# Patient Record
Sex: MALE | Race: WHITE | NOT HISPANIC OR LATINO
[De-identification: names, ages, dates, MRNs, and addresses within clinical notes are randomized per-mention and may not be internally consistent; named-entity substitution may affect disease eponyms.]

---

## 2017-02-10 ENCOUNTER — APPOINTMENT (OUTPATIENT)
Dept: CARDIOLOGY | Facility: CLINIC | Age: 77
End: 2017-02-10

## 2017-02-10 VITALS — HEIGHT: 71 IN | BODY MASS INDEX: 33.6 KG/M2 | WEIGHT: 240 LBS

## 2017-02-10 VITALS — DIASTOLIC BLOOD PRESSURE: 70 MMHG | HEART RATE: 68 BPM | RESPIRATION RATE: 18 BRPM | SYSTOLIC BLOOD PRESSURE: 118 MMHG

## 2017-02-16 ENCOUNTER — APPOINTMENT (OUTPATIENT)
Dept: CARDIOLOGY | Facility: CLINIC | Age: 77
End: 2017-02-16

## 2017-02-16 ENCOUNTER — APPOINTMENT (OUTPATIENT)
Dept: ENDOCRINOLOGY | Facility: CLINIC | Age: 77
End: 2017-02-16

## 2017-02-16 VITALS — HEIGHT: 71 IN | WEIGHT: 240 LBS | BODY MASS INDEX: 33.6 KG/M2

## 2017-02-16 VITALS — WEIGHT: 240 LBS | BODY MASS INDEX: 33.47 KG/M2

## 2017-02-16 VITALS — SYSTOLIC BLOOD PRESSURE: 110 MMHG | DIASTOLIC BLOOD PRESSURE: 64 MMHG

## 2017-02-16 DIAGNOSIS — Z86.39 PERSONAL HISTORY OF OTHER ENDOCRINE, NUTRITIONAL AND METABOLIC DISEASE: ICD-10-CM

## 2017-02-17 PROBLEM — Z86.39 HISTORY OF TYPE 2 DIABETES MELLITUS: Status: RESOLVED | Noted: 2017-02-10 | Resolved: 2017-02-17

## 2017-02-23 ENCOUNTER — APPOINTMENT (OUTPATIENT)
Dept: CARDIOLOGY | Facility: CLINIC | Age: 77
End: 2017-02-23

## 2017-03-09 ENCOUNTER — APPOINTMENT (OUTPATIENT)
Dept: ENDOCRINOLOGY | Facility: CLINIC | Age: 77
End: 2017-03-09

## 2017-03-09 VITALS — HEIGHT: 71 IN | WEIGHT: 240 LBS | BODY MASS INDEX: 33.6 KG/M2

## 2017-03-09 RX ORDER — CANAGLIFLOZIN 100 MG/1
100 TABLET, FILM COATED ORAL
Qty: 30 | Refills: 5 | Status: DISCONTINUED | COMMUNITY
Start: 2017-02-16 | End: 2017-03-09

## 2017-03-16 ENCOUNTER — APPOINTMENT (OUTPATIENT)
Dept: CARDIOLOGY | Facility: CLINIC | Age: 77
End: 2017-03-16

## 2017-03-17 ENCOUNTER — MED ADMIN CHARGE (OUTPATIENT)
Age: 77
End: 2017-03-17

## 2017-03-20 RX ORDER — BLOOD SUGAR DIAGNOSTIC
STRIP MISCELLANEOUS 3 TIMES DAILY
Qty: 3 | Refills: 2 | Status: ACTIVE | COMMUNITY
Start: 2017-03-20 | End: 1900-01-01

## 2017-03-23 ENCOUNTER — APPOINTMENT (OUTPATIENT)
Dept: ENDOCRINOLOGY | Facility: CLINIC | Age: 77
End: 2017-03-23

## 2017-04-28 ENCOUNTER — APPOINTMENT (OUTPATIENT)
Dept: CARDIOLOGY | Facility: CLINIC | Age: 77
End: 2017-04-28

## 2017-04-28 VITALS
OXYGEN SATURATION: 97 % | BODY MASS INDEX: 35.71 KG/M2 | SYSTOLIC BLOOD PRESSURE: 148 MMHG | HEART RATE: 66 BPM | DIASTOLIC BLOOD PRESSURE: 81 MMHG | WEIGHT: 256 LBS

## 2017-05-18 ENCOUNTER — OUTPATIENT (OUTPATIENT)
Dept: OUTPATIENT SERVICES | Facility: HOSPITAL | Age: 77
LOS: 1 days | Discharge: HOME | End: 2017-05-18

## 2017-06-15 ENCOUNTER — OUTPATIENT (OUTPATIENT)
Dept: OUTPATIENT SERVICES | Facility: HOSPITAL | Age: 77
LOS: 1 days | Discharge: HOME | End: 2017-06-15

## 2017-06-15 DIAGNOSIS — R74.8 ABNORMAL LEVELS OF OTHER SERUM ENZYMES: ICD-10-CM

## 2017-06-15 DIAGNOSIS — I50.9 HEART FAILURE, UNSPECIFIED: ICD-10-CM

## 2017-06-15 DIAGNOSIS — R07.9 CHEST PAIN, UNSPECIFIED: ICD-10-CM

## 2017-06-22 ENCOUNTER — OUTPATIENT (OUTPATIENT)
Dept: OUTPATIENT SERVICES | Facility: HOSPITAL | Age: 77
LOS: 1 days | Discharge: HOME | End: 2017-06-22

## 2017-06-22 DIAGNOSIS — I50.9 HEART FAILURE, UNSPECIFIED: ICD-10-CM

## 2017-06-22 DIAGNOSIS — R74.8 ABNORMAL LEVELS OF OTHER SERUM ENZYMES: ICD-10-CM

## 2017-06-22 DIAGNOSIS — R07.9 CHEST PAIN, UNSPECIFIED: ICD-10-CM

## 2017-06-23 ENCOUNTER — APPOINTMENT (OUTPATIENT)
Dept: CARDIOLOGY | Facility: CLINIC | Age: 77
End: 2017-06-23

## 2017-06-23 VITALS — DIASTOLIC BLOOD PRESSURE: 80 MMHG | SYSTOLIC BLOOD PRESSURE: 159 MMHG | HEART RATE: 65 BPM | OXYGEN SATURATION: 96 %

## 2017-06-23 VITALS — BODY MASS INDEX: 35.7 KG/M2 | WEIGHT: 255 LBS | HEIGHT: 71 IN

## 2017-06-23 VITALS — DIASTOLIC BLOOD PRESSURE: 80 MMHG | SYSTOLIC BLOOD PRESSURE: 120 MMHG

## 2017-06-23 VITALS — RESPIRATION RATE: 18 BRPM | HEART RATE: 64 BPM

## 2017-06-28 DIAGNOSIS — I48.91 UNSPECIFIED ATRIAL FIBRILLATION: ICD-10-CM

## 2017-06-28 DIAGNOSIS — Z79.01 LONG TERM (CURRENT) USE OF ANTICOAGULANTS: ICD-10-CM

## 2017-07-06 ENCOUNTER — OUTPATIENT (OUTPATIENT)
Dept: OUTPATIENT SERVICES | Facility: HOSPITAL | Age: 77
LOS: 1 days | Discharge: HOME | End: 2017-07-06

## 2017-07-06 DIAGNOSIS — R07.9 CHEST PAIN, UNSPECIFIED: ICD-10-CM

## 2017-07-06 DIAGNOSIS — Z79.01 LONG TERM (CURRENT) USE OF ANTICOAGULANTS: ICD-10-CM

## 2017-07-06 DIAGNOSIS — I50.9 HEART FAILURE, UNSPECIFIED: ICD-10-CM

## 2017-07-06 DIAGNOSIS — R74.8 ABNORMAL LEVELS OF OTHER SERUM ENZYMES: ICD-10-CM

## 2017-07-06 DIAGNOSIS — I48.91 UNSPECIFIED ATRIAL FIBRILLATION: ICD-10-CM

## 2017-07-10 ENCOUNTER — OUTPATIENT (OUTPATIENT)
Dept: OUTPATIENT SERVICES | Facility: HOSPITAL | Age: 77
LOS: 1 days | Discharge: HOME | End: 2017-07-10

## 2017-07-10 DIAGNOSIS — R74.8 ABNORMAL LEVELS OF OTHER SERUM ENZYMES: ICD-10-CM

## 2017-07-10 DIAGNOSIS — Z79.01 LONG TERM (CURRENT) USE OF ANTICOAGULANTS: ICD-10-CM

## 2017-07-10 DIAGNOSIS — I48.91 UNSPECIFIED ATRIAL FIBRILLATION: ICD-10-CM

## 2017-07-10 DIAGNOSIS — R07.9 CHEST PAIN, UNSPECIFIED: ICD-10-CM

## 2017-07-10 DIAGNOSIS — I50.9 HEART FAILURE, UNSPECIFIED: ICD-10-CM

## 2017-07-11 ENCOUNTER — APPOINTMENT (OUTPATIENT)
Dept: CARDIOLOGY | Facility: CLINIC | Age: 77
End: 2017-07-11

## 2017-07-12 ENCOUNTER — OUTPATIENT (OUTPATIENT)
Dept: PREADMISSION | Facility: HOSPITAL | Age: 77
LOS: 1 days | Discharge: HOME | End: 2017-07-12

## 2017-07-12 DIAGNOSIS — I50.9 HEART FAILURE, UNSPECIFIED: ICD-10-CM

## 2017-07-12 DIAGNOSIS — R74.8 ABNORMAL LEVELS OF OTHER SERUM ENZYMES: ICD-10-CM

## 2017-07-12 DIAGNOSIS — R07.9 CHEST PAIN, UNSPECIFIED: ICD-10-CM

## 2017-07-12 DIAGNOSIS — Z01.818 ENCOUNTER FOR OTHER PREPROCEDURAL EXAMINATION: ICD-10-CM

## 2017-07-17 ENCOUNTER — INPATIENT (INPATIENT)
Facility: HOSPITAL | Age: 77
LOS: 1 days | Discharge: HOME | End: 2017-07-19
Attending: INTERNAL MEDICINE

## 2017-07-17 DIAGNOSIS — R74.8 ABNORMAL LEVELS OF OTHER SERUM ENZYMES: ICD-10-CM

## 2017-07-17 DIAGNOSIS — R07.9 CHEST PAIN, UNSPECIFIED: ICD-10-CM

## 2017-07-17 DIAGNOSIS — I50.9 HEART FAILURE, UNSPECIFIED: ICD-10-CM

## 2017-07-20 ENCOUNTER — OUTPATIENT (OUTPATIENT)
Dept: OUTPATIENT SERVICES | Facility: HOSPITAL | Age: 77
LOS: 1 days | Discharge: HOME | End: 2017-07-20

## 2017-07-20 DIAGNOSIS — R74.8 ABNORMAL LEVELS OF OTHER SERUM ENZYMES: ICD-10-CM

## 2017-07-20 DIAGNOSIS — Z79.01 LONG TERM (CURRENT) USE OF ANTICOAGULANTS: ICD-10-CM

## 2017-07-20 DIAGNOSIS — I48.91 UNSPECIFIED ATRIAL FIBRILLATION: ICD-10-CM

## 2017-07-20 DIAGNOSIS — R07.9 CHEST PAIN, UNSPECIFIED: ICD-10-CM

## 2017-07-20 DIAGNOSIS — I50.9 HEART FAILURE, UNSPECIFIED: ICD-10-CM

## 2017-07-24 ENCOUNTER — OUTPATIENT (OUTPATIENT)
Dept: OUTPATIENT SERVICES | Facility: HOSPITAL | Age: 77
LOS: 1 days | Discharge: HOME | End: 2017-07-24
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
## 2017-07-24 DIAGNOSIS — Z87.891 PERSONAL HISTORY OF NICOTINE DEPENDENCE: ICD-10-CM

## 2017-07-24 DIAGNOSIS — I48.0 PAROXYSMAL ATRIAL FIBRILLATION: ICD-10-CM

## 2017-07-24 DIAGNOSIS — I11.0 HYPERTENSIVE HEART DISEASE WITH HEART FAILURE: ICD-10-CM

## 2017-07-24 DIAGNOSIS — I82.B12 ACUTE EMBOLISM AND THROMBOSIS OF LEFT SUBCLAVIAN VEIN: ICD-10-CM

## 2017-07-24 DIAGNOSIS — E03.9 HYPOTHYROIDISM, UNSPECIFIED: ICD-10-CM

## 2017-07-24 DIAGNOSIS — E11.9 TYPE 2 DIABETES MELLITUS WITHOUT COMPLICATIONS: ICD-10-CM

## 2017-07-24 DIAGNOSIS — I82.A11 ACUTE EMBOLISM AND THROMBOSIS OF RIGHT AXILLARY VEIN: ICD-10-CM

## 2017-07-24 DIAGNOSIS — I42.8 OTHER CARDIOMYOPATHIES: ICD-10-CM

## 2017-07-24 DIAGNOSIS — I50.9 HEART FAILURE, UNSPECIFIED: ICD-10-CM

## 2017-07-24 DIAGNOSIS — E78.00 PURE HYPERCHOLESTEROLEMIA, UNSPECIFIED: ICD-10-CM

## 2017-07-24 DIAGNOSIS — H40.89 OTHER SPECIFIED GLAUCOMA: ICD-10-CM

## 2017-07-24 DIAGNOSIS — I25.2 OLD MYOCARDIAL INFARCTION: ICD-10-CM

## 2017-07-24 DIAGNOSIS — Z79.01 LONG TERM (CURRENT) USE OF ANTICOAGULANTS: ICD-10-CM

## 2017-07-24 DIAGNOSIS — T82.897A OTHER SPECIFIED COMPLICATION OF CARDIAC PROSTHETIC DEVICES, IMPLANTS AND GRAFTS, INITIAL ENCOUNTER: ICD-10-CM

## 2017-07-24 DIAGNOSIS — R74.8 ABNORMAL LEVELS OF OTHER SERUM ENZYMES: ICD-10-CM

## 2017-07-24 DIAGNOSIS — Z86.73 PERSONAL HISTORY OF TRANSIENT ISCHEMIC ATTACK (TIA), AND CEREBRAL INFARCTION WITHOUT RESIDUAL DEFICITS: ICD-10-CM

## 2017-07-24 DIAGNOSIS — Z95.1 PRESENCE OF AORTOCORONARY BYPASS GRAFT: ICD-10-CM

## 2017-07-24 DIAGNOSIS — N40.0 BENIGN PROSTATIC HYPERPLASIA WITHOUT LOWER URINARY TRACT SYMPTOMS: ICD-10-CM

## 2017-07-24 DIAGNOSIS — I25.10 ATHEROSCLEROTIC HEART DISEASE OF NATIVE CORONARY ARTERY WITHOUT ANGINA PECTORIS: ICD-10-CM

## 2017-07-24 DIAGNOSIS — R07.9 CHEST PAIN, UNSPECIFIED: ICD-10-CM

## 2017-07-24 DIAGNOSIS — I44.2 ATRIOVENTRICULAR BLOCK, COMPLETE: ICD-10-CM

## 2017-07-24 DIAGNOSIS — L40.9 PSORIASIS, UNSPECIFIED: ICD-10-CM

## 2017-07-24 DIAGNOSIS — I48.91 UNSPECIFIED ATRIAL FIBRILLATION: ICD-10-CM

## 2017-07-31 DIAGNOSIS — I50.22 CHRONIC SYSTOLIC (CONGESTIVE) HEART FAILURE: ICD-10-CM

## 2017-08-02 ENCOUNTER — OUTPATIENT (OUTPATIENT)
Dept: OUTPATIENT SERVICES | Facility: HOSPITAL | Age: 77
LOS: 1 days | Discharge: HOME | End: 2017-08-02

## 2017-08-02 DIAGNOSIS — I50.9 HEART FAILURE, UNSPECIFIED: ICD-10-CM

## 2017-08-02 DIAGNOSIS — R07.9 CHEST PAIN, UNSPECIFIED: ICD-10-CM

## 2017-08-02 DIAGNOSIS — I48.91 UNSPECIFIED ATRIAL FIBRILLATION: ICD-10-CM

## 2017-08-02 DIAGNOSIS — Z79.01 LONG TERM (CURRENT) USE OF ANTICOAGULANTS: ICD-10-CM

## 2017-08-02 DIAGNOSIS — R74.8 ABNORMAL LEVELS OF OTHER SERUM ENZYMES: ICD-10-CM

## 2017-08-04 ENCOUNTER — APPOINTMENT (OUTPATIENT)
Dept: CARDIOLOGY | Facility: CLINIC | Age: 77
End: 2017-08-04

## 2017-08-04 VITALS — SYSTOLIC BLOOD PRESSURE: 145 MMHG | DIASTOLIC BLOOD PRESSURE: 80 MMHG | BODY MASS INDEX: 35.43 KG/M2 | WEIGHT: 254 LBS

## 2017-08-09 ENCOUNTER — OUTPATIENT (OUTPATIENT)
Dept: OUTPATIENT SERVICES | Facility: HOSPITAL | Age: 77
LOS: 1 days | Discharge: HOME | End: 2017-08-09

## 2017-08-09 DIAGNOSIS — I50.9 HEART FAILURE, UNSPECIFIED: ICD-10-CM

## 2017-08-09 DIAGNOSIS — R74.8 ABNORMAL LEVELS OF OTHER SERUM ENZYMES: ICD-10-CM

## 2017-08-09 DIAGNOSIS — Z79.01 LONG TERM (CURRENT) USE OF ANTICOAGULANTS: ICD-10-CM

## 2017-08-09 DIAGNOSIS — I48.91 UNSPECIFIED ATRIAL FIBRILLATION: ICD-10-CM

## 2017-08-09 DIAGNOSIS — R07.9 CHEST PAIN, UNSPECIFIED: ICD-10-CM

## 2017-08-23 ENCOUNTER — OUTPATIENT (OUTPATIENT)
Dept: OUTPATIENT SERVICES | Facility: HOSPITAL | Age: 77
LOS: 1 days | Discharge: HOME | End: 2017-08-23

## 2017-08-23 DIAGNOSIS — I48.91 UNSPECIFIED ATRIAL FIBRILLATION: ICD-10-CM

## 2017-08-23 DIAGNOSIS — R07.9 CHEST PAIN, UNSPECIFIED: ICD-10-CM

## 2017-08-23 DIAGNOSIS — R74.8 ABNORMAL LEVELS OF OTHER SERUM ENZYMES: ICD-10-CM

## 2017-08-23 DIAGNOSIS — I50.9 HEART FAILURE, UNSPECIFIED: ICD-10-CM

## 2017-08-23 DIAGNOSIS — Z79.01 LONG TERM (CURRENT) USE OF ANTICOAGULANTS: ICD-10-CM

## 2017-08-29 ENCOUNTER — OUTPATIENT (OUTPATIENT)
Dept: OUTPATIENT SERVICES | Facility: HOSPITAL | Age: 77
LOS: 1 days | Discharge: HOME | End: 2017-08-29

## 2017-08-29 DIAGNOSIS — I50.9 HEART FAILURE, UNSPECIFIED: ICD-10-CM

## 2017-08-29 DIAGNOSIS — I48.91 UNSPECIFIED ATRIAL FIBRILLATION: ICD-10-CM

## 2017-08-29 DIAGNOSIS — R74.8 ABNORMAL LEVELS OF OTHER SERUM ENZYMES: ICD-10-CM

## 2017-08-29 DIAGNOSIS — Z79.01 LONG TERM (CURRENT) USE OF ANTICOAGULANTS: ICD-10-CM

## 2017-08-29 DIAGNOSIS — R07.9 CHEST PAIN, UNSPECIFIED: ICD-10-CM

## 2017-08-31 ENCOUNTER — OUTPATIENT (OUTPATIENT)
Dept: OUTPATIENT SERVICES | Facility: HOSPITAL | Age: 77
LOS: 1 days | Discharge: HOME | End: 2017-08-31

## 2017-08-31 ENCOUNTER — APPOINTMENT (OUTPATIENT)
Dept: ENDOCRINOLOGY | Facility: CLINIC | Age: 77
End: 2017-08-31

## 2017-08-31 VITALS
DIASTOLIC BLOOD PRESSURE: 77 MMHG | HEART RATE: 66 BPM | BODY MASS INDEX: 35 KG/M2 | SYSTOLIC BLOOD PRESSURE: 137 MMHG | WEIGHT: 250 LBS | HEIGHT: 71 IN

## 2017-08-31 DIAGNOSIS — R07.9 CHEST PAIN, UNSPECIFIED: ICD-10-CM

## 2017-08-31 DIAGNOSIS — I50.9 HEART FAILURE, UNSPECIFIED: ICD-10-CM

## 2017-08-31 DIAGNOSIS — R74.8 ABNORMAL LEVELS OF OTHER SERUM ENZYMES: ICD-10-CM

## 2017-09-08 ENCOUNTER — OUTPATIENT (OUTPATIENT)
Dept: OUTPATIENT SERVICES | Facility: HOSPITAL | Age: 77
LOS: 1 days | Discharge: HOME | End: 2017-09-08

## 2017-09-08 DIAGNOSIS — R74.8 ABNORMAL LEVELS OF OTHER SERUM ENZYMES: ICD-10-CM

## 2017-09-08 DIAGNOSIS — I50.9 HEART FAILURE, UNSPECIFIED: ICD-10-CM

## 2017-09-08 DIAGNOSIS — I48.91 UNSPECIFIED ATRIAL FIBRILLATION: ICD-10-CM

## 2017-09-08 DIAGNOSIS — R07.9 CHEST PAIN, UNSPECIFIED: ICD-10-CM

## 2017-09-08 DIAGNOSIS — Z79.01 LONG TERM (CURRENT) USE OF ANTICOAGULANTS: ICD-10-CM

## 2017-09-11 DIAGNOSIS — E11.65 TYPE 2 DIABETES MELLITUS WITH HYPERGLYCEMIA: ICD-10-CM

## 2017-09-11 DIAGNOSIS — E66.09 OTHER OBESITY DUE TO EXCESS CALORIES: ICD-10-CM

## 2017-09-11 DIAGNOSIS — E06.3 AUTOIMMUNE THYROIDITIS: ICD-10-CM

## 2017-09-18 ENCOUNTER — OUTPATIENT (OUTPATIENT)
Dept: OUTPATIENT SERVICES | Facility: HOSPITAL | Age: 77
LOS: 1 days | Discharge: HOME | End: 2017-09-18

## 2017-09-18 DIAGNOSIS — R07.9 CHEST PAIN, UNSPECIFIED: ICD-10-CM

## 2017-09-18 DIAGNOSIS — I48.91 UNSPECIFIED ATRIAL FIBRILLATION: ICD-10-CM

## 2017-09-18 DIAGNOSIS — R74.8 ABNORMAL LEVELS OF OTHER SERUM ENZYMES: ICD-10-CM

## 2017-09-18 DIAGNOSIS — I50.9 HEART FAILURE, UNSPECIFIED: ICD-10-CM

## 2017-09-18 DIAGNOSIS — Z79.01 LONG TERM (CURRENT) USE OF ANTICOAGULANTS: ICD-10-CM

## 2017-09-25 ENCOUNTER — OUTPATIENT (OUTPATIENT)
Dept: OUTPATIENT SERVICES | Facility: HOSPITAL | Age: 77
LOS: 1 days | Discharge: HOME | End: 2017-09-25

## 2017-09-25 DIAGNOSIS — I48.91 UNSPECIFIED ATRIAL FIBRILLATION: ICD-10-CM

## 2017-09-25 DIAGNOSIS — Z79.01 LONG TERM (CURRENT) USE OF ANTICOAGULANTS: ICD-10-CM

## 2017-09-25 DIAGNOSIS — R74.8 ABNORMAL LEVELS OF OTHER SERUM ENZYMES: ICD-10-CM

## 2017-09-25 DIAGNOSIS — R07.9 CHEST PAIN, UNSPECIFIED: ICD-10-CM

## 2017-09-25 DIAGNOSIS — I50.9 HEART FAILURE, UNSPECIFIED: ICD-10-CM

## 2017-10-03 ENCOUNTER — OUTPATIENT (OUTPATIENT)
Dept: OUTPATIENT SERVICES | Facility: HOSPITAL | Age: 77
LOS: 1 days | Discharge: HOME | End: 2017-10-03

## 2017-10-03 DIAGNOSIS — R07.9 CHEST PAIN, UNSPECIFIED: ICD-10-CM

## 2017-10-03 DIAGNOSIS — I48.91 UNSPECIFIED ATRIAL FIBRILLATION: ICD-10-CM

## 2017-10-03 DIAGNOSIS — R74.8 ABNORMAL LEVELS OF OTHER SERUM ENZYMES: ICD-10-CM

## 2017-10-03 DIAGNOSIS — I50.9 HEART FAILURE, UNSPECIFIED: ICD-10-CM

## 2017-10-03 DIAGNOSIS — Z79.01 LONG TERM (CURRENT) USE OF ANTICOAGULANTS: ICD-10-CM

## 2017-10-17 ENCOUNTER — APPOINTMENT (OUTPATIENT)
Dept: CARDIOLOGY | Facility: CLINIC | Age: 77
End: 2017-10-17

## 2017-10-17 ENCOUNTER — OUTPATIENT (OUTPATIENT)
Dept: OUTPATIENT SERVICES | Facility: HOSPITAL | Age: 77
LOS: 1 days | Discharge: HOME | End: 2017-10-17

## 2017-10-17 VITALS — HEART RATE: 80 BPM | RESPIRATION RATE: 18 BRPM | DIASTOLIC BLOOD PRESSURE: 80 MMHG | SYSTOLIC BLOOD PRESSURE: 120 MMHG

## 2017-10-17 VITALS — HEART RATE: 80 BPM | BODY MASS INDEX: 36.12 KG/M2 | WEIGHT: 258 LBS | HEIGHT: 71 IN

## 2017-10-17 DIAGNOSIS — Z79.01 LONG TERM (CURRENT) USE OF ANTICOAGULANTS: ICD-10-CM

## 2017-10-17 DIAGNOSIS — I48.91 UNSPECIFIED ATRIAL FIBRILLATION: ICD-10-CM

## 2017-10-17 DIAGNOSIS — R07.9 CHEST PAIN, UNSPECIFIED: ICD-10-CM

## 2017-10-17 DIAGNOSIS — I50.9 HEART FAILURE, UNSPECIFIED: ICD-10-CM

## 2017-10-17 DIAGNOSIS — R74.8 ABNORMAL LEVELS OF OTHER SERUM ENZYMES: ICD-10-CM

## 2017-11-03 ENCOUNTER — APPOINTMENT (OUTPATIENT)
Dept: CARDIOLOGY | Facility: CLINIC | Age: 77
End: 2017-11-03

## 2017-11-03 VITALS
OXYGEN SATURATION: 97 % | WEIGHT: 257 LBS | BODY MASS INDEX: 35.84 KG/M2 | SYSTOLIC BLOOD PRESSURE: 153 MMHG | HEART RATE: 79 BPM | DIASTOLIC BLOOD PRESSURE: 87 MMHG

## 2017-11-06 ENCOUNTER — OUTPATIENT (OUTPATIENT)
Dept: OUTPATIENT SERVICES | Facility: HOSPITAL | Age: 77
LOS: 1 days | Discharge: HOME | End: 2017-11-06

## 2017-11-06 DIAGNOSIS — I48.91 UNSPECIFIED ATRIAL FIBRILLATION: ICD-10-CM

## 2017-11-06 DIAGNOSIS — R07.9 CHEST PAIN, UNSPECIFIED: ICD-10-CM

## 2017-11-06 DIAGNOSIS — I50.9 HEART FAILURE, UNSPECIFIED: ICD-10-CM

## 2017-11-06 DIAGNOSIS — R74.8 ABNORMAL LEVELS OF OTHER SERUM ENZYMES: ICD-10-CM

## 2017-11-06 DIAGNOSIS — Z79.01 LONG TERM (CURRENT) USE OF ANTICOAGULANTS: ICD-10-CM

## 2017-11-13 ENCOUNTER — OUTPATIENT (OUTPATIENT)
Dept: OUTPATIENT SERVICES | Facility: HOSPITAL | Age: 77
LOS: 1 days | Discharge: HOME | End: 2017-11-13

## 2017-11-13 DIAGNOSIS — Z79.01 LONG TERM (CURRENT) USE OF ANTICOAGULANTS: ICD-10-CM

## 2017-11-13 DIAGNOSIS — R74.8 ABNORMAL LEVELS OF OTHER SERUM ENZYMES: ICD-10-CM

## 2017-11-13 DIAGNOSIS — I48.91 UNSPECIFIED ATRIAL FIBRILLATION: ICD-10-CM

## 2017-11-13 DIAGNOSIS — R07.9 CHEST PAIN, UNSPECIFIED: ICD-10-CM

## 2017-11-13 DIAGNOSIS — I50.9 HEART FAILURE, UNSPECIFIED: ICD-10-CM

## 2017-11-21 ENCOUNTER — OUTPATIENT (OUTPATIENT)
Dept: OUTPATIENT SERVICES | Facility: HOSPITAL | Age: 77
LOS: 1 days | Discharge: HOME | End: 2017-11-21

## 2017-11-21 DIAGNOSIS — I48.91 UNSPECIFIED ATRIAL FIBRILLATION: ICD-10-CM

## 2017-11-21 DIAGNOSIS — Z79.01 LONG TERM (CURRENT) USE OF ANTICOAGULANTS: ICD-10-CM

## 2017-11-21 DIAGNOSIS — R07.9 CHEST PAIN, UNSPECIFIED: ICD-10-CM

## 2017-11-21 DIAGNOSIS — R74.8 ABNORMAL LEVELS OF OTHER SERUM ENZYMES: ICD-10-CM

## 2017-11-21 DIAGNOSIS — I50.9 HEART FAILURE, UNSPECIFIED: ICD-10-CM

## 2017-11-28 ENCOUNTER — OUTPATIENT (OUTPATIENT)
Dept: OUTPATIENT SERVICES | Facility: HOSPITAL | Age: 77
LOS: 1 days | Discharge: HOME | End: 2017-11-28

## 2017-11-28 DIAGNOSIS — R07.9 CHEST PAIN, UNSPECIFIED: ICD-10-CM

## 2017-11-28 DIAGNOSIS — I50.9 HEART FAILURE, UNSPECIFIED: ICD-10-CM

## 2017-11-28 DIAGNOSIS — Z79.01 LONG TERM (CURRENT) USE OF ANTICOAGULANTS: ICD-10-CM

## 2017-11-28 DIAGNOSIS — I48.91 UNSPECIFIED ATRIAL FIBRILLATION: ICD-10-CM

## 2017-11-28 DIAGNOSIS — R74.8 ABNORMAL LEVELS OF OTHER SERUM ENZYMES: ICD-10-CM

## 2017-12-06 ENCOUNTER — OUTPATIENT (OUTPATIENT)
Dept: OUTPATIENT SERVICES | Facility: HOSPITAL | Age: 77
LOS: 1 days | Discharge: HOME | End: 2017-12-06

## 2017-12-06 ENCOUNTER — APPOINTMENT (OUTPATIENT)
Dept: ENDOCRINOLOGY | Facility: CLINIC | Age: 77
End: 2017-12-06

## 2017-12-06 DIAGNOSIS — Z79.01 LONG TERM (CURRENT) USE OF ANTICOAGULANTS: ICD-10-CM

## 2017-12-06 DIAGNOSIS — R07.9 CHEST PAIN, UNSPECIFIED: ICD-10-CM

## 2017-12-06 DIAGNOSIS — I50.9 HEART FAILURE, UNSPECIFIED: ICD-10-CM

## 2017-12-06 DIAGNOSIS — R74.8 ABNORMAL LEVELS OF OTHER SERUM ENZYMES: ICD-10-CM

## 2017-12-06 DIAGNOSIS — I48.91 UNSPECIFIED ATRIAL FIBRILLATION: ICD-10-CM

## 2017-12-11 ENCOUNTER — OUTPATIENT (OUTPATIENT)
Dept: OUTPATIENT SERVICES | Facility: HOSPITAL | Age: 77
LOS: 1 days | Discharge: HOME | End: 2017-12-11

## 2017-12-11 DIAGNOSIS — E78.00 PURE HYPERCHOLESTEROLEMIA, UNSPECIFIED: ICD-10-CM

## 2017-12-11 DIAGNOSIS — I50.9 HEART FAILURE, UNSPECIFIED: ICD-10-CM

## 2017-12-11 DIAGNOSIS — E06.3 AUTOIMMUNE THYROIDITIS: ICD-10-CM

## 2017-12-11 DIAGNOSIS — R07.9 CHEST PAIN, UNSPECIFIED: ICD-10-CM

## 2017-12-11 DIAGNOSIS — R74.8 ABNORMAL LEVELS OF OTHER SERUM ENZYMES: ICD-10-CM

## 2017-12-11 DIAGNOSIS — E11.65 TYPE 2 DIABETES MELLITUS WITH HYPERGLYCEMIA: ICD-10-CM

## 2017-12-20 ENCOUNTER — OUTPATIENT (OUTPATIENT)
Dept: OUTPATIENT SERVICES | Facility: HOSPITAL | Age: 77
LOS: 1 days | Discharge: HOME | End: 2017-12-20

## 2017-12-20 DIAGNOSIS — Z79.01 LONG TERM (CURRENT) USE OF ANTICOAGULANTS: ICD-10-CM

## 2017-12-20 DIAGNOSIS — I48.91 UNSPECIFIED ATRIAL FIBRILLATION: ICD-10-CM

## 2017-12-20 DIAGNOSIS — I50.9 HEART FAILURE, UNSPECIFIED: ICD-10-CM

## 2017-12-20 DIAGNOSIS — R07.9 CHEST PAIN, UNSPECIFIED: ICD-10-CM

## 2017-12-20 DIAGNOSIS — R74.8 ABNORMAL LEVELS OF OTHER SERUM ENZYMES: ICD-10-CM

## 2018-01-03 ENCOUNTER — OUTPATIENT (OUTPATIENT)
Dept: OUTPATIENT SERVICES | Facility: HOSPITAL | Age: 78
LOS: 1 days | Discharge: HOME | End: 2018-01-03

## 2018-01-03 DIAGNOSIS — R74.8 ABNORMAL LEVELS OF OTHER SERUM ENZYMES: ICD-10-CM

## 2018-01-03 DIAGNOSIS — Z79.01 LONG TERM (CURRENT) USE OF ANTICOAGULANTS: ICD-10-CM

## 2018-01-03 DIAGNOSIS — R07.9 CHEST PAIN, UNSPECIFIED: ICD-10-CM

## 2018-01-03 DIAGNOSIS — I50.9 HEART FAILURE, UNSPECIFIED: ICD-10-CM

## 2018-01-03 DIAGNOSIS — I48.91 UNSPECIFIED ATRIAL FIBRILLATION: ICD-10-CM

## 2018-01-11 ENCOUNTER — TRANSCRIPTION ENCOUNTER (OUTPATIENT)
Age: 78
End: 2018-01-11

## 2018-01-16 ENCOUNTER — RX RENEWAL (OUTPATIENT)
Age: 78
End: 2018-01-16

## 2018-01-16 DIAGNOSIS — N40.0 BENIGN PROSTATIC HYPERPLASIA WITHOUT LOWER URINARY TRACT SYMPMS: ICD-10-CM

## 2018-01-24 ENCOUNTER — OUTPATIENT (OUTPATIENT)
Dept: OUTPATIENT SERVICES | Facility: HOSPITAL | Age: 78
LOS: 1 days | Discharge: HOME | End: 2018-01-24

## 2018-01-24 DIAGNOSIS — I48.91 UNSPECIFIED ATRIAL FIBRILLATION: ICD-10-CM

## 2018-01-24 DIAGNOSIS — Z79.01 LONG TERM (CURRENT) USE OF ANTICOAGULANTS: ICD-10-CM

## 2018-02-04 DIAGNOSIS — R07.9 CHEST PAIN, UNSPECIFIED: ICD-10-CM

## 2018-02-04 DIAGNOSIS — R74.8 ABNORMAL LEVELS OF OTHER SERUM ENZYMES: ICD-10-CM

## 2018-02-04 DIAGNOSIS — I50.9 HEART FAILURE, UNSPECIFIED: ICD-10-CM

## 2018-02-05 ENCOUNTER — OUTPATIENT (OUTPATIENT)
Dept: OUTPATIENT SERVICES | Facility: HOSPITAL | Age: 78
LOS: 1 days | Discharge: HOME | End: 2018-02-05

## 2018-02-05 DIAGNOSIS — Z79.01 LONG TERM (CURRENT) USE OF ANTICOAGULANTS: ICD-10-CM

## 2018-02-05 DIAGNOSIS — I48.91 UNSPECIFIED ATRIAL FIBRILLATION: ICD-10-CM

## 2018-02-09 ENCOUNTER — APPOINTMENT (OUTPATIENT)
Dept: CARDIOLOGY | Facility: CLINIC | Age: 78
End: 2018-02-09

## 2018-02-09 VITALS — DIASTOLIC BLOOD PRESSURE: 80 MMHG | RESPIRATION RATE: 18 BRPM | SYSTOLIC BLOOD PRESSURE: 130 MMHG | HEART RATE: 72 BPM

## 2018-02-09 VITALS — BODY MASS INDEX: 35.7 KG/M2 | WEIGHT: 255 LBS | HEIGHT: 71 IN

## 2018-02-09 DIAGNOSIS — I63.9 CEREBRAL INFARCTION, UNSPECIFIED: ICD-10-CM

## 2018-02-21 ENCOUNTER — OUTPATIENT (OUTPATIENT)
Dept: OUTPATIENT SERVICES | Facility: HOSPITAL | Age: 78
LOS: 1 days | Discharge: HOME | End: 2018-02-21

## 2018-02-21 DIAGNOSIS — I50.9 HEART FAILURE, UNSPECIFIED: ICD-10-CM

## 2018-02-26 ENCOUNTER — OUTPATIENT (OUTPATIENT)
Dept: OUTPATIENT SERVICES | Facility: HOSPITAL | Age: 78
LOS: 1 days | Discharge: HOME | End: 2018-02-26

## 2018-02-26 DIAGNOSIS — I48.91 UNSPECIFIED ATRIAL FIBRILLATION: ICD-10-CM

## 2018-02-26 DIAGNOSIS — Z79.01 LONG TERM (CURRENT) USE OF ANTICOAGULANTS: ICD-10-CM

## 2018-03-02 ENCOUNTER — OUTPATIENT (OUTPATIENT)
Dept: OUTPATIENT SERVICES | Facility: HOSPITAL | Age: 78
LOS: 1 days | Discharge: HOME | End: 2018-03-02

## 2018-03-02 DIAGNOSIS — I48.91 UNSPECIFIED ATRIAL FIBRILLATION: ICD-10-CM

## 2018-03-02 DIAGNOSIS — Z79.01 LONG TERM (CURRENT) USE OF ANTICOAGULANTS: ICD-10-CM

## 2018-03-14 ENCOUNTER — OUTPATIENT (OUTPATIENT)
Dept: OUTPATIENT SERVICES | Facility: HOSPITAL | Age: 78
LOS: 1 days | Discharge: HOME | End: 2018-03-14

## 2018-03-14 DIAGNOSIS — Z79.01 LONG TERM (CURRENT) USE OF ANTICOAGULANTS: ICD-10-CM

## 2018-03-14 DIAGNOSIS — I48.91 UNSPECIFIED ATRIAL FIBRILLATION: ICD-10-CM

## 2018-03-19 ENCOUNTER — OUTPATIENT (OUTPATIENT)
Dept: OUTPATIENT SERVICES | Facility: HOSPITAL | Age: 78
LOS: 1 days | Discharge: HOME | End: 2018-03-19

## 2018-03-19 DIAGNOSIS — Z79.01 LONG TERM (CURRENT) USE OF ANTICOAGULANTS: ICD-10-CM

## 2018-03-19 DIAGNOSIS — I48.91 UNSPECIFIED ATRIAL FIBRILLATION: ICD-10-CM

## 2018-03-26 ENCOUNTER — OUTPATIENT (OUTPATIENT)
Dept: OUTPATIENT SERVICES | Facility: HOSPITAL | Age: 78
LOS: 1 days | Discharge: HOME | End: 2018-03-26

## 2018-03-26 DIAGNOSIS — Z79.01 LONG TERM (CURRENT) USE OF ANTICOAGULANTS: ICD-10-CM

## 2018-03-26 DIAGNOSIS — I48.91 UNSPECIFIED ATRIAL FIBRILLATION: ICD-10-CM

## 2018-03-28 ENCOUNTER — OUTPATIENT (OUTPATIENT)
Dept: OUTPATIENT SERVICES | Facility: HOSPITAL | Age: 78
LOS: 1 days | Discharge: HOME | End: 2018-03-28

## 2018-03-28 ENCOUNTER — APPOINTMENT (OUTPATIENT)
Dept: ENDOCRINOLOGY | Facility: CLINIC | Age: 78
End: 2018-03-28

## 2018-03-28 VITALS
WEIGHT: 255 LBS | DIASTOLIC BLOOD PRESSURE: 80 MMHG | HEIGHT: 71 IN | BODY MASS INDEX: 35.7 KG/M2 | SYSTOLIC BLOOD PRESSURE: 129 MMHG | HEART RATE: 70 BPM

## 2018-03-28 DIAGNOSIS — I48.91 UNSPECIFIED ATRIAL FIBRILLATION: ICD-10-CM

## 2018-03-28 DIAGNOSIS — Z86.39 PERSONAL HISTORY OF OTHER ENDOCRINE, NUTRITIONAL AND METABOLIC DISEASE: ICD-10-CM

## 2018-03-28 DIAGNOSIS — Z79.01 LONG TERM (CURRENT) USE OF ANTICOAGULANTS: ICD-10-CM

## 2018-03-30 ENCOUNTER — OUTPATIENT (OUTPATIENT)
Dept: OUTPATIENT SERVICES | Facility: HOSPITAL | Age: 78
LOS: 1 days | Discharge: HOME | End: 2018-03-30

## 2018-03-30 DIAGNOSIS — I48.91 UNSPECIFIED ATRIAL FIBRILLATION: ICD-10-CM

## 2018-03-30 DIAGNOSIS — Z79.01 LONG TERM (CURRENT) USE OF ANTICOAGULANTS: ICD-10-CM

## 2018-04-03 ENCOUNTER — OUTPATIENT (OUTPATIENT)
Dept: OUTPATIENT SERVICES | Facility: HOSPITAL | Age: 78
LOS: 1 days | Discharge: HOME | End: 2018-04-03

## 2018-04-03 DIAGNOSIS — Z79.01 LONG TERM (CURRENT) USE OF ANTICOAGULANTS: ICD-10-CM

## 2018-04-03 DIAGNOSIS — I48.91 UNSPECIFIED ATRIAL FIBRILLATION: ICD-10-CM

## 2018-04-04 DIAGNOSIS — E06.3 AUTOIMMUNE THYROIDITIS: ICD-10-CM

## 2018-04-04 DIAGNOSIS — E66.01 MORBID (SEVERE) OBESITY DUE TO EXCESS CALORIES: ICD-10-CM

## 2018-04-04 DIAGNOSIS — E11.65 TYPE 2 DIABETES MELLITUS WITH HYPERGLYCEMIA: ICD-10-CM

## 2018-04-09 ENCOUNTER — INPATIENT (INPATIENT)
Facility: HOSPITAL | Age: 78
LOS: 4 days | Discharge: HOME | End: 2018-04-14
Attending: INTERNAL MEDICINE

## 2018-04-09 ENCOUNTER — OUTPATIENT (OUTPATIENT)
Dept: OUTPATIENT SERVICES | Facility: HOSPITAL | Age: 78
LOS: 1 days | Discharge: HOME | End: 2018-04-09

## 2018-04-09 VITALS
TEMPERATURE: 96 F | RESPIRATION RATE: 18 BRPM | OXYGEN SATURATION: 100 % | HEART RATE: 62 BPM | DIASTOLIC BLOOD PRESSURE: 91 MMHG | SYSTOLIC BLOOD PRESSURE: 140 MMHG

## 2018-04-09 DIAGNOSIS — Z79.01 LONG TERM (CURRENT) USE OF ANTICOAGULANTS: ICD-10-CM

## 2018-04-09 DIAGNOSIS — Z95.0 PRESENCE OF CARDIAC PACEMAKER: Chronic | ICD-10-CM

## 2018-04-09 DIAGNOSIS — I48.91 UNSPECIFIED ATRIAL FIBRILLATION: ICD-10-CM

## 2018-04-09 DIAGNOSIS — Z95.1 PRESENCE OF AORTOCORONARY BYPASS GRAFT: Chronic | ICD-10-CM

## 2018-04-09 LAB
ALBUMIN SERPL ELPH-MCNC: 4.5 G/DL — SIGNIFICANT CHANGE UP (ref 3.5–5.2)
ALP SERPL-CCNC: 99 U/L — SIGNIFICANT CHANGE UP (ref 30–115)
ALT FLD-CCNC: 41 U/L — SIGNIFICANT CHANGE UP (ref 0–41)
ANION GAP SERPL CALC-SCNC: 13 MMOL/L — SIGNIFICANT CHANGE UP (ref 7–14)
APTT BLD: 28.1 SEC — SIGNIFICANT CHANGE UP (ref 27–39.2)
AST SERPL-CCNC: 103 U/L — HIGH (ref 0–41)
BASE EXCESS BLDV CALC-SCNC: 1.4 MMOL/L — SIGNIFICANT CHANGE UP (ref -2–2)
BASOPHILS # BLD AUTO: 0.02 K/UL — SIGNIFICANT CHANGE UP (ref 0–0.2)
BASOPHILS NFR BLD AUTO: 0.3 % — SIGNIFICANT CHANGE UP (ref 0–1)
BILIRUB SERPL-MCNC: 0.8 MG/DL — SIGNIFICANT CHANGE UP (ref 0.2–1.2)
BLD GP AB SCN SERPL QL: SIGNIFICANT CHANGE UP
BUN SERPL-MCNC: 35 MG/DL — HIGH (ref 10–20)
CA-I SERPL-SCNC: 1.21 MMOL/L — SIGNIFICANT CHANGE UP (ref 1.12–1.3)
CALCIUM SERPL-MCNC: 9 MG/DL — SIGNIFICANT CHANGE UP (ref 8.5–10.1)
CHLORIDE SERPL-SCNC: 102 MMOL/L — SIGNIFICANT CHANGE UP (ref 98–110)
CO2 SERPL-SCNC: 25 MMOL/L — SIGNIFICANT CHANGE UP (ref 17–32)
CREAT SERPL-MCNC: 1.3 MG/DL — SIGNIFICANT CHANGE UP (ref 0.7–1.5)
EOSINOPHIL # BLD AUTO: 0.05 K/UL — SIGNIFICANT CHANGE UP (ref 0–0.7)
EOSINOPHIL NFR BLD AUTO: 0.7 % — SIGNIFICANT CHANGE UP (ref 0–8)
GAS PNL BLDV: 144 MMOL/L — SIGNIFICANT CHANGE UP (ref 136–145)
GAS PNL BLDV: SIGNIFICANT CHANGE UP
GLUCOSE SERPL-MCNC: 174 MG/DL — HIGH (ref 70–99)
HCO3 BLDV-SCNC: 28 MMOL/L — SIGNIFICANT CHANGE UP (ref 22–29)
HCT VFR BLD CALC: 36.7 % — LOW (ref 42–52)
HCT VFR BLDA CALC: 39.6 % — SIGNIFICANT CHANGE UP (ref 34–44)
HGB BLD CALC-MCNC: 12.9 G/DL — LOW (ref 14–18)
HGB BLD-MCNC: 12.2 G/DL — LOW (ref 14–18)
IMM GRANULOCYTES NFR BLD AUTO: 1 % — HIGH (ref 0.1–0.3)
INR BLD: 1.67 RATIO — HIGH (ref 0.65–1.3)
LACTATE BLDV-MCNC: 1.1 MMOL/L — SIGNIFICANT CHANGE UP (ref 0.5–1.6)
LIDOCAIN IGE QN: 27 U/L — SIGNIFICANT CHANGE UP (ref 7–60)
LYMPHOCYTES # BLD AUTO: 1.47 K/UL — SIGNIFICANT CHANGE UP (ref 1.2–3.4)
LYMPHOCYTES # BLD AUTO: 21.7 % — SIGNIFICANT CHANGE UP (ref 20.5–51.1)
MAGNESIUM SERPL-MCNC: 1.6 MG/DL — LOW (ref 1.8–2.4)
MCHC RBC-ENTMCNC: 32.4 PG — HIGH (ref 27–31)
MCHC RBC-ENTMCNC: 33.2 G/DL — SIGNIFICANT CHANGE UP (ref 32–37)
MCV RBC AUTO: 97.6 FL — HIGH (ref 80–94)
MONOCYTES # BLD AUTO: 0.85 K/UL — HIGH (ref 0.1–0.6)
MONOCYTES NFR BLD AUTO: 12.6 % — HIGH (ref 1.7–9.3)
NEUTROPHILS # BLD AUTO: 4.31 K/UL — SIGNIFICANT CHANGE UP (ref 1.4–6.5)
NEUTROPHILS NFR BLD AUTO: 63.7 % — SIGNIFICANT CHANGE UP (ref 42.2–75.2)
NRBC # BLD: 0 /100 WBCS — SIGNIFICANT CHANGE UP (ref 0–0)
NT-PROBNP SERPL-SCNC: 1828 PG/ML — HIGH (ref 0–300)
PCO2 BLDV: 49 MMHG — SIGNIFICANT CHANGE UP (ref 41–51)
PH BLDV: 7.36 — SIGNIFICANT CHANGE UP (ref 7.26–7.43)
PLATELET # BLD AUTO: 135 K/UL — SIGNIFICANT CHANGE UP (ref 130–400)
PO2 BLDV: 14 MMHG — LOW (ref 20–40)
POTASSIUM BLDV-SCNC: 5 MMOL/L — SIGNIFICANT CHANGE UP (ref 3.3–5.6)
POTASSIUM SERPL-MCNC: 7.1 MMOL/L — CRITICAL HIGH (ref 3.5–5)
POTASSIUM SERPL-SCNC: 7.1 MMOL/L — CRITICAL HIGH (ref 3.5–5)
PROT SERPL-MCNC: 8.1 G/DL — HIGH (ref 6–8)
PROTHROM AB SERPL-ACNC: 18.2 SEC — HIGH (ref 9.95–12.87)
RBC # BLD: 3.76 M/UL — LOW (ref 4.7–6.1)
RBC # FLD: 16.9 % — HIGH (ref 11.5–14.5)
SAO2 % BLDV: 17 % — SIGNIFICANT CHANGE UP
SODIUM SERPL-SCNC: 140 MMOL/L — SIGNIFICANT CHANGE UP (ref 135–146)
TROPONIN T SERPL-MCNC: 0.11 NG/ML — CRITICAL HIGH
TROPONIN T SERPL-MCNC: 0.11 NG/ML — CRITICAL HIGH
TYPE + AB SCN PNL BLD: SIGNIFICANT CHANGE UP
WBC # BLD: 6.77 K/UL — SIGNIFICANT CHANGE UP (ref 4.8–10.8)
WBC # FLD AUTO: 6.77 K/UL — SIGNIFICANT CHANGE UP (ref 4.8–10.8)

## 2018-04-09 RX ORDER — MAGNESIUM SULFATE 500 MG/ML
2 VIAL (ML) INJECTION ONCE
Qty: 0 | Refills: 0 | Status: COMPLETED | OUTPATIENT
Start: 2018-04-09 | End: 2018-04-09

## 2018-04-09 RX ORDER — GLUCAGON INJECTION, SOLUTION 0.5 MG/.1ML
1 INJECTION, SOLUTION SUBCUTANEOUS ONCE
Qty: 0 | Refills: 0 | Status: DISCONTINUED | OUTPATIENT
Start: 2018-04-09 | End: 2018-04-14

## 2018-04-09 RX ORDER — FUROSEMIDE 40 MG
40 TABLET ORAL DAILY
Qty: 0 | Refills: 0 | Status: DISCONTINUED | OUTPATIENT
Start: 2018-04-09 | End: 2018-04-11

## 2018-04-09 RX ORDER — DEXTROSE 50 % IN WATER 50 %
25 SYRINGE (ML) INTRAVENOUS ONCE
Qty: 0 | Refills: 0 | Status: DISCONTINUED | OUTPATIENT
Start: 2018-04-09 | End: 2018-04-14

## 2018-04-09 RX ORDER — WARFARIN SODIUM 2.5 MG/1
10 TABLET ORAL ONCE
Qty: 0 | Refills: 0 | Status: COMPLETED | OUTPATIENT
Start: 2018-04-09 | End: 2018-04-09

## 2018-04-09 RX ORDER — LEVOTHYROXINE SODIUM 125 MCG
88 TABLET ORAL DAILY
Qty: 0 | Refills: 0 | Status: DISCONTINUED | OUTPATIENT
Start: 2018-04-09 | End: 2018-04-14

## 2018-04-09 RX ORDER — METOPROLOL TARTRATE 50 MG
50 TABLET ORAL DAILY
Qty: 0 | Refills: 0 | Status: DISCONTINUED | OUTPATIENT
Start: 2018-04-09 | End: 2018-04-14

## 2018-04-09 RX ORDER — LOSARTAN POTASSIUM 100 MG/1
100 TABLET, FILM COATED ORAL DAILY
Qty: 0 | Refills: 0 | Status: DISCONTINUED | OUTPATIENT
Start: 2018-04-09 | End: 2018-04-11

## 2018-04-09 RX ORDER — ENOXAPARIN SODIUM 100 MG/ML
115 INJECTION SUBCUTANEOUS
Qty: 0 | Refills: 0 | Status: DISCONTINUED | OUTPATIENT
Start: 2018-04-09 | End: 2018-04-10

## 2018-04-09 RX ORDER — DEXTROSE 50 % IN WATER 50 %
12.5 SYRINGE (ML) INTRAVENOUS ONCE
Qty: 0 | Refills: 0 | Status: DISCONTINUED | OUTPATIENT
Start: 2018-04-09 | End: 2018-04-14

## 2018-04-09 RX ORDER — DEXTROSE 50 % IN WATER 50 %
1 SYRINGE (ML) INTRAVENOUS ONCE
Qty: 0 | Refills: 0 | Status: DISCONTINUED | OUTPATIENT
Start: 2018-04-09 | End: 2018-04-14

## 2018-04-09 RX ORDER — INSULIN LISPRO 100/ML
6 VIAL (ML) SUBCUTANEOUS
Qty: 0 | Refills: 0 | Status: DISCONTINUED | OUTPATIENT
Start: 2018-04-09 | End: 2018-04-14

## 2018-04-09 RX ORDER — SODIUM CHLORIDE 9 MG/ML
1000 INJECTION, SOLUTION INTRAVENOUS
Qty: 0 | Refills: 0 | Status: DISCONTINUED | OUTPATIENT
Start: 2018-04-09 | End: 2018-04-14

## 2018-04-09 RX ORDER — TAMSULOSIN HYDROCHLORIDE 0.4 MG/1
0.4 CAPSULE ORAL AT BEDTIME
Qty: 0 | Refills: 0 | Status: DISCONTINUED | OUTPATIENT
Start: 2018-04-09 | End: 2018-04-14

## 2018-04-09 RX ORDER — LATANOPROST 0.05 MG/ML
1 SOLUTION/ DROPS OPHTHALMIC; TOPICAL AT BEDTIME
Qty: 0 | Refills: 0 | Status: DISCONTINUED | OUTPATIENT
Start: 2018-04-09 | End: 2018-04-14

## 2018-04-09 RX ORDER — ASPIRIN/CALCIUM CARB/MAGNESIUM 324 MG
81 TABLET ORAL DAILY
Qty: 0 | Refills: 0 | Status: DISCONTINUED | OUTPATIENT
Start: 2018-04-09 | End: 2018-04-14

## 2018-04-09 RX ORDER — INSULIN GLARGINE 100 [IU]/ML
16 INJECTION, SOLUTION SUBCUTANEOUS EVERY MORNING
Qty: 0 | Refills: 0 | Status: DISCONTINUED | OUTPATIENT
Start: 2018-04-10 | End: 2018-04-14

## 2018-04-09 RX ORDER — ASPIRIN/CALCIUM CARB/MAGNESIUM 324 MG
325 TABLET ORAL ONCE
Qty: 0 | Refills: 0 | Status: COMPLETED | OUTPATIENT
Start: 2018-04-09 | End: 2018-04-09

## 2018-04-09 RX ORDER — ATORVASTATIN CALCIUM 80 MG/1
40 TABLET, FILM COATED ORAL AT BEDTIME
Qty: 0 | Refills: 0 | Status: DISCONTINUED | OUTPATIENT
Start: 2018-04-09 | End: 2018-04-14

## 2018-04-09 RX ORDER — INSULIN LISPRO 100/ML
VIAL (ML) SUBCUTANEOUS
Qty: 0 | Refills: 0 | Status: DISCONTINUED | OUTPATIENT
Start: 2018-04-09 | End: 2018-04-14

## 2018-04-09 RX ADMIN — ENOXAPARIN SODIUM 115 MILLIGRAM(S): 100 INJECTION SUBCUTANEOUS at 22:17

## 2018-04-09 RX ADMIN — WARFARIN SODIUM 10 MILLIGRAM(S): 2.5 TABLET ORAL at 22:18

## 2018-04-09 RX ADMIN — LATANOPROST 1 DROP(S): 0.05 SOLUTION/ DROPS OPHTHALMIC; TOPICAL at 22:17

## 2018-04-09 RX ADMIN — ATORVASTATIN CALCIUM 40 MILLIGRAM(S): 80 TABLET, FILM COATED ORAL at 22:17

## 2018-04-09 RX ADMIN — TAMSULOSIN HYDROCHLORIDE 0.4 MILLIGRAM(S): 0.4 CAPSULE ORAL at 22:17

## 2018-04-09 RX ADMIN — Medication 50 GRAM(S): at 19:22

## 2018-04-09 NOTE — ED PROVIDER NOTE - NS ED ROS FT
Review of Systems:  	•	CONSTITUTIONAL - No fever, No diaphoresis, No weight change  	•	SKIN - No rash  	•	HEMATOLOGIC - No abnormal bleeding or bruising  	•	EYES - No eye pain, No blurred vision  	•	ENT - No change in hearing, No sore throat, No neck pain, No rhinorrhea, No ear pain  	•	RESPIRATORY - No shortness of breath, No cough, +dyspnea on exertion  	•	CARDIAC - No chest pain, No palpitations  	•	GI - No abdominal pain, No nausea, No vomiting, No diarrhea, No constipation, No bright red blood per rectum or melena.                      •                 - No dysuria, frequency, hematuria.   	•	ENDO - No polydypsia, No polyuria, No heat/cold intolerance  	•	MUSCULOSKELETAL - No joint paint, No swelling, No back pain  	•	NEUROLOGIC - No numbness, No focal weakness, No headache, + dizziness

## 2018-04-09 NOTE — ED PROVIDER NOTE - CARE PLAN
Principal Discharge DX:	NSTEMI (non-ST elevated myocardial infarction)  Secondary Diagnosis:	Hypomagnesemia Principal Discharge DX:	NSTEMI (non-ST elevated myocardial infarction)  Assessment and plan of treatment:	a/p: Lightheaded, [place] - will check cbc, lytes, EKG/troponin, CXR, UA, CTH given coumadin, H&P not c/w pe, dissection, cva/tia. consider chf, acs, electrolyte abnl, r/o ich, r/o pna/uti.  Secondary Diagnosis:	Hypomagnesemia

## 2018-04-09 NOTE — H&P ADULT - HISTORY OF PRESENT ILLNESS
76 yo male patient with PMHx of CAD s/p CABG (2014), repeat cath in 2017 showed patent grafts, Atrial fibrillation on coumadin, chronic systolic CHF s/P CRT-D, uncontrolled DM II (last HbA1c 10.4%), hypothyroidism, HTN, DL, BPH, obesity, presented because of worsening lightheadedness over the last month.  Patient says he's been experiencing short episodes of dizziness and light headedness particularly when he stands up from sitting position or when he bends forward. Symptoms lasts for few seconds/minutes, until he stands still or sits down. He denies headaches, denies fainting, denies palpitations, chest pain, nausea vomiting, GI or  symptoms.  Patient has exertional shortness of breath (chronic) because of his heart failure.  On the side, patient says its been around a month or so that he ran out of his synthroid (so he was not taking his thyroid medications), also he says he stopped taking the lasix for around a month now because he was urinating a lot.

## 2018-04-09 NOTE — H&P ADULT - NSHPSOCIALHISTORY_GEN_ALL_CORE
Social History:    Substance Use (street drugs): (x ) never used  (  ) other:  Tobacco Usage:  (   ) never smoked   (  x ) former smoker   (   ) current smoker  (     ) pack year  (        ) last cigarette date  Alcohol Usage: negative

## 2018-04-09 NOTE — H&P ADULT - PMH
Atrial fibrillation    Benign prostate hyperplasia    Coronary artery disease  s/p CABG 2014  Diabetes mellitus    Dyslipidemia    Heart failure  chronic systolic heart failure  Hypertension    Hypothyroidism

## 2018-04-09 NOTE — ED PROVIDER NOTE - PHYSICAL EXAMINATION
VITAL SIGNS: AFebrile, vital signs stable  CONSTITUTIONAL: Well-developed; well-nourished; in no acute distress.  SKIN: Skin exam is warm and dry, no acute rash.  HEAD: Normocephalic; atraumatic.  EYES: Pupils equal round reactive to light, Extraocular movements intact; conjunctiva and sclera clear.  ENT: No nasal discharge; airway clear. Moist mucus membranes.  NECK: Supple; non tender. No rigidity  CARD: Regular rate and rhythm. Normal S1, S2; no murmurs, gallops, or rubs.  RESP: Lungs clear to auscultation bilaterally. No wheezes, rales or rhonchi.  ABD: Abdomen soft; non-tender; non-distended;  no hepatosplenomegaly. No costovertebral angle tenderness.   EXT: Normal ROM. No clubbing, cyanosis or edema. No calf tenderness to palpation.  NEURO: Alert and Oriented x 3, Cranial nerves 2-12 intact, No nystagmus.  5/5 motor x 4 extremities, Sensation intact to light touch x 4 extremities, No facial droop or slurred speech. No pronator drift.  Normal rapid alternating movement and finger nose finger bilaterally. No midline cervical/thoracic/lumbar tenderness to palpation or step off. Normal gait, No ataxia.  PSYCH: Cooperative, appropriate.

## 2018-04-09 NOTE — H&P ADULT - NSHPPHYSICALEXAM_GEN_ALL_CORE
PHYSICAL EXAM:    T(F): 96.6, Max: 96.6 (04-09-18 @ 20:56)  HR: 61  BP: 128/70  RR: 18  SpO2: 98%    GENERAL: NAD, well-developed  HEAD:  Atraumatic, Normocephalic  EYES: EOMI, PERRLA, conjunctiva and sclera clear  NECK: Supple, No JVD  CHEST/LUNG: Clear to auscultation bilaterally; No wheeze  HEART: Regular rate and rhythm; No murmurs, rubs, or gallops  ABDOMEN: Soft, Nontender, Nondistended; Bowel sounds present  EXTREMITIES:  2+ Peripheral Pulses, No clubbing, cyanosis, Bilateral +1 pitting edema  PSYCH: AAOx3  NEUROLOGY: non-focal  SKIN: No rashes or lesions

## 2018-04-09 NOTE — ED PROVIDER NOTE - PLAN OF CARE
a/p: Lightheaded, [place] - will check cbc, lytes, EKG/troponin, CXR, UA, CTH given coumadin, H&P not c/w pe, dissection, cva/tia. consider chf, acs, electrolyte abnl, r/o ich, r/o pna/uti.

## 2018-04-09 NOTE — ED PROVIDER NOTE - MEDICAL DECISION MAKING DETAILS
sign out given to dr anderson. spoke w cards fellow dr chavez who recommended 2nd trop to trend for tele vs ccu but then LAVONNE Butt spoke w cards fellow and it was determined pt should go to tele. will admit to tele

## 2018-04-09 NOTE — H&P ADULT - NSHPLABSRESULTS_GEN_ALL_CORE
12.2   6.77  )-----------( 135      ( 09 Apr 2018 15:17 )             36.7     140  |  102  |  35<H>  ----------------------------<  174<H>  7.1<HH>   |  25  |  1.3    Ca    9.0      09 Apr 2018 15:17  Mg     1.6     04-09    TPro  8.1<H>  /  Alb  4.5  /  TBili  0.8  /  DBili  x   /  AST  103<H>  /  ALT  41  /  AlkPhos  99  04-09        PT/INR - ( 09 Apr 2018 15:17 )   PT: 18.20 sec;   INR: 1.67 ratio      PTT - ( 09 Apr 2018 15:17 )  PTT:28.1 sec    CARDIAC MARKERS ( 09 Apr 2018 15:17 )  x     / 0.11 ng/mL / x     / x     / x    ECG: Ventricular pacing on a background of AF    CXR: No congestion, CRT-D in place    < from: CT Head No Cont (04.09.18 @ 14:49) >    Impression:     1.  Extensive periventricular and subcortical white matter chronic small   vessel ischemic changes.    2.  No acute mass effect, midline shift or hemorrhage.      3.  If the patient continues to be symptomatic follow-up MRI of the brain   may be helpful for further evaluation.    < end of copied text >

## 2018-04-09 NOTE — ED PROVIDER NOTE - OBJECTIVE STATEMENT
77M pmh cad stents cabg, chf, aicd, afib coumadin, dm, htn, hl, hypothyroid, bph, p/w 2 months intermittent lightheadedness and GOLD. Pt reports noncompliance w lasix because it makes him urinate frequently. States only takes if he is staying home. feels like he is going to pass out when walking. no loc. no palp. no cp. no sob at rest. no le edema, calf pain, immobilization, hormones, hemoptysis. no abd pain, nvdc. nod ysuria, freq, hematuria. no ha, neck pain, bp, AMS. no numbness, weakness, tingling. no visual, gait, speech changes.

## 2018-04-10 DIAGNOSIS — Z02.9 ENCOUNTER FOR ADMINISTRATIVE EXAMINATIONS, UNSPECIFIED: ICD-10-CM

## 2018-04-10 LAB
ANION GAP SERPL CALC-SCNC: 13 MMOL/L — SIGNIFICANT CHANGE UP (ref 7–14)
ANION GAP SERPL CALC-SCNC: 16 MMOL/L — HIGH (ref 7–14)
BASOPHILS # BLD AUTO: 0.03 K/UL — SIGNIFICANT CHANGE UP (ref 0–0.2)
BASOPHILS NFR BLD AUTO: 0.4 % — SIGNIFICANT CHANGE UP (ref 0–1)
BUN SERPL-MCNC: 27 MG/DL — HIGH (ref 10–20)
BUN SERPL-MCNC: 34 MG/DL — HIGH (ref 10–20)
CALCIUM SERPL-MCNC: 8.6 MG/DL — SIGNIFICANT CHANGE UP (ref 8.5–10.1)
CALCIUM SERPL-MCNC: 9.1 MG/DL — SIGNIFICANT CHANGE UP (ref 8.5–10.1)
CHLORIDE SERPL-SCNC: 101 MMOL/L — SIGNIFICANT CHANGE UP (ref 98–110)
CHLORIDE SERPL-SCNC: 102 MMOL/L — SIGNIFICANT CHANGE UP (ref 98–110)
CK SERPL-CCNC: 2640 U/L — HIGH (ref 0–225)
CO2 SERPL-SCNC: 24 MMOL/L — SIGNIFICANT CHANGE UP (ref 17–32)
CO2 SERPL-SCNC: 26 MMOL/L — SIGNIFICANT CHANGE UP (ref 17–32)
CREAT SERPL-MCNC: 1.2 MG/DL — SIGNIFICANT CHANGE UP (ref 0.7–1.5)
CREAT SERPL-MCNC: 1.3 MG/DL — SIGNIFICANT CHANGE UP (ref 0.7–1.5)
EOSINOPHIL # BLD AUTO: 0.1 K/UL — SIGNIFICANT CHANGE UP (ref 0–0.7)
EOSINOPHIL NFR BLD AUTO: 1.4 % — SIGNIFICANT CHANGE UP (ref 0–8)
ESTIMATED AVERAGE GLUCOSE: 206 MG/DL — HIGH (ref 68–114)
GLUCOSE SERPL-MCNC: 145 MG/DL — HIGH (ref 70–99)
GLUCOSE SERPL-MCNC: 202 MG/DL — HIGH (ref 70–99)
HBA1C BLD-MCNC: 8.8 % — HIGH (ref 4–5.6)
HCT VFR BLD CALC: 33 % — LOW (ref 42–52)
HCT VFR BLD CALC: 38.5 % — LOW (ref 42–52)
HGB BLD-MCNC: 11.1 G/DL — LOW (ref 14–18)
HGB BLD-MCNC: 12.5 G/DL — LOW (ref 14–18)
IMM GRANULOCYTES NFR BLD AUTO: 0.4 % — HIGH (ref 0.1–0.3)
INR BLD: 1.8 RATIO — HIGH (ref 0.65–1.3)
LYMPHOCYTES # BLD AUTO: 1.6 K/UL — SIGNIFICANT CHANGE UP (ref 1.2–3.4)
LYMPHOCYTES # BLD AUTO: 22.4 % — SIGNIFICANT CHANGE UP (ref 20.5–51.1)
MAGNESIUM SERPL-MCNC: 1.7 MG/DL — LOW (ref 1.8–2.4)
MCHC RBC-ENTMCNC: 32.5 G/DL — SIGNIFICANT CHANGE UP (ref 32–37)
MCHC RBC-ENTMCNC: 32.5 PG — HIGH (ref 27–31)
MCHC RBC-ENTMCNC: 32.9 PG — HIGH (ref 27–31)
MCHC RBC-ENTMCNC: 33.6 G/DL — SIGNIFICANT CHANGE UP (ref 32–37)
MCV RBC AUTO: 100 FL — HIGH (ref 80–94)
MCV RBC AUTO: 97.9 FL — HIGH (ref 80–94)
MONOCYTES # BLD AUTO: 0.87 K/UL — HIGH (ref 0.1–0.6)
MONOCYTES NFR BLD AUTO: 12.2 % — HIGH (ref 1.7–9.3)
NEUTROPHILS # BLD AUTO: 4.51 K/UL — SIGNIFICANT CHANGE UP (ref 1.4–6.5)
NEUTROPHILS NFR BLD AUTO: 63.2 % — SIGNIFICANT CHANGE UP (ref 42.2–75.2)
NRBC # BLD: 0 /100 WBCS — SIGNIFICANT CHANGE UP (ref 0–0)
NRBC # BLD: 0 /100 WBCS — SIGNIFICANT CHANGE UP (ref 0–0)
PLATELET # BLD AUTO: 121 K/UL — LOW (ref 130–400)
PLATELET # BLD AUTO: 135 K/UL — SIGNIFICANT CHANGE UP (ref 130–400)
POTASSIUM SERPL-MCNC: 4.6 MMOL/L — SIGNIFICANT CHANGE UP (ref 3.5–5)
POTASSIUM SERPL-MCNC: 4.9 MMOL/L — SIGNIFICANT CHANGE UP (ref 3.5–5)
POTASSIUM SERPL-SCNC: 4.6 MMOL/L — SIGNIFICANT CHANGE UP (ref 3.5–5)
POTASSIUM SERPL-SCNC: 4.9 MMOL/L — SIGNIFICANT CHANGE UP (ref 3.5–5)
PROTHROM AB SERPL-ACNC: 19.7 SEC — HIGH (ref 9.95–12.87)
RBC # BLD: 3.37 M/UL — LOW (ref 4.7–6.1)
RBC # BLD: 3.85 M/UL — LOW (ref 4.7–6.1)
RBC # FLD: 17 % — HIGH (ref 11.5–14.5)
RBC # FLD: 17.3 % — HIGH (ref 11.5–14.5)
SODIUM SERPL-SCNC: 138 MMOL/L — SIGNIFICANT CHANGE UP (ref 135–146)
SODIUM SERPL-SCNC: 144 MMOL/L — SIGNIFICANT CHANGE UP (ref 135–146)
TROPONIN T SERPL-MCNC: 0.11 NG/ML — CRITICAL HIGH
WBC # BLD: 7.14 K/UL — SIGNIFICANT CHANGE UP (ref 4.8–10.8)
WBC # BLD: 7.47 K/UL — SIGNIFICANT CHANGE UP (ref 4.8–10.8)
WBC # FLD AUTO: 7.14 K/UL — SIGNIFICANT CHANGE UP (ref 4.8–10.8)
WBC # FLD AUTO: 7.47 K/UL — SIGNIFICANT CHANGE UP (ref 4.8–10.8)

## 2018-04-10 RX ORDER — MAGNESIUM SULFATE 500 MG/ML
2 VIAL (ML) INJECTION ONCE
Qty: 0 | Refills: 0 | Status: COMPLETED | OUTPATIENT
Start: 2018-04-10 | End: 2018-04-10

## 2018-04-10 RX ORDER — WARFARIN SODIUM 2.5 MG/1
6 TABLET ORAL ONCE
Qty: 0 | Refills: 0 | Status: COMPLETED | OUTPATIENT
Start: 2018-04-10 | End: 2018-04-10

## 2018-04-10 RX ADMIN — Medication 88 MICROGRAM(S): at 06:55

## 2018-04-10 RX ADMIN — LOSARTAN POTASSIUM 100 MILLIGRAM(S): 100 TABLET, FILM COATED ORAL at 06:55

## 2018-04-10 RX ADMIN — Medication 50 MILLIGRAM(S): at 06:56

## 2018-04-10 RX ADMIN — LATANOPROST 1 DROP(S): 0.05 SOLUTION/ DROPS OPHTHALMIC; TOPICAL at 21:05

## 2018-04-10 RX ADMIN — Medication 81 MILLIGRAM(S): at 12:25

## 2018-04-10 RX ADMIN — WARFARIN SODIUM 6 MILLIGRAM(S): 2.5 TABLET ORAL at 21:05

## 2018-04-10 RX ADMIN — TAMSULOSIN HYDROCHLORIDE 0.4 MILLIGRAM(S): 0.4 CAPSULE ORAL at 21:04

## 2018-04-10 RX ADMIN — Medication 40 MILLIGRAM(S): at 06:55

## 2018-04-10 RX ADMIN — Medication 50 GRAM(S): at 23:34

## 2018-04-10 RX ADMIN — ENOXAPARIN SODIUM 115 MILLIGRAM(S): 100 INJECTION SUBCUTANEOUS at 06:56

## 2018-04-10 RX ADMIN — INSULIN GLARGINE 16 UNIT(S): 100 INJECTION, SOLUTION SUBCUTANEOUS at 12:22

## 2018-04-10 RX ADMIN — ATORVASTATIN CALCIUM 40 MILLIGRAM(S): 80 TABLET, FILM COATED ORAL at 21:04

## 2018-04-10 NOTE — PROGRESS NOTE ADULT - ASSESSMENT
76 yo male patient with PMHx of CAD s/p CABG (2014), repeat cath in 2017 showed patent grafts, Atrial fibrillation on coumadin, chronic systolic CHF s/P CRT-D, uncontrolled DM II (last HbA1c 10.4%), hypothyroidism, HTN, DL, BPH, obesity, presented because of worsening lightheadedness over the last month.   Patient reports that he has been feeling lightheaded when ever he gets up from sitting position and goes away after few seconds to minutes after standing still. Patient also reports that he has not been taking his synthroid and lasix for a month now.   ED: Vitals were all within normal limit /90.     Relevant Investigations on admission   CT scan of head: No acute pathology   Hb >12 MCV: 97.6   BUN: 35- might indicate volume depletion   Venous blood gas within normal limit- No hypoxemia or hypercarbia   Blood glucose >150   EKG: Ventricular paced rhythm- Atrial fibrillation   BNP:1828   CXR: No acute pathology- No congestion   ECHO: Pending     Admitting Diagnosis: Orthostatic Hypotension     1- Orthostatic Hypotension   - Placed cardiology consult for Dr. Laughlin   - Discontinued HCTZ- changed flomax to bedtime   - Will advise patient to increase PO intake of fluids   - Can consider compression stockings     2- Chronic systolic CHF  -continue with metoprolol, ARB  -No evidence of decompensation    3- Atrial fibrillation  Subtherapeutic INR  Patient was at coumadin clinic today, was prescribed bridging with lovenox and increase in coumadin dose    4-Hypothyroidism  Patient was off levothyroxine for the last month  will check TSH and resume synthroid at a lower dose for now, and increase slowly to 125mcg (his usual dose)    5-Uncontrolled diabetes mellitus  Last Hba1c 10.4%  continue with insulin S/C  F/U endocrinology as outpatient    6- Miscellaneous  DVT proph (already on coumadin)  GI proph  ambulate as tolerated  2gr Na DASH diet, carb consistent  Full code  Disposition home      TSH-Lovenox? 78 yo male patient with PMHx of CAD s/p CABG (2014), repeat cath in 2017 showed patent grafts, Atrial fibrillation on coumadin, chronic systolic CHF s/P CRT-D, uncontrolled DM II (last HbA1c 10.4%), hypothyroidism, HTN, DL, BPH, obesity, presented because of worsening lightheadedness over the last month.   Patient reports that he has been feeling lightheaded when ever he gets up from sitting position and goes away after few seconds to minutes after standing still. Patient also reports that he has not been taking his synthroid and lasix for a month now.   ED: Vitals were all within normal limit /90.     Relevant Investigations on admission   CT scan of head: No acute pathology   Hb >12 MCV: 97.6   BUN: 35- might indicate volume depletion   Venous blood gas within normal limit- No hypoxemia or hypercarbia   Blood glucose >150   EKG: Ventricular paced rhythm- Atrial fibrillation   BNP:1828   CXR: No acute pathology- No congestion   ECHO: Pending     Admitting Diagnosis: Orthostatic Hypotension     1- Orthostatic Hypotension   - Placed cardiology consult for Dr. Laughlin   - Discontinued HCTZ- changed flomax to bedtime   - Will check orthostatics   - Will advise patient to increase PO intake of fluids   - Can consider compression stockings     2- Chronic systolic CHF  -continue with metoprolol, ARB and lasix   -No evidence of decompensation  -ECHO pending     3- Atrial fibrillation  Subtherapeutic INR  Will dose Coumadin as per INR     4-Hypothyroidism  Patient was off levothyroxine for the last month  will check TSH and resume synthroid at a lower dose for now, and increase slowly to 125mcg (his usual dose)    5-Uncontrolled diabetes mellitus  Last Hba1c 10.4%  continue with insulin S/C  F/U endocrinology as outpatient    6- Miscellaneous  DVT proph (already on coumadin)  GI proph  ambulate as tolerated  2gr Na DASH diet, carb consistent  Full code  Disposition home      TSH to follow

## 2018-04-10 NOTE — CONSULT NOTE ADULT - SUBJECTIVE AND OBJECTIVE BOX
Patient is a 77y old  Male who presents with a chief complaint of Worsening lightheadedness over the last month (09 Apr 2018 21:00)    HPI:  76 yo male patient with PMHx of CAD s/p CABG (2014), repeat cath in 2017 showed patent grafts, atrial fibrillation on coumadin, chronic systolic CHF s/p CRT-D, uncontrolled DM II (last HbA1c 10.4%), hypothyroidism, HTN, DL, BPH, obesity, presented because of worsening lightheadedness over the last month.  Patient says he has been experiencing short episodes of lightheadedness, particularly when he stands up from sitting position or when he bends forward. Symptoms lasts for few seconds/minutes, until he stands still or sits down. He denies headaches, denies fainting, denies palpitations, chest pain, nausea vomiting, GI or  symptoms.  Patient has exertional shortness of breath (chronic) because of his heart failure.    Patient ran out of his synthroid about one month prior to presentation.  He also stopped taking the lasix for around a month now because he was urinating a lot. (09 Apr 2018 21:00)  On admission, HCTZ held, and flomax changed to bedtime dosing.      PAST MEDICAL & SURGICAL HISTORY:  Diabetes mellitus II  Hypothyroidism  Hypertension  Dyslipidemia  Benign prostate hyperplasia  Atrial fibrillation  Heart failure: chronic systolic heart failure  Coronary artery disease: s/p CABG 2014  S/P placement of cardiac pacemaker: CRT-D  S/P CABG (coronary artery bypass graft)    ECHO (TIMOTEO 11/2015)  FINDINGS: Left ventricle: Moderate-to-severe diffuse hypokinesis. Estimated ejection   fraction was 30 % to 35 %.   Aortic valve: Mild aortic stenosis. Mild aortic regurgitation.   Aorta, systemic arteries: Mild, sessile atheroma in the proximal descending   aorta.   Mitral valve: Moderate annular calcification. Mild leaflet calcification. Mild   mitral regurgitation.   Left atrium: Moderately dilated. No thrombus identified. Evidence of   continuous spontaneous echo contrast ("smoke").   Left atrial appendage: The function was severely reduced (markedly reduced   emptying velocity). No thrombus identified. Evidence of continuous   spontaneous echo contrast ("smoke") in the appendage.   Atrial septum: No defect or patent foramen ovale identified on color flow   Doppler. Contrast injection was performed. There was no right-to-left shunt,   with provocative maneuvers to increase right atrial pressure.   Tricuspid valve: Moderate tricuspid regurgitation.     MEDICATIONS  (STANDING):  aspirin  chewable 81 milliGRAM(s) Oral daily  atorvastatin 40 milliGRAM(s) Oral at bedtime  dextrose 5%. 1000 milliLiter(s) (50 mL/Hr) IV Continuous <Continuous>  dextrose 50% Injectable 12.5 Gram(s) IV Push once  dextrose 50% Injectable 25 Gram(s) IV Push once  dextrose 50% Injectable 25 Gram(s) IV Push once  enoxaparin Injectable 115 milliGRAM(s) SubCutaneous two times a day  furosemide    Tablet 40 milliGRAM(s) Oral daily  insulin glargine Injectable (LANTUS) 16 Unit(s) SubCutaneous every morning  insulin lispro (HumaLOG) corrective regimen sliding scale   SubCutaneous three times a day before meals  insulin lispro Injectable (HumaLOG) 6 Unit(s) SubCutaneous three times a day before meals  latanoprost 0.005% Ophthalmic Solution 1 Drop(s) Both EYES at bedtime  levothyroxine 88 MICROGram(s) Oral daily  losartan 100 milliGRAM(s) Oral daily  metoprolol succinate ER 50 milliGRAM(s) Oral daily  tamsulosin 0.4 milliGRAM(s) Oral at bedtime    MEDICATIONS  (PRN):  dextrose Gel 1 Dose(s) Oral once PRN Blood Glucose LESS THAN 70 milliGRAM(s)/deciliter  glucagon  Injectable 1 milliGRAM(s) IntraMuscular once PRN Glucose LESS THAN 70 milligrams/deciliter      FAMILY HISTORY:  Family history of coronary artery disease (Father)    REVIEW OF SYSTEMS      General:	    Skin/Breast:  	  Ophthalmologic:  	  ENMT:	    Respiratory and Thorax:  	  Cardiovascular:	    Gastrointestinal:	    Genitourinary:	    Musculoskeletal:	    Neurological:	    Psychiatric:	    Hematology/Lymphatics:	    Endocrine:	    Allergic/Immunologic:	  SOCIAL HISTORY:    CIGARETTES:    ALCOHOL:  Vital Signs Last 24 Hrs  T(C): 35.6 (10 Apr 2018 06:22), Max: 36 (09 Apr 2018 22:08)  T(F): 96 (10 Apr 2018 06:22), Max: 96.8 (09 Apr 2018 22:08)  HR: 60 (10 Apr 2018 06:45) (59 - 62)  BP: 110/63 (10 Apr 2018 06:45) (104/59 - 140/91)  BP(mean): --  RR: 18 (10 Apr 2018 06:22) (18 - 18)  SpO2: 97% (10 Apr 2018 06:45) (97% - 100%)    PHYSICAL EXAM:  96 degrees Fahrenheit, 110/60, 60, 97%RA      Constitutional:    Eyes:    ENMT:    Neck:    Breasts:    Back:    Respiratory:    Cardiovascular:    Gastrointestinal:    Genitourinary:    Rectal:    Extremities:    Vascular:    Neurological:    Skin:    Lymph Nodes:    Musculoskeletal:    Psychiatric:          ECG: Ventricular pacing on a background of AF    LABS:                        12.2   6.77  )-----------( 135      ( 09 Apr 2018 15:17 )             36.7     04-09    140  |  102  |  35<H>  ----------------------------<  174<H>  7.1<HH>   |  25  |  1.3    (VBG K of 5)    Ca    9.0      09 Apr 2018 15:17  Mg     1.6     04-09    TPro  8.1<H>  /  Alb  4.5  /  TBili  0.8  /  DBili  x   /  AST  103<H>  /  ALT  41  /  AlkPhos  99  04-09    CARDIAC MARKERS ( 09 Apr 2018 20:27 )  x     / 0.11 ng/mL / x     / x     / x      CARDIAC MARKERS ( 09 Apr 2018 15:17 )  x     / 0.11 ng/mL / x     / x     / x          PT/INR - ( 09 Apr 2018 15:17 )   PT: 18.20 sec;   INR: 1.67 ratio         PTT - ( 09 Apr 2018 15:17 )  PTT:28.1 sec    BNP Serum Pro-Brain Natriuretic Peptide: 1828 pg/mL (04-09 @ 15:17)    RADIOLOGY & ADDITIONAL STUDIES: Reviewed Patient is a 77y old  Male who presents with a chief complaint of Worsening lightheadedness over the last month (09 Apr 2018 21:00)    HPI:  78 yo male patient with PMHx of CAD s/p CABG (2014), repeat cath in 2017 showed patent grafts, atrial fibrillation on coumadin, chronic systolic CHF s/p CRT-D (device interrogated by Dr. Gipson in 8/2017-no events), uncontrolled DM II (last HbA1c 10.4%), hypothyroidism, HTN, DL, BPH, obesity, presented because of worsening lightheadedness over the last month.  Patient says he has been experiencing short episodes of lightheadedness, particularly when he stands up from sitting position or when he bends forward. Symptoms lasts for few seconds/minutes, until he stands still or sits down. He denies headaches, denies fainting, denies palpitations, chest pain, nausea vomiting, GI or  symptoms.  Patient has exertional shortness of breath (chronic) because of his heart failure.    Patient ran out of his synthroid about one month prior to presentation.  He also stopped taking the lasix for around a month now because he was urinating a lot. (09 Apr 2018 21:00)  Last saw cardiologist in February 2018, who recommended medical management and EP/PMD follow up.  On admission, HCTZ held, and flomax changed to bedtime dosing.      PAST MEDICAL & SURGICAL HISTORY:  Diabetes mellitus II  Hypothyroidism  Hypertension  Dyslipidemia  Benign prostate hyperplasia  Atrial fibrillation  Heart failure: chronic systolic heart failure  Coronary artery disease: s/p CABG 2014  S/P placement of cardiac pacemaker: CRT-D  S/P CABG (coronary artery bypass graft)    ECHO (TIMOTEO 11/2015)  FINDINGS: Left ventricle: Moderate-to-severe diffuse hypokinesis. Estimated ejection   fraction was 30 % to 35 %.   Aortic valve: Mild aortic stenosis. Mild aortic regurgitation.   Aorta, systemic arteries: Mild, sessile atheroma in the proximal descending   aorta.   Mitral valve: Moderate annular calcification. Mild leaflet calcification. Mild   mitral regurgitation.   Left atrium: Moderately dilated. No thrombus identified. Evidence of   continuous spontaneous echo contrast ("smoke").   Left atrial appendage: The function was severely reduced (markedly reduced   emptying velocity). No thrombus identified. Evidence of continuous   spontaneous echo contrast ("smoke") in the appendage.   Atrial septum: No defect or patent foramen ovale identified on color flow   Doppler. Contrast injection was performed. There was no right-to-left shunt,   with provocative maneuvers to increase right atrial pressure.   Tricuspid valve: Moderate tricuspid regurgitation.     MEDICATIONS  (STANDING):  aspirin  chewable 81 milliGRAM(s) Oral daily  atorvastatin 40 milliGRAM(s) Oral at bedtime  dextrose 5%. 1000 milliLiter(s) (50 mL/Hr) IV Continuous <Continuous>  dextrose 50% Injectable 12.5 Gram(s) IV Push once  dextrose 50% Injectable 25 Gram(s) IV Push once  dextrose 50% Injectable 25 Gram(s) IV Push once  enoxaparin Injectable 115 milliGRAM(s) SubCutaneous two times a day  furosemide    Tablet 40 milliGRAM(s) Oral daily  insulin glargine Injectable (LANTUS) 16 Unit(s) SubCutaneous every morning  insulin lispro (HumaLOG) corrective regimen sliding scale   SubCutaneous three times a day before meals  insulin lispro Injectable (HumaLOG) 6 Unit(s) SubCutaneous three times a day before meals  latanoprost 0.005% Ophthalmic Solution 1 Drop(s) Both EYES at bedtime  levothyroxine 88 MICROGram(s) Oral daily  losartan 100 milliGRAM(s) Oral daily  metoprolol succinate ER 50 milliGRAM(s) Oral daily  tamsulosin 0.4 milliGRAM(s) Oral at bedtime    MEDICATIONS  (PRN):  dextrose Gel 1 Dose(s) Oral once PRN Blood Glucose LESS THAN 70 milliGRAM(s)/deciliter  glucagon  Injectable 1 milliGRAM(s) IntraMuscular once PRN Glucose LESS THAN 70 milligrams/deciliter      FAMILY HISTORY:  Family history of coronary artery disease (Father)    REVIEW OF SYSTEMS      General:	    Skin/Breast:  	  Ophthalmologic:  	  ENMT:	    Respiratory and Thorax:  	  Cardiovascular:	    Gastrointestinal:	    Genitourinary:	    Musculoskeletal:	    Neurological:	    Psychiatric:	    Hematology/Lymphatics:	    Endocrine:	    Allergic/Immunologic:	  SOCIAL HISTORY:    CIGARETTES:    ALCOHOL:  Vital Signs Last 24 Hrs  T(C): 35.6 (10 Apr 2018 06:22), Max: 36 (09 Apr 2018 22:08)  T(F): 96 (10 Apr 2018 06:22), Max: 96.8 (09 Apr 2018 22:08)  HR: 60 (10 Apr 2018 06:45) (59 - 62)  BP: 110/63 (10 Apr 2018 06:45) (104/59 - 140/91)  BP(mean): --  RR: 18 (10 Apr 2018 06:22) (18 - 18)  SpO2: 97% (10 Apr 2018 06:45) (97% - 100%)    PHYSICAL EXAM:  96 degrees Fahrenheit, 110/60, 60, 97%RA      Constitutional:    Eyes:    ENMT:    Neck:    Breasts:    Back:    Respiratory:    Cardiovascular:    Gastrointestinal:    Genitourinary:    Rectal:    Extremities:    Vascular:    Neurological:    Skin:    Lymph Nodes:    Musculoskeletal:    Psychiatric:          ECG: Ventricular pacing on a background of AF    LABS:                        12.2   6.77  )-----------( 135      ( 09 Apr 2018 15:17 )             36.7     04-09    140  |  102  |  35<H>  ----------------------------<  174<H>  7.1<HH>   |  25  |  1.3    (VBG K of 5)    Ca    9.0      09 Apr 2018 15:17  Mg     1.6     04-09    TPro  8.1<H>  /  Alb  4.5  /  TBili  0.8  /  DBili  x   /  AST  103<H>  /  ALT  41  /  AlkPhos  99  04-09    CARDIAC MARKERS ( 09 Apr 2018 20:27 )  x     / 0.11 ng/mL / x     / x     / x      CARDIAC MARKERS ( 09 Apr 2018 15:17 )  x     / 0.11 ng/mL / x     / x     / x          PT/INR - ( 09 Apr 2018 15:17 )   PT: 18.20 sec;   INR: 1.67 ratio         PTT - ( 09 Apr 2018 15:17 )  PTT:28.1 sec    BNP Serum Pro-Brain Natriuretic Peptide: 1828 pg/mL (04-09 @ 15:17)    RADIOLOGY & ADDITIONAL STUDIES: Reviewed Patient is a 77y old  Male who presents with a chief complaint of worsening lightheadedness over the last month (09 Apr 2018 21:00)    HPI:  76 yo male patient with PMHx of CAD s/p CABG (2014), repeat cath in 2017 showed patent grafts, atrial fibrillation on coumadin, chronic systolic CHF s/p CRT-D (device interrogated by Dr. Gipson in 8/2017-no events), uncontrolled DM II (last HbA1c 10.4%), hypothyroidism, HTN, DL, BPH, obesity, presented because of worsening lightheadedness over the last month.  Patient says he has been experiencing short episodes of lightheadedness, particularly when he stands up from sitting position or when he bends forward.  Symptoms lasts for few seconds/minutes, until he stands still or sits down. He denies headaches, denies fainting, denies palpitations, chest pain, nausea vomiting, GI or  symptoms.  Patient has exertional shortness of breath (chronic) because of his heart failure.    Patient ran out of his synthroid about one month prior to presentation.  He also stopped taking the lasix for around a month now because he was urinating a lot.  Prior to that, he was cutting his lasix 40 mg tabs in half and only taking 20 mg. (09 Apr 2018 21:00)  Last saw cardiologist in February 2018, who recommended medical management and EP/PMD follow up.  On admission, HCTZ held, and flomax changed to bedtime dosing.  On examination, patient is continuing to complain of exertional shortness of breath, which he states has become worse over the winter, along with the dizziness with position changes.  He is requesting that his lasix be decreased because shortness of breath limits his ability to walk to the bathroom.      PAST MEDICAL & SURGICAL HISTORY:  Diabetes mellitus II  Hypothyroidism  Hypertension  Dyslipidemia  Benign prostate hyperplasia  Atrial fibrillation  Heart failure: chronic systolic heart failure  Coronary artery disease: s/p CABG 2014  S/P placement of cardiac pacemaker: CRT-D  S/P CABG (coronary artery bypass graft)    ECHO (TIMOTEO 11/2015)  FINDINGS: Left ventricle: Moderate-to-severe diffuse hypokinesis. Estimated ejection   fraction was 30 % to 35 %.   Aortic valve: Mild aortic stenosis. Mild aortic regurgitation.   Aorta, systemic arteries: Mild, sessile atheroma in the proximal descending   aorta.   Mitral valve: Moderate annular calcification. Mild leaflet calcification. Mild   mitral regurgitation.   Left atrium: Moderately dilated. No thrombus identified. Evidence of   continuous spontaneous echo contrast ("smoke").   Left atrial appendage: The function was severely reduced (markedly reduced   emptying velocity). No thrombus identified. Evidence of continuous   spontaneous echo contrast ("smoke") in the appendage.   Atrial septum: No defect or patent foramen ovale identified on color flow   Doppler. Contrast injection was performed. There was no right-to-left shunt,   with provocative maneuvers to increase right atrial pressure.   Tricuspid valve: Moderate tricuspid regurgitation.     MEDICATIONS  (STANDING):  aspirin  chewable 81 milliGRAM(s) Oral daily  atorvastatin 40 milliGRAM(s) Oral at bedtime  dextrose 5%. 1000 milliLiter(s) (50 mL/Hr) IV Continuous <Continuous>  dextrose 50% Injectable 12.5 Gram(s) IV Push once  dextrose 50% Injectable 25 Gram(s) IV Push once  dextrose 50% Injectable 25 Gram(s) IV Push once  enoxaparin Injectable 115 milliGRAM(s) SubCutaneous two times a day  furosemide    Tablet 40 milliGRAM(s) Oral daily  insulin glargine Injectable (LANTUS) 16 Unit(s) SubCutaneous every morning  insulin lispro (HumaLOG) corrective regimen sliding scale   SubCutaneous three times a day before meals  insulin lispro Injectable (HumaLOG) 6 Unit(s) SubCutaneous three times a day before meals  latanoprost 0.005% Ophthalmic Solution 1 Drop(s) Both EYES at bedtime  levothyroxine 88 MICROGram(s) Oral daily  losartan 100 milliGRAM(s) Oral daily  metoprolol succinate ER 50 milliGRAM(s) Oral daily  tamsulosin 0.4 milliGRAM(s) Oral at bedtime    MEDICATIONS  (PRN):  dextrose Gel 1 Dose(s) Oral once PRN Blood Glucose LESS THAN 70 milliGRAM(s)/deciliter  glucagon  Injectable 1 milliGRAM(s) IntraMuscular once PRN Glucose LESS THAN 70 milligrams/deciliter    FAMILY HISTORY:  Family history of coronary artery disease (Father)    REVIEW OF SYSTEMS: as per HPI    SOCIAL HISTORY:  Former heavy smoker, quit 20 years ago.  Rare alcohol use.  No recreational drug use.    ALCOHOL:  Vital Signs Last 24 Hrs  T(C): 35.6 (10 Apr 2018 06:22), Max: 36 (09 Apr 2018 22:08)  T(F): 96 (10 Apr 2018 06:22), Max: 96.8 (09 Apr 2018 22:08)  HR: 60 (10 Apr 2018 06:45) (59 - 62)  BP: 110/63 (10 Apr 2018 06:45) (104/59 - 140/91)  BP(mean): --  RR: 18 (10 Apr 2018 06:22) (18 - 18)  SpO2: 97% (10 Apr 2018 06:45) (97% - 100%)    PHYSICAL EXAM:  96 degrees Fahrenheit, 110/60, 60, 97%RA    Constitutional: NAD, lying comfortably on back in bed    Respiratory:  lungs CTA B/L    Cardiovascular: +S1, S2, irregularly irregular, + healed sternotomy scar    Abdomen: soft, obese, NT/ND, +BS    Extremities: no edema    Neurological:  AAO x 4    Skin: +mild psoriatic plaques on abdomen and B/L LE      ECG: Ventricular pacing on a background of AF    LABS:                        12.2   6.77  )-----------( 135      ( 09 Apr 2018 15:17 )             36.7     04-09    140  |  102  |  35<H>  ----------------------------<  174<H>  7.1<HH>   |  25  |  1.3    (VBG K of 5)    Ca    9.0      09 Apr 2018 15:17  Mg     1.6     04-09    TPro  8.1<H>  /  Alb  4.5  /  TBili  0.8  /  DBili  x   /  AST  103<H>  /  ALT  41  /  AlkPhos  99  04-09    CARDIAC MARKERS ( 09 Apr 2018 20:27 )  x     / 0.11 ng/mL / x     / x     / x      CARDIAC MARKERS ( 09 Apr 2018 15:17 )  x     / 0.11 ng/mL / x     / x     / x          PT/INR - ( 09 Apr 2018 15:17 )   PT: 18.20 sec;   INR: 1.67 ratio         PTT - ( 09 Apr 2018 15:17 )  PTT:28.1 sec    BNP Serum Pro-Brain Natriuretic Peptide: 1828 pg/mL (04-09 @ 15:17)    RADIOLOGY & ADDITIONAL STUDIES: Reviewed Patient is a 77y old  Male who presents with a chief complaint of worsening lightheadedness over the last month (09 Apr 2018 21:00)    HPI:  78 yo male patient with PMHx of CAD s/p CABG (2014), repeat cath in 2017 showed patent grafts, atrial fibrillation on coumadin, chronic systolic CHF s/p CRT-D (device interrogated by Dr. Gipson in 8/2017-no events), uncontrolled DM II (last HbA1c 10.4%), hypothyroidism, HTN, DL, BPH, obesity, presented because of worsening lightheadedness over the last month.  Patient says he has been experiencing short episodes of lightheadedness, particularly when he stands up from sitting position or when he bends forward.  Symptoms lasts for few seconds/minutes, until he stands still or sits down. He denies headaches, denies fainting, denies palpitations, chest pain, nausea vomiting, GI or  symptoms.  Patient has exertional shortness of breath (chronic) because of his heart failure.    Patient ran out of his synthroid about one month prior to presentation.  He also stopped taking the lasix for around a month now because he was urinating a lot.  Prior to that, he was cutting his lasix 40 mg tabs in half and only taking 20 mg. (09 Apr 2018 21:00)  Last saw cardiologist in February 2018, who recommended medical management and EP/PMD follow up.  On admission, HCTZ held, and flomax changed to bedtime dosing.  On examination, patient is continuing to complain of exertional shortness of breath, which he states has become worse over the winter, along with the dizziness with position changes.  He is requesting that his lasix be decreased because shortness of breath limits his ability to walk to the bathroom.      PAST MEDICAL & SURGICAL HISTORY:  Diabetes mellitus II  Hypothyroidism  Hypertension  Dyslipidemia  Benign prostate hyperplasia  Atrial fibrillation  Heart failure: chronic systolic heart failure  Coronary artery disease: s/p CABG 2014  S/P placement of cardiac pacemaker: CRT-D  S/P CABG (coronary artery bypass graft)    ECHO (TIMOTEO 11/2015)  FINDINGS: Left ventricle: Moderate-to-severe diffuse hypokinesis. Estimated ejection   fraction was 30 % to 35 %.   Aortic valve: Mild aortic stenosis. Mild aortic regurgitation.   Aorta, systemic arteries: Mild, sessile atheroma in the proximal descending   aorta.   Mitral valve: Moderate annular calcification. Mild leaflet calcification. Mild   mitral regurgitation.   Left atrium: Moderately dilated. No thrombus identified. Evidence of   continuous spontaneous echo contrast ("smoke").   Left atrial appendage: The function was severely reduced (markedly reduced   emptying velocity). No thrombus identified. Evidence of continuous   spontaneous echo contrast ("smoke") in the appendage.   Atrial septum: No defect or patent foramen ovale identified on color flow   Doppler. Contrast injection was performed. There was no right-to-left shunt,   with provocative maneuvers to increase right atrial pressure.   Tricuspid valve: Moderate tricuspid regurgitation.     MEDICATIONS  (STANDING):  aspirin  chewable 81 milliGRAM(s) Oral daily  atorvastatin 40 milliGRAM(s) Oral at bedtime  dextrose 5%. 1000 milliLiter(s) (50 mL/Hr) IV Continuous <Continuous>  dextrose 50% Injectable 12.5 Gram(s) IV Push once  dextrose 50% Injectable 25 Gram(s) IV Push once  dextrose 50% Injectable 25 Gram(s) IV Push once  enoxaparin Injectable 115 milliGRAM(s) SubCutaneous two times a day  furosemide    Tablet 40 milliGRAM(s) Oral daily  insulin glargine Injectable (LANTUS) 16 Unit(s) SubCutaneous every morning  insulin lispro (HumaLOG) corrective regimen sliding scale   SubCutaneous three times a day before meals  insulin lispro Injectable (HumaLOG) 6 Unit(s) SubCutaneous three times a day before meals  latanoprost 0.005% Ophthalmic Solution 1 Drop(s) Both EYES at bedtime  levothyroxine 88 MICROGram(s) Oral daily  losartan 100 milliGRAM(s) Oral daily  metoprolol succinate ER 50 milliGRAM(s) Oral daily  tamsulosin 0.4 milliGRAM(s) Oral at bedtime    MEDICATIONS  (PRN):  dextrose Gel 1 Dose(s) Oral once PRN Blood Glucose LESS THAN 70 milliGRAM(s)/deciliter  glucagon  Injectable 1 milliGRAM(s) IntraMuscular once PRN Glucose LESS THAN 70 milligrams/deciliter    FAMILY HISTORY:  Family history of coronary artery disease (Father)    REVIEW OF SYSTEMS: as per HPI    SOCIAL HISTORY:  Former heavy smoker, quit 20 years ago.  Rare alcohol use.  No recreational drug use.    ALCOHOL:  Vital Signs Last 24 Hrs  T(C): 35.6 (10 Apr 2018 06:22), Max: 36 (09 Apr 2018 22:08)  T(F): 96 (10 Apr 2018 06:22), Max: 96.8 (09 Apr 2018 22:08)  HR: 60 (10 Apr 2018 06:45) (59 - 62)  BP: 110/63 (10 Apr 2018 06:45) (104/59 - 140/91)  BP(mean): --  RR: 18 (10 Apr 2018 06:22) (18 - 18)  SpO2: 97% (10 Apr 2018 06:45) (97% - 100%)    PHYSICAL EXAM:  96 degrees Fahrenheit, 110/60, 60, 97%RA    Constitutional: NAD, lying comfortably on back in bed    Respiratory:  lungs CTA B/L    Cardiovascular: +S1, S2, irregularly irregular, + healed sternotomy scar    Abdomen: soft, obese, NT/ND, +BS    Extremities: no edema    Neurological:  AAO x 4    Skin: +mild psoriatic plaques on abdomen and B/L LE      ECG: Ventricular pacing on a background of AF    LABS:                        12.2   6.77  )-----------( 135      ( 09 Apr 2018 15:17 )             36.7     04-09    140  |  102  |  35<H>  ----------------------------<  174<H>  7.1<HH>   |  25  |  1.3    (VBG K of 5)    Ca    9.0      09 Apr 2018 15:17  Mg     1.6     04-09    TPro  8.1<H>  /  Alb  4.5  /  TBili  0.8  /  DBili  x   /  AST  103<H>  /  ALT  41  /  AlkPhos  99  04-09    CARDIAC MARKERS ( 09 Apr 2018 20:27 )  x     / 0.11 ng/mL / x     / x     / x      CARDIAC MARKERS ( 09 Apr 2018 15:17 )  x     / 0.11 ng/mL / x     / x     / x          PT/INR - ( 09 Apr 2018 15:17 )   PT: 18.20 sec;   INR: 1.67 ratio         PTT - ( 09 Apr 2018 15:17 )  PTT:28.1 sec    BNP Serum Pro-Brain Natriuretic Peptide: 1828 pg/mL (04-09 @ 15:17)    Echo (4/10/18):  1. Severely decreased global left ventricular systolic function.   2. LV Ejection Fraction by Hickey's Method with a biplane EF of 30 %.   3. Moderately increased LV wall thickness.   4. There is moderate concentric left ventricular hypertrophy.   5. Moderate mitral annular calcification.   6. Mild aortic regurgitation.   7. Mild calcific aortic stenosis.   8. LA volume Index is 51.0 ml/m² ml/m2.   9. Mitral valve mean gradient is 2.3 mmHg consistent with mild mitral   stenosis.    RADIOLOGY & ADDITIONAL STUDIES: Reviewed Patient is a 77y old  Male who presents with a chief complaint of worsening lightheadedness over the last month (09 Apr 2018 21:00)    HPI:  76 yo male patient with PMHx of CAD s/p CABG (2014), repeat cath in 2017 showed patent grafts, atrial fibrillation on coumadin, chronic systolic CHF s/p CRT-D (device interrogated by Dr. Gipson in 8/2017-no events), uncontrolled DM II (last HbA1c 10.4%), hypothyroidism, HTN, DL, BPH, obesity, presented because of worsening lightheadedness over the last month.  Patient says he has been experiencing short episodes of lightheadedness, particularly when he stands up from sitting position or when he bends forward.  Symptoms lasts for few seconds/minutes, until he stands still or sits down. He denies headaches, denies fainting, denies palpitations, chest pain, nausea vomiting, GI or  symptoms.  Patient has exertional shortness of breath (chronic) because of his heart failure.    Patient ran out of his synthroid about one month prior to presentation.  He also stopped taking the lasix for around a month now because he was urinating a lot.  Prior to that, he was cutting his lasix 40 mg tabs in half and only taking 20 mg. (09 Apr 2018 21:00)  Last saw cardiologist in February 2018, who recommended medical management and EP/PMD follow up.  On admission, HCTZ held, and flomax changed to bedtime dosing.  On examination, patient is continuing to complain of exertional shortness of breath, which he states has become worse over the winter, along with the dizziness with position changes.  He is requesting that his lasix be decreased because shortness of breath limits his ability to walk to the bathroom.      PAST MEDICAL & SURGICAL HISTORY:  Diabetes mellitus II  Hypothyroidism  Hypertension  Dyslipidemia  Benign prostate hyperplasia  Atrial fibrillation  Heart failure: chronic systolic heart failure  Coronary artery disease: s/p CABG 2014  S/P placement of cardiac pacemaker: CRT-D  S/P CABG (coronary artery bypass graft)    ECHO (TIMOTEO 11/2015)  FINDINGS: Left ventricle: Moderate-to-severe diffuse hypokinesis. Estimated ejection   fraction was 30 % to 35 %.   Aortic valve: Mild aortic stenosis. Mild aortic regurgitation.   Aorta, systemic arteries: Mild, sessile atheroma in the proximal descending   aorta.   Mitral valve: Moderate annular calcification. Mild leaflet calcification. Mild   mitral regurgitation.   Left atrium: Moderately dilated. No thrombus identified. Evidence of   continuous spontaneous echo contrast ("smoke").   Left atrial appendage: The function was severely reduced (markedly reduced   emptying velocity). No thrombus identified. Evidence of continuous   spontaneous echo contrast ("smoke") in the appendage.   Atrial septum: No defect or patent foramen ovale identified on color flow   Doppler. Contrast injection was performed. There was no right-to-left shunt,   with provocative maneuvers to increase right atrial pressure.   Tricuspid valve: Moderate tricuspid regurgitation.     MEDICATIONS  (STANDING):  aspirin  chewable 81 milliGRAM(s) Oral daily  atorvastatin 40 milliGRAM(s) Oral at bedtime  dextrose 5%. 1000 milliLiter(s) (50 mL/Hr) IV Continuous <Continuous>  dextrose 50% Injectable 12.5 Gram(s) IV Push once  dextrose 50% Injectable 25 Gram(s) IV Push once  dextrose 50% Injectable 25 Gram(s) IV Push once  enoxaparin Injectable 115 milliGRAM(s) SubCutaneous two times a day  furosemide    Tablet 40 milliGRAM(s) Oral daily  insulin glargine Injectable (LANTUS) 16 Unit(s) SubCutaneous every morning  insulin lispro (HumaLOG) corrective regimen sliding scale   SubCutaneous three times a day before meals  insulin lispro Injectable (HumaLOG) 6 Unit(s) SubCutaneous three times a day before meals  latanoprost 0.005% Ophthalmic Solution 1 Drop(s) Both EYES at bedtime  levothyroxine 88 MICROGram(s) Oral daily  losartan 100 milliGRAM(s) Oral daily  metoprolol succinate ER 50 milliGRAM(s) Oral daily  tamsulosin 0.4 milliGRAM(s) Oral at bedtime    MEDICATIONS  (PRN):  dextrose Gel 1 Dose(s) Oral once PRN Blood Glucose LESS THAN 70 milliGRAM(s)/deciliter  glucagon  Injectable 1 milliGRAM(s) IntraMuscular once PRN Glucose LESS THAN 70 milligrams/deciliter    FAMILY HISTORY:  Family history of coronary artery disease (Father)    REVIEW OF SYSTEMS: as per HPI    SOCIAL HISTORY:  Former heavy smoker, quit 20 years ago.  Rare alcohol use.  No recreational drug use.  ALCOHOL: Social  FAMILY HISTORY: Negative    Review of Systems  No fever chills or weight loss  Gum Bleeding from recent Dental Work  No chest pain  (+) Shortness of breath  As above  All others unremarkable       Vital Signs Last 24 Hrs  T(C): 35.6 (10 Apr 2018 06:22), Max: 36 (09 Apr 2018 22:08)  T(F): 96 (10 Apr 2018 06:22), Max: 96.8 (09 Apr 2018 22:08)  HR: 60 (10 Apr 2018 06:45) (59 - 62)  BP: 110/63 (10 Apr 2018 06:45) (104/59 - 140/91)  BP(mean): --  RR: 18 (10 Apr 2018 06:22) (18 - 18)  SpO2: 97% (10 Apr 2018 06:45) (97% - 100%)    PHYSICAL EXAM:  96 degrees Fahrenheit, 110/60, 60, 97%RA    Constitutional: W\D W\N NAD, lying comfortably on back in bed    HEENMT: small amount of gum bleeding    Neck: No JVD    Respiratory:  lungs CTA B/L    Cardiovascular: +S1, S2, irregularly irregular, + healed sternotomy scar    Abdomen: soft, obese, NT/ND, +BS    Extremities: no edema    Neurological:  AAO x 4    Skin: +mild psoriatic plaques on abdomen and B/L LE      ECG: Ventricular pacing on a background of AF    LABS:                        12.2   6.77  )-----------( 135      ( 09 Apr 2018 15:17 )             36.7     04-09    140  |  102  |  35<H>  ----------------------------<  174<H>  7.1<HH>   |  25  |  1.3    (VBG K of 5)    Ca    9.0      09 Apr 2018 15:17  Mg     1.6     04-09    TPro  8.1<H>  /  Alb  4.5  /  TBili  0.8  /  DBili  x   /  AST  103<H>  /  ALT  41  /  AlkPhos  99  04-09    CARDIAC MARKERS ( 09 Apr 2018 20:27 )  x     / 0.11 ng/mL / x     / x     / x      CARDIAC MARKERS ( 09 Apr 2018 15:17 )  x     / 0.11 ng/mL / x     / x     / x          PT/INR - ( 09 Apr 2018 15:17 )   PT: 18.20 sec;   INR: 1.67 ratio         PTT - ( 09 Apr 2018 15:17 )  PTT:28.1 sec    BNP Serum Pro-Brain Natriuretic Peptide: 1828 pg/mL (04-09 @ 15:17)    Echo (4/10/18):  1. Severely decreased global left ventricular systolic function.   2. LV Ejection Fraction by Hickey's Method with a biplane EF of 30 %.   3. Moderately increased LV wall thickness.   4. There is moderate concentric left ventricular hypertrophy.   5. Moderate mitral annular calcification.   6. Mild aortic regurgitation.   7. Mild calcific aortic stenosis.   8. LA volume Index is 51.0 ml/m² ml/m2.   9. Mitral valve mean gradient is 2.3 mmHg consistent with mild mitral   stenosis.    RADIOLOGY & ADDITIONAL STUDIES: Reviewed

## 2018-04-10 NOTE — CONSULT NOTE ADULT - ASSESSMENT
77 yom with extensive PMH, including CAD s/p CABG (2014), repeat cath in 2017 showed patent grafts, atrial fibrillation on coumadin, chronic systolic CHF s/p CRT-D (device interrogated by Dr. Gipson in 8/2017-no events), uncontrolled DM II (last HbA1c 10.4%), hypothyroidism, HTN, DL, BPH, obesity, presented because of worsening lightheadedness over the last month and worsening dyspnea on exertion since winter months.  Patient stopped taking synthroid and lasix about one month prior to presentation.  1. Lightheadedness with position changes-would recommend checking orthostatic vital changes.  Continue to hold hydrochlorothiazide.  Change flomax from daytime to bedtime dosing.  2. Increased dyspnea on exertion-Patient has elevated BNP.  However, there is no evidence of acute CHF exacerbation based on physical exam or CXR.  Check echo-no recent study performed.  Lasix resumed at current dosage.  Patient is requesting dose decrease secondary to difficulty ambulating to bathroom.  However, I explained to patient that his shortness of breath will not improve if he does not take his lasix as instructed.  I also explained to patient that he has multiple comorbidities and may always have some component of dyspnea, although we can work to optimize his medical therapy.  3. CAD s/p CABG-continue aspirin, statin, beta blocker and ARB.  4. Atrial fibrillation-Patient is rate controlled.  CHADS-Vasc score of 5.  Continue warfarin with weight-based lovenox bridging for goal INR of 2-3.    Dr. Laughlin to see. 77 yom with extensive PMH, including CAD s/p CABG (2014), repeat cath in 2017 showed patent grafts, atrial fibrillation on coumadin, chronic systolic CHF s/p CRT-D (device interrogated by Dr. Gipson in 8/2017-no events), uncontrolled DM II (last HbA1c 10.4%), hypothyroidism, HTN, DL, BPH, obesity, presented because of worsening lightheadedness over the last month and worsening dyspnea on exertion since winter months.  Patient stopped taking synthroid and lasix about one month prior to presentation.  1. Lightheadedness with position changes-would recommend checking orthostatic vital changes.  Continue to hold hydrochlorothiazide.  Change flomax from daytime to bedtime dosing.  Also suggest EP consult with Dr. Gipson for device interrogation (last interrogated in August 2017).  2. Increased dyspnea on exertion-Patient has elevated BNP.  However, there is no evidence of acute CHF exacerbation based on physical exam or CXR.  Check echo-no recent study performed.  Lasix resumed at current dosage.  Patient is requesting dose decrease secondary to difficulty ambulating to bathroom.  However, I explained to patient that his shortness of breath will not improve if he does not take his lasix as instructed.  I also explained to patient that he has multiple comorbidities and may always have some component of dyspnea, although we can work to optimize his medical therapy.  3. CAD s/p CABG-continue aspirin, statin, beta blocker and ARB.  4. Atrial fibrillation-Patient is rate controlled.  CHADS-Vasc score of 5.  Continue warfarin with weight-based lovenox bridging for goal INR of 2-3. 77 y.o. m with extensive PMH, including CAD s/p CABG (2014), repeat cath in 2017 showed patent grafts, atrial fibrillation on coumadin, chronic systolic CHF s/p CRT-D (device interrogated by Dr. Gipson in 8/2017-no events), uncontrolled DM II (last HbA1c 10.4%), hypothyroidism, HTN, DL, BPH, obesity, presented because of worsening lightheadedness over the last month and worsening dyspnea on exertion since winter months.  Patient stopped taking synthroid and lasix about one month prior to presentation.  1. Lightheadedness with position changes-would recommend checking orthostatic vital changes.  Continue to hold hydrochlorothiazide.  Change flomax from daytime to bedtime dosing.  Also suggest EP consult with Dr. Gipson for device interrogation (last interrogated in August 2017).  2. Increased dyspnea on exertion-Patient has elevated BNP.  However, there is no evidence of acute CHF exacerbation based on physical exam or CXR.  Check echo-no recent study performed.  Lasix resumed at current dosage.  Patient is requesting dose decrease secondary to difficulty ambulating to bathroom.  However, I explained to patient that his shortness of breath will not improve if he does not take his lasix as instructed.  I also explained to patient that he has multiple comorbidities and may always have some component of dyspnea, although we can work to optimize his medical therapy.  3. CAD s/p CABG-continue aspirin, statin, beta blocker and ARB.  4. Atrial fibrillation-Patient is rate controlled.  CHADS-Vasc score of 5.  Continue warfarin with weight-based lovenox bridging for goal INR of 2-3.

## 2018-04-10 NOTE — PROGRESS NOTE ADULT - SUBJECTIVE AND OBJECTIVE BOX
Patient is a 77y old  Male who presents with a chief complaint of Worsening lightheadedness over the last month (09 Apr 2018 21:00)      HPI:  76 yo male patient with PMHx of CAD s/p CABG (2014), repeat cath in 2017 showed patent grafts, Atrial fibrillation on coumadin, chronic systolic CHF s/P CRT-D, uncontrolled DM II (last HbA1c 10.4%), hypothyroidism, HTN, DL, BPH, obesity, presented because of worsening lightheadedness over the last month.  Patient says he's been experiencing short episodes of dizziness and light headedness particularly when he stands up from sitting position or when he bends forward. Symptoms lasts for few seconds/minutes, until he stands still or sits down. He denies headaches, denies fainting, denies palpitations, chest pain, nausea vomiting, GI or  symptoms.  Patient has exertional shortness of breath (chronic) because of his heart failure.  On the side, patient says its been around a month or so that he ran out of his synthroid (so he was not taking his thyroid medications), also he says he stopped taking the lasix for around a month now because he was urinating a lot. (09 Apr 2018 21:00)      PAST MEDICAL & SURGICAL HISTORY:  Diabetes mellitus  Hypothyroidism  Hypertension  Dyslipidemia  Benign prostate hyperplasia  Atrial fibrillation  Heart failure: chronic systolic heart failure  Coronary artery disease: s/p CABG 2014  S/P placement of cardiac pacemaker: CRT-D  S/P CABG (coronary artery bypass graft)      Home Medications:  aspirin 81 mg oral tablet, chewable: 1 tab(s) orally once a day (09 Apr 2018 21:13)  atorvastatin 40 mg oral tablet: 1 tab(s) orally once a day (at bedtime) (09 Apr 2018 21:09)  Flomax 0.4 mg oral capsule: 1 cap(s) orally once a day (at bedtime) (09 Apr 2018 21:12)  Lasix 40 mg oral tablet: 1 tab(s) orally once a day (09 Apr 2018 21:10)  latanoprost 0.005% ophthalmic solution: 1 drop(s) to each affected eye once a day (in the evening) (09 Apr 2018 21:11)  levothyroxine 125 mcg (0.125 mg) oral capsule: 1 cap(s) orally once a day (09 Apr 2018 21:10)  losartan-hydrochlorothiazide 100mg-12.5mg oral tablet: 1 tab(s) orally once a day (09 Apr 2018 21:11)  metFORMIN 1000 mg oral tablet: 1 tab(s) orally 2 times a day (09 Apr 2018 21:11)  metoprolol succinate 50 mg oral tablet, extended release: 1 tab(s) orally once a day (09 Apr 2018 21:12)  Toujeo SoloStar 300 units/mL subcutaneous solution: 16 unit(s) subcutaneous once a day (09 Apr 2018 21:12)  warfarin 7.5 mg oral tablet: 1 tab(s) orally once a day (09 Apr 2018 21:09)    No Known Allergies      FAMILY HISTORY:  Family history of coronary artery disease (Father)    Home Medications   aspirin  chewable 81 milliGRAM(s) Oral daily  atorvastatin 40 milliGRAM(s) Oral at bedtime  dextrose 5%. 1000 milliLiter(s) IV Continuous <Continuous>  dextrose 50% Injectable 12.5 Gram(s) IV Push once  dextrose 50% Injectable 25 Gram(s) IV Push once  dextrose 50% Injectable 25 Gram(s) IV Push once  dextrose Gel 1 Dose(s) Oral once PRN  enoxaparin Injectable 115 milliGRAM(s) SubCutaneous two times a day  furosemide    Tablet 40 milliGRAM(s) Oral daily  glucagon  Injectable 1 milliGRAM(s) IntraMuscular once PRN  insulin glargine Injectable (LANTUS) 16 Unit(s) SubCutaneous every morning  insulin lispro (HumaLOG) corrective regimen sliding scale   SubCutaneous three times a day before meals  insulin lispro Injectable (HumaLOG) 6 Unit(s) SubCutaneous three times a day before meals  latanoprost 0.005% Ophthalmic Solution 1 Drop(s) Both EYES at bedtime  levothyroxine 88 MICROGram(s) Oral daily  losartan 100 milliGRAM(s) Oral daily  metoprolol succinate ER 50 milliGRAM(s) Oral daily  tamsulosin 0.4 milliGRAM(s) Oral at bedtime      Vital Signs Last 24 Hrs  T(C): 36 (09 Apr 2018 22:08), Max: 36 (09 Apr 2018 22:08)  T(F): 96.8 (09 Apr 2018 22:08), Max: 96.8 (09 Apr 2018 22:08)  HR: 61 (09 Apr 2018 22:08) (61 - 62)  BP: 139/84 (09 Apr 2018 22:08) (128/70 - 140/91)  BP(mean): --  RR: 18 (09 Apr 2018 22:08) (18 - 18)  SpO2: 98% (09 Apr 2018 20:56) (98% - 100%)    I&O's Summary                            12.2   6.77  )-----------( 135      ( 09 Apr 2018 15:17 )             36.7       04-09    140  |  102  |  35<H>  ----------------------------<  174<H>  7.1<HH>   |  25  |  1.3    Ca    9.0      09 Apr 2018 15:17  Mg     1.6     04-09    TPro  8.1<H>  /  Alb  4.5  /  TBili  0.8  /  DBili  x   /  AST  103<H>  /  ALT  41  /  AlkPhos  99  04-09      CARDIAC MARKERS ( 09 Apr 2018 20:27 )  x     / 0.11 ng/mL / x     / x     / x      CARDIAC MARKERS ( 09 Apr 2018 15:17 )  x     / 0.11 ng/mL / x     / x     / x Patient is a 77y old  Male who presents with a chief complaint of Worsening lightheadedness over the last month (09 Apr 2018 21:00)      HPI:  78 yo male patient with PMHx of CAD s/p CABG (2014), repeat cath in 2017 showed patent grafts, Atrial fibrillation on coumadin, chronic systolic CHF s/P CRT-D, uncontrolled DM II (last HbA1c 10.4%), hypothyroidism, HTN, DL, BPH, obesity, presented because of worsening lightheadedness over the last month.  Patient says he's been experiencing short episodes of dizziness and light headedness particularly when he stands up from sitting position or when he bends forward. Symptoms lasts for few seconds/minutes, until he stands still or sits down. He denies headaches, denies fainting, denies palpitations, chest pain, nausea vomiting, GI or  symptoms.  Patient has exertional shortness of breath (chronic) because of his heart failure.  On the side, patient says its been around a month or so that he ran out of his synthroid (so he was not taking his thyroid medications), also he says he stopped taking the lasix for around a month now because he was urinating a lot. (09 Apr 2018 21:00)      PAST MEDICAL & SURGICAL HISTORY:  Diabetes mellitus  Hypothyroidism  Hypertension  Dyslipidemia  Benign prostate hyperplasia  Atrial fibrillation  Heart failure: chronic systolic heart failure  Coronary artery disease: s/p CABG 2014  S/P placement of cardiac pacemaker: CRT-D  S/P CABG (coronary artery bypass graft)      Home Medications:  aspirin 81 mg oral tablet, chewable: 1 tab(s) orally once a day (09 Apr 2018 21:13)  atorvastatin 40 mg oral tablet: 1 tab(s) orally once a day (at bedtime) (09 Apr 2018 21:09)  Flomax 0.4 mg oral capsule: 1 cap(s) orally once a day (at bedtime) (09 Apr 2018 21:12)  Lasix 40 mg oral tablet: 1 tab(s) orally once a day (09 Apr 2018 21:10)  latanoprost 0.005% ophthalmic solution: 1 drop(s) to each affected eye once a day (in the evening) (09 Apr 2018 21:11)  levothyroxine 125 mcg (0.125 mg) oral capsule: 1 cap(s) orally once a day (09 Apr 2018 21:10)  losartan-hydrochlorothiazide 100mg-12.5mg oral tablet: 1 tab(s) orally once a day (09 Apr 2018 21:11)  metFORMIN 1000 mg oral tablet: 1 tab(s) orally 2 times a day (09 Apr 2018 21:11)  metoprolol succinate 50 mg oral tablet, extended release: 1 tab(s) orally once a day (09 Apr 2018 21:12)  Toujeo SoloStar 300 units/mL subcutaneous solution: 16 unit(s) subcutaneous once a day (09 Apr 2018 21:12)  warfarin 7.5 mg oral tablet: 1 tab(s) orally once a day (09 Apr 2018 21:09)    No Known Allergies      FAMILY HISTORY:  Family history of coronary artery disease (Father)    Home Medications   aspirin  chewable 81 milliGRAM(s) Oral daily  atorvastatin 40 milliGRAM(s) Oral at bedtime  dextrose 5%. 1000 milliLiter(s) IV Continuous <Continuous>  dextrose 50% Injectable 12.5 Gram(s) IV Push once  dextrose 50% Injectable 25 Gram(s) IV Push once  dextrose 50% Injectable 25 Gram(s) IV Push once  dextrose Gel 1 Dose(s) Oral once PRN  enoxaparin Injectable 115 milliGRAM(s) SubCutaneous two times a day  furosemide    Tablet 40 milliGRAM(s) Oral daily  glucagon  Injectable 1 milliGRAM(s) IntraMuscular once PRN  insulin glargine Injectable (LANTUS) 16 Unit(s) SubCutaneous every morning  insulin lispro (HumaLOG) corrective regimen sliding scale   SubCutaneous three times a day before meals  insulin lispro Injectable (HumaLOG) 6 Unit(s) SubCutaneous three times a day before meals  latanoprost 0.005% Ophthalmic Solution 1 Drop(s) Both EYES at bedtime  levothyroxine 88 MICROGram(s) Oral daily  losartan 100 milliGRAM(s) Oral daily  metoprolol succinate ER 50 milliGRAM(s) Oral daily  tamsulosin 0.4 milliGRAM(s) Oral at bedtime      Vital Signs Last 24 Hrs  T(C): 36 (09 Apr 2018 22:08), Max: 36 (09 Apr 2018 22:08)  T(F): 96.8 (09 Apr 2018 22:08), Max: 96.8 (09 Apr 2018 22:08)  HR: 61 (09 Apr 2018 22:08) (61 - 62)  BP: 139/84 (09 Apr 2018 22:08) (128/70 - 140/91)  BP(mean): --  RR: 18 (09 Apr 2018 22:08) (18 - 18)  SpO2: 98% (09 Apr 2018 20:56) (98% - 100%)    I&O's Summary                            12.2   6.77  )-----------( 135      ( 09 Apr 2018 15:17 )             36.7       04-09    140  |  102  |  35<H>  ----------------------------<  174<H>  7.1<HH>   |  25  |  1.3    Ca    9.0      09 Apr 2018 15:17  Mg     1.6     04-09    TPro  8.1<H>  /  Alb  4.5  /  TBili  0.8  /  DBili  x   /  AST  103<H>  /  ALT  41  /  AlkPhos  99  04-09      CARDIAC MARKERS ( 09 Apr 2018 20:27 )  x     / 0.11 ng/mL / x     / x     / x      CARDIAC MARKERS ( 09 Apr 2018 15:17 )  x     / 0.11 ng/mL / x     / x     / x              Physical Exam   Chest is clear- no added breath sounds   S1 + S2- no murmur   Abdomen is soft and non tender

## 2018-04-10 NOTE — PROGRESS NOTE ADULT - SUBJECTIVE AND OBJECTIVE BOX
seen/examined full h/p completed  s/s of dizziness yesterday at home  today no dizziness no chest pain    no jvd   cta   rrr  soft nt nd + bs  no e/c/c

## 2018-04-10 NOTE — CONSULT NOTE ADULT - SUBJECTIVE AND OBJECTIVE BOX
HPI:  78 yo male patient with PMHx of CAD s/p CABG (2014), repeat cath in 2017 showed patent grafts, Atrial fibrillation on coumadin, chronic systolic CHF s/P CRT-D, uncontrolled DM II (last HbA1c 10.4%), hypothyroidism, HTN, DL, BPH, obesity, presented because of worsening lightheadedness over the last month.  Patient says he's been experiencing short episodes of dizziness and light headedness particularly when he stands up from sitting position or when he bends forward. Symptoms lasts for few seconds/minutes, until he stands still or sits down. He denies headaches, denies fainting, denies palpitations, chest pain, nausea vomiting, GI or  symptoms.  Patient has exertional shortness of breath (chronic) because of his heart failure.  On the side, patient says its been around a month or so that he ran out of his synthroid (so he was not taking his thyroid medications), also he says he stopped taking the lasix for around a month now because he was urinating a lot. (09 Apr 2018 21:00)      T(C): 35.6 (04-10-18 @ 15:06), Max: 36 (04-09-18 @ 22:08)  HR: 59 (04-10-18 @ 15:06) (59 - 61)  BP: 107/61 (04-10-18 @ 15:06) (104/59 - 139/84)  RR: 18 (04-10-18 @ 15:06) (18 - 18)  SpO2: 97% (04-10-18 @ 06:45) (97% - 98%)    MOTOR EXAM  Good ROM with MS 4+/5 all ext- dizziness/SOB better      Physical Medicine And Rehabilitation Services are not indicated in this patient for the following Reason(s):    [    ] Patient is medically unstable      [    ]  Patient does not have appropriate activity orders      [     ] Patient has no weight bearing status for:      [     ] Patient is independently ambulating      [     ]  Patient is from Skilled Nursing Facility and is appropriate to return      [     ] Patient was non-ambulatory prior to admission      [   x  ]  Other  PATIENT REFUSES PT/REHAB- IS AMBULATING IN ROOM WITH [facility]- LIVES IN ELEVATOR APT WITH GIRLFRIEND                       STATES HE IS GOING HOME TOMORROW    WILL CANCEL PM&R / PT request

## 2018-04-11 LAB
ALBUMIN SERPL ELPH-MCNC: 4 G/DL — SIGNIFICANT CHANGE UP (ref 3.5–5.2)
ALP SERPL-CCNC: 92 U/L — SIGNIFICANT CHANGE UP (ref 30–115)
ALT FLD-CCNC: 31 U/L — SIGNIFICANT CHANGE UP (ref 0–41)
ANION GAP SERPL CALC-SCNC: 15 MMOL/L — HIGH (ref 7–14)
ANION GAP SERPL CALC-SCNC: 15 MMOL/L — HIGH (ref 7–14)
AST SERPL-CCNC: 67 U/L — HIGH (ref 0–41)
BASOPHILS # BLD AUTO: 0.03 K/UL — SIGNIFICANT CHANGE UP (ref 0–0.2)
BASOPHILS NFR BLD AUTO: 0.4 % — SIGNIFICANT CHANGE UP (ref 0–1)
BILIRUB DIRECT SERPL-MCNC: 0.2 MG/DL — SIGNIFICANT CHANGE UP (ref 0–0.2)
BILIRUB INDIRECT FLD-MCNC: 0.5 MG/DL — SIGNIFICANT CHANGE UP (ref 0.2–1.2)
BILIRUB SERPL-MCNC: 0.7 MG/DL — SIGNIFICANT CHANGE UP (ref 0.2–1.2)
BUN SERPL-MCNC: 39 MG/DL — HIGH (ref 10–20)
BUN SERPL-MCNC: 46 MG/DL — HIGH (ref 10–20)
CALCIUM SERPL-MCNC: 8.5 MG/DL — SIGNIFICANT CHANGE UP (ref 8.5–10.1)
CALCIUM SERPL-MCNC: 9 MG/DL — SIGNIFICANT CHANGE UP (ref 8.5–10.1)
CHLORIDE SERPL-SCNC: 100 MMOL/L — SIGNIFICANT CHANGE UP (ref 98–110)
CHLORIDE SERPL-SCNC: 102 MMOL/L — SIGNIFICANT CHANGE UP (ref 98–110)
CK SERPL-CCNC: 1979 U/L — HIGH (ref 0–225)
CO2 SERPL-SCNC: 23 MMOL/L — SIGNIFICANT CHANGE UP (ref 17–32)
CO2 SERPL-SCNC: 24 MMOL/L — SIGNIFICANT CHANGE UP (ref 17–32)
CREAT SERPL-MCNC: 1.9 MG/DL — HIGH (ref 0.7–1.5)
CREAT SERPL-MCNC: 2 MG/DL — HIGH (ref 0.7–1.5)
EOSINOPHIL # BLD AUTO: 0.17 K/UL — SIGNIFICANT CHANGE UP (ref 0–0.7)
EOSINOPHIL NFR BLD AUTO: 2.2 % — SIGNIFICANT CHANGE UP (ref 0–8)
GLUCOSE SERPL-MCNC: 156 MG/DL — HIGH (ref 70–99)
GLUCOSE SERPL-MCNC: 88 MG/DL — SIGNIFICANT CHANGE UP (ref 70–99)
HCT VFR BLD CALC: 35.7 % — LOW (ref 42–52)
HGB BLD-MCNC: 11.7 G/DL — LOW (ref 14–18)
IMM GRANULOCYTES NFR BLD AUTO: 0.5 % — HIGH (ref 0.1–0.3)
INR BLD: 1.88 RATIO — HIGH (ref 0.65–1.3)
LYMPHOCYTES # BLD AUTO: 2.06 K/UL — SIGNIFICANT CHANGE UP (ref 1.2–3.4)
LYMPHOCYTES # BLD AUTO: 26.3 % — SIGNIFICANT CHANGE UP (ref 20.5–51.1)
MAGNESIUM SERPL-MCNC: 2.2 MG/DL — SIGNIFICANT CHANGE UP (ref 1.8–2.4)
MCHC RBC-ENTMCNC: 32.3 PG — HIGH (ref 27–31)
MCHC RBC-ENTMCNC: 32.8 G/DL — SIGNIFICANT CHANGE UP (ref 32–37)
MCV RBC AUTO: 98.6 FL — HIGH (ref 80–94)
MONOCYTES # BLD AUTO: 1.22 K/UL — HIGH (ref 0.1–0.6)
MONOCYTES NFR BLD AUTO: 15.6 % — HIGH (ref 1.7–9.3)
NEUTROPHILS # BLD AUTO: 4.32 K/UL — SIGNIFICANT CHANGE UP (ref 1.4–6.5)
NEUTROPHILS NFR BLD AUTO: 55 % — SIGNIFICANT CHANGE UP (ref 42.2–75.2)
NRBC # BLD: 0 /100 WBCS — SIGNIFICANT CHANGE UP (ref 0–0)
PLATELET # BLD AUTO: 133 K/UL — SIGNIFICANT CHANGE UP (ref 130–400)
POTASSIUM SERPL-MCNC: 4.9 MMOL/L — SIGNIFICANT CHANGE UP (ref 3.5–5)
POTASSIUM SERPL-MCNC: 5.5 MMOL/L — HIGH (ref 3.5–5)
POTASSIUM SERPL-SCNC: 4.9 MMOL/L — SIGNIFICANT CHANGE UP (ref 3.5–5)
POTASSIUM SERPL-SCNC: 5.5 MMOL/L — HIGH (ref 3.5–5)
PROT SERPL-MCNC: 6.7 G/DL — SIGNIFICANT CHANGE UP (ref 6–8)
PROTHROM AB SERPL-ACNC: 20.6 SEC — HIGH (ref 9.95–12.87)
RBC # BLD: 3.62 M/UL — LOW (ref 4.7–6.1)
RBC # FLD: 17 % — HIGH (ref 11.5–14.5)
SODIUM SERPL-SCNC: 138 MMOL/L — SIGNIFICANT CHANGE UP (ref 135–146)
SODIUM SERPL-SCNC: 141 MMOL/L — SIGNIFICANT CHANGE UP (ref 135–146)
TROPONIN T SERPL-MCNC: 0.15 NG/ML — CRITICAL HIGH
TSH SERPL-MCNC: 82.33 UIU/ML — HIGH (ref 0.27–4.2)
WBC # BLD: 7.84 K/UL — SIGNIFICANT CHANGE UP (ref 4.8–10.8)
WBC # FLD AUTO: 7.84 K/UL — SIGNIFICANT CHANGE UP (ref 4.8–10.8)

## 2018-04-11 RX ORDER — WARFARIN SODIUM 2.5 MG/1
6 TABLET ORAL ONCE
Qty: 0 | Refills: 0 | Status: COMPLETED | OUTPATIENT
Start: 2018-04-11 | End: 2018-04-11

## 2018-04-11 RX ADMIN — Medication 6 UNIT(S): at 11:19

## 2018-04-11 RX ADMIN — Medication 88 MICROGRAM(S): at 05:17

## 2018-04-11 RX ADMIN — LATANOPROST 1 DROP(S): 0.05 SOLUTION/ DROPS OPHTHALMIC; TOPICAL at 21:18

## 2018-04-11 RX ADMIN — WARFARIN SODIUM 6 MILLIGRAM(S): 2.5 TABLET ORAL at 21:18

## 2018-04-11 RX ADMIN — INSULIN GLARGINE 16 UNIT(S): 100 INJECTION, SOLUTION SUBCUTANEOUS at 08:32

## 2018-04-11 RX ADMIN — LOSARTAN POTASSIUM 100 MILLIGRAM(S): 100 TABLET, FILM COATED ORAL at 05:17

## 2018-04-11 RX ADMIN — Medication 40 MILLIGRAM(S): at 05:16

## 2018-04-11 RX ADMIN — TAMSULOSIN HYDROCHLORIDE 0.4 MILLIGRAM(S): 0.4 CAPSULE ORAL at 21:18

## 2018-04-11 RX ADMIN — ATORVASTATIN CALCIUM 40 MILLIGRAM(S): 80 TABLET, FILM COATED ORAL at 21:18

## 2018-04-11 NOTE — PROGRESS NOTE ADULT - ATTENDING COMMENTS
1- Orthostatic Hypotension       cardiology eval appreciated- agree w/ Flomax in PM      Discontinued HCTZ      check orthostatics      increase PO intake of fluids       consider compression stockings     2- Chronic systolic CHF     continue with metoprolol, ARB and lasix      No evidence of decompensation    ECHO pending     3.   Atrial fibrillation     Subtherapeutic INR     dose Coumadin as per INR     4.  Hypothyroidism     off levothyroxine for the last month     f/u TFTs    5. Uncontrolled diabetes mellitus     Last Hba1c 10.4%     continue with insulin S/C     F/U endocrinology as outpatient    6- Miscellaneous  DVT proph (already on coumadin)  GI proph  ambulate as tolerated  2gr Na DASH diet, carb consistent  Full code  Disposition home      TSH to follow

## 2018-04-11 NOTE — PROGRESS NOTE ADULT - SUBJECTIVE AND OBJECTIVE BOX
MYRNA TOVAR  77y  Male    Patient is a 77y old  Male who presents with a chief complaint of Worsening lightheadedness over the last month (09 Apr 2018 21:00)      INTERVAL HPI/OVERNIGHT EVENTS: None    T(C): 36.3 (04-10-18 @ 19:28), Max: 36.3 (04-10-18 @ 19:28)  HR: 61 (04-10-18 @ 19:28) (59 - 61)  BP: 106/59 (04-10-18 @ 19:28) (104/59 - 110/63)  RR: 18 (04-10-18 @ 19:28) (18 - 18)  SpO2: 97% (04-10-18 @ 06:45) (97% - 97%)  Wt(kg): --Vital Signs Last 24 Hrs  T(C): 36.3 (10 Apr 2018 19:28), Max: 36.3 (10 Apr 2018 19:28)  T(F): 97.3 (10 Apr 2018 19:28), Max: 97.3 (10 Apr 2018 19:28)  HR: 61 (10 Apr 2018 19:28) (59 - 61)  BP: 106/59 (10 Apr 2018 19:28) (104/59 - 110/63)  BP(mean): --  RR: 18 (10 Apr 2018 19:28) (18 - 18)  SpO2: 97% (10 Apr 2018 06:45) (97% - 97%)    PHYSICAL EXAM:  GENERAL: NAD, well-groomed, well-developed  HEAD:  Atraumatic, Normocephalic  NECK: Supple, No JVD, Normal thyroid  NERVOUS SYSTEM:  Alert & Oriented X3, Good concentration; Motor Strength 5/5 B/L upper and lower extremities; DTRs 2+ intact and symmetric  CHEST/LUNG: Clear to percussion bilaterally; No rales, rhonchi, wheezing, or rubs  HEART: Regular rate and rhythm; No murmurs, rubs, or gallops  ABDOMEN: Soft, Nontender, Nondistended; Bowel sounds present  EXTREMITIES:  2+ Peripheral Pulses, No clubbing, cyanosis, or edema    Consultant(s) Notes Reviewed:  [x ] YES  [ ] NO    Discussed with Consultants/Other Providers/Residents [ x] YES     LABS                         11.1   7.14  )-----------( 121      ( 10 Apr 2018 17:21 )             33.0     04-10    138  |  101  |  34<H>  ----------------------------<  202<H>  4.6   |  24  |  1.3    Ca    8.6      10 Apr 2018 17:21  Mg     1.7     04-10    TPro  8.1<H>  /  Alb  4.5  /  TBili  0.8  /  DBili  x   /  AST  103<H>  /  ALT  41  /  AlkPhos  99  04-09    PT/INR - ( 10 Apr 2018 08:24 )   PT: 19.70 sec;   INR: 1.80 ratio         PTT - ( 09 Apr 2018 15:17 )  PTT:28.1 sec      LIVER FUNCTIONS - ( 09 Apr 2018 15:17 )  Alb: 4.5 g/dL / Pro: 8.1 g/dL / ALK PHOS: 99 U/L / ALT: 41 U/L / AST: 103 U/L / GGT: x           CAPILLARY BLOOD GLUCOSE            RADIOLOGY & ADDITIONAL TESTS:    Imaging Personally Reviewed:  [x ] YES  [ ] NO    MEDICATIONS  (STANDING):  aspirin  chewable 81 milliGRAM(s) Oral daily  atorvastatin 40 milliGRAM(s) Oral at bedtime  dextrose 5%. 1000 milliLiter(s) (50 mL/Hr) IV Continuous <Continuous>  dextrose 50% Injectable 12.5 Gram(s) IV Push once  dextrose 50% Injectable 25 Gram(s) IV Push once  dextrose 50% Injectable 25 Gram(s) IV Push once  furosemide    Tablet 40 milliGRAM(s) Oral daily  insulin glargine Injectable (LANTUS) 16 Unit(s) SubCutaneous every morning  insulin lispro (HumaLOG) corrective regimen sliding scale   SubCutaneous three times a day before meals  insulin lispro Injectable (HumaLOG) 6 Unit(s) SubCutaneous three times a day before meals  latanoprost 0.005% Ophthalmic Solution 1 Drop(s) Both EYES at bedtime  levothyroxine 88 MICROGram(s) Oral daily  losartan 100 milliGRAM(s) Oral daily  metoprolol succinate ER 50 milliGRAM(s) Oral daily  tamsulosin 0.4 milliGRAM(s) Oral at bedtime    MEDICATIONS  (PRN):  dextrose Gel 1 Dose(s) Oral once PRN Blood Glucose LESS THAN 70 milliGRAM(s)/deciliter  glucagon  Injectable 1 milliGRAM(s) IntraMuscular once PRN Glucose LESS THAN 70 milligrams/deciliter      HEALTH ISSUES - PROBLEM Dx:

## 2018-04-11 NOTE — PROGRESS NOTE ADULT - ASSESSMENT
78 yo male patient with PMHx of CAD s/p CABG (2014), repeat cath in 2017 showed patent grafts, Atrial fibrillation on coumadin, chronic systolic CHF s/P CRT-D, uncontrolled DM II (last HbA1c 10.4%), hypothyroidism, HTN, DL, BPH, obesity, presented because of worsening lightheadedness over the last month.   Patient reports that he has been feeling lightheaded when ever he gets up from sitting position and goes away after few seconds to minutes after standing still. Patient also reports that he has not been taking his synthroid and lasix for a month now.   ED: Vitals were all within normal limit /90.     Relevant Investigations on admission   CT scan of head: No acute pathology   Hb >12 MCV: 97.6   BUN: 35- might indicate volume depletion   Venous blood gas within normal limit- No hypoxemia or hypercarbia   Blood glucose >150   EKG: Ventricular paced rhythm- Atrial fibrillation   BNP:1828   CXR: No acute pathology- No congestion   ECHO: EF of 30%- decreases LV systolic function     Admitting Diagnosis: Orthostatic Hypotension     1- Orthostatic Hypotension   - Patient is asymptomatic now and orthostatics were negative   - Discontinued HCTZ and losartan and lasix ( also in face of developing MAHESH)- changed flomax to bedtime     2- Chronic systolic CHF- ECHO revealed EF of 30%  -continue with metoprolol- Discontinued ARB and lasix in face of MAHESH  -No evidence of decompensation     3- MAHESH   Baseline Cr is 1.3- Today it bumped to 1.9   Elevated CK and K (>5)- might be secondary to Rhabdomyolysis?   Sent urine electrolytes and creatinine   Nephrology consult called   Hydration is questionable as patients EF is low     4- Atrial fibrillation  Subtherapeutic INR  Will dose Coumadin as per INR     5-Hypothyroidism  Patient was off levothyroxine for the last month  will check TSH and resume synthroid at a lower dose for now, and increase slowly to 125mcg (his usual dose)    6-Uncontrolled diabetes mellitus  Last Hba1c 10.4%  continue with insulin S/C  F/U endocrinology as outpatient    7- Miscellaneous  DVT proph (already on coumadin)  GI proph  ambulate as tolerated  2gr Na DASH diet, carb consistent  Full code  Disposition home      TSH to follow

## 2018-04-11 NOTE — PROGRESS NOTE ADULT - SUBJECTIVE AND OBJECTIVE BOX
Patient is a 77y old  Male who presents with a chief complaint of Worsening lightheadedness over the last month (09 Apr 2018 21:00)      HPI:  76 yo male patient with PMHx of CAD s/p CABG (2014), repeat cath in 2017 showed patent grafts, Atrial fibrillation on coumadin, chronic systolic CHF s/P CRT-D, uncontrolled DM II (last HbA1c 10.4%), hypothyroidism, HTN, DL, BPH, obesity, presented because of worsening lightheadedness over the last month.  Patient says he's been experiencing short episodes of dizziness and light headedness particularly when he stands up from sitting position or when he bends forward. Symptoms lasts for few seconds/minutes, until he stands still or sits down. He denies headaches, denies fainting, denies palpitations, chest pain, nausea vomiting, GI or  symptoms.  Patient has exertional shortness of breath (chronic) because of his heart failure.  On the side, patient says its been around a month or so that he ran out of his synthroid (so he was not taking his thyroid medications), also he says he stopped taking the lasix for around a month now because he was urinating a lot. (09 Apr 2018 21:00)      PAST MEDICAL & SURGICAL HISTORY:  Diabetes mellitus  Hypothyroidism  Hypertension  Dyslipidemia  Benign prostate hyperplasia  Atrial fibrillation  Heart failure: chronic systolic heart failure  Coronary artery disease: s/p CABG 2014  S/P placement of cardiac pacemaker: CRT-D  S/P CABG (coronary artery bypass graft)      Home Medications:  aspirin 81 mg oral tablet, chewable: 1 tab(s) orally once a day (09 Apr 2018 21:13)  atorvastatin 40 mg oral tablet: 1 tab(s) orally once a day (at bedtime) (09 Apr 2018 21:09)  Flomax 0.4 mg oral capsule: 1 cap(s) orally once a day (at bedtime) (09 Apr 2018 21:12)  Lasix 40 mg oral tablet: 1 tab(s) orally once a day (09 Apr 2018 21:10)  latanoprost 0.005% ophthalmic solution: 1 drop(s) to each affected eye once a day (in the evening) (09 Apr 2018 21:11)  levothyroxine 125 mcg (0.125 mg) oral capsule: 1 cap(s) orally once a day (09 Apr 2018 21:10)  losartan-hydrochlorothiazide 100mg-12.5mg oral tablet: 1 tab(s) orally once a day (09 Apr 2018 21:11)  metFORMIN 1000 mg oral tablet: 1 tab(s) orally 2 times a day (09 Apr 2018 21:11)  metoprolol succinate 50 mg oral tablet, extended release: 1 tab(s) orally once a day (09 Apr 2018 21:12)  Toujeo SoloStar 300 units/mL subcutaneous solution: 16 unit(s) subcutaneous once a day (09 Apr 2018 21:12)  warfarin 7.5 mg oral tablet: 1 tab(s) orally once a day (09 Apr 2018 21:09)      No Known Allergies      FAMILY HISTORY:  Family history of coronary artery disease (Father)      aspirin  chewable 81 milliGRAM(s) Oral daily  atorvastatin 40 milliGRAM(s) Oral at bedtime  dextrose 5%. 1000 milliLiter(s) IV Continuous <Continuous>  dextrose 50% Injectable 12.5 Gram(s) IV Push once  dextrose 50% Injectable 25 Gram(s) IV Push once  dextrose 50% Injectable 25 Gram(s) IV Push once  dextrose Gel 1 Dose(s) Oral once PRN  glucagon  Injectable 1 milliGRAM(s) IntraMuscular once PRN  insulin glargine Injectable (LANTUS) 16 Unit(s) SubCutaneous every morning  insulin lispro (HumaLOG) corrective regimen sliding scale   SubCutaneous three times a day before meals  insulin lispro Injectable (HumaLOG) 6 Unit(s) SubCutaneous three times a day before meals  latanoprost 0.005% Ophthalmic Solution 1 Drop(s) Both EYES at bedtime  levothyroxine 88 MICROGram(s) Oral daily  metoprolol succinate ER 50 milliGRAM(s) Oral daily  tamsulosin 0.4 milliGRAM(s) Oral at bedtime      Vital Signs Last 24 Hrs  T(C): 35.9 (11 Apr 2018 14:02), Max: 36.3 (10 Apr 2018 19:28)  T(F): 96.6 (11 Apr 2018 14:02), Max: 97.3 (10 Apr 2018 19:28)  HR: 70 (11 Apr 2018 14:02) (60 - 70)  BP: 95/55 (11 Apr 2018 14:02) (95/55 - 116/71)  BP(mean): --  RR: 20 (11 Apr 2018 14:02) (18 - 20)  SpO2: --    I&O's Summary    10 Apr 2018 07:01  -  11 Apr 2018 07:00  --------------------------------------------------------  IN: 50 mL / OUT: 0 mL / NET: 50 mL                              11.7   7.84  )-----------( 133      ( 11 Apr 2018 06:27 )             35.7       04-11    141  |  102  |  39<H>  ----------------------------<  88  5.5<H>   |  24  |  1.9<H>    Ca    9.0      11 Apr 2018 06:27  Mg     2.2     04-11        CARDIAC MARKERS ( 10 Apr 2018 08:24 )  x     / 0.11 ng/mL / 2640 U/L / x     / x      CARDIAC MARKERS ( 09 Apr 2018 20:27 )  x     / 0.11 ng/mL / x     / x     / x              Physical exam   Chest is clear to auscultation   S1+S2- no murmur, no rubs   Abdomen is soft and non tender   Bleeding from sites of tooth extraction

## 2018-04-12 LAB
ANION GAP SERPL CALC-SCNC: 10 MMOL/L — SIGNIFICANT CHANGE UP (ref 7–14)
BASOPHILS # BLD AUTO: 0.02 K/UL — SIGNIFICANT CHANGE UP (ref 0–0.2)
BASOPHILS NFR BLD AUTO: 0.3 % — SIGNIFICANT CHANGE UP (ref 0–1)
BUN SERPL-MCNC: 42 MG/DL — HIGH (ref 10–20)
CALCIUM SERPL-MCNC: 8.5 MG/DL — SIGNIFICANT CHANGE UP (ref 8.5–10.1)
CHLORIDE SERPL-SCNC: 102 MMOL/L — SIGNIFICANT CHANGE UP (ref 98–110)
CO2 SERPL-SCNC: 26 MMOL/L — SIGNIFICANT CHANGE UP (ref 17–32)
CREAT ?TM UR-MCNC: 88 MG/DL — SIGNIFICANT CHANGE UP
CREAT SERPL-MCNC: 1.6 MG/DL — HIGH (ref 0.7–1.5)
EOSINOPHIL # BLD AUTO: 0.08 K/UL — SIGNIFICANT CHANGE UP (ref 0–0.7)
EOSINOPHIL NFR BLD AUTO: 1.3 % — SIGNIFICANT CHANGE UP (ref 0–8)
GLUCOSE SERPL-MCNC: 84 MG/DL — SIGNIFICANT CHANGE UP (ref 70–99)
HCT VFR BLD CALC: 32.4 % — LOW (ref 42–52)
HGB BLD-MCNC: 11.1 G/DL — LOW (ref 14–18)
IMM GRANULOCYTES NFR BLD AUTO: 0.5 % — HIGH (ref 0.1–0.3)
INR BLD: 1.98 RATIO — HIGH (ref 0.65–1.3)
LYMPHOCYTES # BLD AUTO: 1.65 K/UL — SIGNIFICANT CHANGE UP (ref 1.2–3.4)
LYMPHOCYTES # BLD AUTO: 26.8 % — SIGNIFICANT CHANGE UP (ref 20.5–51.1)
MAGNESIUM SERPL-MCNC: 1.9 MG/DL — SIGNIFICANT CHANGE UP (ref 1.8–2.4)
MCHC RBC-ENTMCNC: 33.2 PG — HIGH (ref 27–31)
MCHC RBC-ENTMCNC: 34.3 G/DL — SIGNIFICANT CHANGE UP (ref 32–37)
MCV RBC AUTO: 97 FL — HIGH (ref 80–94)
MONOCYTES # BLD AUTO: 0.88 K/UL — HIGH (ref 0.1–0.6)
MONOCYTES NFR BLD AUTO: 14.3 % — HIGH (ref 1.7–9.3)
NEUTROPHILS # BLD AUTO: 3.49 K/UL — SIGNIFICANT CHANGE UP (ref 1.4–6.5)
NEUTROPHILS NFR BLD AUTO: 56.8 % — SIGNIFICANT CHANGE UP (ref 42.2–75.2)
NRBC # BLD: 0 /100 WBCS — SIGNIFICANT CHANGE UP (ref 0–0)
PHOSPHATE 24H UR-MCNC: 34 MG/DL — SIGNIFICANT CHANGE UP
PLATELET # BLD AUTO: 120 K/UL — LOW (ref 130–400)
POTASSIUM SERPL-MCNC: 4.8 MMOL/L — SIGNIFICANT CHANGE UP (ref 3.5–5)
POTASSIUM SERPL-SCNC: 4.8 MMOL/L — SIGNIFICANT CHANGE UP (ref 3.5–5)
POTASSIUM UR-SCNC: 43 MMOL/L — SIGNIFICANT CHANGE UP
PROTHROM AB SERPL-ACNC: 21.7 SEC — HIGH (ref 9.95–12.87)
RBC # BLD: 3.34 M/UL — LOW (ref 4.7–6.1)
RBC # FLD: 16.5 % — HIGH (ref 11.5–14.5)
SODIUM SERPL-SCNC: 138 MMOL/L — SIGNIFICANT CHANGE UP (ref 135–146)
SODIUM UR-SCNC: 90 MMOL/L — SIGNIFICANT CHANGE UP
UUN UR-MCNC: 332 MG/DL — SIGNIFICANT CHANGE UP
WBC # BLD: 6.15 K/UL — SIGNIFICANT CHANGE UP (ref 4.8–10.8)
WBC # FLD AUTO: 6.15 K/UL — SIGNIFICANT CHANGE UP (ref 4.8–10.8)

## 2018-04-12 RX ORDER — MAGNESIUM SULFATE 500 MG/ML
2 VIAL (ML) INJECTION ONCE
Qty: 0 | Refills: 0 | Status: COMPLETED | OUTPATIENT
Start: 2018-04-12 | End: 2018-04-12

## 2018-04-12 RX ORDER — WARFARIN SODIUM 2.5 MG/1
6 TABLET ORAL ONCE
Qty: 0 | Refills: 0 | Status: COMPLETED | OUTPATIENT
Start: 2018-04-12 | End: 2018-04-12

## 2018-04-12 RX ADMIN — ATORVASTATIN CALCIUM 40 MILLIGRAM(S): 80 TABLET, FILM COATED ORAL at 22:09

## 2018-04-12 RX ADMIN — Medication 6 UNIT(S): at 11:51

## 2018-04-12 RX ADMIN — TAMSULOSIN HYDROCHLORIDE 0.4 MILLIGRAM(S): 0.4 CAPSULE ORAL at 22:09

## 2018-04-12 RX ADMIN — Medication 88 MICROGRAM(S): at 05:24

## 2018-04-12 RX ADMIN — Medication 50 GRAM(S): at 11:16

## 2018-04-12 RX ADMIN — INSULIN GLARGINE 16 UNIT(S): 100 INJECTION, SOLUTION SUBCUTANEOUS at 10:41

## 2018-04-12 RX ADMIN — Medication 50 MILLIGRAM(S): at 05:24

## 2018-04-12 RX ADMIN — Medication 6 UNIT(S): at 18:26

## 2018-04-12 RX ADMIN — Medication 2: at 18:26

## 2018-04-12 RX ADMIN — WARFARIN SODIUM 6 MILLIGRAM(S): 2.5 TABLET ORAL at 22:09

## 2018-04-12 NOTE — CONSULT NOTE ADULT - SUBJECTIVE AND OBJECTIVE BOX
NEPHROLOGY CONSULTATION NOTE    Patient is a 78y Male whom presented to the hospital with     PAST MEDICAL & SURGICAL HISTORY:  Diabetes mellitus  Hypothyroidism  Hypertension  Dyslipidemia  Benign prostate hyperplasia  Atrial fibrillation  Heart failure: chronic systolic heart failure  Coronary artery disease: s/p CABG 2014  S/P placement of cardiac pacemaker: CRT-D  S/P CABG (coronary artery bypass graft)    Allergies:  No Known Allergies    Home Medications Reviewed  Hospital Medications:   MEDICATIONS  (STANDING):  aspirin  chewable 81 milliGRAM(s) Oral daily  atorvastatin 40 milliGRAM(s) Oral at bedtime  dextrose 5%. 1000 milliLiter(s) (50 mL/Hr) IV Continuous <Continuous>  dextrose 50% Injectable 12.5 Gram(s) IV Push once  dextrose 50% Injectable 25 Gram(s) IV Push once  dextrose 50% Injectable 25 Gram(s) IV Push once  insulin glargine Injectable (LANTUS) 16 Unit(s) SubCutaneous every morning  insulin lispro (HumaLOG) corrective regimen sliding scale   SubCutaneous three times a day before meals  insulin lispro Injectable (HumaLOG) 6 Unit(s) SubCutaneous three times a day before meals  latanoprost 0.005% Ophthalmic Solution 1 Drop(s) Both EYES at bedtime  levothyroxine 88 MICROGram(s) Oral daily  metoprolol succinate ER 50 milliGRAM(s) Oral daily  tamsulosin 0.4 milliGRAM(s) Oral at bedtime      SOCIAL HISTORY:  Denies ETOH,Smoking,   FAMILY HISTORY:  Family history of coronary artery disease (Father)        REVIEW OF SYSTEMS:  CONSTITUTIONAL: No weakness, fevers or chills  EYES/ENT: No visual changes;  No vertigo or throat pain   NECK: No pain or stiffness  RESPIRATORY: No cough, wheezing, hemoptysis; No shortness of breath  CARDIOVASCULAR: No chest pain or palpitations.  GASTROINTESTINAL: No abdominal or epigastric pain. No nausea, vomiting, or hematemesis; No diarrhea or constipation. No melena or hematochezia.  GENITOURINARY: No dysuria, frequency, foamy urine, urinary urgency, incontinence or hematuria  NEUROLOGICAL: No numbness or weakness  SKIN: No itching, burning, rashes, or lesions   VASCULAR: No bilateral lower extremity edema.   All other review of systems is negative unless indicated above.    VITALS:  T(F): 96.7 (04-12-18 @ 05:26), Max: 96.8 (04-11-18 @ 21:00)  HR: 59 (04-12-18 @ 05:26)  BP: 111/59 (04-12-18 @ 05:26)  RR: 18 (04-12-18 @ 05:26)  SpO2: --        I&O's Detail    Creatine Kinase, Serum: 1979 U/L (04-11-18 @ 18:18)      PHYSICAL EXAM:  Constitutional: NAD  HEENT: anicteric sclera, oropharynx clear, MMM  Neck: No JVD  Respiratory: CTAB, no wheezes, rales or rhonchi  Cardiovascular: S1, S2, RRR  Gastrointestinal: BS+, soft, NT/ND  Extremities: No cyanosis or clubbing. No peripheral edema  Neurological: A/O x 3, no focal deficits  Psychiatric: Normal mood, normal affect  : No CVA tenderness. No davis.   Skin: No rashes  Vascular Access:    LABS:  04-12    138  |  102  |  42<H>  ----------------------------<  84  4.8   |  26  |  1.6<H>    Ca    8.5      12 Apr 2018 08:10  Mg     1.9     04-12    TPro  6.7  /  Alb  4.0  /  TBili  0.7  /  DBili  0.2  /  AST  67<H>  /  ALT  31  /  AlkPhos  92  04-11    Creatinine Trend: 1.6 <--, 2.0 <--, 1.9 <--, 1.3 <--, 1.2 <--, 1.3 <--                        11.1   6.15  )-----------( 120      ( 12 Apr 2018 08:10 )             32.4     Urine Studies:    Sodium, Random Urine: 90.0 mmoL/L (04-12 @ 06:09)  Potassium, Random Urine: 43 mmol/L (04-12 @ 06:09)  Creatinine, Random Urine: 88 mg/dL (04-12 @ 06:09)            RADIOLOGY & ADDITIONAL STUDIES: NEPHROLOGY CONSULTATION NOTE    Patient is a 78y Male whom presented to the hospital with worsening lightheadedness likely 2/2 orthostatic hypotension and dyspnea with exertion likely 2/2 worsening chronic HFrEF 30% due to medication non-compliance. Other significant pmh includes CAD s/p CABG (2014), A.fib on coumain, chronic systolic CHF s/p defibrillator, uncontrolled DMII (A1C 10.4), hypothyroidism, HTN, DLD, BPH, obesity. Hospital course complicated by MAHESH, baseline cr 1.3 and now 1.9. Pt was taking HCTZ, losartan and lasix which was discontinued      PAST MEDICAL & SURGICAL HISTORY:  Diabetes mellitus  Hypothyroidism  Hypertension  Dyslipidemia  Benign prostate hyperplasia  Atrial fibrillation  Heart failure: chronic systolic heart failure  Coronary artery disease: s/p CABG 2014  S/P placement of cardiac pacemaker: CRT-D  S/P CABG (coronary artery bypass graft)    Allergies:  No Known Allergies    Home Medications:  aspirin 81 mg oral tablet, chewable: 1 tab(s) orally once a day (09 Apr 2018 21:13)  atorvastatin 40 mg oral tablet: 1 tab(s) orally once a day (at bedtime) (09 Apr 2018 21:09)  Flomax 0.4 mg oral capsule: 1 cap(s) orally once a day (at bedtime) (09 Apr 2018 21:12)  Lasix 40 mg oral tablet: 1 tab(s) orally once a day (09 Apr 2018 21:10)  latanoprost 0.005% ophthalmic solution: 1 drop(s) to each affected eye once a day (in the evening) (09 Apr 2018 21:11)  levothyroxine 125 mcg (0.125 mg) oral capsule: 1 cap(s) orally once a day (09 Apr 2018 21:10)  losartan-hydrochlorothiazide 100mg-12.5mg oral tablet: 1 tab(s) orally once a day (09 Apr 2018 21:11)  metFORMIN 1000 mg oral tablet: 1 tab(s) orally 2 times a day (09 Apr 2018 21:11)  metoprolol succinate 50 mg oral tablet, extended release: 1 tab(s) orally once a day (09 Apr 2018 21:12)  Toujeo SoloStar 300 units/mL subcutaneous solution: 16 unit(s) subcutaneous once a day (09 Apr 2018 21:12)  warfarin 7.5 mg oral tablet: 1 tab(s) orally once a day (09 Apr 2018 21:09)      Hospital Medications:   MEDICATIONS  (STANDING):  aspirin  chewable 81 milliGRAM(s) Oral daily  atorvastatin 40 milliGRAM(s) Oral at bedtime  dextrose 5%. 1000 milliLiter(s) (50 mL/Hr) IV Continuous <Continuous>  dextrose 50% Injectable 12.5 Gram(s) IV Push once  dextrose 50% Injectable 25 Gram(s) IV Push once  dextrose 50% Injectable 25 Gram(s) IV Push once  insulin glargine Injectable (LANTUS) 16 Unit(s) SubCutaneous every morning  insulin lispro (HumaLOG) corrective regimen sliding scale   SubCutaneous three times a day before meals  insulin lispro Injectable (HumaLOG) 6 Unit(s) SubCutaneous three times a day before meals  latanoprost 0.005% Ophthalmic Solution 1 Drop(s) Both EYES at bedtime  levothyroxine 88 MICROGram(s) Oral daily  metoprolol succinate ER 50 milliGRAM(s) Oral daily  tamsulosin 0.4 milliGRAM(s) Oral at bedtime      SOCIAL HISTORY:  Denies ETOH,Smoking,   FAMILY HISTORY:  Family history of coronary artery disease (Father)        REVIEW OF SYSTEMS:  CONSTITUTIONAL: No weakness, fevers or chills  EYES/ENT: No visual changes;  No vertigo or throat pain   NECK: No pain or stiffness  RESPIRATORY: No cough, wheezing, hemoptysis; No shortness of breath  CARDIOVASCULAR: No chest pain or palpitations.  GASTROINTESTINAL: No abdominal or epigastric pain. No nausea, vomiting, or hematemesis; No diarrhea or constipation. No melena or hematochezia.  GENITOURINARY: No dysuria, frequency, foamy urine, urinary urgency, incontinence or hematuria  NEUROLOGICAL: No numbness or weakness  SKIN: No itching, burning, rashes, or lesions   VASCULAR: No bilateral lower extremity edema.   All other review of systems is negative unless indicated above.    VITALS:  T(F): 96.7 (04-12-18 @ 05:26), Max: 96.8 (04-11-18 @ 21:00)  HR: 59 (04-12-18 @ 05:26)  BP: 111/59 (04-12-18 @ 05:26)  RR: 18 (04-12-18 @ 05:26)  SpO2: --        I&O's Detail      Creatine Kinase, Serum: 1979 U/L (04-11-18 @ 18:18)      PHYSICAL EXAM:  Constitutional: NAD  HEENT: anicteric sclera, oropharynx clear, MMM  Neck: No JVD  Respiratory: CTAB, no wheezes, rales or rhonchi  Cardiovascular: S1, S2, RRR  Gastrointestinal: BS+, soft, NT/ND  Extremities: No cyanosis or clubbing. No peripheral edema  Neurological: A/O x 3, no focal deficits  Psychiatric: Normal mood, normal affect  : No CVA tenderness. No davis.   Skin: No rashes  Vascular Access:    LABS:  04-12    138  |  102  |  42<H>  ----------------------------<  84  4.8   |  26  |  1.6<H>    Ca    8.5      12 Apr 2018 08:10  Mg     1.9     04-12    TPro  6.7  /  Alb  4.0  /  TBili  0.7  /  DBili  0.2  /  AST  67<H>  /  ALT  31  /  AlkPhos  92  04-11    Creatinine Trend: 1.6 <--, 2.0 <--, 1.9 <--, 1.3 <--, 1.2 <--, 1.3 <--                        11.1   6.15  )-----------( 120      ( 12 Apr 2018 08:10 )             32.4     Urine Studies:    Sodium, Random Urine: 90.0 mmoL/L (04-12 @ 06:09)  Potassium, Random Urine: 43 mmol/L (04-12 @ 06:09)  Creatinine, Random Urine: 88 mg/dL (04-12 @ 06:09)            RADIOLOGY & ADDITIONAL STUDIES: NEPHROLOGY CONSULTATION NOTE    Patient is a 78y Male whom presented to the hospital with worsening lightheadedness likely 2/2 orthostatic hypotension and dyspnea with exertion likely 2/2 worsening chronic HFrEF 30% due to medication non-compliance. Other significant pmh includes CAD s/p CABG (2014), A.fib on coumain, chronic systolic CHF s/p defibrillator, uncontrolled DMII (A1C 10.4), hypothyroidism, HTN, DLD, BPH, obesity. Hospital course complicated by MAHESH, baseline cr 1.3 and now 1.9. Pt was taking HCTZ, losartan and lasix which was discontinued      PAST MEDICAL & SURGICAL HISTORY:  Diabetes mellitus  Hypothyroidism  Hypertension  Dyslipidemia  Benign prostate hyperplasia  Atrial fibrillation  Heart failure: chronic systolic heart failure  Coronary artery disease: s/p CABG 2014  S/P placement of cardiac pacemaker: CRT-D  S/P CABG (coronary artery bypass graft)    Allergies:  No Known Allergies    Home Medications:  aspirin 81 mg oral tablet, chewable: 1 tab(s) orally once a day (09 Apr 2018 21:13)  atorvastatin 40 mg oral tablet: 1 tab(s) orally once a day (at bedtime) (09 Apr 2018 21:09)  Flomax 0.4 mg oral capsule: 1 cap(s) orally once a day (at bedtime) (09 Apr 2018 21:12)  Lasix 40 mg oral tablet: 1 tab(s) orally once a day (09 Apr 2018 21:10)  latanoprost 0.005% ophthalmic solution: 1 drop(s) to each affected eye once a day (in the evening) (09 Apr 2018 21:11)  levothyroxine 125 mcg (0.125 mg) oral capsule: 1 cap(s) orally once a day (09 Apr 2018 21:10)  losartan-hydrochlorothiazide 100mg-12.5mg oral tablet: 1 tab(s) orally once a day (09 Apr 2018 21:11)  metFORMIN 1000 mg oral tablet: 1 tab(s) orally 2 times a day (09 Apr 2018 21:11)  metoprolol succinate 50 mg oral tablet, extended release: 1 tab(s) orally once a day (09 Apr 2018 21:12)  Toujeo SoloStar 300 units/mL subcutaneous solution: 16 unit(s) subcutaneous once a day (09 Apr 2018 21:12)  warfarin 7.5 mg oral tablet: 1 tab(s) orally once a day (09 Apr 2018 21:09)      Hospital Medications:   MEDICATIONS  (STANDING):  aspirin  chewable 81 milliGRAM(s) Oral daily  atorvastatin 40 milliGRAM(s) Oral at bedtime  dextrose 5%. 1000 milliLiter(s) (50 mL/Hr) IV Continuous <Continuous>  dextrose 50% Injectable 12.5 Gram(s) IV Push once  dextrose 50% Injectable 25 Gram(s) IV Push once  dextrose 50% Injectable 25 Gram(s) IV Push once  insulin glargine Injectable (LANTUS) 16 Unit(s) SubCutaneous every morning  insulin lispro (HumaLOG) corrective regimen sliding scale   SubCutaneous three times a day before meals  insulin lispro Injectable (HumaLOG) 6 Unit(s) SubCutaneous three times a day before meals  latanoprost 0.005% Ophthalmic Solution 1 Drop(s) Both EYES at bedtime  levothyroxine 88 MICROGram(s) Oral daily  metoprolol succinate ER 50 milliGRAM(s) Oral daily  tamsulosin 0.4 milliGRAM(s) Oral at bedtime      SOCIAL HISTORY:  Denies ETOH,Smoking,   FAMILY HISTORY:  Family history of coronary artery disease (Father)        REVIEW OF SYSTEMS:  CONSTITUTIONAL: No weakness, fevers or chills  EYES/ENT: No visual changes;  No vertigo or throat pain   NECK: No pain or stiffness  RESPIRATORY: No cough, wheezing, hemoptysis; No shortness of breath  CARDIOVASCULAR: No chest pain or palpitations.  GASTROINTESTINAL: No abdominal or epigastric pain. No nausea, vomiting, or hematemesis; No diarrhea or constipation. No melena or hematochezia.  GENITOURINARY: No dysuria, frequency, foamy urine, urinary urgency, incontinence or hematuria  NEUROLOGICAL: No numbness or weakness  SKIN: No itching, burning, rashes, or lesions   VASCULAR: No bilateral lower extremity edema.   All other review of systems is negative unless indicated above.    VITALS:  T(F): 96.7 (04-12-18 @ 05:26), Max: 96.8 (04-11-18 @ 21:00)  HR: 59 (04-12-18 @ 05:26)  BP: 111/59 (04-12-18 @ 05:26)  RR: 18 (04-12-18 @ 05:26)  SpO2: --        I&O's Detail      Creatine Kinase, Serum: 1979 U/L (04-11-18 @ 18:18)      PHYSICAL EXAM:  Constitutional: NAD  HEENT: gum bleeding  Neck: No JVD  Respiratory: CTAB, no wheezes, rales or rhonchi  Cardiovascular: S1, S2, RRR  Gastrointestinal: BS+, soft, NT/ND  Extremities: No cyanosis or clubbing. No peripheral edema  Neurological: A/O x 3, no focal deficits  Psychiatric: Normal mood, normal affect  : No CVA tenderness. No davis.   Skin: No rashes  Vascular Access:    LABS:  04-12    138  |  102  |  42<H>  ----------------------------<  84  4.8   |  26  |  1.6<H>    Ca    8.5      12 Apr 2018 08:10  Mg     1.9     04-12    TPro  6.7  /  Alb  4.0  /  TBili  0.7  /  DBili  0.2  /  AST  67<H>  /  ALT  31  /  AlkPhos  92  04-11    Creatinine Trend: 1.6 <--, 2.0 <--, 1.9 <--, 1.3 <--, 1.2 <--, 1.3 <--                        11.1   6.15  )-----------( 120      ( 12 Apr 2018 08:10 )             32.4     Urine Studies:    Sodium, Random Urine: 90.0 mmoL/L (04-12 @ 06:09)  Potassium, Random Urine: 43 mmol/L (04-12 @ 06:09)  Creatinine, Random Urine: 88 mg/dL (04-12 @ 06:09)            RADIOLOGY & ADDITIONAL STUDIES: NEPHROLOGY CONSULTATION NOTE    Patient is a 78y Male whom presented to the hospital with worsening lightheadedness likely 2/2 orthostatic hypotension and dyspnea with exertion likely 2/2 worsening chronic HFrEF 30% due to medication non-compliance. Other significant pmh includes CAD s/p CABG (2014), A.fib on coumain, chronic systolic CHF s/p defibrillator, uncontrolled DMII (A1C 10.4), hypothyroidism, HTN, DLD, BPH, obesity. Hospital course complicated by MAHESH, baseline cr 1.3 and now 1.9. Pt was taking HCTZ, losartan and lasix which was discontinued. Pt is currently feeling better. Has no problem urinating. No fever, chill, HA, CP, SOB, abd pain, n/v/d.       PAST MEDICAL & SURGICAL HISTORY:  Diabetes mellitus  Hypothyroidism  Hypertension  Dyslipidemia  Benign prostate hyperplasia  Atrial fibrillation  Heart failure: chronic systolic heart failure  Coronary artery disease: s/p CABG 2014  S/P placement of cardiac pacemaker: CRT-D  S/P CABG (coronary artery bypass graft)    Allergies:  No Known Allergies    Home Medications:  aspirin 81 mg oral tablet, chewable: 1 tab(s) orally once a day (09 Apr 2018 21:13)  atorvastatin 40 mg oral tablet: 1 tab(s) orally once a day (at bedtime) (09 Apr 2018 21:09)  Flomax 0.4 mg oral capsule: 1 cap(s) orally once a day (at bedtime) (09 Apr 2018 21:12)  Lasix 40 mg oral tablet: 1 tab(s) orally once a day (09 Apr 2018 21:10)  latanoprost 0.005% ophthalmic solution: 1 drop(s) to each affected eye once a day (in the evening) (09 Apr 2018 21:11)  levothyroxine 125 mcg (0.125 mg) oral capsule: 1 cap(s) orally once a day (09 Apr 2018 21:10)  losartan-hydrochlorothiazide 100mg-12.5mg oral tablet: 1 tab(s) orally once a day (09 Apr 2018 21:11)  metFORMIN 1000 mg oral tablet: 1 tab(s) orally 2 times a day (09 Apr 2018 21:11)  metoprolol succinate 50 mg oral tablet, extended release: 1 tab(s) orally once a day (09 Apr 2018 21:12)  Toujeo SoloStar 300 units/mL subcutaneous solution: 16 unit(s) subcutaneous once a day (09 Apr 2018 21:12)  warfarin 7.5 mg oral tablet: 1 tab(s) orally once a day (09 Apr 2018 21:09)      Hospital Medications:   MEDICATIONS  (STANDING):  aspirin  chewable 81 milliGRAM(s) Oral daily  atorvastatin 40 milliGRAM(s) Oral at bedtime  dextrose 5%. 1000 milliLiter(s) (50 mL/Hr) IV Continuous <Continuous>  dextrose 50% Injectable 12.5 Gram(s) IV Push once  dextrose 50% Injectable 25 Gram(s) IV Push once  dextrose 50% Injectable 25 Gram(s) IV Push once  insulin glargine Injectable (LANTUS) 16 Unit(s) SubCutaneous every morning  insulin lispro (HumaLOG) corrective regimen sliding scale   SubCutaneous three times a day before meals  insulin lispro Injectable (HumaLOG) 6 Unit(s) SubCutaneous three times a day before meals  latanoprost 0.005% Ophthalmic Solution 1 Drop(s) Both EYES at bedtime  levothyroxine 88 MICROGram(s) Oral daily  metoprolol succinate ER 50 milliGRAM(s) Oral daily  tamsulosin 0.4 milliGRAM(s) Oral at bedtime      SOCIAL HISTORY:  Denies ETOH,Smoking,   FAMILY HISTORY:  Family history of coronary artery disease (Father)        REVIEW OF SYSTEMS:  CONSTITUTIONAL: No weakness, fevers or chills  EYES/ENT: No visual changes;  No vertigo or throat pain   NECK: No pain or stiffness  RESPIRATORY: No cough, wheezing, hemoptysis; No shortness of breath  CARDIOVASCULAR: No chest pain or palpitations.  GASTROINTESTINAL: No abdominal or epigastric pain. No nausea, vomiting, or hematemesis; No diarrhea or constipation. No melena or hematochezia.  GENITOURINARY: No dysuria, frequency, foamy urine, urinary urgency, incontinence or hematuria  NEUROLOGICAL: No numbness or weakness  SKIN: No itching, burning, rashes, or lesions   VASCULAR: No bilateral lower extremity edema.   All other review of systems is negative unless indicated above.    VITALS:  T(F): 96.7 (04-12-18 @ 05:26), Max: 96.8 (04-11-18 @ 21:00)  HR: 59 (04-12-18 @ 05:26)  BP: 111/59 (04-12-18 @ 05:26)  RR: 18 (04-12-18 @ 05:26)  SpO2: --        I&O's Detail      Creatine Kinase, Serum: 1979 U/L (04-11-18 @ 18:18)      PHYSICAL EXAM:  Constitutional: NAD  HEENT: gum bleeding  Neck: No JVD  Respiratory: CTAB, no wheezes, rales or rhonchi  Cardiovascular: S1, S2, RRR  Gastrointestinal: BS+, soft, NT/ND  Extremities: No cyanosis or clubbing. No peripheral edema  Neurological: A/O x 3, no focal deficits  Psychiatric: Normal mood, normal affect  : No CVA tenderness. No davis.   Skin: No rashes  Vascular Access:    LABS:  04-12    138  |  102  |  42<H>  ----------------------------<  84  4.8   |  26  |  1.6<H>    Ca    8.5      12 Apr 2018 08:10  Mg     1.9     04-12    TPro  6.7  /  Alb  4.0  /  TBili  0.7  /  DBili  0.2  /  AST  67<H>  /  ALT  31  /  AlkPhos  92  04-11    Creatinine Trend: 1.6 <--, 2.0 <--, 1.9 <--, 1.3 <--, 1.2 <--, 1.3 <--                        11.1   6.15  )-----------( 120      ( 12 Apr 2018 08:10 )             32.4     Urine Studies:    Sodium, Random Urine: 90.0 mmoL/L (04-12 @ 06:09)  Potassium, Random Urine: 43 mmol/L (04-12 @ 06:09)  Creatinine, Random Urine: 88 mg/dL (04-12 @ 06:09)            RADIOLOGY & ADDITIONAL STUDIES: NEPHROLOGY CONSULTATION NOTE    Patient is a 78y Male whom presented to the hospital with worsening lightheadedness likely 2/2 orthostatic hypotension and dyspnea with exertion likely 2/2 worsening chronic HFrEF 30% due to medication non-compliance. Other significant pmh includes CAD s/p CABG (2014), A.fib on coumain, chronic systolic CHF s/p defibrillator, uncontrolled DMII (A1C 10.4), hypothyroidism, HTN, DLD, BPH, obesity. Hospital course complicated by MAHESH, baseline cr 1.3 and now 1.9. Pt was taking HCTZ, losartan and lasix which was discontinued. Pt is currently feeling better. Has no problem urinating. No fever, chill, HA, CP, SOB, abd pain, n/v/d.       PAST MEDICAL & SURGICAL HISTORY:  Diabetes mellitus  Hypothyroidism  Hypertension  Dyslipidemia  Benign prostate hyperplasia  Atrial fibrillation  Heart failure: chronic systolic heart failure  Coronary artery disease: s/p CABG 2014  S/P placement of cardiac pacemaker: CRT-D  S/P CABG (coronary artery bypass graft)    Allergies:  No Known Allergies    Home Medications:  aspirin 81 mg oral tablet, chewable: 1 tab(s) orally once a day (09 Apr 2018 21:13)  atorvastatin 40 mg oral tablet: 1 tab(s) orally once a day (at bedtime) (09 Apr 2018 21:09)  Flomax 0.4 mg oral capsule: 1 cap(s) orally once a day (at bedtime) (09 Apr 2018 21:12)  Lasix 40 mg oral tablet: 1 tab(s) orally once a day (09 Apr 2018 21:10)  latanoprost 0.005% ophthalmic solution: 1 drop(s) to each affected eye once a day (in the evening) (09 Apr 2018 21:11)  levothyroxine 125 mcg (0.125 mg) oral capsule: 1 cap(s) orally once a day (09 Apr 2018 21:10)  losartan-hydrochlorothiazide 100mg-12.5mg oral tablet: 1 tab(s) orally once a day (09 Apr 2018 21:11)  metFORMIN 1000 mg oral tablet: 1 tab(s) orally 2 times a day (09 Apr 2018 21:11)  metoprolol succinate 50 mg oral tablet, extended release: 1 tab(s) orally once a day (09 Apr 2018 21:12)  Toujeo SoloStar 300 units/mL subcutaneous solution: 16 unit(s) subcutaneous once a day (09 Apr 2018 21:12)  warfarin 7.5 mg oral tablet: 1 tab(s) orally once a day (09 Apr 2018 21:09)      Hospital Medications:   MEDICATIONS  (STANDING):  aspirin  chewable 81 milliGRAM(s) Oral daily  atorvastatin 40 milliGRAM(s) Oral at bedtime  dextrose 5%. 1000 milliLiter(s) (50 mL/Hr) IV Continuous <Continuous>  dextrose 50% Injectable 12.5 Gram(s) IV Push once  dextrose 50% Injectable 25 Gram(s) IV Push once  dextrose 50% Injectable 25 Gram(s) IV Push once  insulin glargine Injectable (LANTUS) 16 Unit(s) SubCutaneous every morning  insulin lispro (HumaLOG) corrective regimen sliding scale   SubCutaneous three times a day before meals  insulin lispro Injectable (HumaLOG) 6 Unit(s) SubCutaneous three times a day before meals  latanoprost 0.005% Ophthalmic Solution 1 Drop(s) Both EYES at bedtime  levothyroxine 88 MICROGram(s) Oral daily  metoprolol succinate ER 50 milliGRAM(s) Oral daily  tamsulosin 0.4 milliGRAM(s) Oral at bedtime      SOCIAL HISTORY:  Denies ETOH,Smoking,   FAMILY HISTORY:  Family history of coronary artery disease (Father)        REVIEW OF SYSTEMS:  CONSTITUTIONAL: No weakness, fevers or chills  EYES/ENT: No visual changes;  No vertigo or throat pain   NECK: c/o discomfort in his shoulders  RESPIRATORY: No cough, wheezing, hemoptysis; No shortness of breath  CARDIOVASCULAR: No chest pain or palpitations.  GASTROINTESTINAL: No abdominal or epigastric pain. No nausea, vomiting, or hematemesis; No diarrhea or constipation.   GENITOURINARY: No dysuria, frequency, foamy urine, urinary urgency, incontinence or hematuria  SKIN: No itching, burning, rashes, or lesions   VASCULAR: No bilateral lower extremity edema.   All other review of systems is negative unless indicated above.    VITALS:  T(F): 96.7 (04-12-18 @ 05:26), Max: 96.8 (04-11-18 @ 21:00)  HR: 59 (04-12-18 @ 05:26)  BP: 111/59 (04-12-18 @ 05:26)  RR: 18 (04-12-18 @ 05:26)  SpO2: --        I&O's Detail      Creatine Kinase, Serum: 1979 U/L (04-11-18 @ 18:18)      PHYSICAL EXAM:  Constitutional: NAD  HEENT: gum bleeding  Neck: No JVD  Respiratory: CTAB, no wheezes, rales or rhonchi  Cardiovascular: S1, S2, RRR  Gastrointestinal: BS+, soft, NT/ND  Extremities: No cyanosis or clubbing. No peripheral edema  Neurological: A/O x 3, no focal deficits  Psychiatric: Normal mood, normal affect  : No CVA tenderness. No davis.   Skin: No rashes  Vascular Access:    LABS:  04-12    138  |  102  |  42<H>  ----------------------------<  84  4.8   |  26  |  1.6<H>    Ca    8.5      12 Apr 2018 08:10  Mg     1.9     04-12    TPro  6.7  /  Alb  4.0  /  TBili  0.7  /  DBili  0.2  /  AST  67<H>  /  ALT  31  /  AlkPhos  92  04-11    Creatinine Trend: 1.6 <--, 2.0 <--, 1.9 <--, 1.3 <--, 1.2 <--, 1.3 <--                        11.1   6.15  )-----------( 120      ( 12 Apr 2018 08:10 )             32.4     Urine Studies:    Sodium, Random Urine: 90.0 mmoL/L (04-12 @ 06:09)  Potassium, Random Urine: 43 mmol/L (04-12 @ 06:09)  Creatinine, Random Urine: 88 mg/dL (04-12 @ 06:09)            RADIOLOGY & ADDITIONAL STUDIES:

## 2018-04-12 NOTE — CONSULT NOTE ADULT - ASSESSMENT
78y Male pmh of CAD s/p CABG (2014), A.fib on coumain, chronic systolic CHF s/p defibrillator, uncontrolled DMII (A1C 10.4), hypothyroidism, HTN, DLD, BPH, obesity, whom presented to the hospital with worsening lightheadedness likely 2/2 orthostatic hypotension and dyspnea with exertion likely 2/2 worsening chronic HFrEF 30% due to medication non-compliance. Hospital course complicated by MAHESH, baseline cr 1.3 and now 1.9. Pt was taking HCTZ, losartan and lasix which was discontinued.     # MAHESH likely pre-renal  - was on lasix 40mg (last does 4/11AM), now off lasix, HCTZ, losartan  - baseline crt 1.3, Crt today 1.6 from 2.0 (4/11)  - BUN 42 today, 27 on presentation (4/10)  - FENA 1.1% (pt was on lasix)  - check US bladder/kidney to r/o obstruction, check post voidal  - keep I=O, strict I and O, no IVF for now  - low Na diet 78y Male pmh of CAD s/p CABG (2014), A.fib on coumain, chronic systolic CHF s/p defibrillator, uncontrolled DMII (A1C 10.4), hypothyroidism, HTN, DLD, BPH, obesity, whom presented to the hospital with worsening lightheadedness likely 2/2 orthostatic hypotension and dyspnea with exertion likely 2/2 worsening chronic HFrEF 30% due to medication non-compliance. Hospital course complicated by MAHESH, baseline cr 1.3 and now 1.9. Pt was taking HCTZ, losartan and lasix which was discontinued.     # MAHESH likely pre-renal  - was on lasix 40mg (last does 4/11AM), now off lasix, HCTZ, losartan  - baseline crt 1.3, Crt today 1.6 from 2.0 (4/11)  - BUN 42 today, 27 on presentation (4/10)  - FENA 1.1% (pt was on lasix)  - check US bladder/kidney to r/o obstruction, check post voidal  - keep I=O, strict I and O, no IVF for now  - low Na diet  - f/u cardio 78y Male pmh of CAD s/p CABG (2014), A.fib on coumain, chronic systolic CHF s/p defibrillator, uncontrolled DMII (A1C 10.4), hypothyroidism, HTN, DLD, BPH, obesity, whom presented to the hospital with worsening lightheadedness likely 2/2 orthostatic hypotension and dyspnea with exertion likely 2/2 worsening chronic HFrEF 30% due to medication non-compliance. Hospital course complicated by MAHESH, baseline cr 1.3 and now 1.9. Pt was taking HCTZ, losartan and lasix which was discontinued.     # MAHESH likely pre-renal  - was on lasix 40mg (last does 4/11 AM), now off lasix, HCTZ, losartan  - baseline creat 1.3, Crt today 1.6 from 2.0 (4/11), no need for IVF or Lasix  - BUN 42 today, 27 on presentation (4/10)  - FENA 1.1% (pt was on lasix)  - check US bladder/kidney to r/o obstruction, check post voidal  - keep I=O, strict I and O, no IVF for now  - obtain UA  # Elevated CPK trending down, may cont Lipitor for now   -? myositis, w/u as per medicine (SAGE, ESR) if perisistent  #Orthostatic hypotension - likely due to autonomic dysfunction - compression stockings  - TSH, cortisol, mel, no diuretics  - low Na diet  - f/u cardio  D/W HS

## 2018-04-12 NOTE — CONSULT NOTE ADULT - SUBJECTIVE AND OBJECTIVE BOX
Patient is a 78y old  Male who presents with a chief complaint of Worsening lightheadedness over the last month (09 Apr 2018 21:00)      HPI:  76 yo male patient with PMHx of CAD s/p CABG (2014), repeat cath in 2017 showed patent grafts, Atrial fibrillation on coumadin, chronic systolic CHF s/P CRT-D, uncontrolled DM II (last HbA1c 10.4%), hypothyroidism, HTN, DL, BPH, obesity, presented because of worsening lightheadedness over the last month.  Patient says he's been experiencing short episodes of dizziness and light headedness particularly when he stands up from sitting position or when he bends forward. Symptoms lasts for few seconds/minutes, until he stands still or sits down. He denies headaches, denies fainting, denies palpitations, chest pain, nausea vomiting, GI or  symptoms.  Patient has exertional shortness of breath (chronic) because of his heart failure.  On the side, patient says its been around a month or so that he ran out of his synthroid (so he was not taking his thyroid medications), also he says he stopped taking the lasix for around a month now because he was urinating a lot. (09 Apr 2018 21:00)      PAST MEDICAL & SURGICAL HISTORY:  Diabetes mellitus  Hypothyroidism  Hypertension  Dyslipidemia  Benign prostate hyperplasia  Atrial fibrillation  Heart failure: chronic systolic heart failure  Coronary artery disease: s/p CABG 2014  S/P placement of cardiac pacemaker: CRT-D  S/P CABG (coronary artery bypass graft)    (   ) heart valve replacement  (   ) joint replacement  (   ) pregnancy    MEDICATIONS  (STANDING):  aspirin  chewable 81 milliGRAM(s) Oral daily  atorvastatin 40 milliGRAM(s) Oral at bedtime  dextrose 5%. 1000 milliLiter(s) (50 mL/Hr) IV Continuous <Continuous>  dextrose 50% Injectable 12.5 Gram(s) IV Push once  dextrose 50% Injectable 25 Gram(s) IV Push once  dextrose 50% Injectable 25 Gram(s) IV Push once  insulin glargine Injectable (LANTUS) 16 Unit(s) SubCutaneous every morning  insulin lispro (HumaLOG) corrective regimen sliding scale   SubCutaneous three times a day before meals  insulin lispro Injectable (HumaLOG) 6 Unit(s) SubCutaneous three times a day before meals  latanoprost 0.005% Ophthalmic Solution 1 Drop(s) Both EYES at bedtime  levothyroxine 88 MICROGram(s) Oral daily  metoprolol succinate ER 50 milliGRAM(s) Oral daily  tamsulosin 0.4 milliGRAM(s) Oral at bedtime  warfarin 6 milliGRAM(s) Oral once    MEDICATIONS  (PRN):  dextrose Gel 1 Dose(s) Oral once PRN Blood Glucose LESS THAN 70 milliGRAM(s)/deciliter  glucagon  Injectable 1 milliGRAM(s) IntraMuscular once PRN Glucose LESS THAN 70 milligrams/deciliter      REVIEW OF SYSTEMS      General:	    Skin/Breast:  	  Ophthalmologic:  	  ENMT:	    Respiratory and Thorax:  	  Cardiovascular:	    Gastrointestinal:	    Genitourinary:	    Musculoskeletal:	    Neurological:	    Psychiatric:	    Hematology/Lymphatics:	    Endocrine:	    Allergic/Immunologic:	    Allergies    No Known Allergies    Intolerances        FAMILY HISTORY:  Family history of coronary artery disease (Father)      *SOCIAL HISTORY: (   ) Tobacco; (   ) ETOH    Vital Signs Last 24 Hrs  T(C): 36.3 (12 Apr 2018 13:49), Max: 36.3 (12 Apr 2018 13:49)  T(F): 97.3 (12 Apr 2018 13:49), Max: 97.3 (12 Apr 2018 13:49)  HR: 62 (12 Apr 2018 13:49) (59 - 62)  BP: 98/52 (12 Apr 2018 13:49) (98/52 - 111/59)  BP(mean): --  RR: 18 (12 Apr 2018 13:49) (18 - 20)  SpO2: --    LABS:                        11.1   6.15  )-----------( 120      ( 12 Apr 2018 08:10 )             32.4     04-12    138  |  102  |  42<H>  ----------------------------<  84  4.8   |  26  |  1.6<H>    Ca    8.5      12 Apr 2018 08:10  Mg     1.9     04-12    TPro  6.7  /  Alb  4.0  /  TBili  0.7  /  DBili  0.2  /  AST  67<H>  /  ALT  31  /  AlkPhos  92  04-11    Platelet Count - Automated: 120 K/uL <L> [130 - 400] (04-12 @ 08:10)  WBC Count: 6.15 K/uL [4.80 - 10.80] (04-12 @ 08:10)  INR: 1.98 ratio <H> [0.65 - 1.30] (04-12 @ 08:10)  Platelet Count - Automated: 133 K/uL [130 - 400] (04-11 @ 06:27)  WBC Count: 7.84 K/uL [4.80 - 10.80] (04-11 @ 06:27)  INR: 1.88 ratio <H> [0.65 - 1.30] (04-11 @ 06:27)  Platelet Count - Automated: 121 K/uL <L> [130 - 400] (04-10 @ 17:21)  WBC Count: 7.14 K/uL [4.80 - 10.80] (04-10 @ 17:21)  Platelet Count - Automated: 135 K/uL [130 - 400] (04-10 @ 08:24)  WBC Count: 7.47 K/uL [4.80 - 10.80] (04-10 @ 08:24)  INR: 1.80 ratio <H> [0.65 - 1.30] (04-10 @ 08:24)      PT/INR - ( 12 Apr 2018 08:10 )   PT: 21.70 sec;   INR: 1.98 ratio             Last Dental Visit: <<  >>    EOE:  TMJ (   ) clicks                     (   ) pops                     (   ) crepitus             Mandible <<FROM>>             Facial bones and MOM <<grossly intact>>             (   ) trismus             (   ) lymphadenopathy             (   ) swelling             (   ) asymmetry             (   ) palpation             (   ) dyspnea             (   ) dysphagia             (   ) loss of consciousness    IOE:  [id number] blood clot present in post operative (tooth #26) site. Hemostasis achieved.                hard/soft palate:  (   ) palatal torus, <<No pathology noted>>            tongue/FOM <<No pathology noted>>            labial/buccal mucosa <<No pathology noted>>           (   ) percussion           (   ) palpation           (   ) swelling            (   ) abscess           (   ) sinus tract    *DENTAL RADIOGRAPHS: N/A    RADIOLOGY & ADDITIONAL STUDIES:    *ASSESSMENT: [id number] blood clot present in post operative (tooth #26) site. Hemostasis achieved. Patient states that bleeding occurred today at 2am and did not stop until 4am. Instructed patient to apply pressure or bite on a piece of gauze for 20 minutes to stop the bleeding if it happens.       *PLAN: Transport patient to dental clinic tomorrow morning, April 13th, for further evaluation.    PROCEDURE:   Verbal and written consent given.  Anesthesia: <<    >>   Treatment: <<    >>     RECOMMENDATIONS:  1) << Transport patient to dental clinic tomorrow morning, April 13th, for further evaluation.  >>  2) Dental F/U with outpatient dentist for comprehensive dental care.   3) If any difficulty swallowing/breathing, fever occur, return to ER.     Resident Name, pager # Ragnii Mayo, [phone]

## 2018-04-12 NOTE — PROGRESS NOTE ADULT - SUBJECTIVE AND OBJECTIVE BOX
SUBJ:No chest pain or shortness of breath. Still complaining of gum bleeding       MEDICATIONS  (STANDING):  aspirin  chewable 81 milliGRAM(s) Oral daily  atorvastatin 40 milliGRAM(s) Oral at bedtime  dextrose 5%. 1000 milliLiter(s) (50 mL/Hr) IV Continuous <Continuous>  dextrose 50% Injectable 12.5 Gram(s) IV Push once  dextrose 50% Injectable 25 Gram(s) IV Push once  dextrose 50% Injectable 25 Gram(s) IV Push once  insulin glargine Injectable (LANTUS) 16 Unit(s) SubCutaneous every morning  insulin lispro (HumaLOG) corrective regimen sliding scale   SubCutaneous three times a day before meals  insulin lispro Injectable (HumaLOG) 6 Unit(s) SubCutaneous three times a day before meals  latanoprost 0.005% Ophthalmic Solution 1 Drop(s) Both EYES at bedtime  levothyroxine 88 MICROGram(s) Oral daily  metoprolol succinate ER 50 milliGRAM(s) Oral daily  tamsulosin 0.4 milliGRAM(s) Oral at bedtime    MEDICATIONS  (PRN):  dextrose Gel 1 Dose(s) Oral once PRN Blood Glucose LESS THAN 70 milliGRAM(s)/deciliter  glucagon  Injectable 1 milliGRAM(s) IntraMuscular once PRN Glucose LESS THAN 70 milligrams/deciliter            Vital Signs Last 24 Hrs  T(C): 35.9 (12 Apr 2018 05:26), Max: 36 (11 Apr 2018 21:00)  T(F): 96.7 (12 Apr 2018 05:26), Max: 96.8 (11 Apr 2018 21:00)  HR: 59 (12 Apr 2018 05:26) (59 - 70)  BP: 111/59 (12 Apr 2018 05:26) (95/55 - 111/59)  BP(mean): --  RR: 18 (12 Apr 2018 05:26) (18 - 20)  SpO2: --        PHYSICAL EXAM:  · CONSTITUTIONAL:	Well-developed, well nourished   AAO x 3  · EYES:	EOMI; PERRL; no drainage or redness  · HEENMT	No oral lesions; no gross abnormalities still with oozing at extraction site  · NECK:	No bruits; no thyromegaly or nodules  · BACK:	No deformity or limitation of movement  · RESPIRATORY:   airway patent; breath sounds equal; good air movement; respirations non-labored; clear to auscultation bilaterally; no chest wall tenderness; no intercostal retractions; no rales, rhonchi or wheeze  · CARDIOVASCULAR: S1\S2 regular rate and rhythm  no rub  no murmur  normal PMI  . GASTROINTESTINAL:  no distention; no masses palpable; bowel sounds normal; no rebound tenderness; no guarding; no rigidity; no organomegaly  · EXTREMITIES: No cyanosis, clubbing , trace edema  · VASCULAR: 	Equal and normal pulses (carotid, femoral, dorsalis pedis)  · NEUROLOGICAL:   alert and oriented x 3; sensation intact; deep reflexes intact; cranial nerves intact; no spontaneous movement; superficial reflexes intact; normal strength  · MUSCULOSKELETAL:   No calf tenderness  no joint swelling	    LABS:                        11.7   7.84  )-----------( 133      ( 11 Apr 2018 06:27 )             35.7     04-11    138  |  100  |  46<H>  ----------------------------<  156<H>  4.9   |  23  |  2.0<H>    Ca    8.5      11 Apr 2018 18:18  Mg     2.2     04-11    TPro  6.7  /  Alb  4.0  /  TBili  0.7  /  DBili  0.2  /  AST  67<H>  /  ALT  31  /  AlkPhos  92  04-11    CARDIAC MARKERS ( 11 Apr 2018 18:18 )  x     / 0.15 ng/mL / 1979 U/L / x     / x          PT/INR - ( 11 Apr 2018 06:27 )   PT: 20.60 sec;   INR: 1.88 ratio             I&O's Summary    BNP  RADIOLOGY & ADDITIONAL STUDIES:    IMPRESSION AND PLAN:    MAHESH   Hold ACE\ARB   Hold diuretics    Cardiomyopathy\CHF\ CRT   EP Dr. Gipson for optimization of device    Gum bleeding S\P dental work   INR < 2.0     Lightheadedness   Hold diuretics\ ACE\ARB\ Flomax

## 2018-04-12 NOTE — PROGRESS NOTE ADULT - ASSESSMENT
76 yo male patient with PMHx of CAD s/p CABG (2014), repeat cath in 2017 showed patent grafts, Atrial fibrillation on coumadin, chronic systolic CHF s/P CRT-D, uncontrolled DM II (last HbA1c 10.4%), hypothyroidism, HTN, DL, BPH, obesity, presented because of worsening lightheadedness over the last month.   Patient reports that he has been feeling lightheaded when ever he gets up from sitting position and goes away after few seconds to minutes after standing still. Patient also reports that he has not been taking his synthroid and lasix for a month now.   ED: Vitals were all within normal limit /90.     Relevant Investigations on admission   CT scan of head: No acute pathology   Hb >12 MCV: 97.6   BUN: 35- might indicate volume depletion   Venous blood gas within normal limit- No hypoxemia or hypercarbia   Blood glucose >150   EKG: Ventricular paced rhythm- Atrial fibrillation   BNP:1828   CXR: No acute pathology- No congestion   ECHO: EF of 30%- decreases LV systolic function     Admitting Diagnosis: Orthostatic Hypotension     1- Orthostatic Hypotension   - Patient is asymptomatic now and orthostatics were negative   - Discontinued HCTZ and losartan and lasix ( also in face of developing MAHESH)- changed flomax to bedtime     2- Chronic systolic CHF- ECHO revealed EF of 30%  -continue with metoprolol- Discontinued ARB and lasix in face of MAHESH- will restart once Cr is back to baseline   -No evidence of decompensation     3- MAHESH   Baseline Cr is 1.3- Trending down- FeNa is 1.1%   Nephrology on board: Ordered US Renal, Urinalysis, Serum aldosterone and Cortisol- No IV fluids     4- Atrial fibrillation  INR 1.98- Patient refused bridging to lovenox (coumadin clinic wanted to bridge him to lovenox in face of bleeding after dental work)  Will dose Coumadin as per INR     5-Hypothyroidism  TSH is 82- plan to go up on synthroid- Patient has no symptoms of hypothyroidism   Patient was off levothyroxine for the last month    6-Uncontrolled diabetes mellitus  Last Hba1c 10.4%  continue with insulin S/C  F/U endocrinology as outpatient    7- Miscellaneous  DVT proph (already on coumadin)  GI proph  ambulate as tolerated  2gr Na DASH diet, carb consistent  Full code  Disposition home

## 2018-04-12 NOTE — PROGRESS NOTE ADULT - SUBJECTIVE AND OBJECTIVE BOX
Patient is a 78y old  Male who presents with a chief complaint of Worsening lightheadedness over the last month (09 Apr 2018 21:00)      HPI:  76 yo male patient with PMHx of CAD s/p CABG (2014), repeat cath in 2017 showed patent grafts, Atrial fibrillation on coumadin, chronic systolic CHF s/P CRT-D, uncontrolled DM II (last HbA1c 10.4%), hypothyroidism, HTN, DL, BPH, obesity, presented because of worsening lightheadedness over the last month.  Patient says he's been experiencing short episodes of dizziness and light headedness particularly when he stands up from sitting position or when he bends forward. Symptoms lasts for few seconds/minutes, until he stands still or sits down. He denies headaches, denies fainting, denies palpitations, chest pain, nausea vomiting, GI or  symptoms.  Patient has exertional shortness of breath (chronic) because of his heart failure.  On the side, patient says its been around a month or so that he ran out of his synthroid (so he was not taking his thyroid medications), also he says he stopped taking the lasix for around a month now because he was urinating a lot. (09 Apr 2018 21:00)      PAST MEDICAL & SURGICAL HISTORY:  Diabetes mellitus  Hypothyroidism  Hypertension  Dyslipidemia  Benign prostate hyperplasia  Atrial fibrillation  Heart failure: chronic systolic heart failure  Coronary artery disease: s/p CABG 2014  S/P placement of cardiac pacemaker: CRT-D  S/P CABG (coronary artery bypass graft)      Home Medications:  aspirin 81 mg oral tablet, chewable: 1 tab(s) orally once a day (09 Apr 2018 21:13)  atorvastatin 40 mg oral tablet: 1 tab(s) orally once a day (at bedtime) (09 Apr 2018 21:09)  Flomax 0.4 mg oral capsule: 1 cap(s) orally once a day (at bedtime) (09 Apr 2018 21:12)  Lasix 40 mg oral tablet: 1 tab(s) orally once a day (09 Apr 2018 21:10)  latanoprost 0.005% ophthalmic solution: 1 drop(s) to each affected eye once a day (in the evening) (09 Apr 2018 21:11)  levothyroxine 125 mcg (0.125 mg) oral capsule: 1 cap(s) orally once a day (09 Apr 2018 21:10)  losartan-hydrochlorothiazide 100mg-12.5mg oral tablet: 1 tab(s) orally once a day (09 Apr 2018 21:11)  metFORMIN 1000 mg oral tablet: 1 tab(s) orally 2 times a day (09 Apr 2018 21:11)  metoprolol succinate 50 mg oral tablet, extended release: 1 tab(s) orally once a day (09 Apr 2018 21:12)  Toujeo SoloStar 300 units/mL subcutaneous solution: 16 unit(s) subcutaneous once a day (09 Apr 2018 21:12)  warfarin 7.5 mg oral tablet: 1 tab(s) orally once a day (09 Apr 2018 21:09)      No Known Allergies      FAMILY HISTORY:  Family history of coronary artery disease (Father)      aspirin  chewable 81 milliGRAM(s) Oral daily  atorvastatin 40 milliGRAM(s) Oral at bedtime  dextrose 5%. 1000 milliLiter(s) IV Continuous <Continuous>  dextrose 50% Injectable 12.5 Gram(s) IV Push once  dextrose 50% Injectable 25 Gram(s) IV Push once  dextrose 50% Injectable 25 Gram(s) IV Push once  dextrose Gel 1 Dose(s) Oral once PRN  glucagon  Injectable 1 milliGRAM(s) IntraMuscular once PRN  insulin glargine Injectable (LANTUS) 16 Unit(s) SubCutaneous every morning  insulin lispro (HumaLOG) corrective regimen sliding scale   SubCutaneous three times a day before meals  insulin lispro Injectable (HumaLOG) 6 Unit(s) SubCutaneous three times a day before meals  latanoprost 0.005% Ophthalmic Solution 1 Drop(s) Both EYES at bedtime  levothyroxine 88 MICROGram(s) Oral daily  metoprolol succinate ER 50 milliGRAM(s) Oral daily  tamsulosin 0.4 milliGRAM(s) Oral at bedtime      Vital Signs Last 24 Hrs  T(C): 36.3 (12 Apr 2018 13:49), Max: 36.3 (12 Apr 2018 13:49)  T(F): 97.3 (12 Apr 2018 13:49), Max: 97.3 (12 Apr 2018 13:49)  HR: 62 (12 Apr 2018 13:49) (59 - 62)  BP: 98/52 (12 Apr 2018 13:49) (98/52 - 111/59)  BP(mean): --  RR: 18 (12 Apr 2018 13:49) (18 - 20)  SpO2: --    I&O's Summary    12 Apr 2018 07:01  -  12 Apr 2018 14:57  --------------------------------------------------------  IN: 350 mL / OUT: 300 mL / NET: 50 mL                              11.1   6.15  )-----------( 120      ( 12 Apr 2018 08:10 )             32.4       04-12    138  |  102  |  42<H>  ----------------------------<  84  4.8   |  26  |  1.6<H>    Ca    8.5      12 Apr 2018 08:10  Mg     1.9     04-12    TPro  6.7  /  Alb  4.0  /  TBili  0.7  /  DBili  0.2  /  AST  67<H>  /  ALT  31  /  AlkPhos  92  04-11      CARDIAC MARKERS ( 11 Apr 2018 18:18 )  x     / 0.15 ng/mL / 1979 U/L / x     / x

## 2018-04-13 ENCOUNTER — TRANSCRIPTION ENCOUNTER (OUTPATIENT)
Age: 78
End: 2018-04-13

## 2018-04-13 DIAGNOSIS — I50.9 HEART FAILURE, UNSPECIFIED: ICD-10-CM

## 2018-04-13 LAB
ALBUMIN SERPL ELPH-MCNC: 4.2 G/DL — SIGNIFICANT CHANGE UP (ref 3.5–5.2)
ALP SERPL-CCNC: 99 U/L — SIGNIFICANT CHANGE UP (ref 30–115)
ALT FLD-CCNC: 32 U/L — SIGNIFICANT CHANGE UP (ref 0–41)
ANION GAP SERPL CALC-SCNC: 15 MMOL/L — HIGH (ref 7–14)
APPEARANCE UR: CLEAR — SIGNIFICANT CHANGE UP
AST SERPL-CCNC: 63 U/L — HIGH (ref 0–41)
BILIRUB DIRECT SERPL-MCNC: 0.2 MG/DL — SIGNIFICANT CHANGE UP (ref 0–0.2)
BILIRUB INDIRECT FLD-MCNC: 0.5 MG/DL — SIGNIFICANT CHANGE UP (ref 0.2–1.2)
BILIRUB SERPL-MCNC: 0.7 MG/DL — SIGNIFICANT CHANGE UP (ref 0.2–1.2)
BILIRUB UR-MCNC: NEGATIVE — SIGNIFICANT CHANGE UP
BUN SERPL-MCNC: 33 MG/DL — HIGH (ref 10–20)
CALCIUM SERPL-MCNC: 8.4 MG/DL — LOW (ref 8.5–10.1)
CHLORIDE SERPL-SCNC: 99 MMOL/L — SIGNIFICANT CHANGE UP (ref 98–110)
CO2 SERPL-SCNC: 26 MMOL/L — SIGNIFICANT CHANGE UP (ref 17–32)
COLOR SPEC: YELLOW — SIGNIFICANT CHANGE UP
CREAT SERPL-MCNC: 1.3 MG/DL — SIGNIFICANT CHANGE UP (ref 0.7–1.5)
DIFF PNL FLD: NEGATIVE — SIGNIFICANT CHANGE UP
GLUCOSE SERPL-MCNC: 130 MG/DL — HIGH (ref 70–99)
GLUCOSE UR QL: NEGATIVE MG/DL — SIGNIFICANT CHANGE UP
INR BLD: 2.31 RATIO — HIGH (ref 0.65–1.3)
KETONES UR-MCNC: NEGATIVE — SIGNIFICANT CHANGE UP
LEUKOCYTE ESTERASE UR-ACNC: NEGATIVE — SIGNIFICANT CHANGE UP
MAGNESIUM SERPL-MCNC: 2 MG/DL — SIGNIFICANT CHANGE UP (ref 1.8–2.4)
NITRITE UR-MCNC: NEGATIVE — SIGNIFICANT CHANGE UP
PH UR: 6 — SIGNIFICANT CHANGE UP (ref 5–8)
POTASSIUM SERPL-MCNC: 4.8 MMOL/L — SIGNIFICANT CHANGE UP (ref 3.5–5)
POTASSIUM SERPL-SCNC: 4.8 MMOL/L — SIGNIFICANT CHANGE UP (ref 3.5–5)
PROT SERPL-MCNC: 7 G/DL — SIGNIFICANT CHANGE UP (ref 6–8)
PROT UR-MCNC: NEGATIVE MG/DL — SIGNIFICANT CHANGE UP
PROTHROM AB SERPL-ACNC: 25.4 SEC — HIGH (ref 9.95–12.87)
SODIUM SERPL-SCNC: 140 MMOL/L — SIGNIFICANT CHANGE UP (ref 135–146)
SP GR SPEC: 1.01 — SIGNIFICANT CHANGE UP (ref 1.01–1.03)
UROBILINOGEN FLD QL: 1 MG/DL (ref 0.2–0.2)
VIT B12 SERPL-MCNC: 583 PG/ML — SIGNIFICANT CHANGE UP (ref 232–1245)

## 2018-04-13 RX ORDER — LOSARTAN POTASSIUM 100 MG/1
1 TABLET, FILM COATED ORAL
Qty: 60 | Refills: 0
Start: 2018-04-13 | End: 2018-06-11

## 2018-04-13 RX ORDER — FUROSEMIDE 40 MG
1 TABLET ORAL
Qty: 0 | Refills: 0 | COMMUNITY

## 2018-04-13 RX ORDER — WARFARIN SODIUM 2.5 MG/1
3 TABLET ORAL ONCE
Qty: 0 | Refills: 0 | Status: COMPLETED | OUTPATIENT
Start: 2018-04-13 | End: 2018-04-13

## 2018-04-13 RX ORDER — LOSARTAN/HYDROCHLOROTHIAZIDE 100MG-25MG
1 TABLET ORAL
Qty: 0 | Refills: 0 | COMMUNITY

## 2018-04-13 RX ADMIN — Medication 6 UNIT(S): at 12:35

## 2018-04-13 RX ADMIN — ATORVASTATIN CALCIUM 40 MILLIGRAM(S): 80 TABLET, FILM COATED ORAL at 21:51

## 2018-04-13 RX ADMIN — TAMSULOSIN HYDROCHLORIDE 0.4 MILLIGRAM(S): 0.4 CAPSULE ORAL at 21:51

## 2018-04-13 RX ADMIN — WARFARIN SODIUM 3 MILLIGRAM(S): 2.5 TABLET ORAL at 21:51

## 2018-04-13 RX ADMIN — Medication 6 UNIT(S): at 16:31

## 2018-04-13 RX ADMIN — Medication 50 MILLIGRAM(S): at 05:21

## 2018-04-13 RX ADMIN — LATANOPROST 1 DROP(S): 0.05 SOLUTION/ DROPS OPHTHALMIC; TOPICAL at 21:52

## 2018-04-13 RX ADMIN — INSULIN GLARGINE 16 UNIT(S): 100 INJECTION, SOLUTION SUBCUTANEOUS at 08:01

## 2018-04-13 RX ADMIN — Medication 6 UNIT(S): at 08:01

## 2018-04-13 RX ADMIN — Medication 88 MICROGRAM(S): at 05:21

## 2018-04-13 NOTE — PROGRESS NOTE ADULT - ASSESSMENT
78y Male pmh of CAD s/p CABG (2014), A.fib on coumain, chronic systolic CHF s/p defibrillator, uncontrolled DMII (A1C 10.4), hypothyroidism, HTN, DLD, BPH, obesity, whom presented to the hospital with worsening lightheadedness likely 2/2 orthostatic hypotension and dyspnea with exertion likely 2/2 worsening chronic HFrEF 30% due to medication non-compliance. Hospital course complicated by MAHESH, baseline cr 1.3 and now 1.9. Pt was taking HCTZ, losartan and lasix which was discontinued.     # MAHESH - pre-renal  - resolved with holding lasix and higher BP  - no need for Lasix, may cont Losartan  - kidney sono neg for obstruction    # Elevated CPK trending down, may cont Lipitor for now   - please monitor CPK and make sure is coming down otherwise will need to hold Lipitor or at least resude the dose    will sign off  call as needed    D/W HS

## 2018-04-13 NOTE — CONSULT NOTE ADULT - CONSULT REQUESTED DATE/TIME
13-Apr-2018 13:27
10-Apr-2018 10:02
10-Apr-2018 16:20
12-Apr-2018
13-Apr-2018 12:18
11-Apr-2018 09:47
20.07

## 2018-04-13 NOTE — CONSULT NOTE ADULT - ATTENDING COMMENTS
Patient examined chart reviewed case discussed with resident and fellow
Normal Device function BiV-ICD  Recommend medical therapy for heart failure  No events   Outpatient f/u with Dr. iGpson

## 2018-04-13 NOTE — DISCHARGE NOTE ADULT - CARE PROVIDERS DIRECT ADDRESSES
,DirectAddress_Unknown,DirectAddress_Unknown,DirectAddress_Unknown,[email or web address]

## 2018-04-13 NOTE — DISCHARGE NOTE ADULT - HOSPITAL COURSE
76 yo male patient with PMHx of CAD s/p CABG (2014), repeat cath in 2017 showed patent grafts, Atrial fibrillation on coumadin, chronic systolic CHF s/P CRT-D, uncontrolled DM II (last HbA1c 10.4%), hypothyroidism, HTN, DL, BPH, obesity, presented because of worsening lightheadedness over the last month.   Patient reports that he has been feeling lightheaded when ever he gets up from sitting position and goes away after few seconds to minutes after standing still. Patient also reports that he has not been taking his synthroid and lasix for a month now.   ED: Vitals were all within normal limit /90.     Relevant Investigations on admission   CT scan of head: No acute pathology   Hb >12 MCV: 97.6   BUN: 35- might indicate volume depletion   Venous blood gas within normal limit- No hypoxemia or hypercarbia   Blood glucose >150   EKG: Ventricular paced rhythm- Atrial fibrillation   BNP:1828   CXR: No acute pathology- No congestion   ECHO: EF of 30%- decreases LV systolic function     Admitting Diagnosis: Orthostatic Hypotension     1- Orthostatic Hypotension   - Patient is asymptomatic now and orthostatics were negative   - changed flomax to bedtime     2- Chronic systolic CHF- ECHO revealed EF of 30%  -continue with metoprolol and ARB   -No evidence of decompensation   - Seen by EPS- no intervention or upgrade needed cleared to discharge     3- MAHESH   Baseline Cr is 1.3- resolved now  Nephrology on board: US renal was negative- Cleared the patient to be discharged     4- Atrial fibrillation  INR 1.98- Patient refused bridging to lovenox (coumadin clinic wanted to bridge him to lovenox in face of bleeding after dental work)  Will dose Coumadin as per INR     5-Hypothyroidism  TSH is 82- plan to go up on synthroid- Patient has no symptoms of hypothyroidism   Patient was off levothyroxine for the last month    6-Uncontrolled diabetes mellitus  Last Hba1c 10.4%  continue with insulin S/C  F/U endocrinology as outpatient      7- Elevation of CPK which is now trending down   Lipitor might be responsible, Continue lipitor for now but need repeat CPK which can be done on out patient basis

## 2018-04-13 NOTE — PROGRESS NOTE ADULT - SUBJECTIVE AND OBJECTIVE BOX
MYRNA TOVAR  78y  Male    Patient is a 78y old  Male who presents with a chief complaint of Worsening lightheadedness over the last month (09 Apr 2018 21:00)      INTERVAL HPI/OVERNIGHT EVENTS: None    T(C): 36.3 (04-12-18 @ 21:12), Max: 36.3 (04-12-18 @ 13:49)  HR: 60 (04-12-18 @ 21:12) (59 - 62)  BP: 106/58 (04-12-18 @ 21:12) (98/52 - 111/59)  RR: 18 (04-12-18 @ 21:12) (18 - 18)  SpO2: --  Wt(kg): --Vital Signs Last 24 Hrs  T(C): 36.3 (12 Apr 2018 21:12), Max: 36.3 (12 Apr 2018 13:49)  T(F): 97.3 (12 Apr 2018 21:12), Max: 97.3 (12 Apr 2018 13:49)  HR: 60 (12 Apr 2018 21:12) (59 - 62)  BP: 106/58 (12 Apr 2018 21:12) (98/52 - 111/59)  BP(mean): --  RR: 18 (12 Apr 2018 21:12) (18 - 18)  SpO2: --    PHYSICAL EXAM:  GENERAL: NAD, well-groomed, well-developed  HEAD:  Atraumatic, Normocephalic  NECK: Supple, No JVD, Normal thyroid  NERVOUS SYSTEM:  Alert & Oriented X3, Good concentration; Motor Strength 5/5 B/L upper and lower extremities; DTRs 2+ intact and symmetric  CHEST/LUNG: Clear to percussion bilaterally; No rales, rhonchi, wheezing, or rubs  HEART: Regular rate and rhythm; No murmurs, rubs, or gallops  ABDOMEN: Soft, Nontender, Nondistended; Bowel sounds present  EXTREMITIES:  2+ Peripheral Pulses, No clubbing, cyanosis, or edema    Consultant(s) Notes Reviewed:  [x ] YES  [ ] NO    Discussed with Consultants/Other Providers/Residents [ x] YES     LABS                         11.1   6.15  )-----------( 120      ( 12 Apr 2018 08:10 )             32.4     04-12    138  |  102  |  42<H>  ----------------------------<  84  4.8   |  26  |  1.6<H>    Ca    8.5      12 Apr 2018 08:10  Mg     1.9     04-12    TPro  6.7  /  Alb  4.0  /  TBili  0.7  /  DBili  0.2  /  AST  67<H>  /  ALT  31  /  AlkPhos  92  04-11    PT/INR - ( 12 Apr 2018 08:10 )   PT: 21.70 sec;   INR: 1.98 ratio               LIVER FUNCTIONS - ( 11 Apr 2018 18:18 )  Alb: 4.0 g/dL / Pro: 6.7 g/dL / ALK PHOS: 92 U/L / ALT: 31 U/L / AST: 67 U/L / GGT: x           CAPILLARY BLOOD GLUCOSE            RADIOLOGY & ADDITIONAL TESTS:    Imaging Personally Reviewed:  [x ] YES  [ ] NO    MEDICATIONS  (STANDING):  aspirin  chewable 81 milliGRAM(s) Oral daily  atorvastatin 40 milliGRAM(s) Oral at bedtime  dextrose 5%. 1000 milliLiter(s) (50 mL/Hr) IV Continuous <Continuous>  dextrose 50% Injectable 12.5 Gram(s) IV Push once  dextrose 50% Injectable 25 Gram(s) IV Push once  dextrose 50% Injectable 25 Gram(s) IV Push once  insulin glargine Injectable (LANTUS) 16 Unit(s) SubCutaneous every morning  insulin lispro (HumaLOG) corrective regimen sliding scale   SubCutaneous three times a day before meals  insulin lispro Injectable (HumaLOG) 6 Unit(s) SubCutaneous three times a day before meals  latanoprost 0.005% Ophthalmic Solution 1 Drop(s) Both EYES at bedtime  levothyroxine 88 MICROGram(s) Oral daily  metoprolol succinate ER 50 milliGRAM(s) Oral daily  tamsulosin 0.4 milliGRAM(s) Oral at bedtime    MEDICATIONS  (PRN):  dextrose Gel 1 Dose(s) Oral once PRN Blood Glucose LESS THAN 70 milliGRAM(s)/deciliter  glucagon  Injectable 1 milliGRAM(s) IntraMuscular once PRN Glucose LESS THAN 70 milligrams/deciliter      HEALTH ISSUES - PROBLEM Dx:

## 2018-04-13 NOTE — DISCHARGE NOTE ADULT - PATIENT PORTAL LINK FT
You can access the [email or web address] Patient Portal, offered by [facility], by registering with the following website: [email or web address]

## 2018-04-13 NOTE — DISCHARGE NOTE ADULT - PLAN OF CARE
Resolution Take your medications as indicated and follow with your doctors on out patient basis Prevention of recurrence of symptoms

## 2018-04-13 NOTE — PROGRESS NOTE ADULT - ASSESSMENT
78 yo male patient with PMHx of CAD s/p CABG (2014), repeat cath in 2017 showed patent grafts, Atrial fibrillation on coumadin, chronic systolic CHF s/P CRT-D, uncontrolled DM II (last HbA1c 10.4%), hypothyroidism, HTN, DL, BPH, obesity, presented because of worsening lightheadedness over the last month.   Patient reports that he has been feeling lightheaded when ever he gets up from sitting position and goes away after few seconds to minutes after standing still. Patient also reports that he has not been taking his synthroid and lasix for a month now.   ED: Vitals were all within normal limit /90.     Relevant Investigations on admission   CT scan of head: No acute pathology   Hb >12 MCV: 97.6   BUN: 35- might indicate volume depletion   Venous blood gas within normal limit- No hypoxemia or hypercarbia   Blood glucose >150   EKG: Ventricular paced rhythm- Atrial fibrillation   BNP:1828   CXR: No acute pathology- No congestion   ECHO: EF of 30%- decreases LV systolic function     Admitting Diagnosis: Orthostatic Hypotension     1- Orthostatic Hypotension   - Patient is asymptomatic now and orthostatics were negative   - Discontinued HCTZ and lasix- Patient remains on losartan      2- Chronic systolic CHF- ECHO revealed EF of 30%  -continue with metoprolol and losartan   -No evidence of decompensation   -EPS was consulted for AICD interrogation or possible upgrade: Cleared by EP- no upgrade needed continue current treatment     3- MAHESH   Status: Resolved   Baseline Cr is 1.3-   Restarted Losartan- No need for lasix and Thiazides as per nephro      4- Atrial fibrillation  INR 1.98- Patient refused bridging to lovenox (coumadin clinic wanted to bridge him to lovenox in face of bleeding after dental work)  Will dose Coumadin as per INR     5-Hypothyroidism  TSH is 82- plan to go up on synthroid- Patient has no symptoms of hypothyroidism   Patient was off levothyroxine for the last month    6-Uncontrolled diabetes mellitus  Last Hba1c 10.4%  continue with insulin S/C  F/U endocrinology as outpatient    7- Miscellaneous  DVT proph (already on coumadin)  GI proph  ambulate as tolerated  2gr Na DASH diet, carb consistent  Full code  Disposition home- DC possible tomorrow

## 2018-04-13 NOTE — DISCHARGE NOTE ADULT - MEDICATION SUMMARY - MEDICATIONS TO STOP TAKING
I will STOP taking the medications listed below when I get home from the hospital:    Lasix 40 mg oral tablet  -- 1 tab(s) by mouth once a day    losartan-hydrochlorothiazide 100mg-12.5mg oral tablet  -- 1 tab(s) by mouth once a day

## 2018-04-13 NOTE — PROGRESS NOTE ADULT - SUBJECTIVE AND OBJECTIVE BOX
Nephrology progress note    Patient is seen and examined, events over the last 24 h noted .    Allergies:  No Known Allergies    Hospital Medications:   MEDICATIONS  (STANDING):  aspirin  chewable 81 milliGRAM(s) Oral daily  atorvastatin 40 milliGRAM(s) Oral at bedtime  dextrose 5%. 1000 milliLiter(s) (50 mL/Hr) IV Continuous <Continuous>  dextrose 50% Injectable 12.5 Gram(s) IV Push once  dextrose 50% Injectable 25 Gram(s) IV Push once  dextrose 50% Injectable 25 Gram(s) IV Push once  insulin glargine Injectable (LANTUS) 16 Unit(s) SubCutaneous every morning  insulin lispro (HumaLOG) corrective regimen sliding scale   SubCutaneous three times a day before meals  insulin lispro Injectable (HumaLOG) 6 Unit(s) SubCutaneous three times a day before meals  latanoprost 0.005% Ophthalmic Solution 1 Drop(s) Both EYES at bedtime  levothyroxine 88 MICROGram(s) Oral daily  metoprolol succinate ER 50 milliGRAM(s) Oral daily  tamsulosin 0.4 milliGRAM(s) Oral at bedtime        VITALS:  T(F): 97.8 (18 @ 13:44), Max: 97.8 (18 @ 13:44)  HR: 62 (18 @ 13:44)  BP: 126/65 (18 @ 13:44)  RR: 18 (18 @ 13:44)  SpO2: 98% (18 @ 05:18)  Wt(kg): --     @ 07:  -   @ 07:00  --------------------------------------------------------  IN: 350 mL / OUT: 1150 mL / NET: -800 mL     @ 07:01  -   @ 14:34  --------------------------------------------------------  IN: 350 mL / OUT: 300 mL / NET: 50 mL          PHYSICAL EXAM:  Constitutional: NAD  HEENT: anicteric sclera, MMM  Neck: No JVD  Respiratory: CTAB, no wheezes, rales or rhonchi  Cardiovascular: S1, S2, RRR  Gastrointestinal: BS+, soft, NT/ND  Extremities:  No peripheral edema  Neurological: Awake alert  : . No davis.   Skin: No rashes  Vascular Access:    LABS:      140  |  99  |  33<H>  ----------------------------<  130<H>  4.8   |  26  |  1.3  Creatinine Trend: 1.3<--, 1.6<--, 2.0<--, 1.9<--, 1.3<--, 1.2<--    Ca    8.4<L>      2018 09:15  Mg     2.0         TPro  7.0  /  Alb  4.2  /  TBili  0.7  /  DBili  0.2  /  AST  63<H>  /  ALT  32  /  AlkPhos  99                            11.1   6.15  )-----------( 120      ( 2018 08:10 )             32.4       Urine Studies:  Urinalysis Basic - ( 2018 12:20 )    Color: Yellow / Appearance: Clear / S.015 / pH:   Gluc:  / Ketone: Negative  / Bili: Negative / Urobili: 1.0 mg/dL   Blood:  / Protein: Negative mg/dL / Nitrite: Negative   Leuk Esterase: Negative / RBC:  / WBC    Sq Epi:  / Non Sq Epi:  / Bacteria:       Sodium, Random Urine: 90.0 mmoL/L ( @ 06:09)  Potassium, Random Urine: 43 mmol/L ( @ 06:09)  Creatinine, Random Urine: 88 mg/dL ( @ 06:09)    RADIOLOGY & ADDITIONAL STUDIES:  < from:  Renal (18 @ 11:05) >  1.  No hydronephrosis.    < end of copied text >

## 2018-04-13 NOTE — DISCHARGE NOTE ADULT - MEDICATION SUMMARY - MEDICATIONS TO TAKE
I will START or STAY ON the medications listed below when I get home from the hospital:    aspirin 81 mg oral tablet, chewable  -- 1 tab(s) by mouth once a day  -- Indication: For Coronary artery disease    losartan 100 mg oral tablet  -- 1 tab(s) by mouth once a day   -- Do not take this drug if you are pregnant.  It is very important that you take or use this exactly as directed.  Do not skip doses or discontinue unless directed by your doctor.  Some non-prescription drugs may aggravate your condition.  Read all labels carefully.  If a warning appears, check with your doctor before taking.    -- Indication: For Blood pressure    Flomax 0.4 mg oral capsule  -- 1 cap(s) by mouth once a day (at bedtime)  -- Indication: For urinary retention     warfarin 7.5 mg oral tablet  -- 1 tab(s) by mouth once a day  -- Indication: For Atrial fibrillation    metFORMIN 1000 mg oral tablet  -- 1 tab(s) by mouth 2 times a day  -- Indication: For Diabetes mellitus    Toujeo SoloStar 300 units/mL subcutaneous solution  -- 16 unit(s) subcutaneous once a day  -- Indication: For Diabetes mellitus    atorvastatin 40 mg oral tablet  -- 1 tab(s) by mouth once a day (at bedtime)  -- Indication: For Coronary artery disease    metoprolol succinate 50 mg oral tablet, extended release  -- 1 tab(s) by mouth once a day  -- Indication: For Coronary artery disease    latanoprost 0.005% ophthalmic solution  -- 1 drop(s) to each affected eye once a day (in the evening)  -- Indication: For eye drop    levothyroxine 125 mcg (0.125 mg) oral capsule  -- 1 cap(s) by mouth once a day  -- Indication: For Hypothyroidism

## 2018-04-13 NOTE — CONSULT NOTE ADULT - SUBJECTIVE AND OBJECTIVE BOX
Patient is a 78y old  Male who presents with a chief complaint of Worsening lightheadedness over the last month (09 Apr 2018 21:00)      HPI:  76 yo male patient with PMHx of CAD s/p CABG (2014), repeat cath in 2017 showed patent grafts, Atrial fibrillation on coumadin, chronic systolic CHF s/P CRT-D, uncontrolled DM II (last HbA1c 10.4%), hypothyroidism, HTN, DL, BPH, obesity, presented because of worsening lightheadedness over the last month.  Patient says he's been experiencing short episodes of dizziness and light headedness particularly when he stands up from sitting position or when he bends forward. Symptoms lasts for few seconds/minutes, until he stands still or sits down. He denies headaches, denies fainting, denies palpitations, chest pain, nausea vomiting, GI or  symptoms.  Patient has exertional shortness of breath (chronic) because of his heart failure.  On the side, patient says its been around a month or so that he ran out of his synthroid (so he was not taking his thyroid medications), also he says he stopped taking the lasix for around a month now because he was urinating a lot. (09 Apr 2018 21:00)      PAST MEDICAL & SURGICAL HISTORY:  Diabetes mellitus  Hypothyroidism  Hypertension  Dyslipidemia  Benign prostate hyperplasia  Atrial fibrillation  Heart failure: chronic systolic heart failure  Coronary artery disease: s/p CABG 2014  S/P placement of cardiac pacemaker: CRT-D  S/P CABG (coronary artery bypass graft)    ( -  ) heart valve replacement  ( -  ) joint replacement  ( -  ) pregnancy    MEDICATIONS  (STANDING):  aspirin  chewable 81 milliGRAM(s) Oral daily  atorvastatin 40 milliGRAM(s) Oral at bedtime  dextrose 5%. 1000 milliLiter(s) (50 mL/Hr) IV Continuous <Continuous>  dextrose 50% Injectable 12.5 Gram(s) IV Push once  dextrose 50% Injectable 25 Gram(s) IV Push once  dextrose 50% Injectable 25 Gram(s) IV Push once  insulin glargine Injectable (LANTUS) 16 Unit(s) SubCutaneous every morning  insulin lispro (HumaLOG) corrective regimen sliding scale   SubCutaneous three times a day before meals  insulin lispro Injectable (HumaLOG) 6 Unit(s) SubCutaneous three times a day before meals  latanoprost 0.005% Ophthalmic Solution 1 Drop(s) Both EYES at bedtime  levothyroxine 88 MICROGram(s) Oral daily  metoprolol succinate ER 50 milliGRAM(s) Oral daily  tamsulosin 0.4 milliGRAM(s) Oral at bedtime    MEDICATIONS  (PRN):  dextrose Gel 1 Dose(s) Oral once PRN Blood Glucose LESS THAN 70 milliGRAM(s)/deciliter  glucagon  Injectable 1 milliGRAM(s) IntraMuscular once PRN Glucose LESS THAN 70 milligrams/deciliter      REVIEW OF SYSTEMS      General:	  AAO x3  Skin/Breast:  WNL	  Ophthalmologic:  WNL	  ENMT:	  WNL  Respiratory and Thorax:  WNL	  Cardiovascular:	  A. Fib, hypotension, CABG, Pacemaker  Gastrointestinal:	  WNL  Genitourinary:	  BPH  Musculoskeletal:	  WNL  Neurological:	  WNL  Psychiatric:	  WNL  Hematology/Lymphatics:	  WNL  Endocrine:	  hypothyroidism  Allergic/Immunologic:	  WNL  Allergies    No Known Allergies    Intolerances        FAMILY HISTORY:  Family history of coronary artery disease (Father)      Vital Signs Last 24 Hrs  T(C): 35.9 (13 Apr 2018 04:54), Max: 36.3 (12 Apr 2018 13:49)  T(F): 96.6 (13 Apr 2018 04:54), Max: 97.3 (12 Apr 2018 13:49)  HR: 60 (13 Apr 2018 04:54) (60 - 62)  BP: 102/60 (13 Apr 2018 04:54) (98/52 - 106/58)  BP(mean): --  RR: 18 (13 Apr 2018 04:54) (18 - 18)  SpO2: 98% (13 Apr 2018 05:18) (98% - 98%)    LABS:                        11.1   6.15  )-----------( 120      ( 12 Apr 2018 08:10 )             32.4     04-13    140  |  99  |  33<H>  ----------------------------<  130<H>  4.8   |  26  |  1.3    Ca    8.4<L>      13 Apr 2018 09:15  Mg     2.0     04-13    TPro  7.0  /  Alb  4.2  /  TBili  0.7  /  DBili  0.2  /  AST  63<H>  /  ALT  32  /  AlkPhos  99  04-13    Platelet Count - Automated: 120 K/uL <L> [130 - 400] (04-12 @ 08:10)  WBC Count: 6.15 K/uL [4.80 - 10.80] (04-12 @ 08:10)  INR: 1.98 ratio <H> [0.65 - 1.30] (04-12 @ 08:10)  Platelet Count - Automated: 133 K/uL [130 - 400] (04-11 @ 06:27)  WBC Count: 7.84 K/uL [4.80 - 10.80] (04-11 @ 06:27)  INR: 1.88 ratio <H> [0.65 - 1.30] (04-11 @ 06:27)  Platelet Count - Automated: 121 K/uL <L> [130 - 400] (04-10 @ 17:21)  WBC Count: 7.14 K/uL [4.80 - 10.80] (04-10 @ 17:21)      PT/INR - ( 12 Apr 2018 08:10 )   PT: 21.70 sec;   INR: 1.98 ratio             Last Dental Visit: <<  >>    EOE:  TMJ ( -  ) clicks                     ( -  ) pops                     ( -  ) crepitus             Mandible <<FROM>>             Facial bones and MOM <<grossly intact>>             ( -  ) trismus             ( -  ) lymphadenopathy             ( -  ) swelling             ( -  ) asymmetry             ( -  ) palpation             ( -  ) dyspnea             ( -  ) dysphagia             ( -  ) loss of consciousness    IOE:  <<permanent>> dentition:            <<grossly intact>>     <<multiple missing teeth>>             Dentition Present << 3-8, 20-22, 27-30 >>              #26 extracted 2 weeks ago, patient reported bleeding yesterday, on exam no bleeding, organized clot present above extraction socket.                       hard/soft palate:  ( -  ) palatal torus, <<No pathology noted>>            tongue/FOM <<No pathology noted>>            labial/buccal mucosa <<No pathology noted>>           ( -  ) percussion           ( -  ) palpation           ( -  ) swelling            ( -  ) abscess           ( -  ) sinus tract    *DENTAL RADIOGRAPHS:  Periapical #[id number]    a/p: 76 yo male patient with PMHx of CAD s/p CABG (2014), repeat cath in 2017 showed patent grafts, Atrial fibrillation on coumadin, chronic systolic CHF s/P CRT-D, uncontrolled DM II (last HbA1c 10.4%), hypothyroidism, HTN, DL, BPH, obesity, presented because of worsening lightheadedness over the last month, INR levels were subtherapeutic, Patient coumadin was restarted yesterday and bridged with Lovenox, most recent INR was 1.98.   Patient complains of oral bleeding from dental extraction site #26.  On exam organized clot present above extraction socket, tissue WNL, Advised removal of clot, revaluation.     PROCEDURE:   Verbal and written consent given.  Anesthesia: <<1 carpule 2% lidocaine with epinephrine locally.  >>   Treatment: << removed organized clot which revealed healthy blood clot in socket. minimal bleeding, hemostasis achieved. Advised follow up with patients private dentist on discharge   >>     RECOMMENDATIONS:  1) <<Soft diet >>  2) Dental F/U with outpatient dentist for comprehensive dental care.   3) If any difficulty swallowing/breathing, fever occur, return to ER.     [place] DDS

## 2018-04-13 NOTE — DISCHARGE NOTE ADULT - CARE PLAN
Principal Discharge DX:	Acute kidney injury  Goal:	Resolution  Assessment and plan of treatment:	Take your medications as indicated and follow with your doctors on out patient basis  Secondary Diagnosis:	Heart failure  Goal:	Prevention of recurrence of symptoms  Assessment and plan of treatment:	Take your medications as indicated and follow with your doctors on out patient basis

## 2018-04-13 NOTE — CONSULT NOTE ADULT - SUBJECTIVE AND OBJECTIVE BOX
Patient is a 78y old  Male who presents with a chief complaint of Worsening lightheadedness over the last month (2018 21:00)      HPI:   78 yo male patient with PMHx of CAD s/p CABG (), repeat cath in 2017 showed patent grafts, atrial fibrillation on coumadin, chronic systolic CHF s/p CRT-D (device interrogated by Dr. Gipson in 2017-no events), uncontrolled DM II (last HbA1c 10.4%), hypothyroidism, HTN, DL, BPH, obesity, presented because of worsening lightheadedness over the last month.  Patient says he has been experiencing short episodes of lightheadedness, particularly when he stands up from sitting position or when he bends forward.  Symptoms lasts for few seconds/minutes, until he stands still or sits down. He denies headaches, denies fainting, denies palpitations, chest pain, nausea vomiting, GI or  symptoms.  Patient has exertional shortness of breath (chronic) because of his heart failure.      PAST MEDICAL & SURGICAL HISTORY:  Diabetes mellitus  Hypothyroidism  Hypertension  Dyslipidemia  Benign prostate hyperplasia  Atrial fibrillation  Heart failure: chronic systolic heart failure  Coronary artery disease: s/p CABG   S/P placement of cardiac pacemaker: CRT-D  S/P CABG (coronary artery bypass graft)          PREVIOUS DIAGNOSTIC TESTING:      ECHO 2017  (NO REPORT)      CATH 3/2017    Cardiac Catheterization -- Comprehensive Report   Pre Procedure Nurse: Annalisa Giron RN   Diagnostic Cardiologist: Allen Brooke MD   Fellow: Andre Ivy M.D.   Monitor: Sarah King RN   Circulator: Issac Forde RN   Monitor: Annalisa Giron RN   Referring MD: Jah Laughlin MD   Referring MD: JIMMY Gipson MD     SUMMARY:     HEMODYNAMICS: Hemodynamic assessment demonstrates moderately elevated LVEDP,   normal cardiac output, and mildly to moderately elevated pulmonary capillary   wedge pressure. There is mild pulmonary hypertension.     CORONARY CIRCULATION: There was 3-vessel native coronary artery disease (LAD,   RCA, and circumflex). Bypass grafts were patent.     CARDIAC STRUCTURES: There was moderate aortic stenosis. Calculated valve area   of 1.32 cm2.     INDICATIONS: Angina/MI: stable angina. Coronary artery disease: ischemic heart   disease. Congestive heart failure with dyspnea. Cardiac: aortic valve   disease.     HISTORY: The patient has hypertension, oral hypoglycemic-treated diabetes,   medication-treated dyslipidemia, and obesity. PRIOR CARDIOVASCULAR   PROCEDURES: Pacemaker implantation. Coronary bypass.     PROCEDURES PERFORMED:     -- Right heart catheterization.   -- Bilateral coronary angiography.   -- Saphenous vein graft angiography.   -- LIMA graft angiography.   -- Left heart catheterization.     PROCEDURE: The risks and alternatives of the procedures and conscious sedation   were explained to the patient and informed consent was obtained. Except as   noted, lesion stenosis was estimated visually. The patient was brought to the   cath lab and placed on the table. The planned puncture sites were prepped and   draped in the usual sterile fashion. Cardiac catheterization performed   electively.     -- Right femoral vein access. The puncture site was infiltrated with 10 ml 2   % lidocaine. The vessel was accessed using the modified Seldinger technique,   a wire was threaded into the vessel, and a 7F Sheath 12cm was advanced over   the wire into the vessel.     -- Right femoral artery access. The puncture site was infiltrated with 10 ml   2 % lidocaine. The vessel was accessed using the modified Seldinger   technique, a wire was threaded into the vessel, and a 6F Sheath 23cm Long was   advanced over the wire into the vessel.     -- Right heart catheterization. A 7 fr swan vidhya catheter was advanced to the   pulmonary artery wedge position under fluoroscopic guidance. Measurements of   pressures and cardiac output (by thermodilution) were obtained. The catheter   was removed.     -- Right and left coronary angiography. A JL4 catheter was advanced to the   aorta and positioned in the vessel ostia. For proper position, JR4   catheter(s) were also used. Angiography was performed in multiple projections   using hand-injection of contrast.     -- Saphenous vein graft angiography. A JR4 catheter was advanced to the aorta   and positioned in the vessel ostia under fluoroscopic guidance. Angiography   was performed in multiple projections using hand-injection of contrast.     -- Left internal mammary graft angiography. A JR4 catheter was advanced to   the aorta and positioned in the vessel ostia under fluoroscopic guidance.   Angiography was performed in multiple projections using hand-injection of   contrast.     -- Left heart catheterization. A AL1 catheter was advanced to the ascending   aorta. For proper position, Jeramy Dual Lumen Catheter Pigtail catheter(s)   were also used. After recording ascending aortic pressure, the catheter was   advanced across the aortic valve and left ventricular pressure was recorded.   The catheter was gradually withdrawn into the aorta under continuous pressure   monitoring and aortic pressure was recorded.     PROCEDURE COMPLETION: TIMING: Test started at 07:00. Test concluded at 09:07.   RADIATION EXPOSURE: Fluoroscopy time: 20 min. MEDIA: Cine ID [id number].     HEMOSTASIS: The sheath was removed in the recovery room. The site was   compressed manually. Hemostasis was successful at the right femoral artery   access site. The sheath was removed in the recovery room. The site was   compressed manually. Hemostasis was successful at the right femoral vein   access site.     MEDICATIONS GIVEN: Fentanyl, 50 mcg, IV, at 08:08. Midazolam, 2 mg, IV, at   08:08.     CONTRAST GIVEN: Omnipaque 100 ml.     HEMODYNAMICS: Hemodynamic assessment demonstrates moderately elevated LVEDP,   normal cardiac output, and mildly to moderately elevated pulmonary capillary   wedge pressure. There is mild pulmonary hypertension.     VALVES: AORTIC VALVE: There was moderate aortic stenosis. Calculated valve   area of 1.32 cm2.     CORONARY CIRCULATION: The coronary circulation is left dominant. There was   3-vessel native coronary artery disease (LAD, RCA, and circumflex). Bypass   grafts were patent. Left main: The vessel was small to medium sized.   Angiography showed moderate atherosclerosis with no clinical lesions   appreciated. Proximal LAD: There was a 100 % stenosis. Distal LAD:   Angiography showed moderate - severe atherosclerosis with no clinical lesions   appreciated. The artery was supplied by a patent bypass graft. Circumflex:   The vessel was large sized (dominant). 1st obtuse marginal: The artery was   supplied by a patent bypass graft. 2nd obtuse marginal: The artery was   supplied by a patent bypass graft. Left posterior descending artery: The   vessel was small to medium sized. Angiography showed severe atherosclerosis.   Ramus intermedius: There was a discrete 90 % stenosis. There was DION grade 3   flow through the vessel (brisk flow). RCA: The vessel was small   (non-dominant). Angiography showed moderate atherosclerosis with no clinical   lesions appreciated. Graft to the LAD: The graft was a normal sized LIMA from   the aorta. Distal vessel angiography showed moderate - severe diffuse   disease. Graft to the 1st obtuse marginal: The graft was a normal sized   saphenous vein graft from the ascending aorta.Its jump graft to OM2. Graft   angiography showed minor luminal irregularities.     COMPLICATIONS: No complications occurred during the cath lab visit.     IMPRESSIONS: There is significant triple vessel coronary artery disease.     RECOMMENDATIONS:   The patient should continue with the present medications.   The patient's diuretic regimen should be carefully increased.   The patient's anti-anginal regimen should be further intensified.   A consultation with a cardiac electrophysiologist will be obtained.   Patient management should include aggressive medical therapy, close monitoring   of BUN and creatinine, an exercise program, weight reduction, and aggressive   risk factor modification. The patient should follow a low fat and low calorie   diet.     DISPOSITION: The patient left the catheterization laboratory in stable   condition. The patient will be discharged on the day of the procedure,   following bed rest and subsequent ambulation, provided the recovery   parameters are appropriate. The patient has been instructed to call the   procedural cardiologist immediately if symptoms recur, or should there be any   problems with the puncture site, such as bleeding, swelling, pain or signs of   infection. The patient has been instructed to follow up with the referring   physician in the near future.               MEDICATIONS  (STANDING):  aspirin  chewable 81 milliGRAM(s) Oral daily  atorvastatin 40 milliGRAM(s) Oral at bedtime  dextrose 5%. 1000 milliLiter(s) (50 mL/Hr) IV Continuous <Continuous>  dextrose 50% Injectable 12.5 Gram(s) IV Push once  dextrose 50% Injectable 25 Gram(s) IV Push once  dextrose 50% Injectable 25 Gram(s) IV Push once  insulin glargine Injectable (LANTUS) 16 Unit(s) SubCutaneous every morning  insulin lispro (HumaLOG) corrective regimen sliding scale   SubCutaneous three times a day before meals  insulin lispro Injectable (HumaLOG) 6 Unit(s) SubCutaneous three times a day before meals  latanoprost 0.005% Ophthalmic Solution 1 Drop(s) Both EYES at bedtime  levothyroxine 88 MICROGram(s) Oral daily  metoprolol succinate ER 50 milliGRAM(s) Oral daily  tamsulosin 0.4 milliGRAM(s) Oral at bedtime    MEDICATIONS  (PRN):  dextrose Gel 1 Dose(s) Oral once PRN Blood Glucose LESS THAN 70 milliGRAM(s)/deciliter  glucagon  Injectable 1 milliGRAM(s) IntraMuscular once PRN Glucose LESS THAN 70 milligrams/deciliter      FAMILY HISTORY:  Family history of coronary artery disease (Father)      SOCIAL HISTORY:    CIGARETTES:    ALCOHOL:      Vital Signs Last 24 Hrs  T(C): 35.9 (2018 04:54), Max: 36.3 (2018 13:49)  T(F): 96.6 (2018 04:54), Max: 97.3 (2018 13:49)  HR: 60 (2018 04:54) (60 - 62)  BP: 102/60 (2018 04:54) (98/52 - 106/58)  BP(mean): --  RR: 18 (2018 04:54) (18 - 18)  SpO2: 98% (2018 05:18) (98% - 98%)          INTERPRETATION OF TELEMETRY:    ECG:    < from: 12 Lead ECG (04.10.18 @ 07:24) >  Ventricular Rate 67 BPM    Atrial Rate 375 BPM    QRS Duration 158 ms    Q-T Interval 476 ms    QTC Calculation(Bezet) 502 ms    R Axis 226 degrees    T Axis 39 degrees    Diagnosis Line Ventricular pacing  Atrial fibrillation  Premature ventricular complexes                Confirmed by ROBERTH PARK MD ([id number]) on 4/10/2018 7:50:10 AM    < end of copied text >      I&O's Detail    2018 07:01  -  2018 07:00  --------------------------------------------------------  IN:    Oral Fluid: 350 mL  Total IN: 350 mL    OUT:    Voided: 1150 mL  Total OUT: 1150 mL    Total NET: -800 mL      2018 07:01  -  2018 13:28  --------------------------------------------------------  IN:    Oral Fluid: 350 mL  Total IN: 350 mL    OUT:    Voided: 300 mL  Total OUT: 300 mL    Total NET: 50 mL          LABS:                        11.1   6.15  )-----------( 120      ( 2018 08:10 )             32.4     04-13    140  |  99  |  33<H>  ----------------------------<  130<H>  4.8   |  26  |  1.3    Ca    8.4<L>      2018 09:15  Mg     2.0     04-13    TPro  7.0  /  Alb  4.2  /  TBili  0.7  /  DBili  0.2  /  AST  63<H>  /  ALT  32  /  AlkPhos  99  04-13    CARDIAC MARKERS ( 2018 18:18 )  x     / 0.15 ng/mL / 1979 U/L / x     / x          PT/INR - ( 2018 08:10 )   PT: 21.70 sec;   INR: 1.98 ratio           Urinalysis Basic - ( 2018 12:20 )    Color: Yellow / Appearance: Clear / S.015 / pH: x  Gluc: x / Ketone: Negative  / Bili: Negative / Urobili: 1.0 mg/dL   Blood: x / Protein: Negative mg/dL / Nitrite: Negative   Leuk Esterase: Negative / RBC: x / WBC x   Sq Epi: x / Non Sq Epi: x / Bacteria: x            RADIOLOGY & ADDITIONAL STUDIES:      Chest Xray   < from: Xray Chest 1 View AP/PA (18 @ 14:25) >  EXAM:  XR CHEST FRONTAL 1V            PROCEDURE DATE:  2018        ECHO:     INTERPRETATION:  Clinical History / Reason for exam: Dizziness    Comparison : Chest radiograph 2017.    Technique/Positioning: Frontal, portable.    Findings:    Support devices: Left-sided cardiac pacing device is again noted.    Cardiac/mediastinum/hilum: Heart is prominent and status post sternotomy,   stable since prior examination.    Lung parenchyma/Pleura: Within normal limits.  < from: Transthoracic Echocardiogram (04.10.18 @ 12:56) >  rocedure:     2D Echo/Doppler/Color Doppler Complete.  Indications:   I42.9 - Cardiomyopathy, unspecified  Study Details: Technically suboptimal study. Study quality was adversely                 affected due to patient obesity and body habitus.         Summary:   1. Severely decreased global left ventricular systolic function.   2. LV Ejection Fraction by Hickey's Method with abiplane EF of 30 %.   3. Moderately increased LV wall thickness.   4. There is moderate concentric left ventricular hypertrophy.   5. Moderate mitral annular calcification.   6. Mild aortic regurgitation.   7. Mild calcific arotic stenosis.   8. LA volume Index is 51.0 ml/m² ml/m2.   9. Mitral valve mean gradient is 2.3 mmHg consistent with mild mitral   stenosis.    PHYSICIAN INTERPRETATION:  Left Ventricle: Normal left ventricular size and wall thicknesses, with   normal systolic and diastolic function. The left ventricular internal   cavity size is normal. Left ventricular wall thickness is moderately   increased. There is moderate concentric left ventricular hypertrophy.   Global LV systolic function was severely decreased. LV Ejection Fraction   by Hickey's Method with a biplane EF of 30 %.  Right Ventricle: Normal right ventricular size and function.  Left Atrium: Moderate to severe left atrial enlargement. LA volume Index   is 51.0 ml/m² ml/m2.  Right Atrium: Normal right atrial size.  Pericardium: There is no evidence of pericardial effusion.  Mitral Valve: Structurally normal mitral valve, with normal leaflet   excursion. There is moderate mitral annular calcification. Peak   transmitral mean gradient equals 2.3 mmHg, calculated mitral valve area   by pressure half time equals 2.75 cm² consistent with No evidence of   mitral stenosis. Mitral valve mean gradient is 2.3 mmHg consistent with   mild mitral stenosis.  Tricuspid Valve: The tricuspid valve is normal in structure. Mild   tricuspid regurgitation is visualized.  Aortic Valve: The aortic valve is trileaflet. Mild aortic valve   regurgitation is seen. Mild calcific arotic stenosis.  Pulmonic Valve: Structurally normal pulmonic valve, with normal leaflet   excursion.  Aorta: The aortic root and ascending aorta are structurally normal, with   no evidence of dilitation.  Pulmonary Artery: The main pulmonary artery is normal in size.  Venous: The inferior vena cava was normal sized, with respiratory size   variation greater than 50%.    < end of copied text >    Skeleton/soft tissues: Degenerative change    Impression:      No radiographic evidence of acute cardiopulmonary disease.    PAUL FRANKS M.D., ATTENDING RADIOLOGIST  This document has been electronically signed. 2018  2:45PM        < end of copied text > Patient is a 78y old  Male who presents with a chief complaint of Worsening lightheadedness over the last month (2018 21:00)      HPI:   78 yo male patient with PMHx of CAD s/p CABG (), repeat cath in 2017 showed patent grafts, atrial fibrillation on coumadin, chronic systolic CHF s/p CRT-D (device interrogated by Dr. Gipson in 2017-no events), uncontrolled DM II (last HbA1c 10.4%), hypothyroidism, HTN, DL, BPH, obesity, presented because of worsening lightheadedness over the last month.  Patient says he has been experiencing short episodes of lightheadedness, particularly when he stands up from sitting position or when he bends forward.  Symptoms lasts for few seconds/minutes, until he stands still or sits down. He denies headaches, denies fainting, denies palpitations, chest pain, nausea vomiting, GI or  symptoms.  Patient has exertional shortness of breath (chronic) because of his heart failure.      PAST MEDICAL & SURGICAL HISTORY:  Diabetes mellitus  Hypothyroidism  Hypertension  Dyslipidemia  Benign prostate hyperplasia  Atrial fibrillation  Heart failure: chronic systolic heart failure  Coronary artery disease: s/p CABG   S/P placement of cardiac pacemaker: CRT-D  S/P CABG (coronary artery bypass graft)          PREVIOUS DIAGNOSTIC TESTING:      ECHO 2017  (NO REPORT)      CATH 3/2017    Cardiac Catheterization -- Comprehensive Report   Pre Procedure Nurse: Annalisa Giron RN   Diagnostic Cardiologist: Allen Brooke MD   Fellow: Andre Ivy M.D.   Monitor: Sarah King RN   Circulator: Issac Forde RN   Monitor: Annalisa Giron RN   Referring MD: Jah Laughlin MD   Referring MD: JIMMY Gipson MD     SUMMARY:     HEMODYNAMICS: Hemodynamic assessment demonstrates moderately elevated LVEDP,   normal cardiac output, and mildly to moderately elevated pulmonary capillary   wedge pressure. There is mild pulmonary hypertension.     CORONARY CIRCULATION: There was 3-vessel native coronary artery disease (LAD,   RCA, and circumflex). Bypass grafts were patent.     CARDIAC STRUCTURES: There was moderate aortic stenosis. Calculated valve area   of 1.32 cm2.     INDICATIONS: Angina/MI: stable angina. Coronary artery disease: ischemic heart   disease. Congestive heart failure with dyspnea. Cardiac: aortic valve   disease.     HISTORY: The patient has hypertension, oral hypoglycemic-treated diabetes,   medication-treated dyslipidemia, and obesity. PRIOR CARDIOVASCULAR   PROCEDURES: Pacemaker implantation. Coronary bypass.     PROCEDURES PERFORMED:     -- Right heart catheterization.   -- Bilateral coronary angiography.   -- Saphenous vein graft angiography.   -- LIMA graft angiography.   -- Left heart catheterization.     PROCEDURE: The risks and alternatives of the procedures and conscious sedation   were explained to the patient and informed consent was obtained. Except as   noted, lesion stenosis was estimated visually. The patient was brought to the   cath lab and placed on the table. The planned puncture sites were prepped and   draped in the usual sterile fashion. Cardiac catheterization performed   electively.     -- Right femoral vein access. The puncture site was infiltrated with 10 ml 2   % lidocaine. The vessel was accessed using the modified Seldinger technique,   a wire was threaded into the vessel, and a 7F Sheath 12cm was advanced over   the wire into the vessel.     -- Right femoral artery access. The puncture site was infiltrated with 10 ml   2 % lidocaine. The vessel was accessed using the modified Seldinger   technique, a wire was threaded into the vessel, and a 6F Sheath 23cm Long was   advanced over the wire into the vessel.     -- Right heart catheterization. A 7 fr swan vidhya catheter was advanced to the   pulmonary artery wedge position under fluoroscopic guidance. Measurements of   pressures and cardiac output (by thermodilution) were obtained. The catheter   was removed.     -- Right and left coronary angiography. A JL4 catheter was advanced to the   aorta and positioned in the vessel ostia. For proper position, JR4   catheter(s) were also used. Angiography was performed in multiple projections   using hand-injection of contrast.     -- Saphenous vein graft angiography. A JR4 catheter was advanced to the aorta   and positioned in the vessel ostia under fluoroscopic guidance. Angiography   was performed in multiple projections using hand-injection of contrast.     -- Left internal mammary graft angiography. A JR4 catheter was advanced to   the aorta and positioned in the vessel ostia under fluoroscopic guidance.   Angiography was performed in multiple projections using hand-injection of   contrast.     -- Left heart catheterization. A AL1 catheter was advanced to the ascending   aorta. For proper position, Jeramy Dual Lumen Catheter Pigtail catheter(s)   were also used. After recording ascending aortic pressure, the catheter was   advanced across the aortic valve and left ventricular pressure was recorded.   The catheter was gradually withdrawn into the aorta under continuous pressure   monitoring and aortic pressure was recorded.     PROCEDURE COMPLETION: TIMING: Test started at 07:00. Test concluded at 09:07.   RADIATION EXPOSURE: Fluoroscopy time: 20 min. MEDIA: Cine ID [id number].     HEMOSTASIS: The sheath was removed in the recovery room. The site was   compressed manually. Hemostasis was successful at the right femoral artery   access site. The sheath was removed in the recovery room. The site was   compressed manually. Hemostasis was successful at the right femoral vein   access site.     MEDICATIONS GIVEN: Fentanyl, 50 mcg, IV, at 08:08. Midazolam, 2 mg, IV, at   08:08.     CONTRAST GIVEN: Omnipaque 100 ml.     HEMODYNAMICS: Hemodynamic assessment demonstrates moderately elevated LVEDP,   normal cardiac output, and mildly to moderately elevated pulmonary capillary   wedge pressure. There is mild pulmonary hypertension.     VALVES: AORTIC VALVE: There was moderate aortic stenosis. Calculated valve   area of 1.32 cm2.     CORONARY CIRCULATION: The coronary circulation is left dominant. There was   3-vessel native coronary artery disease (LAD, RCA, and circumflex). Bypass   grafts were patent. Left main: The vessel was small to medium sized.   Angiography showed moderate atherosclerosis with no clinical lesions   appreciated. Proximal LAD: There was a 100 % stenosis. Distal LAD:   Angiography showed moderate - severe atherosclerosis with no clinical lesions   appreciated. The artery was supplied by a patent bypass graft. Circumflex:   The vessel was large sized (dominant). 1st obtuse marginal: The artery was   supplied by a patent bypass graft. 2nd obtuse marginal: The artery was   supplied by a patent bypass graft. Left posterior descending artery: The   vessel was small to medium sized. Angiography showed severe atherosclerosis.   Ramus intermedius: There was a discrete 90 % stenosis. There was DION grade 3   flow through the vessel (brisk flow). RCA: The vessel was small   (non-dominant). Angiography showed moderate atherosclerosis with no clinical   lesions appreciated. Graft to the LAD: The graft was a normal sized LIMA from   the aorta. Distal vessel angiography showed moderate - severe diffuse   disease. Graft to the 1st obtuse marginal: The graft was a normal sized   saphenous vein graft from the ascending aorta.Its jump graft to OM2. Graft   angiography showed minor luminal irregularities.     COMPLICATIONS: No complications occurred during the cath lab visit.     IMPRESSIONS: There is significant triple vessel coronary artery disease.     RECOMMENDATIONS:   The patient should continue with the present medications.   The patient's diuretic regimen should be carefully increased.   The patient's anti-anginal regimen should be further intensified.   A consultation with a cardiac electrophysiologist will be obtained.   Patient management should include aggressive medical therapy, close monitoring   of BUN and creatinine, an exercise program, weight reduction, and aggressive   risk factor modification. The patient should follow a low fat and low calorie   diet.     DISPOSITION: The patient left the catheterization laboratory in stable   condition. The patient will be discharged on the day of the procedure,   following bed rest and subsequent ambulation, provided the recovery   parameters are appropriate. The patient has been instructed to call the   procedural cardiologist immediately if symptoms recur, or should there be any   problems with the puncture site, such as bleeding, swelling, pain or signs of   infection. The patient has been instructed to follow up with the referring   physician in the near future.               MEDICATIONS  (STANDING):  aspirin  chewable 81 milliGRAM(s) Oral daily  atorvastatin 40 milliGRAM(s) Oral at bedtime  dextrose 5%. 1000 milliLiter(s) (50 mL/Hr) IV Continuous <Continuous>  dextrose 50% Injectable 12.5 Gram(s) IV Push once  dextrose 50% Injectable 25 Gram(s) IV Push once  dextrose 50% Injectable 25 Gram(s) IV Push once  insulin glargine Injectable (LANTUS) 16 Unit(s) SubCutaneous every morning  insulin lispro (HumaLOG) corrective regimen sliding scale   SubCutaneous three times a day before meals  insulin lispro Injectable (HumaLOG) 6 Unit(s) SubCutaneous three times a day before meals  latanoprost 0.005% Ophthalmic Solution 1 Drop(s) Both EYES at bedtime  levothyroxine 88 MICROGram(s) Oral daily  metoprolol succinate ER 50 milliGRAM(s) Oral daily  tamsulosin 0.4 milliGRAM(s) Oral at bedtime    MEDICATIONS  (PRN):  dextrose Gel 1 Dose(s) Oral once PRN Blood Glucose LESS THAN 70 milliGRAM(s)/deciliter  glucagon  Injectable 1 milliGRAM(s) IntraMuscular once PRN Glucose LESS THAN 70 milligrams/deciliter      FAMILY HISTORY:  Family history of coronary artery disease (Father)      SOCIAL HISTORY:    CIGARETTES: denies    ALCOHOL: denies      Vital Signs Last 24 Hrs  T(C): 35.9 (2018 04:54), Max: 36.3 (2018 13:49)  T(F): 96.6 (2018 04:54), Max: 97.3 (2018 13:49)  HR: 60 (2018 04:54) (60 - 62)  BP: 102/60 (2018 04:54) (98/52 - 106/58)  BP(mean): --  RR: 18 (2018 04:54) (18 - 18)  SpO2: 98% (2018 05:18) (98% - 98%)          INTERPRETATION OF TELEMETRY: no events    ECG:    < from: 12 Lead ECG (04.10.18 @ 07:24) >  Ventricular Rate 67 BPM    Atrial Rate 375 BPM    QRS Duration 158 ms    Q-T Interval 476 ms    QTC Calculation(Bezet) 502 ms    R Axis 226 degrees    T Axis 39 degrees    Diagnosis Line Ventricular pacing  Atrial fibrillation  Premature ventricular complexes                Confirmed by VIVIAN EDMONDS, ROBERTH ([id number]) on 4/10/2018 7:50:10 AM    < end of copied text >      I&O's Detail    2018 07:01  -  2018 07:00  --------------------------------------------------------  IN:    Oral Fluid: 350 mL  Total IN: 350 mL    OUT:    Voided: 1150 mL  Total OUT: 1150 mL    Total NET: -800 mL      2018 07:01  -  2018 13:28  --------------------------------------------------------  IN:    Oral Fluid: 350 mL  Total IN: 350 mL    OUT:    Voided: 300 mL  Total OUT: 300 mL    Total NET: 50 mL          LABS:                        11.1   6.15  )-----------( 120      ( 2018 08:10 )             32.4     04-13    140  |  99  |  33<H>  ----------------------------<  130<H>  4.8   |  26  |  1.3    Ca    8.4<L>      2018 09:15  Mg     2.0     -    TPro  7.0  /  Alb  4.2  /  TBili  0.7  /  DBili  0.2  /  AST  63<H>  /  ALT  32  /  AlkPhos  99  04-13    CARDIAC MARKERS ( 2018 18:18 )  x     / 0.15 ng/mL / 1979 U/L / x     / x          PT/INR - ( 2018 08:10 )   PT: 21.70 sec;   INR: 1.98 ratio           Urinalysis Basic - ( 2018 12:20 )    Color: Yellow / Appearance: Clear / S.015 / pH: x  Gluc: x / Ketone: Negative  / Bili: Negative / Urobili: 1.0 mg/dL   Blood: x / Protein: Negative mg/dL / Nitrite: Negative   Leuk Esterase: Negative / RBC: x / WBC x   Sq Epi: x / Non Sq Epi: x / Bacteria: x            RADIOLOGY & ADDITIONAL STUDIES:      Chest Xray   < from: Xray Chest 1 View AP/PA (18 @ 14:25) >  EXAM:  XR CHEST FRONTAL 1V            PROCEDURE DATE:  2018        ECHO:     INTERPRETATION:  Clinical History / Reason for exam: Dizziness    Comparison : Chest radiograph 2017.    Technique/Positioning: Frontal, portable.    Findings:    Support devices: Left-sided cardiac pacing device is again noted.    Cardiac/mediastinum/hilum: Heart is prominent and status post sternotomy,   stable since prior examination.    Lung parenchyma/Pleura: Within normal limits.  < from: Transthoracic Echocardiogram (04.10.18 @ 12:56) >  rocedure:     2D Echo/Doppler/Color Doppler Complete.  Indications:   I42.9 - Cardiomyopathy, unspecified  Study Details: Technically suboptimal study. Study quality was adversely                 affected due to patient obesity and body habitus.         Summary:   1. Severely decreased global left ventricular systolic function.   2. LV Ejection Fraction by Hickey's Method with abiplane EF of 30 %.   3. Moderately increased LV wall thickness.   4. There is moderate concentric left ventricular hypertrophy.   5. Moderate mitral annular calcification.   6. Mild aortic regurgitation.   7. Mild calcific arotic stenosis.   8. LA volume Index is 51.0 ml/m² ml/m2.   9. Mitral valve mean gradient is 2.3 mmHg consistent with mild mitral   stenosis.    PHYSICIAN INTERPRETATION:  Left Ventricle: Normal left ventricular size and wall thicknesses, with   normal systolic and diastolic function. The left ventricular internal   cavity size is normal. Left ventricular wall thickness is moderately   increased. There is moderate concentric left ventricular hypertrophy.   Global LV systolic function was severely decreased. LV Ejection Fraction   by Hickey's Method with a biplane EF of 30 %.  Right Ventricle: Normal right ventricular size and function.  Left Atrium: Moderate to severe left atrial enlargement. LA volume Index   is 51.0 ml/m² ml/m2.  Right Atrium: Normal right atrial size.  Pericardium: There is no evidence of pericardial effusion.  Mitral Valve: Structurally normal mitral valve, with normal leaflet   excursion. There is moderate mitral annular calcification. Peak   transmitral mean gradient equals 2.3 mmHg, calculated mitral valve area   by pressure half time equals 2.75 cm² consistent with No evidence of   mitral stenosis. Mitral valve mean gradient is 2.3 mmHg consistent with   mild mitral stenosis.  Tricuspid Valve: The tricuspid valve is normal in structure. Mild   tricuspid regurgitation is visualized.  Aortic Valve: The aortic valve is trileaflet. Mild aortic valve   regurgitation is seen. Mild calcific arotic stenosis.  Pulmonic Valve: Structurally normal pulmonic valve, with normal leaflet   excursion.  Aorta: The aortic root and ascending aorta are structurally normal, with   no evidence of dilitation.  Pulmonary Artery: The main pulmonary artery is normal in size.  Venous: The inferior vena cava was normal sized, with respiratory size   variation greater than 50%.    < end of copied text >    Skeleton/soft tissues: Degenerative change    Impression:      No radiographic evidence of acute cardiopulmonary disease.    PAUL FRANKS M.D., ATTENDING RADIOLOGIST  This document has been electronically signed. 2018  2:45PM        < end of copied text >

## 2018-04-13 NOTE — DISCHARGE NOTE ADULT - CARE PROVIDER_API CALL
Go Brothers), Internal Medicine  [address] [facility] [address]  [place] [address]  Phone: [phone]  Fax: [phone]    Jah Laughlin), Cardiovascular Disease; Internal Medicine  [address] [place] [address] [address]  [place] [address]  Phone: [phone]  Fax: [phone]    Quita Bedolla), Nephrology  [address] [place] [address]  Phone: [phone]  Fax: [phone]    Adrian Gipson; ANDERSON), Cardiac Electrophysiology; Cardiovascular Disease  [address] [place] [address]  Phone: [phone]  Fax: [phone]

## 2018-04-13 NOTE — PROGRESS NOTE ADULT - ATTENDING COMMENTS
1- Orthostatic Hypotension       cardiology eval appreciated- agree w/ Flomax in PM      Discontinued HCTZ ARB LAsix     check orthostatics      increase PO intake of fluids       consider compression stockings     2- Chronic systolic CHF- Now MAHESH     continue with metoprolol, holding ARB and lasix      No evidence of decompensation    ECHO pending       Cardio eval appreciated     Renal on board; f/u urine lytes and Osmo RB sono    3.   Atrial fibrillation     Subtherapeutic INR     dose Coumadin as per INR  (pt declining Lovenox)    4.  Hypothyroidism     off levothyroxine for the last month     f/u TFTs    5. Uncontrolled diabetes mellitus     Last Hba1c 10.4%     continue with insulin S/C     F/U endocrinology as outpatient    6. Tooth #26 bleed     DDS eval appreciated     do not dislodge clot      DVT proph (already on coumadin)  GI proph  ambulate as tolerated  2gr Na DASH diet, carb consistent  Full code  Disposition home

## 2018-04-13 NOTE — PROGRESS NOTE ADULT - SUBJECTIVE AND OBJECTIVE BOX
Patient is a 78y old  Male who presents with a chief complaint of Worsening lightheadedness over the last month (13 Apr 2018 18:53)      HPI:  78 yo male patient with PMHx of CAD s/p CABG (2014), repeat cath in 2017 showed patent grafts, Atrial fibrillation on coumadin, chronic systolic CHF s/P CRT-D, uncontrolled DM II (last HbA1c 10.4%), hypothyroidism, HTN, DL, BPH, obesity, presented because of worsening lightheadedness over the last month.  Patient says he's been experiencing short episodes of dizziness and light headedness particularly when he stands up from sitting position or when he bends forward. Symptoms lasts for few seconds/minutes, until he stands still or sits down. He denies headaches, denies fainting, denies palpitations, chest pain, nausea vomiting, GI or  symptoms.  Patient has exertional shortness of breath (chronic) because of his heart failure.  On the side, patient says its been around a month or so that he ran out of his synthroid (so he was not taking his thyroid medications), also he says he stopped taking the lasix for around a month now because he was urinating a lot. (09 Apr 2018 21:00)      PAST MEDICAL & SURGICAL HISTORY:  Diabetes mellitus  Hypothyroidism  Hypertension  Dyslipidemia  Benign prostate hyperplasia  Atrial fibrillation  Heart failure: chronic systolic heart failure  Coronary artery disease: s/p CABG 2014  S/P placement of cardiac pacemaker: CRT-D  S/P CABG (coronary artery bypass graft)      Home Medications:  aspirin 81 mg oral tablet, chewable: 1 tab(s) orally once a day (09 Apr 2018 21:13)  atorvastatin 40 mg oral tablet: 1 tab(s) orally once a day (at bedtime) (09 Apr 2018 21:09)  Flomax 0.4 mg oral capsule: 1 cap(s) orally once a day (at bedtime) (09 Apr 2018 21:12)  latanoprost 0.005% ophthalmic solution: 1 drop(s) to each affected eye once a day (in the evening) (09 Apr 2018 21:11)  levothyroxine 125 mcg (0.125 mg) oral capsule: 1 cap(s) orally once a day (09 Apr 2018 21:10)  metFORMIN 1000 mg oral tablet: 1 tab(s) orally 2 times a day (09 Apr 2018 21:11)  metoprolol succinate 50 mg oral tablet, extended release: 1 tab(s) orally once a day (09 Apr 2018 21:12)  Toujeo SoloStar 300 units/mL subcutaneous solution: 16 unit(s) subcutaneous once a day (09 Apr 2018 21:12)  warfarin 7.5 mg oral tablet: 1 tab(s) orally once a day (09 Apr 2018 21:09)      .  Tobacco use:   EtOH use:  Illicit drug use:    Living situation:    No Known Allergies      FAMILY HISTORY:  Family history of coronary artery disease (Father)      aspirin  chewable 81 milliGRAM(s) Oral daily  atorvastatin 40 milliGRAM(s) Oral at bedtime  dextrose 5%. 1000 milliLiter(s) IV Continuous <Continuous>  dextrose 50% Injectable 12.5 Gram(s) IV Push once  dextrose 50% Injectable 25 Gram(s) IV Push once  dextrose 50% Injectable 25 Gram(s) IV Push once  dextrose Gel 1 Dose(s) Oral once PRN  glucagon  Injectable 1 milliGRAM(s) IntraMuscular once PRN  insulin glargine Injectable (LANTUS) 16 Unit(s) SubCutaneous every morning  insulin lispro (HumaLOG) corrective regimen sliding scale   SubCutaneous three times a day before meals  insulin lispro Injectable (HumaLOG) 6 Unit(s) SubCutaneous three times a day before meals  latanoprost 0.005% Ophthalmic Solution 1 Drop(s) Both EYES at bedtime  levothyroxine 88 MICROGram(s) Oral daily  metoprolol succinate ER 50 milliGRAM(s) Oral daily  tamsulosin 0.4 milliGRAM(s) Oral at bedtime  warfarin 3 milliGRAM(s) Oral once      Vital Signs Last 24 Hrs  T(C): 36.2 (13 Apr 2018 20:13), Max: 36.6 (13 Apr 2018 13:44)  T(F): 97.1 (13 Apr 2018 20:13), Max: 97.8 (13 Apr 2018 13:44)  HR: 61 (13 Apr 2018 20:13) (60 - 62)  BP: 113/59 (13 Apr 2018 20:13) (102/60 - 126/65)  BP(mean): --  RR: 18 (13 Apr 2018 13:44) (18 - 18)  SpO2: 98% (13 Apr 2018 05:18) (98% - 98%)    I&O's Summary    12 Apr 2018 07:01 - 13 Apr 2018 07:00  --------------------------------------------------------  IN: 350 mL / OUT: 1150 mL / NET: -800 mL    13 Apr 2018 07:01  -  13 Apr 2018 20:58  --------------------------------------------------------  IN: 350 mL / OUT: 300 mL / NET: 50 mL                              11.1   6.15  )-----------( 120      ( 12 Apr 2018 08:10 )             32.4       04-13    140  |  99  |  33<H>  ----------------------------<  130<H>  4.8   |  26  |  1.3    Ca    8.4<L>      13 Apr 2018 09:15  Mg     2.0     04-13    TPro  7.0  /  Alb  4.2  /  TBili  0.7  /  DBili  0.2  /  AST  63<H>  /  ALT  32  /  AlkPhos  99  04-13          Physical examination   Chest is clear to auscultation   S1+S2 no murmur   Abdomen is soft and non tender

## 2018-04-14 VITALS
DIASTOLIC BLOOD PRESSURE: 58 MMHG | SYSTOLIC BLOOD PRESSURE: 108 MMHG | HEART RATE: 60 BPM | TEMPERATURE: 97 F | RESPIRATION RATE: 20 BRPM

## 2018-04-14 LAB
ANION GAP SERPL CALC-SCNC: 10 MMOL/L — SIGNIFICANT CHANGE UP (ref 7–14)
BASOPHILS # BLD AUTO: 0.02 K/UL — SIGNIFICANT CHANGE UP (ref 0–0.2)
BASOPHILS NFR BLD AUTO: 0.3 % — SIGNIFICANT CHANGE UP (ref 0–1)
BUN SERPL-MCNC: 30 MG/DL — HIGH (ref 10–20)
CALCIUM SERPL-MCNC: 8.2 MG/DL — LOW (ref 8.5–10.1)
CHLORIDE SERPL-SCNC: 103 MMOL/L — SIGNIFICANT CHANGE UP (ref 98–110)
CO2 SERPL-SCNC: 26 MMOL/L — SIGNIFICANT CHANGE UP (ref 17–32)
CREAT SERPL-MCNC: 1.2 MG/DL — SIGNIFICANT CHANGE UP (ref 0.7–1.5)
EOSINOPHIL # BLD AUTO: 0.13 K/UL — SIGNIFICANT CHANGE UP (ref 0–0.7)
EOSINOPHIL NFR BLD AUTO: 2 % — SIGNIFICANT CHANGE UP (ref 0–8)
GLUCOSE SERPL-MCNC: 115 MG/DL — HIGH (ref 70–99)
HCT VFR BLD CALC: 31.9 % — LOW (ref 42–52)
HGB BLD-MCNC: 10.4 G/DL — LOW (ref 14–18)
IMM GRANULOCYTES NFR BLD AUTO: 0.5 % — HIGH (ref 0.1–0.3)
INR BLD: 2.37 RATIO — HIGH (ref 0.65–1.3)
LYMPHOCYTES # BLD AUTO: 1.71 K/UL — SIGNIFICANT CHANGE UP (ref 1.2–3.4)
LYMPHOCYTES # BLD AUTO: 26.4 % — SIGNIFICANT CHANGE UP (ref 20.5–51.1)
MAGNESIUM SERPL-MCNC: 1.8 MG/DL — SIGNIFICANT CHANGE UP (ref 1.8–2.4)
MCHC RBC-ENTMCNC: 32.1 PG — HIGH (ref 27–31)
MCHC RBC-ENTMCNC: 32.6 G/DL — SIGNIFICANT CHANGE UP (ref 32–37)
MCV RBC AUTO: 98.5 FL — HIGH (ref 80–94)
MONOCYTES # BLD AUTO: 0.85 K/UL — HIGH (ref 0.1–0.6)
MONOCYTES NFR BLD AUTO: 13.1 % — HIGH (ref 1.7–9.3)
NEUTROPHILS # BLD AUTO: 3.74 K/UL — SIGNIFICANT CHANGE UP (ref 1.4–6.5)
NEUTROPHILS NFR BLD AUTO: 57.7 % — SIGNIFICANT CHANGE UP (ref 42.2–75.2)
NRBC # BLD: 0 /100 WBCS — SIGNIFICANT CHANGE UP (ref 0–0)
PLATELET # BLD AUTO: 115 K/UL — LOW (ref 130–400)
POTASSIUM SERPL-MCNC: 4.9 MMOL/L — SIGNIFICANT CHANGE UP (ref 3.5–5)
POTASSIUM SERPL-SCNC: 4.9 MMOL/L — SIGNIFICANT CHANGE UP (ref 3.5–5)
PROTHROM AB SERPL-ACNC: 26.1 SEC — HIGH (ref 9.95–12.87)
RBC # BLD: 3.24 M/UL — LOW (ref 4.7–6.1)
RBC # FLD: 16.4 % — HIGH (ref 11.5–14.5)
SODIUM SERPL-SCNC: 139 MMOL/L — SIGNIFICANT CHANGE UP (ref 135–146)
WBC # BLD: 6.48 K/UL — SIGNIFICANT CHANGE UP (ref 4.8–10.8)
WBC # FLD AUTO: 6.48 K/UL — SIGNIFICANT CHANGE UP (ref 4.8–10.8)

## 2018-04-14 RX ADMIN — Medication 50 MILLIGRAM(S): at 05:38

## 2018-04-14 RX ADMIN — Medication 88 MICROGRAM(S): at 05:38

## 2018-04-14 RX ADMIN — Medication 6 UNIT(S): at 08:51

## 2018-04-14 RX ADMIN — INSULIN GLARGINE 16 UNIT(S): 100 INJECTION, SOLUTION SUBCUTANEOUS at 08:50

## 2018-04-14 NOTE — PROGRESS NOTE ADULT - SUBJECTIVE AND OBJECTIVE BOX
MYRNA TOVAR  78y  Male    Patient is a 78y old  Male who presents with a chief complaint of Worsening lightheadedness over the last month (2018 18:53)      INTERVAL HPI/OVERNIGHT EVENTS: None    T(C): 36.2 (18 @ 20:13), Max: 36.6 (18 @ 13:44)  HR: 61 (18 @ 20:13) (60 - 62)  BP: 113/59 (18 @ 20:13) (102/60 - 126/65)  RR: 18 (18 @ 13:44) (18 - 18)  SpO2: 98% (18 @ 05:18) (98% - 98%)  Wt(kg): --Vital Signs Last 24 Hrs  T(C): 36.2 (2018 20:13), Max: 36.6 (2018 13:44)  T(F): 97.1 (2018 20:13), Max: 97.8 (2018 13:44)  HR: 61 (2018 20:13) (60 - 62)  BP: 113/59 (2018 20:13) (102/60 - 126/65)  BP(mean): --  RR: 18 (2018 13:44) (18 - 18)  SpO2: 98% (2018 05:18) (98% - 98%)    PHYSICAL EXAM:  GENERAL: NAD, well-groomed, well-developed  HEAD:  Atraumatic, Normocephalic  NECK: Supple, No JVD, Normal thyroid  NERVOUS SYSTEM:  Alert & Oriented X3, Good concentration; Motor Strength 5/5 B/L upper and lower extremities; DTRs 2+ intact and symmetric  CHEST/LUNG: Clear to percussion bilaterally; No rales, rhonchi, wheezing, or rubs  HEART: Regular rate and rhythm; No murmurs, rubs, or gallops  ABDOMEN: Soft, Nontender, Nondistended; Bowel sounds present  EXTREMITIES:  2+ Peripheral Pulses, No clubbing, cyanosis, or edema    Consultant(s) Notes Reviewed:  [x ] YES  [ ] NO    Discussed with Consultants/Other Providers/Residents [ x] YES     LABS                         11.1   6.15  )-----------( 120      ( 2018 08:10 )             32.4     04-13    140  |  99  |  33<H>  ----------------------------<  130<H>  4.8   |  26  |  1.3    Ca    8.4<L>      2018 09:15  Mg     2.0     -    TPro  7.0  /  Alb  4.2  /  TBili  0.7  /  DBili  0.2  /  AST  63<H>  /  ALT  32  /  AlkPhos  99  04-13    PT/INR - ( 2018 19:06 )   PT: 25.40 sec;   INR: 2.31 ratio               LIVER FUNCTIONS - ( 2018 09:15 )  Alb: 4.2 g/dL / Pro: 7.0 g/dL / ALK PHOS: 99 U/L / ALT: 32 U/L / AST: 63 U/L / GGT: x           CAPILLARY BLOOD GLUCOSE        Urinalysis Basic - ( 2018 12:20 )    Color: Yellow / Appearance: Clear / S.015 / pH: x  Gluc: x / Ketone: Negative  / Bili: Negative / Urobili: 1.0 mg/dL   Blood: x / Protein: Negative mg/dL / Nitrite: Negative   Leuk Esterase: Negative / RBC: x / WBC x   Sq Epi: x / Non Sq Epi: x / Bacteria: x        RADIOLOGY & ADDITIONAL TESTS:    Imaging Personally Reviewed:  [x ] YES  [ ] NO    MEDICATIONS  (STANDING):  aspirin  chewable 81 milliGRAM(s) Oral daily  atorvastatin 40 milliGRAM(s) Oral at bedtime  dextrose 5%. 1000 milliLiter(s) (50 mL/Hr) IV Continuous <Continuous>  dextrose 50% Injectable 12.5 Gram(s) IV Push once  dextrose 50% Injectable 25 Gram(s) IV Push once  dextrose 50% Injectable 25 Gram(s) IV Push once  insulin glargine Injectable (LANTUS) 16 Unit(s) SubCutaneous every morning  insulin lispro (HumaLOG) corrective regimen sliding scale   SubCutaneous three times a day before meals  insulin lispro Injectable (HumaLOG) 6 Unit(s) SubCutaneous three times a day before meals  latanoprost 0.005% Ophthalmic Solution 1 Drop(s) Both EYES at bedtime  levothyroxine 88 MICROGram(s) Oral daily  metoprolol succinate ER 50 milliGRAM(s) Oral daily  tamsulosin 0.4 milliGRAM(s) Oral at bedtime    MEDICATIONS  (PRN):  dextrose Gel 1 Dose(s) Oral once PRN Blood Glucose LESS THAN 70 milliGRAM(s)/deciliter  glucagon  Injectable 1 milliGRAM(s) IntraMuscular once PRN Glucose LESS THAN 70 milligrams/deciliter      HEALTH ISSUES - PROBLEM Dx:  Heart failure, unspecified chronicity, unspecified heart failure type: Heart failure, unspecified chronicity, unspecified heart failure type

## 2018-04-14 NOTE — PROGRESS NOTE ADULT - ATTENDING COMMENTS
1- Orthostatic Hypotension       cardiology eval appreciated- agree w/ Flomax in PM      Discontinued HCTZ ARB LAsix     check orthostatics      increase PO intake of fluids       consider compression stockings     2- Chronic systolic CHF- Now MAHESH     continue with metoprolol, holding ARB and lasix      No evidence of decompensation    ECHO pending       Cardio eval appreciated     Renal f/u appreciated    3.   Atrial fibrillation     Subtherapeutic INR     dose Coumadin as per INR  (pt declining Lovenox)    4.  Hypothyroidism     off levothyroxine for the last month     f/u TFTs    5. Uncontrolled diabetes mellitus     Last Hba1c 10.4%     continue with insulin S/C     F/U endocrinology as outpatient    6. Tooth #26 bleed     DDS f/u appreciated     do not dislodge clot      DVT proph ( on coumadin)  GI proph  ambulate as tolerated  2gr Na DASH diet, carb consistent  Full code      DC  home Today

## 2018-04-16 LAB — ALDOST SERPL-MCNC: 5.8 NG/DL — SIGNIFICANT CHANGE UP

## 2018-04-19 ENCOUNTER — OUTPATIENT (OUTPATIENT)
Dept: OUTPATIENT SERVICES | Facility: HOSPITAL | Age: 78
LOS: 1 days | Discharge: HOME | End: 2018-04-19

## 2018-04-19 DIAGNOSIS — Z95.0 PRESENCE OF CARDIAC PACEMAKER: Chronic | ICD-10-CM

## 2018-04-19 DIAGNOSIS — I48.91 UNSPECIFIED ATRIAL FIBRILLATION: ICD-10-CM

## 2018-04-19 DIAGNOSIS — Z79.01 LONG TERM (CURRENT) USE OF ANTICOAGULANTS: ICD-10-CM

## 2018-04-19 DIAGNOSIS — Z95.1 PRESENCE OF AORTOCORONARY BYPASS GRAFT: Chronic | ICD-10-CM

## 2018-04-19 LAB
CORTICOSTEROID BINDING GLOBULIN RESULT: 3.1 MG/DL — SIGNIFICANT CHANGE UP
CORTIS F/TOTAL MFR SERPL: 3.9 % — SIGNIFICANT CHANGE UP
CORTIS SERPL-MCNC: 13 UG/DL — SIGNIFICANT CHANGE UP
CORTISOL, FREE RESULT: 0.51 UG/DL — SIGNIFICANT CHANGE UP

## 2018-04-26 ENCOUNTER — OUTPATIENT (OUTPATIENT)
Dept: OUTPATIENT SERVICES | Facility: HOSPITAL | Age: 78
LOS: 1 days | Discharge: HOME | End: 2018-04-26

## 2018-04-26 DIAGNOSIS — Z95.1 PRESENCE OF AORTOCORONARY BYPASS GRAFT: Chronic | ICD-10-CM

## 2018-04-26 DIAGNOSIS — Z95.0 PRESENCE OF CARDIAC PACEMAKER: Chronic | ICD-10-CM

## 2018-04-26 DIAGNOSIS — Z79.01 LONG TERM (CURRENT) USE OF ANTICOAGULANTS: ICD-10-CM

## 2018-04-26 DIAGNOSIS — I48.91 UNSPECIFIED ATRIAL FIBRILLATION: ICD-10-CM

## 2018-04-28 DIAGNOSIS — E11.65 TYPE 2 DIABETES MELLITUS WITH HYPERGLYCEMIA: ICD-10-CM

## 2018-04-28 DIAGNOSIS — I95.1 ORTHOSTATIC HYPOTENSION: ICD-10-CM

## 2018-04-28 DIAGNOSIS — I25.10 ATHEROSCLEROTIC HEART DISEASE OF NATIVE CORONARY ARTERY WITHOUT ANGINA PECTORIS: ICD-10-CM

## 2018-04-28 DIAGNOSIS — Z95.1 PRESENCE OF AORTOCORONARY BYPASS GRAFT: ICD-10-CM

## 2018-04-28 DIAGNOSIS — Z79.01 LONG TERM (CURRENT) USE OF ANTICOAGULANTS: ICD-10-CM

## 2018-04-28 DIAGNOSIS — N17.9 ACUTE KIDNEY FAILURE, UNSPECIFIED: ICD-10-CM

## 2018-04-28 DIAGNOSIS — Z91.14 PATIENT'S OTHER NONCOMPLIANCE WITH MEDICATION REGIMEN: ICD-10-CM

## 2018-04-28 DIAGNOSIS — E78.5 HYPERLIPIDEMIA, UNSPECIFIED: ICD-10-CM

## 2018-04-28 DIAGNOSIS — I11.0 HYPERTENSIVE HEART DISEASE WITH HEART FAILURE: ICD-10-CM

## 2018-04-28 DIAGNOSIS — E66.9 OBESITY, UNSPECIFIED: ICD-10-CM

## 2018-04-28 DIAGNOSIS — E03.9 HYPOTHYROIDISM, UNSPECIFIED: ICD-10-CM

## 2018-04-28 DIAGNOSIS — R42 DIZZINESS AND GIDDINESS: ICD-10-CM

## 2018-04-28 DIAGNOSIS — I48.91 UNSPECIFIED ATRIAL FIBRILLATION: ICD-10-CM

## 2018-04-28 DIAGNOSIS — N40.0 BENIGN PROSTATIC HYPERPLASIA WITHOUT LOWER URINARY TRACT SYMPTOMS: ICD-10-CM

## 2018-04-28 DIAGNOSIS — Z95.810 PRESENCE OF AUTOMATIC (IMPLANTABLE) CARDIAC DEFIBRILLATOR: ICD-10-CM

## 2018-04-28 DIAGNOSIS — I08.3 COMBINED RHEUMATIC DISORDERS OF MITRAL, AORTIC AND TRICUSPID VALVES: ICD-10-CM

## 2018-04-28 DIAGNOSIS — E83.42 HYPOMAGNESEMIA: ICD-10-CM

## 2018-04-28 DIAGNOSIS — Z79.84 LONG TERM (CURRENT) USE OF ORAL HYPOGLYCEMIC DRUGS: ICD-10-CM

## 2018-04-28 DIAGNOSIS — I50.22 CHRONIC SYSTOLIC (CONGESTIVE) HEART FAILURE: ICD-10-CM

## 2018-05-07 ENCOUNTER — OUTPATIENT (OUTPATIENT)
Dept: OUTPATIENT SERVICES | Facility: HOSPITAL | Age: 78
LOS: 1 days | Discharge: HOME | End: 2018-05-07

## 2018-05-07 ENCOUNTER — RX RENEWAL (OUTPATIENT)
Age: 78
End: 2018-05-07

## 2018-05-07 DIAGNOSIS — Z95.1 PRESENCE OF AORTOCORONARY BYPASS GRAFT: Chronic | ICD-10-CM

## 2018-05-07 DIAGNOSIS — Z95.0 PRESENCE OF CARDIAC PACEMAKER: Chronic | ICD-10-CM

## 2018-05-07 DIAGNOSIS — Z79.01 LONG TERM (CURRENT) USE OF ANTICOAGULANTS: ICD-10-CM

## 2018-05-07 DIAGNOSIS — I48.91 UNSPECIFIED ATRIAL FIBRILLATION: ICD-10-CM

## 2018-05-16 ENCOUNTER — OUTPATIENT (OUTPATIENT)
Dept: OUTPATIENT SERVICES | Facility: HOSPITAL | Age: 78
LOS: 1 days | Discharge: HOME | End: 2018-05-16

## 2018-05-16 DIAGNOSIS — Z95.1 PRESENCE OF AORTOCORONARY BYPASS GRAFT: Chronic | ICD-10-CM

## 2018-05-16 DIAGNOSIS — Z95.0 PRESENCE OF CARDIAC PACEMAKER: Chronic | ICD-10-CM

## 2018-05-16 DIAGNOSIS — Z79.01 LONG TERM (CURRENT) USE OF ANTICOAGULANTS: ICD-10-CM

## 2018-05-16 DIAGNOSIS — I48.91 UNSPECIFIED ATRIAL FIBRILLATION: ICD-10-CM

## 2018-05-24 ENCOUNTER — APPOINTMENT (OUTPATIENT)
Dept: CARDIOLOGY | Facility: CLINIC | Age: 78
End: 2018-05-24

## 2018-05-24 VITALS — DIASTOLIC BLOOD PRESSURE: 60 MMHG | SYSTOLIC BLOOD PRESSURE: 116 MMHG

## 2018-05-31 ENCOUNTER — OUTPATIENT (OUTPATIENT)
Dept: OUTPATIENT SERVICES | Facility: HOSPITAL | Age: 78
LOS: 1 days | Discharge: HOME | End: 2018-05-31

## 2018-05-31 DIAGNOSIS — Z95.0 PRESENCE OF CARDIAC PACEMAKER: Chronic | ICD-10-CM

## 2018-05-31 DIAGNOSIS — Z95.1 PRESENCE OF AORTOCORONARY BYPASS GRAFT: Chronic | ICD-10-CM

## 2018-05-31 DIAGNOSIS — I48.91 UNSPECIFIED ATRIAL FIBRILLATION: ICD-10-CM

## 2018-05-31 DIAGNOSIS — Z79.01 LONG TERM (CURRENT) USE OF ANTICOAGULANTS: ICD-10-CM

## 2018-06-08 ENCOUNTER — OUTPATIENT (OUTPATIENT)
Dept: OUTPATIENT SERVICES | Facility: HOSPITAL | Age: 78
LOS: 1 days | Discharge: HOME | End: 2018-06-08

## 2018-06-08 DIAGNOSIS — I48.91 UNSPECIFIED ATRIAL FIBRILLATION: ICD-10-CM

## 2018-06-08 DIAGNOSIS — Z95.1 PRESENCE OF AORTOCORONARY BYPASS GRAFT: Chronic | ICD-10-CM

## 2018-06-08 DIAGNOSIS — Z79.01 LONG TERM (CURRENT) USE OF ANTICOAGULANTS: ICD-10-CM

## 2018-06-08 DIAGNOSIS — Z95.0 PRESENCE OF CARDIAC PACEMAKER: Chronic | ICD-10-CM

## 2018-06-18 ENCOUNTER — OUTPATIENT (OUTPATIENT)
Dept: OUTPATIENT SERVICES | Facility: HOSPITAL | Age: 78
LOS: 1 days | Discharge: HOME | End: 2018-06-18

## 2018-06-18 DIAGNOSIS — Z95.0 PRESENCE OF CARDIAC PACEMAKER: Chronic | ICD-10-CM

## 2018-06-18 DIAGNOSIS — Z95.1 PRESENCE OF AORTOCORONARY BYPASS GRAFT: Chronic | ICD-10-CM

## 2018-06-18 DIAGNOSIS — Z79.01 LONG TERM (CURRENT) USE OF ANTICOAGULANTS: ICD-10-CM

## 2018-06-18 DIAGNOSIS — I48.91 UNSPECIFIED ATRIAL FIBRILLATION: ICD-10-CM

## 2018-06-20 ENCOUNTER — RX RENEWAL (OUTPATIENT)
Age: 78
End: 2018-06-20

## 2018-06-25 ENCOUNTER — OUTPATIENT (OUTPATIENT)
Dept: OUTPATIENT SERVICES | Facility: HOSPITAL | Age: 78
LOS: 1 days | Discharge: HOME | End: 2018-06-25

## 2018-06-25 DIAGNOSIS — Z79.01 LONG TERM (CURRENT) USE OF ANTICOAGULANTS: ICD-10-CM

## 2018-06-25 DIAGNOSIS — Z95.1 PRESENCE OF AORTOCORONARY BYPASS GRAFT: Chronic | ICD-10-CM

## 2018-06-25 DIAGNOSIS — Z95.0 PRESENCE OF CARDIAC PACEMAKER: Chronic | ICD-10-CM

## 2018-06-25 DIAGNOSIS — I48.91 UNSPECIFIED ATRIAL FIBRILLATION: ICD-10-CM

## 2018-07-03 ENCOUNTER — OUTPATIENT (OUTPATIENT)
Dept: OUTPATIENT SERVICES | Facility: HOSPITAL | Age: 78
LOS: 1 days | Discharge: HOME | End: 2018-07-03

## 2018-07-03 DIAGNOSIS — Z95.1 PRESENCE OF AORTOCORONARY BYPASS GRAFT: Chronic | ICD-10-CM

## 2018-07-03 DIAGNOSIS — I48.91 UNSPECIFIED ATRIAL FIBRILLATION: ICD-10-CM

## 2018-07-03 DIAGNOSIS — Z95.0 PRESENCE OF CARDIAC PACEMAKER: Chronic | ICD-10-CM

## 2018-07-03 DIAGNOSIS — Z79.01 LONG TERM (CURRENT) USE OF ANTICOAGULANTS: ICD-10-CM

## 2018-07-10 ENCOUNTER — OUTPATIENT (OUTPATIENT)
Dept: OUTPATIENT SERVICES | Facility: HOSPITAL | Age: 78
LOS: 1 days | Discharge: HOME | End: 2018-07-10

## 2018-07-10 DIAGNOSIS — Z79.01 LONG TERM (CURRENT) USE OF ANTICOAGULANTS: ICD-10-CM

## 2018-07-10 DIAGNOSIS — I48.91 UNSPECIFIED ATRIAL FIBRILLATION: ICD-10-CM

## 2018-07-10 DIAGNOSIS — Z95.0 PRESENCE OF CARDIAC PACEMAKER: Chronic | ICD-10-CM

## 2018-07-10 DIAGNOSIS — Z95.1 PRESENCE OF AORTOCORONARY BYPASS GRAFT: Chronic | ICD-10-CM

## 2018-07-17 ENCOUNTER — OUTPATIENT (OUTPATIENT)
Dept: OUTPATIENT SERVICES | Facility: HOSPITAL | Age: 78
LOS: 1 days | Discharge: HOME | End: 2018-07-17

## 2018-07-17 DIAGNOSIS — Z95.1 PRESENCE OF AORTOCORONARY BYPASS GRAFT: Chronic | ICD-10-CM

## 2018-07-17 DIAGNOSIS — Z95.0 PRESENCE OF CARDIAC PACEMAKER: Chronic | ICD-10-CM

## 2018-07-17 DIAGNOSIS — I48.91 UNSPECIFIED ATRIAL FIBRILLATION: ICD-10-CM

## 2018-07-17 DIAGNOSIS — Z79.01 LONG TERM (CURRENT) USE OF ANTICOAGULANTS: ICD-10-CM

## 2018-07-17 PROBLEM — I25.10 ATHEROSCLEROTIC HEART DISEASE OF NATIVE CORONARY ARTERY WITHOUT ANGINA PECTORIS: Chronic | Status: ACTIVE | Noted: 2018-04-09

## 2018-07-17 PROBLEM — I10 ESSENTIAL (PRIMARY) HYPERTENSION: Chronic | Status: ACTIVE | Noted: 2018-04-09

## 2018-07-17 PROBLEM — E11.9 TYPE 2 DIABETES MELLITUS WITHOUT COMPLICATIONS: Chronic | Status: ACTIVE | Noted: 2018-04-09

## 2018-07-17 PROBLEM — N40.0 BENIGN PROSTATIC HYPERPLASIA WITHOUT LOWER URINARY TRACT SYMPTOMS: Chronic | Status: ACTIVE | Noted: 2018-04-09

## 2018-07-17 PROBLEM — E78.5 HYPERLIPIDEMIA, UNSPECIFIED: Chronic | Status: ACTIVE | Noted: 2018-04-09

## 2018-07-17 PROBLEM — E03.9 HYPOTHYROIDISM, UNSPECIFIED: Chronic | Status: ACTIVE | Noted: 2018-04-09

## 2018-07-17 PROBLEM — I50.9 HEART FAILURE, UNSPECIFIED: Chronic | Status: ACTIVE | Noted: 2018-04-09

## 2018-07-24 ENCOUNTER — OUTPATIENT (OUTPATIENT)
Dept: OUTPATIENT SERVICES | Facility: HOSPITAL | Age: 78
LOS: 1 days | Discharge: HOME | End: 2018-07-24

## 2018-07-24 DIAGNOSIS — Z79.01 LONG TERM (CURRENT) USE OF ANTICOAGULANTS: ICD-10-CM

## 2018-07-24 DIAGNOSIS — Z95.0 PRESENCE OF CARDIAC PACEMAKER: Chronic | ICD-10-CM

## 2018-07-24 DIAGNOSIS — Z95.1 PRESENCE OF AORTOCORONARY BYPASS GRAFT: Chronic | ICD-10-CM

## 2018-07-24 DIAGNOSIS — I48.91 UNSPECIFIED ATRIAL FIBRILLATION: ICD-10-CM

## 2018-07-31 ENCOUNTER — OUTPATIENT (OUTPATIENT)
Dept: OUTPATIENT SERVICES | Facility: HOSPITAL | Age: 78
LOS: 1 days | Discharge: HOME | End: 2018-07-31

## 2018-07-31 DIAGNOSIS — Z95.0 PRESENCE OF CARDIAC PACEMAKER: Chronic | ICD-10-CM

## 2018-07-31 DIAGNOSIS — Z79.01 LONG TERM (CURRENT) USE OF ANTICOAGULANTS: ICD-10-CM

## 2018-07-31 DIAGNOSIS — I48.91 UNSPECIFIED ATRIAL FIBRILLATION: ICD-10-CM

## 2018-07-31 DIAGNOSIS — Z95.1 PRESENCE OF AORTOCORONARY BYPASS GRAFT: Chronic | ICD-10-CM

## 2018-08-10 ENCOUNTER — APPOINTMENT (OUTPATIENT)
Dept: CARDIOLOGY | Facility: CLINIC | Age: 78
End: 2018-08-10

## 2018-08-10 VITALS — HEART RATE: 76 BPM | SYSTOLIC BLOOD PRESSURE: 120 MMHG | RESPIRATION RATE: 18 BRPM | DIASTOLIC BLOOD PRESSURE: 80 MMHG

## 2018-08-10 VITALS — BODY MASS INDEX: 35.56 KG/M2 | HEIGHT: 71 IN | WEIGHT: 254 LBS

## 2018-08-15 ENCOUNTER — OUTPATIENT (OUTPATIENT)
Dept: OUTPATIENT SERVICES | Facility: HOSPITAL | Age: 78
LOS: 1 days | Discharge: HOME | End: 2018-08-15

## 2018-08-15 DIAGNOSIS — Z95.0 PRESENCE OF CARDIAC PACEMAKER: Chronic | ICD-10-CM

## 2018-08-15 DIAGNOSIS — I48.91 UNSPECIFIED ATRIAL FIBRILLATION: ICD-10-CM

## 2018-08-15 DIAGNOSIS — Z79.01 LONG TERM (CURRENT) USE OF ANTICOAGULANTS: ICD-10-CM

## 2018-08-15 DIAGNOSIS — Z95.1 PRESENCE OF AORTOCORONARY BYPASS GRAFT: Chronic | ICD-10-CM

## 2018-08-28 ENCOUNTER — OUTPATIENT (OUTPATIENT)
Dept: OUTPATIENT SERVICES | Facility: HOSPITAL | Age: 78
LOS: 1 days | Discharge: HOME | End: 2018-08-28

## 2018-08-28 DIAGNOSIS — Z79.01 LONG TERM (CURRENT) USE OF ANTICOAGULANTS: ICD-10-CM

## 2018-08-28 DIAGNOSIS — Z95.1 PRESENCE OF AORTOCORONARY BYPASS GRAFT: Chronic | ICD-10-CM

## 2018-08-28 DIAGNOSIS — I48.91 UNSPECIFIED ATRIAL FIBRILLATION: ICD-10-CM

## 2018-08-28 DIAGNOSIS — Z95.0 PRESENCE OF CARDIAC PACEMAKER: Chronic | ICD-10-CM

## 2018-08-28 LAB
POCT INR: 2.2 RATIO — HIGH (ref 0.9–1.2)
POCT PT: 27 SEC — HIGH (ref 10–13.4)

## 2018-09-20 ENCOUNTER — OUTPATIENT (OUTPATIENT)
Dept: OUTPATIENT SERVICES | Facility: HOSPITAL | Age: 78
LOS: 1 days | Discharge: HOME | End: 2018-09-20

## 2018-09-20 DIAGNOSIS — Z95.1 PRESENCE OF AORTOCORONARY BYPASS GRAFT: Chronic | ICD-10-CM

## 2018-09-20 DIAGNOSIS — I48.91 UNSPECIFIED ATRIAL FIBRILLATION: ICD-10-CM

## 2018-09-20 DIAGNOSIS — Z95.0 PRESENCE OF CARDIAC PACEMAKER: Chronic | ICD-10-CM

## 2018-09-20 DIAGNOSIS — Z79.01 LONG TERM (CURRENT) USE OF ANTICOAGULANTS: ICD-10-CM

## 2018-09-20 LAB
POCT INR: 1.9 RATIO — HIGH (ref 0.9–1.2)
POCT PT: 22.5 SEC — HIGH (ref 10–13.4)

## 2018-10-04 ENCOUNTER — OUTPATIENT (OUTPATIENT)
Dept: OUTPATIENT SERVICES | Facility: HOSPITAL | Age: 78
LOS: 1 days | Discharge: HOME | End: 2018-10-04

## 2018-10-04 DIAGNOSIS — Z79.01 LONG TERM (CURRENT) USE OF ANTICOAGULANTS: ICD-10-CM

## 2018-10-04 DIAGNOSIS — I48.91 UNSPECIFIED ATRIAL FIBRILLATION: ICD-10-CM

## 2018-10-04 DIAGNOSIS — Z95.1 PRESENCE OF AORTOCORONARY BYPASS GRAFT: Chronic | ICD-10-CM

## 2018-10-04 DIAGNOSIS — Z95.0 PRESENCE OF CARDIAC PACEMAKER: Chronic | ICD-10-CM

## 2018-10-04 LAB
POCT INR: 2.1 RATIO — HIGH (ref 0.9–1.2)
POCT PT: 25 SEC — HIGH (ref 10–13.4)

## 2018-10-11 ENCOUNTER — APPOINTMENT (OUTPATIENT)
Dept: CARDIOLOGY | Facility: CLINIC | Age: 78
End: 2018-10-11

## 2018-10-18 ENCOUNTER — OUTPATIENT (OUTPATIENT)
Dept: OUTPATIENT SERVICES | Facility: HOSPITAL | Age: 78
LOS: 1 days | Discharge: HOME | End: 2018-10-18

## 2018-10-18 DIAGNOSIS — I48.91 UNSPECIFIED ATRIAL FIBRILLATION: ICD-10-CM

## 2018-10-18 DIAGNOSIS — Z95.1 PRESENCE OF AORTOCORONARY BYPASS GRAFT: Chronic | ICD-10-CM

## 2018-10-18 DIAGNOSIS — Z79.01 LONG TERM (CURRENT) USE OF ANTICOAGULANTS: ICD-10-CM

## 2018-10-18 DIAGNOSIS — Z95.0 PRESENCE OF CARDIAC PACEMAKER: Chronic | ICD-10-CM

## 2018-10-18 LAB
POCT INR: 1.8 RATIO — HIGH (ref 0.9–1.2)
POCT PT: 22 SEC — HIGH (ref 10–13.4)

## 2018-11-02 ENCOUNTER — OUTPATIENT (OUTPATIENT)
Dept: OUTPATIENT SERVICES | Facility: HOSPITAL | Age: 78
LOS: 1 days | Discharge: HOME | End: 2018-11-02

## 2018-11-02 DIAGNOSIS — Z95.1 PRESENCE OF AORTOCORONARY BYPASS GRAFT: Chronic | ICD-10-CM

## 2018-11-02 DIAGNOSIS — Z79.01 LONG TERM (CURRENT) USE OF ANTICOAGULANTS: ICD-10-CM

## 2018-11-02 DIAGNOSIS — I48.91 UNSPECIFIED ATRIAL FIBRILLATION: ICD-10-CM

## 2018-11-02 DIAGNOSIS — Z95.0 PRESENCE OF CARDIAC PACEMAKER: Chronic | ICD-10-CM

## 2018-11-02 LAB
POCT INR: 1.5 RATIO — HIGH (ref 0.9–1.2)
POCT PT: 17.5 SEC — HIGH (ref 10–13.4)

## 2018-11-12 ENCOUNTER — OUTPATIENT (OUTPATIENT)
Dept: OUTPATIENT SERVICES | Facility: HOSPITAL | Age: 78
LOS: 1 days | Discharge: HOME | End: 2018-11-12

## 2018-11-12 DIAGNOSIS — Z95.1 PRESENCE OF AORTOCORONARY BYPASS GRAFT: Chronic | ICD-10-CM

## 2018-11-12 DIAGNOSIS — I48.91 UNSPECIFIED ATRIAL FIBRILLATION: ICD-10-CM

## 2018-11-12 DIAGNOSIS — Z79.01 LONG TERM (CURRENT) USE OF ANTICOAGULANTS: ICD-10-CM

## 2018-11-12 DIAGNOSIS — Z95.0 PRESENCE OF CARDIAC PACEMAKER: Chronic | ICD-10-CM

## 2018-11-12 LAB
POCT INR: 1.8 RATIO — HIGH (ref 0.9–1.2)
POCT PT: 21.2 SEC — HIGH (ref 10–13.4)

## 2018-11-21 ENCOUNTER — OUTPATIENT (OUTPATIENT)
Dept: OUTPATIENT SERVICES | Facility: HOSPITAL | Age: 78
LOS: 1 days | Discharge: HOME | End: 2018-11-21

## 2018-11-21 DIAGNOSIS — Z95.1 PRESENCE OF AORTOCORONARY BYPASS GRAFT: Chronic | ICD-10-CM

## 2018-11-21 DIAGNOSIS — Z95.0 PRESENCE OF CARDIAC PACEMAKER: Chronic | ICD-10-CM

## 2018-11-21 DIAGNOSIS — Z79.01 LONG TERM (CURRENT) USE OF ANTICOAGULANTS: ICD-10-CM

## 2018-11-21 DIAGNOSIS — I48.91 UNSPECIFIED ATRIAL FIBRILLATION: ICD-10-CM

## 2018-11-21 LAB
POCT INR: 2.5 RATIO — HIGH (ref 0.9–1.2)
POCT PT: 30.1 SEC — HIGH (ref 10–13.4)

## 2018-11-29 ENCOUNTER — OUTPATIENT (OUTPATIENT)
Dept: OUTPATIENT SERVICES | Facility: HOSPITAL | Age: 78
LOS: 1 days | Discharge: HOME | End: 2018-11-29

## 2018-11-29 DIAGNOSIS — I48.91 UNSPECIFIED ATRIAL FIBRILLATION: ICD-10-CM

## 2018-11-29 DIAGNOSIS — Z79.01 LONG TERM (CURRENT) USE OF ANTICOAGULANTS: ICD-10-CM

## 2018-11-29 DIAGNOSIS — Z95.1 PRESENCE OF AORTOCORONARY BYPASS GRAFT: Chronic | ICD-10-CM

## 2018-11-29 DIAGNOSIS — Z95.0 PRESENCE OF CARDIAC PACEMAKER: Chronic | ICD-10-CM

## 2018-11-29 LAB
POCT INR: 2.3 RATIO — HIGH (ref 0.9–1.2)
POCT PT: 28.2 SEC — HIGH (ref 10–13.4)

## 2018-12-13 ENCOUNTER — OUTPATIENT (OUTPATIENT)
Dept: OUTPATIENT SERVICES | Facility: HOSPITAL | Age: 78
LOS: 1 days | Discharge: HOME | End: 2018-12-13

## 2018-12-13 DIAGNOSIS — Z95.0 PRESENCE OF CARDIAC PACEMAKER: Chronic | ICD-10-CM

## 2018-12-13 DIAGNOSIS — Z79.01 LONG TERM (CURRENT) USE OF ANTICOAGULANTS: ICD-10-CM

## 2018-12-13 DIAGNOSIS — I48.91 UNSPECIFIED ATRIAL FIBRILLATION: ICD-10-CM

## 2018-12-13 DIAGNOSIS — Z95.1 PRESENCE OF AORTOCORONARY BYPASS GRAFT: Chronic | ICD-10-CM

## 2018-12-13 LAB
POCT INR: 2.7 RATIO — HIGH (ref 0.9–1.2)
POCT PT: 32.6 SEC — HIGH (ref 10–13.4)

## 2018-12-21 ENCOUNTER — INPATIENT (INPATIENT)
Facility: HOSPITAL | Age: 78
LOS: 2 days | Discharge: HOME | End: 2018-12-24
Attending: INTERNAL MEDICINE | Admitting: INTERNAL MEDICINE

## 2018-12-21 VITALS
HEART RATE: 66 BPM | TEMPERATURE: 99 F | OXYGEN SATURATION: 99 % | SYSTOLIC BLOOD PRESSURE: 130 MMHG | RESPIRATION RATE: 18 BRPM | DIASTOLIC BLOOD PRESSURE: 66 MMHG

## 2018-12-21 DIAGNOSIS — Z95.1 PRESENCE OF AORTOCORONARY BYPASS GRAFT: Chronic | ICD-10-CM

## 2018-12-21 DIAGNOSIS — Z95.0 PRESENCE OF CARDIAC PACEMAKER: Chronic | ICD-10-CM

## 2018-12-21 DIAGNOSIS — Z95.810 PRESENCE OF AUTOMATIC (IMPLANTABLE) CARDIAC DEFIBRILLATOR: Chronic | ICD-10-CM

## 2018-12-21 LAB
ALBUMIN SERPL ELPH-MCNC: 4.3 G/DL — SIGNIFICANT CHANGE UP (ref 3.5–5.2)
ALP SERPL-CCNC: 123 U/L — HIGH (ref 30–115)
ALT FLD-CCNC: 17 U/L — SIGNIFICANT CHANGE UP (ref 0–41)
ANION GAP SERPL CALC-SCNC: 17 MMOL/L — HIGH (ref 7–14)
APTT BLD: 73.3 SEC — CRITICAL HIGH (ref 27–39.2)
AST SERPL-CCNC: 20 U/L — SIGNIFICANT CHANGE UP (ref 0–41)
BASOPHILS # BLD AUTO: 0.02 K/UL — SIGNIFICANT CHANGE UP (ref 0–0.2)
BASOPHILS NFR BLD AUTO: 0.2 % — SIGNIFICANT CHANGE UP (ref 0–1)
BILIRUB SERPL-MCNC: 0.9 MG/DL — SIGNIFICANT CHANGE UP (ref 0.2–1.2)
BUN SERPL-MCNC: 30 MG/DL — HIGH (ref 10–20)
CALCIUM SERPL-MCNC: 9.1 MG/DL — SIGNIFICANT CHANGE UP (ref 8.5–10.1)
CHLORIDE SERPL-SCNC: 102 MMOL/L — SIGNIFICANT CHANGE UP (ref 98–110)
CO2 SERPL-SCNC: 22 MMOL/L — SIGNIFICANT CHANGE UP (ref 17–32)
CREAT SERPL-MCNC: 1.1 MG/DL — SIGNIFICANT CHANGE UP (ref 0.7–1.5)
EOSINOPHIL # BLD AUTO: 0.05 K/UL — SIGNIFICANT CHANGE UP (ref 0–0.7)
EOSINOPHIL NFR BLD AUTO: 0.5 % — SIGNIFICANT CHANGE UP (ref 0–8)
GLUCOSE BLDC GLUCOMTR-MCNC: 123 MG/DL — HIGH (ref 70–99)
GLUCOSE SERPL-MCNC: 138 MG/DL — HIGH (ref 70–99)
HCT VFR BLD CALC: 38.6 % — LOW (ref 42–52)
HGB BLD-MCNC: 12.8 G/DL — LOW (ref 14–18)
IMM GRANULOCYTES NFR BLD AUTO: 0.3 % — SIGNIFICANT CHANGE UP (ref 0.1–0.3)
INR BLD: 3.55 RATIO — HIGH (ref 0.65–1.3)
LYMPHOCYTES # BLD AUTO: 1.41 K/UL — SIGNIFICANT CHANGE UP (ref 1.2–3.4)
LYMPHOCYTES # BLD AUTO: 15 % — LOW (ref 20.5–51.1)
MAGNESIUM SERPL-MCNC: 1.4 MG/DL — LOW (ref 1.8–2.4)
MCHC RBC-ENTMCNC: 30.5 PG — SIGNIFICANT CHANGE UP (ref 27–31)
MCHC RBC-ENTMCNC: 33.2 G/DL — SIGNIFICANT CHANGE UP (ref 32–37)
MCV RBC AUTO: 91.9 FL — SIGNIFICANT CHANGE UP (ref 80–94)
MONOCYTES # BLD AUTO: 1.26 K/UL — HIGH (ref 0.1–0.6)
MONOCYTES NFR BLD AUTO: 13.4 % — HIGH (ref 1.7–9.3)
NEUTROPHILS # BLD AUTO: 6.61 K/UL — HIGH (ref 1.4–6.5)
NEUTROPHILS NFR BLD AUTO: 70.6 % — SIGNIFICANT CHANGE UP (ref 42.2–75.2)
NRBC # BLD: 0 /100 WBCS — SIGNIFICANT CHANGE UP (ref 0–0)
NT-PROBNP SERPL-SCNC: 1659 PG/ML — HIGH (ref 0–300)
PLATELET # BLD AUTO: 132 K/UL — SIGNIFICANT CHANGE UP (ref 130–400)
POTASSIUM SERPL-MCNC: 5.7 MMOL/L — HIGH (ref 3.5–5)
POTASSIUM SERPL-SCNC: 5.7 MMOL/L — HIGH (ref 3.5–5)
PROT SERPL-MCNC: 7 G/DL — SIGNIFICANT CHANGE UP (ref 6–8)
PROTHROM AB SERPL-ACNC: >40 SEC — HIGH (ref 9.95–12.87)
RBC # BLD: 4.2 M/UL — LOW (ref 4.7–6.1)
RBC # FLD: 15.9 % — HIGH (ref 11.5–14.5)
SODIUM SERPL-SCNC: 141 MMOL/L — SIGNIFICANT CHANGE UP (ref 135–146)
TROPONIN T SERPL-MCNC: 0.03 NG/ML — CRITICAL HIGH
WBC # BLD: 9.38 K/UL — SIGNIFICANT CHANGE UP (ref 4.8–10.8)
WBC # FLD AUTO: 9.38 K/UL — SIGNIFICANT CHANGE UP (ref 4.8–10.8)

## 2018-12-21 RX ORDER — INSULIN HUMAN 100 [IU]/ML
10 INJECTION, SOLUTION SUBCUTANEOUS ONCE
Qty: 0 | Refills: 0 | Status: COMPLETED | OUTPATIENT
Start: 2018-12-21 | End: 2018-12-21

## 2018-12-21 RX ORDER — TAMSULOSIN HYDROCHLORIDE 0.4 MG/1
0.4 CAPSULE ORAL AT BEDTIME
Qty: 0 | Refills: 0 | Status: DISCONTINUED | OUTPATIENT
Start: 2018-12-21 | End: 2018-12-24

## 2018-12-21 RX ORDER — DEXTROSE 50 % IN WATER 50 %
25 SYRINGE (ML) INTRAVENOUS ONCE
Qty: 0 | Refills: 0 | Status: DISCONTINUED | OUTPATIENT
Start: 2018-12-21 | End: 2018-12-24

## 2018-12-21 RX ORDER — MAGNESIUM SULFATE 500 MG/ML
2 VIAL (ML) INJECTION ONCE
Qty: 0 | Refills: 0 | Status: COMPLETED | OUTPATIENT
Start: 2018-12-21 | End: 2018-12-21

## 2018-12-21 RX ORDER — DEXTROSE 50 % IN WATER 50 %
15 SYRINGE (ML) INTRAVENOUS ONCE
Qty: 0 | Refills: 0 | Status: DISCONTINUED | OUTPATIENT
Start: 2018-12-21 | End: 2018-12-24

## 2018-12-21 RX ORDER — FUROSEMIDE 40 MG
40 TABLET ORAL DAILY
Qty: 0 | Refills: 0 | Status: DISCONTINUED | OUTPATIENT
Start: 2018-12-21 | End: 2018-12-24

## 2018-12-21 RX ORDER — GLUCAGON INJECTION, SOLUTION 0.5 MG/.1ML
1 INJECTION, SOLUTION SUBCUTANEOUS ONCE
Qty: 0 | Refills: 0 | Status: DISCONTINUED | OUTPATIENT
Start: 2018-12-21 | End: 2018-12-24

## 2018-12-21 RX ORDER — DEXTROSE 50 % IN WATER 50 %
12.5 SYRINGE (ML) INTRAVENOUS ONCE
Qty: 0 | Refills: 0 | Status: DISCONTINUED | OUTPATIENT
Start: 2018-12-21 | End: 2018-12-24

## 2018-12-21 RX ORDER — ATORVASTATIN CALCIUM 80 MG/1
40 TABLET, FILM COATED ORAL AT BEDTIME
Qty: 0 | Refills: 0 | Status: DISCONTINUED | OUTPATIENT
Start: 2018-12-21 | End: 2018-12-24

## 2018-12-21 RX ORDER — INSULIN LISPRO 100/ML
10 VIAL (ML) SUBCUTANEOUS
Qty: 0 | Refills: 0 | Status: DISCONTINUED | OUTPATIENT
Start: 2018-12-21 | End: 2018-12-24

## 2018-12-21 RX ORDER — LEVOTHYROXINE SODIUM 125 MCG
1 TABLET ORAL
Qty: 0 | Refills: 0 | COMMUNITY

## 2018-12-21 RX ORDER — LATANOPROST 0.05 MG/ML
1 SOLUTION/ DROPS OPHTHALMIC; TOPICAL AT BEDTIME
Qty: 0 | Refills: 0 | Status: DISCONTINUED | OUTPATIENT
Start: 2018-12-21 | End: 2018-12-24

## 2018-12-21 RX ORDER — DEXTROSE 50 % IN WATER 50 %
50 SYRINGE (ML) INTRAVENOUS ONCE
Qty: 0 | Refills: 0 | Status: COMPLETED | OUTPATIENT
Start: 2018-12-21 | End: 2018-12-21

## 2018-12-21 RX ORDER — ALBUTEROL 90 UG/1
2 AEROSOL, METERED ORAL EVERY 6 HOURS
Qty: 0 | Refills: 0 | Status: DISCONTINUED | OUTPATIENT
Start: 2018-12-21 | End: 2018-12-24

## 2018-12-21 RX ORDER — METOPROLOL TARTRATE 50 MG
50 TABLET ORAL DAILY
Qty: 0 | Refills: 0 | Status: DISCONTINUED | OUTPATIENT
Start: 2018-12-21 | End: 2018-12-24

## 2018-12-21 RX ORDER — SODIUM CHLORIDE 9 MG/ML
1000 INJECTION, SOLUTION INTRAVENOUS
Qty: 0 | Refills: 0 | Status: DISCONTINUED | OUTPATIENT
Start: 2018-12-21 | End: 2018-12-24

## 2018-12-21 RX ORDER — BUDESONIDE AND FORMOTEROL FUMARATE DIHYDRATE 160; 4.5 UG/1; UG/1
2 AEROSOL RESPIRATORY (INHALATION)
Qty: 0 | Refills: 0 | Status: DISCONTINUED | OUTPATIENT
Start: 2018-12-21 | End: 2018-12-24

## 2018-12-21 RX ORDER — ALBUTEROL 90 UG/1
10 AEROSOL, METERED ORAL ONCE
Qty: 0 | Refills: 0 | Status: COMPLETED | OUTPATIENT
Start: 2018-12-21 | End: 2018-12-21

## 2018-12-21 RX ORDER — LEVOTHYROXINE SODIUM 125 MCG
250 TABLET ORAL DAILY
Qty: 0 | Refills: 0 | Status: DISCONTINUED | OUTPATIENT
Start: 2018-12-21 | End: 2018-12-24

## 2018-12-21 RX ORDER — INSULIN GLARGINE 100 [IU]/ML
28 INJECTION, SOLUTION SUBCUTANEOUS AT BEDTIME
Qty: 0 | Refills: 0 | Status: DISCONTINUED | OUTPATIENT
Start: 2018-12-21 | End: 2018-12-24

## 2018-12-21 RX ORDER — ASPIRIN/CALCIUM CARB/MAGNESIUM 324 MG
1 TABLET ORAL
Qty: 0 | Refills: 0 | COMMUNITY

## 2018-12-21 RX ORDER — LOSARTAN POTASSIUM 100 MG/1
100 TABLET, FILM COATED ORAL DAILY
Qty: 0 | Refills: 0 | Status: DISCONTINUED | OUTPATIENT
Start: 2018-12-21 | End: 2018-12-24

## 2018-12-21 RX ADMIN — Medication 50 MILLILITER(S): at 23:51

## 2018-12-21 RX ADMIN — TAMSULOSIN HYDROCHLORIDE 0.4 MILLIGRAM(S): 0.4 CAPSULE ORAL at 23:39

## 2018-12-21 RX ADMIN — Medication 50 GRAM(S): at 23:39

## 2018-12-21 RX ADMIN — ATORVASTATIN CALCIUM 40 MILLIGRAM(S): 80 TABLET, FILM COATED ORAL at 23:39

## 2018-12-21 RX ADMIN — INSULIN HUMAN 10 UNIT(S): 100 INJECTION, SOLUTION SUBCUTANEOUS at 23:51

## 2018-12-21 NOTE — H&P ADULT - NSHPPHYSICALEXAM_GEN_ALL_CORE
Vital Signs Last 24 Hrs  T(C): 37 (21 Dec 2018 17:09), Max: 37 (21 Dec 2018 17:09)  T(F): 98.6 (21 Dec 2018 17:09), Max: 98.6 (21 Dec 2018 17:09)  HR: 66 (21 Dec 2018 17:09) (66 - 66)  BP: 130/66 (21 Dec 2018 17:09) (130/66 - 130/66)  BP(mean): --  RR: 18 (21 Dec 2018 17:09) (18 - 18)  SpO2: 99% (21 Dec 2018 17:09) (99% - 99%)    Gen:  HEENT:  CV:  Pulm:  GI:  MSK/Ext:  Neuro: Vital Signs Last 24 Hrs  T(C): 37 (21 Dec 2018 17:09), Max: 37 (21 Dec 2018 17:09)  T(F): 98.6 (21 Dec 2018 17:09), Max: 98.6 (21 Dec 2018 17:09)  HR: 66 (21 Dec 2018 17:09) (66 - 66)  BP: 130/66 (21 Dec 2018 17:09) (130/66 - 130/66)  BP(mean): --  RR: 18 (21 Dec 2018 17:09) (18 - 18)  SpO2: 99% (21 Dec 2018 17:09) (99% - 99%)    Gen: NAD, lying comfortably in bed  HEENT: NCAT, EOMI, PERRLA, moist mucous membranes, nonerythematous oropharynx, neck supple, no JVD  CV: S1 S2, RRR  Pulm: CTAB, DAEB, no audible crackles or wheezes  GI: soft, NTND, +BS  MSK/Ext: NROM, +2 PP b/l, -ve LLE b/l  Neuro: AAOx3, no focal deficits

## 2018-12-21 NOTE — ED ADULT NURSE REASSESSMENT NOTE - NS ED NURSE REASSESS COMMENT FT1
patient assessed and alert and oriented x 3. continuous cardiac and pulse ox monitoring in place. will closely monitor and assess. breathing comfortably

## 2018-12-21 NOTE — ED PROVIDER NOTE - NS ED ROS FT
GENERAL: Denies fever/chills, loss of appetite/weight or fatigue  SKIN: Gin rashes, abrasions, lacerations, ecchymosis, erythema, or edema.  HEAD: Denies headache, dizziness or trauma  EYES: Denies blurry vision, diplopia, or photophobia  ENT: Denies earaches, discharge or hearing loss. Denies nasal discharge or epistaxis. Denies sore throat.   CARDIAC: see HPI  RESPIRATORY: Denies SOB, cough, hemoptysis or wheezing.   GI: Denies abdominal pain, n/v/d, bloody stools or melena.   : Denies testicular/penile pain or discharge. Denies hematuria, dysuria or frequency.   MSK: Denies myalgias, bony deformity or pain.   NEURO: Denies numbness, tingling, weakness.

## 2018-12-21 NOTE — H&P ADULT - NSHPLABSRESULTS_GEN_ALL_CORE
Labs                          12.8   9.38  )-----------( 132      ( 21 Dec 2018 18:52 )             38.6       12-21    141  |  102  |  30<H>  ----------------------------<  138<H>  5.7<H>   |  22  |  1.1    Blood Gas Venous - Potassium (04.09.18 @ 15:05)    Blood Gas Venous - Potassium: 5.0 mmoL/L    Ca    9.1      21 Dec 2018 20:50  Mg     1.4     12-21  TPro  7.0  /  Alb  4.3  /  TBili  0.9  /  DBili  x   /  AST  20  /  ALT  17  /  AlkPhos  123<H>  12-21  PT/INR - ( 21 Dec 2018 18:52 )   PT: >40.00 sec;   INR: 3.55 ratio    PTT - ( 21 Dec 2018 18:52 )  PTT:73.3 sec    CARDIAC MARKERS ( 21 Dec 2018 18:52 )  Trop 0.03 ng/mL<HH> / CK x     / CKMB x         Serum Pro-Brain Natriuretic Peptide (12.21.18 @ 18:52)    Serum Pro-Brain Natriuretic Peptide: 1659 pg/mL  Serum Pro-Brain Natriuretic Peptide (04.09.18 @ 15:17)    Serum Pro-Brain Natriuretic Peptide: 1828 pg/mL      EKG      Radio  < from: Transthoracic Echocardiogram (04.10.18 @ 12:56) >  Summary:   1. Severely decreased global left ventricular systolic function.   2. LV Ejection Fraction by Hickey's Method with abiplane EF of 30 %.   3. Moderately increased LV wall thickness.   4. There is moderate concentric left ventricular hypertrophy.   5. Moderate mitral annular calcification.   6. Mild aortic regurgitation.   7. Mild calcific arotic stenosis.   8. LA volume Index is 51.0 ml/m² ml/m2.   9. Mitral valve mean gradient is 2.3 mmHg consistent with mild mitral  stenosis.  < end of copied text > Labs                          12.8   9.38  )-----------( 132      ( 21 Dec 2018 18:52 )             38.6       12-21    141  |  102  |  30<H>  ----------------------------<  138<H>  5.7<H>   |  22  |  1.1    Blood Gas Venous - Potassium (04.09.18 @ 15:05)    Blood Gas Venous - Potassium: 5.0 mmoL/L    Ca    9.1      21 Dec 2018 20:50  Mg     1.4     12-21  TPro  7.0  /  Alb  4.3  /  TBili  0.9  /  DBili  x   /  AST  20  /  ALT  17  /  AlkPhos  123<H>  12-21  PT/INR - ( 21 Dec 2018 18:52 )   PT: >40.00 sec;   INR: 3.55 ratio    PTT - ( 21 Dec 2018 18:52 )  PTT:73.3 sec    CARDIAC MARKERS ( 21 Dec 2018 18:52 )  Trop 0.03 ng/mL<HH> / CK x     / CKMB x         Serum Pro-Brain Natriuretic Peptide (12.21.18 @ 18:52)    Serum Pro-Brain Natriuretic Peptide: 1659 pg/mL  Serum Pro-Brain Natriuretic Peptide (04.09.18 @ 15:17)    Serum Pro-Brain Natriuretic Peptide: 1828 pg/mL      EKG      Radio  CXR: cardiomegaly, AICD in place, pending official read    < from: Transthoracic Echocardiogram (04.10.18 @ 12:56) >  Summary:   1. Severely decreased global left ventricular systolic function.   2. LV Ejection Fraction by Hickey's Method with abiplane EF of 30 %.   3. Moderately increased LV wall thickness.   4. There is moderate concentric left ventricular hypertrophy.   5. Moderate mitral annular calcification.   6. Mild aortic regurgitation.   7. Mild calcific arotic stenosis.   8. LA volume Index is 51.0 ml/m² ml/m2.   9. Mitral valve mean gradient is 2.3 mmHg consistent with mild mitral  stenosis.  < end of copied text >

## 2018-12-21 NOTE — H&P ADULT - PSH
S/P CABG (coronary artery bypass graft)    S/P placement of cardiac pacemaker  CRT-D AICD (automatic cardioverter/defibrillator) present    S/P CABG (coronary artery bypass graft)

## 2018-12-21 NOTE — H&P ADULT - ASSESSMENT
78 year old male with a past medical history of Heart Failure with reduced Ejection Fraction status post AICD placement, Atrial Fibrillation on Coumadin, Benign Prostatic Hyperplasia, Coronary Artery Disease status post Coronary Artery Bypass Grafting 2014, Diabetes Mellitus, Dyslipidemia, Hypertension, Hypothyroidism presenting with chest pain.    Chest pain in the setting of Heart Failure with reduced Ejection Fraction, Coronary Artery Disease status post CABG in 2014; r/o ACS  - Troponin 0.03 at 7pm, BNP 1659  - EKG   - TTE 4/10/2018 available  - F/u cardiac enzymes at 11:30pm and 4:30am  - Resume medical management for now; aspirin 81, supratherapeutically anticoagulated  - F/u with Cardiology consult  - Monitor in telemetry for 24 hours    Atrial Fibrillation on Coumadin  - INR supratherapeutic, hold tonight's dose of Coumadin  - resume metoprolol  - Hypomagnesemia; repleting, f/u    Benign Prostatic Hyperplasia; resume tamsulosin    Diabetes Mellitus; insulin protocol    Hypertension; resume losartan and metoprolol    Hyperlipidemia; resume atorvastatin    Hypothyroidism; resume levothyroxine    DVT prophylaxis; vte measures placed, supratherapeutic INR, hold coumadin tonight  Activity; Ambulate as tolerated  Diet; low sodium, carb consistent, DASH (hold diet if troponin increases and cardio decides on intervention)  Dispo; 24-48 hours 78 year old male with a past medical history of Heart Failure with reduced Ejection Fraction status post AICD placement, Atrial Fibrillation on Coumadin, Benign Prostatic Hyperplasia, Coronary Artery Disease status post Coronary Artery Bypass Grafting 2014, Chronic Obstructive Pulmonary Disease, Diabetes Mellitus, Dyslipidemia, Hypertension, Hypothyroidism presenting with chest pain.    Chest pain in the setting of Heart Failure with reduced Ejection Fraction, Coronary Artery Disease status post CABG in 2014; r/o ACS  - VS stable. Clinically and hemodynamically stable. Non hypoxic 98% on RA.  - Troponin 0.03 at 7pm, BNP 1659  - EKG NSR  - TTE 4/10/2018 available  - F/u cardiac enzymes at 11:30pm and 4:30am  - Resume medical management for now; supratherapeutically anticoagulated  - F/u with Cardiology consult  - Monitor in telemetry for 24 hours    Atrial Fibrillation on Coumadin  - INR supratherapeutic, hold tonight's dose of Coumadin  - resume metoprolol  - Hypomagnesemia; repleting, f/u    Chronic Obstructive Pulmonary Disease; incruse and ventolin    Benign Prostatic Hyperplasia; resume tamsulosin    Diabetes Mellitus; insulin protocol    Hypertension; resume losartan and metoprolol    Hyperlipidemia; resume atorvastatin    Hypothyroidism; resume levothyroxine    DVT prophylaxis; vte measures placed, supratherapeutic INR, hold coumadin tonight  Activity; Ambulate as tolerated  Diet; low sodium, carb consistent, DASH (hold diet if troponin increases and cardio decides on intervention)  Dispo; 24-48 hours

## 2018-12-21 NOTE — H&P ADULT - PMH
Atrial fibrillation    Benign prostate hyperplasia    Coronary artery disease  s/p CABG 2014  Diabetes mellitus    Dyslipidemia    Heart failure  chronic systolic heart failure  Hypertension    Hypothyroidism Atrial fibrillation    Benign prostate hyperplasia    COPD (chronic obstructive pulmonary disease)    Coronary artery disease  s/p CABG 2014  Diabetes mellitus    Dyslipidemia    Heart failure  chronic systolic heart failure  Hypertension    Hypothyroidism

## 2018-12-21 NOTE — ED ADULT NURSE NOTE - NSIMPLEMENTINTERV_GEN_ALL_ED
Implemented All Fall with Harm Risk Interventions:  [place] to call system. Call bell, personal items and telephone within reach. Instruct patient to call for assistance. Room bathroom lighting operational. Non-slip footwear when patient is off stretcher. Physically safe environment: no spills, clutter or unnecessary equipment. Stretcher in lowest position, wheels locked, appropriate side rails in place. Provide visual cue, wrist band, yellow gown, etc. Monitor gait and stability. Monitor for mental status changes and reorient to person, place, and time. Review medications for side effects contributing to fall risk. Reinforce activity limits and safety measures with patient and family. Provide visual clues: red socks.

## 2018-12-21 NOTE — ED PROVIDER NOTE - MEDICAL DECISION MAKING DETAILS
Chart finished.  79 yo man with chest pain in setting of known CAD and h/o CABG.   EKG unchagned from baseline.  Anticoagulated.   First troponin 0.03.  Cardio team aware.  Tele admission for monitoring and possible intervention.

## 2018-12-21 NOTE — CONSULT NOTE ADULT - ASSESSMENT
h/o CAD s/p CABG patent grafts 3/2017  Atypical chest apain  Chronic Afib  h/o AV block s/p CRT-D  PPM dependent  HFrEF  DM    Tele  Trend CE  c/w lasix, coumadin, statin, BB  risk factor modification h/o CAD s/p CABG patent grafts 3/2017  Atypical chest apain  Chronic Afib  h/o AV block s/p CRT-D  PPM dependent  HFrEF  DM    Tele  Trend CE  c/w lasix, coumadin, statin, BB  Obtain nuclear stress test  risk factor modification

## 2018-12-21 NOTE — CONSULT NOTE ADULT - ATTENDING COMMENTS
Seen / examined and above reviewed.    CAD s/p CABG (patent grafts with distal LAD disease / 2017 cath).  ICM s/p AICD  AF + AVB ([id number] = 5).  Chronic Systolic CHF    Presented with chest discomfort similar to prior angina.  No CP / dyspnea at present.  Hemodynamically stable.  EKG: AF, V-PCK  Mild / stable troponin elevation.     RECOMMEND:  - ASA  - Cont Coumadin.  - Cont Lipitor and Metoprolol.  - Follow-up troponin in morning.  Nuclear stress test if no rise.

## 2018-12-21 NOTE — H&P ADULT - HISTORY OF PRESENT ILLNESS
78 year old male with a past medical history of Heart Failure with reduced Ejection Fraction status post AICD placement, Atrial Fibrillation on Coumadin, Benign Prostatic Hyperplasia, Coronary Artery Disease status post Coronary Artery Bypass Grafting 2014, Diabetes Mellitus, Dyslipidemia, Hypertension, Hypothyroidism presenting with chest pain. 78 year old male with a past medical history of Heart Failure with reduced Ejection Fraction status post AICD placement, Atrial Fibrillation on Coumadin, Benign Prostatic Hyperplasia, Coronary Artery Disease status post Coronary Artery Bypass Grafting 2014, Diabetes Mellitus, Dyslipidemia, Hypertension, Hypothyroidism presenting with chest pain. Chest pain is described pressure like, "different than usual", saw that his heart rate was between , 5/10, non radiating, not associated with diaphoresis, increased shortness of breath from baseline, dizziness, lightheadedness, loss of consciousness, lower extremity edema.  Patient reports that he gets short of breath on ambulation less than a block.  Denies fevers, chills, nausea, vomiting, diarrhea, abdominal pain, urinary or fecal incontinence, focal neurological deficits.  Patient has not been taking his lasix daily, has been taking his lasix around three times a week.

## 2018-12-21 NOTE — ED PROVIDER NOTE - OBJECTIVE STATEMENT
77 yo man who states that about 2 hours PTA he developed substernal chest pressure that was similar to his prior AMI in 2014.  He states that it lasted about 30 minutes on and off lasating 10 minutes at a time.  No exacerbating or alleviating factors.  He was sitting at the time of the pain.  No SOB, diaphoresis, nausea or vomiting.  No light headedness.  No fever or chills.  No cough.   No new leg swelling.  No change in meds recently.  Last cardiac cath was last year and was ok.  He is closely followed by Dr. Laughlin and Dr. Gipson.   Currently chest pain free.

## 2018-12-21 NOTE — H&P ADULT - ATTENDING COMMENTS
78 year old male with a past medical history of Heart Failure with reduced Ejection Fraction status post AICD placement, Atrial Fibrillation on Coumadin, Benign Prostatic Hyperplasia, Coronary Artery Disease status post Coronary Artery Bypass Grafting 2014, Chronic Obstructive Pulmonary Disease, Diabetes Mellitus, Dyslipidemia, Hypertension, Hypothyroidism presenting with chest pain.    1. CP h/o SCHF, CAD post CABG 2014      r/o ACS     Troponin 0.03 at 7pm, BNP 1659     EKG NSR     TTE 4/10/2018 available      F/u cardiac enzymes        Resume medical management       Cardiolo eval     2. Atrial Fibrillation on Coumadin     INR supratherapeutic, hold tonight's dose of Coumadin     resume metoprolol    3. Hypomagnesemia      repleting, f/u BMP    4. COPD      c/w incruse and ventolin    5. BPH       c/w  tamsulosin    6. Diabetes Mellitus     c/w insulin protocol    7. HTN      c/w  losartan and metoprolol    8. Hyperlipidemia     c/w atorvastatin    9. Hypothyroidism     c/w  levothyroxine    DVT prophylaxis; vte measures placed, supratherapeutic INR, hold coumadin tonight  Activity; Ambulate as tolerated  Diet; low sodium, carb consistent, DASH (hold diet if troponin increases and cardio decides on intervention)  Dispo; 24-48 hours

## 2018-12-21 NOTE — CONSULT NOTE ADULT - SUBJECTIVE AND OBJECTIVE BOX
Chief complaint:  Chest Pain    HPI:  78 year old male with a past medical history of Heart Failure with reduced Ejection Fraction status post AICD placement, Atrial Fibrillation on Coumadin, Benign Prostatic Hyperplasia, Coronary Artery Disease status post Coronary Artery Bypass Grafting 2014, Diabetes Mellitus, Dyslipidemia, Hypertension, Hypothyroidism presenting with chest pain. Chest pain is described pressure like, "different than usual", saw that his heart rate was between , 5/10, non radiating, not associated with diaphoresis, increased shortness of breath from baseline, dizziness, lightheadedness, loss of consciousness, lower extremity edema.  Patient reports that he gets short of breath on ambulation less than a block.  Denies fevers, chills, nausea, vomiting, diarrhea, abdominal pain, urinary or fecal incontinence, focal neurological deficits.  Patient has not been taking his lasix daily, has been taking his lasix around three times a week. (21 Dec 2018 21:29)      ROS:  Constitutional: No fever, chill, sweats  Eye: No recent visual problem  ENMT: No ear pain, nasal congestion, throat pain  Respiratoty: No SOB, cough  Cardiovascular: No chest pain, palpitaion, syncope  Gastrointestinal: No nausea, vomitting, diarhea  Genitourinary: No dysuria, hematuria  Heam/Lymp: No brusing tendency, no swollen glands  Endocrine: Negative for excessive hunger, thirst  Musculoskeletal: No neck pain, back pain, joint pain  Intergumentory: No rash, skin lesions  Neurologic: alert and oriented    PAST MEDICAL & SURGICAL HISTORY  COPD (chronic obstructive pulmonary disease)  Diabetes mellitus  Hypothyroidism  Hypertension  Dyslipidemia  Benign prostate hyperplasia  Atrial fibrillation  Heart failure: chronic systolic heart failure  Coronary artery disease: s/p CABG 2014  AICD (automatic cardioverter/defibrillator) present  S/P CABG (coronary artery bypass graft)      FAMILY HISTORY:  FAMILY HISTORY:  Family history of coronary artery disease (Father)      SOCIAL HISTORY:  Ex smoker    ALLERGIES:  No Known Allergies      MEDICATIONS:  MEDICATIONS  (STANDING):  ALBUTerol    0.083% 10 milliGRAM(s) Nebulizer once  atorvastatin 40 milliGRAM(s) Oral at bedtime  buDESOnide 160 MICROgram(s)/formoterol 4.5 MICROgram(s) Inhaler 2 Puff(s) Inhalation two times a day  dextrose 5%. 1000 milliLiter(s) (50 mL/Hr) IV Continuous <Continuous>  dextrose 50% Injectable 12.5 Gram(s) IV Push once  dextrose 50% Injectable 25 Gram(s) IV Push once  dextrose 50% Injectable 25 Gram(s) IV Push once  furosemide    Tablet 40 milliGRAM(s) Oral daily  insulin glargine Injectable (LANTUS) 28 Unit(s) SubCutaneous at bedtime  insulin lispro Injectable (HumaLOG) 10 Unit(s) SubCutaneous before dinner  latanoprost 0.005% Ophthalmic Solution 1 Drop(s) Both EYES at bedtime  levothyroxine 250 MICROGram(s) Oral daily  losartan 100 milliGRAM(s) Oral daily  metoprolol succinate ER 50 milliGRAM(s) Oral daily  tamsulosin 0.4 milliGRAM(s) Oral at bedtime    MEDICATIONS  (PRN):  ALBUTerol    90 MICROgram(s) HFA Inhaler 2 Puff(s) Inhalation every 6 hours PRN Bronchospasm  dextrose 40% Gel 15 Gram(s) Oral once PRN Blood Glucose LESS THAN 70 milliGRAM(s)/deciliter  glucagon  Injectable 1 milliGRAM(s) IntraMuscular once PRN Glucose LESS THAN 70 milligrams/deciliter      HOME MEDICATIONS:  Home Medications:  atorvastatin 40 mg oral tablet: 1 tab(s) orally once a day (at bedtime) (21 Dec 2018 21:53)  Flomax 0.4 mg oral capsule: 1 cap(s) orally once a day (at bedtime) (21 Dec 2018 21:53)  Incruse Ellipta 62.5 mcg/inh inhalation powder: 1 inhaler(s) inhaled once a day (21 Dec 2018 21:53)  Lasix 40 mg oral tablet: 1 tab(s) orally once a day (21 Dec 2018 21:53)  latanoprost 0.005% ophthalmic solution: 1 drop(s) to each affected eye once a day (in the evening) (21 Dec 2018 21:53)  levothyroxine 125 mcg (0.125 mg) oral capsule: 2 cap(s) orally once a day (21 Dec 2018 21:53)  metFORMIN 1000 mg oral tablet: 1 tab(s) orally 2 times a day (21 Dec 2018 21:53)  metoprolol succinate 50 mg oral tablet, extended release: 1 tab(s) orally once a day (21 Dec 2018 21:53)  Toujeo SoloStar 300 units/mL subcutaneous solution: 16 unit(s) subcutaneous once a day (21 Dec 2018 21:53)  Ventolin HFA 90 mcg/inh inhalation aerosol: 2 puff(s) inhaled 4 times a day, As Needed (21 Dec 2018 21:53)  warfarin 7.5 mg oral tablet: 1 tab(s) orally once a day (21 Dec 2018 21:53)      VITALS:   T(F): 96.5 (12-21 @ 22:51), Max: 98.6 (12-21 @ 17:09)  HR: 68 (12-21 @ 22:51) (66 - 68)  BP: 129/69 (12-21 @ 22:51) (129/69 - 130/66)  BP(mean): --  RR: 19 (12-21 @ 22:51) (18 - 19)  SpO2: 99% (12-21 @ 17:09) (99% - 99%)    I&O's Summary      PHYSICAL EXAM:  GEN: Alert and oriented X 3, Well nourished, No acute distress  NECK: Supple, non tender, NO JVD, No carotid bruit,   LUNGS: Clear to auscultation bilaterally, non labored respiration  CARDIOVASCULAR: S1/S2 present, RRR , no murmus or rubs, + PP bilaterally  ABD: Soft, non-tender, non-distended,   EXT: No Lower extreimity edema, no tenderness  NEURO: Non focal  SKIN: Intact    LABS:                        12.8   9.38  )-----------( 132      ( 21 Dec 2018 18:52 )             38.6     12-21    141  |  102  |  30<H>  ----------------------------<  138<H>  5.7<H>   |  22  |  1.1    Ca    9.1      21 Dec 2018 20:50  Mg     1.4     12-21    TPro  7.0  /  Alb  4.3  /  TBili  0.9  /  DBili  x   /  AST  20  /  ALT  17  /  AlkPhos  123<H>  12-21    PT/INR - ( 21 Dec 2018 18:52 )   PT: >40.00 sec;   INR: 3.55 ratio         PTT - ( 21 Dec 2018 18:52 )  PTT:73.3 sec  Troponin T, Serum: 0.03 ng/mL <HH> (12-21-18 @ 18:52)    CARDIAC MARKERS ( 21 Dec 2018 18:52 )  x     / 0.03 ng/mL / x     / x     / x        Serum Pro-Brain Natriuretic Peptide: 1659 pg/mL (12-21-18 @ 18:52)    RADIOLOGY    -TTE:    < from: Transthoracic Echocardiogram (04.10.18 @ 12:56) >  Summary:   1. Severely decreased global left ventricular systolic function.   2. LV Ejection Fraction by Hickey's Method with abiplane EF of 30 %.   3. Moderately increased LV wall thickness.   4. There is moderate concentric left ventricular hypertrophy.   5. Moderate mitral annular calcification.   6. Mild aortic regurgitation.   7. Mild calcific arotic stenosis.   8. LA volume Index is 51.0 ml/m² ml/m2.   9. Mitral valve mean gradient is 2.3 mmHg consistent with mild mitral   stenosis.    < end of copied text >    -CCTA:  -STRESS TEST:  -CATHETERIZATION: patent SVG to OM1 and OM2, LIMA to LAD, Distal LAD ds    ECG:  < from: 12 Lead ECG (04.10.18 @ 07:24) >  Diagnosis Line Ventricular pacing  Atrial fibrillation  Premature ventricular complexes    < end of copied text >

## 2018-12-21 NOTE — ED PROVIDER NOTE - PHYSICAL EXAMINATION
VITAL SIGNS: I have reviewed nursing notes and confirm.  CONSTITUTIONAL: Well-developed; well-nourished; in no acute distress.  SKIN: Skin exam is warm and dry, no acute rash.  HEAD: Normocephalic; atraumatic.  EYES: PERRL, EOM intact; conjunctiva and sclera clear.  ENT: No nasal discharge; airway clear. TMs clear.  NECK: Supple; non tender.  CARD: S1, S2 normal; (+) systolic murmur radiating to axilla; No gallops, or rubs. Irregularly irregular  RESP: No wheezes, rales or rhonchi.  ABD: Normal bowel sounds; soft; non-distended; non-tender; no hepatosplenomegaly.  EXT: Normal ROM. No clubbing, cyanosis or edema.  LYMPH: No acute cervical adenopathy.  NEURO: Alert, oriented. Grossly unremarkable. No focal deficits.  PSYCH: Cooperative, appropriate.

## 2018-12-21 NOTE — H&P ADULT - NSHPSOCIALHISTORY_GEN_ALL_CORE
Non smoker (former smoker 30 years ago, 1 pack a day for 30-50 years)  Social alcohol  No illicit drugs    Walks with cane and walker

## 2018-12-22 LAB
ALBUMIN SERPL ELPH-MCNC: 4 G/DL — SIGNIFICANT CHANGE UP (ref 3.5–5.2)
ALP SERPL-CCNC: 112 U/L — SIGNIFICANT CHANGE UP (ref 30–115)
ALT FLD-CCNC: 15 U/L — SIGNIFICANT CHANGE UP (ref 0–41)
ANION GAP SERPL CALC-SCNC: 17 MMOL/L — HIGH (ref 7–14)
APTT BLD: 49.7 SEC — HIGH (ref 27–39.2)
AST SERPL-CCNC: 19 U/L — SIGNIFICANT CHANGE UP (ref 0–41)
BASOPHILS # BLD AUTO: 0.02 K/UL — SIGNIFICANT CHANGE UP (ref 0–0.2)
BASOPHILS NFR BLD AUTO: 0.2 % — SIGNIFICANT CHANGE UP (ref 0–1)
BILIRUB SERPL-MCNC: 1.7 MG/DL — HIGH (ref 0.2–1.2)
BUN SERPL-MCNC: 27 MG/DL — HIGH (ref 10–20)
CALCIUM SERPL-MCNC: 9.2 MG/DL — SIGNIFICANT CHANGE UP (ref 8.5–10.1)
CHLORIDE SERPL-SCNC: 103 MMOL/L — SIGNIFICANT CHANGE UP (ref 98–110)
CK MB CFR SERPL CALC: 5.6 NG/ML — SIGNIFICANT CHANGE UP (ref 0.6–6.3)
CK MB CFR SERPL CALC: 5.8 NG/ML — SIGNIFICANT CHANGE UP (ref 0.6–6.3)
CK SERPL-CCNC: 137 U/L — SIGNIFICANT CHANGE UP (ref 0–225)
CK SERPL-CCNC: 145 U/L — SIGNIFICANT CHANGE UP (ref 0–225)
CO2 SERPL-SCNC: 22 MMOL/L — SIGNIFICANT CHANGE UP (ref 17–32)
CREAT SERPL-MCNC: 1 MG/DL — SIGNIFICANT CHANGE UP (ref 0.7–1.5)
EOSINOPHIL # BLD AUTO: 0.09 K/UL — SIGNIFICANT CHANGE UP (ref 0–0.7)
EOSINOPHIL NFR BLD AUTO: 1 % — SIGNIFICANT CHANGE UP (ref 0–8)
ESTIMATED AVERAGE GLUCOSE: 220 MG/DL — HIGH (ref 68–114)
GLUCOSE BLDC GLUCOMTR-MCNC: 156 MG/DL — HIGH (ref 70–99)
GLUCOSE BLDC GLUCOMTR-MCNC: 188 MG/DL — HIGH (ref 70–99)
GLUCOSE BLDC GLUCOMTR-MCNC: 189 MG/DL — HIGH (ref 70–99)
GLUCOSE BLDC GLUCOMTR-MCNC: 99 MG/DL — SIGNIFICANT CHANGE UP (ref 70–99)
GLUCOSE SERPL-MCNC: 102 MG/DL — HIGH (ref 70–99)
HBA1C BLD-MCNC: 9.3 % — HIGH (ref 4–5.6)
HCT VFR BLD CALC: 39 % — LOW (ref 42–52)
HGB BLD-MCNC: 13.1 G/DL — LOW (ref 14–18)
IMM GRANULOCYTES NFR BLD AUTO: 0.3 % — SIGNIFICANT CHANGE UP (ref 0.1–0.3)
INR BLD: 2.92 RATIO — HIGH (ref 0.65–1.3)
LYMPHOCYTES # BLD AUTO: 1.99 K/UL — SIGNIFICANT CHANGE UP (ref 1.2–3.4)
LYMPHOCYTES # BLD AUTO: 22.7 % — SIGNIFICANT CHANGE UP (ref 20.5–51.1)
MAGNESIUM SERPL-MCNC: 1.6 MG/DL — LOW (ref 1.8–2.4)
MCHC RBC-ENTMCNC: 31 PG — SIGNIFICANT CHANGE UP (ref 27–31)
MCHC RBC-ENTMCNC: 33.6 G/DL — SIGNIFICANT CHANGE UP (ref 32–37)
MCV RBC AUTO: 92.2 FL — SIGNIFICANT CHANGE UP (ref 80–94)
MONOCYTES # BLD AUTO: 1.35 K/UL — HIGH (ref 0.1–0.6)
MONOCYTES NFR BLD AUTO: 15.4 % — HIGH (ref 1.7–9.3)
NEUTROPHILS # BLD AUTO: 5.28 K/UL — SIGNIFICANT CHANGE UP (ref 1.4–6.5)
NEUTROPHILS NFR BLD AUTO: 60.4 % — SIGNIFICANT CHANGE UP (ref 42.2–75.2)
NRBC # BLD: 0 /100 WBCS — SIGNIFICANT CHANGE UP (ref 0–0)
PLATELET # BLD AUTO: 127 K/UL — LOW (ref 130–400)
POTASSIUM SERPL-MCNC: 4.8 MMOL/L — SIGNIFICANT CHANGE UP (ref 3.5–5)
POTASSIUM SERPL-SCNC: 4.8 MMOL/L — SIGNIFICANT CHANGE UP (ref 3.5–5)
PROT SERPL-MCNC: 6.6 G/DL — SIGNIFICANT CHANGE UP (ref 6–8)
PROTHROM AB SERPL-ACNC: 33.2 SEC — HIGH (ref 9.95–12.87)
RBC # BLD: 4.23 M/UL — LOW (ref 4.7–6.1)
RBC # FLD: 15.7 % — HIGH (ref 11.5–14.5)
SODIUM SERPL-SCNC: 142 MMOL/L — SIGNIFICANT CHANGE UP (ref 135–146)
TROPONIN T SERPL-MCNC: 0.05 NG/ML — CRITICAL HIGH
TROPONIN T SERPL-MCNC: 0.05 NG/ML — CRITICAL HIGH
WBC # BLD: 8.76 K/UL — SIGNIFICANT CHANGE UP (ref 4.8–10.8)
WBC # FLD AUTO: 8.76 K/UL — SIGNIFICANT CHANGE UP (ref 4.8–10.8)

## 2018-12-22 RX ORDER — MAGNESIUM SULFATE 500 MG/ML
2 VIAL (ML) INJECTION ONCE
Qty: 0 | Refills: 0 | Status: COMPLETED | OUTPATIENT
Start: 2018-12-22 | End: 2018-12-22

## 2018-12-22 RX ORDER — WARFARIN SODIUM 2.5 MG/1
2.5 TABLET ORAL ONCE
Qty: 0 | Refills: 0 | Status: COMPLETED | OUTPATIENT
Start: 2018-12-22 | End: 2018-12-22

## 2018-12-22 RX ADMIN — Medication 10 UNIT(S): at 17:27

## 2018-12-22 RX ADMIN — TAMSULOSIN HYDROCHLORIDE 0.4 MILLIGRAM(S): 0.4 CAPSULE ORAL at 21:35

## 2018-12-22 RX ADMIN — Medication 50 GRAM(S): at 13:31

## 2018-12-22 RX ADMIN — Medication 40 MILLIGRAM(S): at 05:55

## 2018-12-22 RX ADMIN — LATANOPROST 1 DROP(S): 0.05 SOLUTION/ DROPS OPHTHALMIC; TOPICAL at 21:34

## 2018-12-22 RX ADMIN — Medication 50 MILLIGRAM(S): at 05:55

## 2018-12-22 RX ADMIN — WARFARIN SODIUM 2.5 MILLIGRAM(S): 2.5 TABLET ORAL at 21:37

## 2018-12-22 RX ADMIN — INSULIN GLARGINE 28 UNIT(S): 100 INJECTION, SOLUTION SUBCUTANEOUS at 21:35

## 2018-12-22 RX ADMIN — Medication 250 MICROGRAM(S): at 05:55

## 2018-12-22 RX ADMIN — ATORVASTATIN CALCIUM 40 MILLIGRAM(S): 80 TABLET, FILM COATED ORAL at 21:33

## 2018-12-22 NOTE — PROGRESS NOTE ADULT - SUBJECTIVE AND OBJECTIVE BOX
MYRNA TOVAR  78y  Male    Patient is a 78y old  Male who presents with a chief complaint of chest pain (21 Dec 2018 23:58)      INTERVAL HPI/OVERNIGHT EVENTS: None AA no complaints    T(C): 35.8 (12-21-18 @ 22:51), Max: 37 (12-21-18 @ 17:09)  HR: 68 (12-21-18 @ 22:51) (66 - 68)  BP: 129/69 (12-21-18 @ 22:51) (129/69 - 130/66)  RR: 19 (12-21-18 @ 22:51) (18 - 19)  SpO2: 98% (12-22-18 @ 00:01) (98% - 99%)  Wt(kg): --Vital Signs Last 24 Hrs  T(C): 35.8 (21 Dec 2018 22:51), Max: 37 (21 Dec 2018 17:09)  T(F): 96.5 (21 Dec 2018 22:51), Max: 98.6 (21 Dec 2018 17:09)  HR: 68 (21 Dec 2018 22:51) (66 - 68)  BP: 129/69 (21 Dec 2018 22:51) (129/69 - 130/66)  BP(mean): --  RR: 19 (21 Dec 2018 22:51) (18 - 19)  SpO2: 98% (22 Dec 2018 00:01) (98% - 99%)    PHYSICAL EXAM:  GENERAL: NAD, well-groomed, well-developed  HEAD:  Atraumatic, Normocephalic  NECK: Supple, No JVD, Normal thyroid  NERVOUS SYSTEM:  Alert & Oriented X3, Good concentration; Motor Strength 5/5 B/L upper and lower extremities; DTRs 2+ intact and symmetric  CHEST/LUNG: Clear to percussion bilaterally; No rales, rhonchi, wheezing, or rubs  HEART: Regular rate and rhythm; No murmurs, rubs, or gallops  ABDOMEN: Soft, Nontender, Nondistended; Bowel sounds present  EXTREMITIES:  2+ Peripheral Pulses, No clubbing, cyanosis, or edema    Consultant(s) Notes Reviewed:  [x ] YES  [ ] NO    Discussed with Consultants/Other Providers/Residents [ x] YES     LABS                         12.8   9.38  )-----------( 132      ( 21 Dec 2018 18:52 )             38.6     12-21    141  |  102  |  30<H>  ----------------------------<  138<H>  5.7<H>   |  22  |  1.1    Ca    9.1      21 Dec 2018 20:50  Mg     1.4     12-21    TPro  7.0  /  Alb  4.3  /  TBili  0.9  /  DBili  x   /  AST  20  /  ALT  17  /  AlkPhos  123<H>  12-21    PT/INR - ( 21 Dec 2018 18:52 )   PT: >40.00 sec;   INR: 3.55 ratio         PTT - ( 21 Dec 2018 18:52 )  PTT:73.3 sec      LIVER FUNCTIONS - ( 21 Dec 2018 20:50 )  Alb: 4.3 g/dL / Pro: 7.0 g/dL / ALK PHOS: 123 U/L / ALT: 17 U/L / AST: 20 U/L / GGT: x           CAPILLARY BLOOD GLUCOSE      POCT Blood Glucose.: 123 mg/dL (21 Dec 2018 22:56)        RADIOLOGY & ADDITIONAL TESTS:    Imaging Personally Reviewed:  [x ] YES  [ ] NO    MEDICATIONS  (STANDING):  atorvastatin 40 milliGRAM(s) Oral at bedtime  buDESOnide 160 MICROgram(s)/formoterol 4.5 MICROgram(s) Inhaler 2 Puff(s) Inhalation two times a day  dextrose 5%. 1000 milliLiter(s) (50 mL/Hr) IV Continuous <Continuous>  dextrose 50% Injectable 12.5 Gram(s) IV Push once  dextrose 50% Injectable 25 Gram(s) IV Push once  dextrose 50% Injectable 25 Gram(s) IV Push once  furosemide    Tablet 40 milliGRAM(s) Oral daily  insulin glargine Injectable (LANTUS) 28 Unit(s) SubCutaneous at bedtime  insulin lispro Injectable (HumaLOG) 10 Unit(s) SubCutaneous before dinner  latanoprost 0.005% Ophthalmic Solution 1 Drop(s) Both EYES at bedtime  levothyroxine 250 MICROGram(s) Oral daily  losartan 100 milliGRAM(s) Oral daily  metoprolol succinate ER 50 milliGRAM(s) Oral daily  tamsulosin 0.4 milliGRAM(s) Oral at bedtime    MEDICATIONS  (PRN):  ALBUTerol    90 MICROgram(s) HFA Inhaler 2 Puff(s) Inhalation every 6 hours PRN Bronchospasm  dextrose 40% Gel 15 Gram(s) Oral once PRN Blood Glucose LESS THAN 70 milliGRAM(s)/deciliter  glucagon  Injectable 1 milliGRAM(s) IntraMuscular once PRN Glucose LESS THAN 70 milligrams/deciliter      HEALTH ISSUES - PROBLEM Dx:

## 2018-12-22 NOTE — PROGRESS NOTE ADULT - ASSESSMENT
78 year old male with a past medical history of Heart Failure with reduced Ejection Fraction status post AICD placement, Atrial Fibrillation on Coumadin, Benign Prostatic Hyperplasia, Coronary Artery Disease status post Coronary Artery Bypass Grafting 2014, Chronic Obstructive Pulmonary Disease, Diabetes Mellitus, Dyslipidemia, Hypertension, Hypothyroidism presenting with chest pain.    # Chest pain in the setting of Heart Failure with reduced Ejection Fraction, Coronary Artery Disease status post CABG in 2014; r/o ACS:   - cardiology consulted , f/u recommendations   - Troponin 0.03 then 0.05 , trend  - BNP 1659, EKG NSR  - TTE 4/10/2018 with LVEF 30%    # Atrial Fibrillation on Coumadin:   - AM INR :  - home dose of coumadin is 7.5  - rate control with Toprolol XL 50 mg PO daily     # COPD:  - c/w  incruse and ventolin    # Benign Prostatic Hyperplasia;  - c/w     # Diabetes Mellitus;  -  Lantus: 28 units at bedtime  - Lispro: 10 units before dinner   - f/u FS    Hypertension; resume losartan and metoprolol    Hyperlipidemia; resume atorvastatin    Hypothyroidism; resume levothyroxine    DVT prophylaxis: coumadin , f/u INR daily  Activity; Ambulate as tolerated  Diet; low sodium, carb consistent, DASH    Dispo; 24-48 hours 78 year old male with a past medical history of Heart Failure with reduced Ejection Fraction status post AICD placement, Atrial Fibrillation on Coumadin, Benign Prostatic Hyperplasia, Coronary Artery Disease status post Coronary Artery Bypass Grafting 2014, Chronic Obstructive Pulmonary Disease, Diabetes Mellitus, Dyslipidemia, Hypertension, Hypothyroidism presenting with chest pain.    # Chest pain in the setting of Heart Failure with reduced Ejection Fraction, Coronary Artery Disease status post CABG in 2014; r/o ACS:   - cardiology consulted , f/u recommendations   - Troponin 0.03 then 0.05 , and 0.05 with negative ckmb  - BNP 1659, EKG NSR  - TTE 4/10/2018 with LVEF 30%    # Atrial Fibrillation on Coumadin:   - AM INR : 2.92  - home dose of coumadin is 2.5 , confirmed with the patient   - rate control with Toprol-xl  50 mg PO daily     # Hypomagnesemia:  - AM 1.6 from 1.4  - will replete and follow repeat   # COPD:  - c/w  incruse and ventolin    # Benign Prostatic Hyperplasia;  - c/w     # Diabetes Mellitus;  -  Lantus: 28 units at bedtime  - Lispro: 10 units before dinner   - f/u FS    Hypertension; resume losartan and metoprolol    Hyperlipidemia; resume atorvastatin    Hypothyroidism; resume levothyroxine    DVT prophylaxis: coumadin 2.5 mg tonight  Activity; Ambulate as tolerated  Diet; low sodium, carb consistent, DASH    Dispo; 24-48 hours

## 2018-12-22 NOTE — PROGRESS NOTE ADULT - SUBJECTIVE AND OBJECTIVE BOX
SUBJECTIVE:    Patient is a 78y old Male who presents with a chief complaint of chest pain (22 Dec 2018 04:16)  Currently admitted to medicine with the primary diagnosis of Chest pain  Today is hospital day 1d. This morning he is resting comfortably in bed and reports no new issues or overnight events.     PAST MEDICAL & SURGICAL HISTORY  COPD (chronic obstructive pulmonary disease)  Diabetes mellitus  Hypothyroidism  Hypertension  Dyslipidemia  Benign prostate hyperplasia  Atrial fibrillation  Heart failure: chronic systolic heart failure  Coronary artery disease: s/p CABG 2014  AICD (automatic cardioverter/defibrillator) present  S/P CABG (coronary artery bypass graft)    SOCIAL HISTORY:  Negative for smoking/alcohol/drug use.     ALLERGIES:  No Known Allergies    MEDICATIONS:  STANDING MEDICATIONS  atorvastatin 40 milliGRAM(s) Oral at bedtime  buDESOnide 160 MICROgram(s)/formoterol 4.5 MICROgram(s) Inhaler 2 Puff(s) Inhalation two times a day  dextrose 5%. 1000 milliLiter(s) IV Continuous <Continuous>  dextrose 50% Injectable 12.5 Gram(s) IV Push once  dextrose 50% Injectable 25 Gram(s) IV Push once  dextrose 50% Injectable 25 Gram(s) IV Push once  furosemide    Tablet 40 milliGRAM(s) Oral daily  insulin glargine Injectable (LANTUS) 28 Unit(s) SubCutaneous at bedtime  insulin lispro Injectable (HumaLOG) 10 Unit(s) SubCutaneous before dinner  latanoprost 0.005% Ophthalmic Solution 1 Drop(s) Both EYES at bedtime  levothyroxine 250 MICROGram(s) Oral daily  losartan 100 milliGRAM(s) Oral daily  metoprolol succinate ER 50 milliGRAM(s) Oral daily  tamsulosin 0.4 milliGRAM(s) Oral at bedtime    PRN MEDICATIONS  ALBUTerol    90 MICROgram(s) HFA Inhaler 2 Puff(s) Inhalation every 6 hours PRN  dextrose 40% Gel 15 Gram(s) Oral once PRN  glucagon  Injectable 1 milliGRAM(s) IntraMuscular once PRN    VITALS:   T(F): 96.7  HR: 60  BP: 125/62  RR: 19  SpO2: 98%    LABS:                        12.8   9.38  )-----------( 132      ( 21 Dec 2018 18:52 )             38.6     12-21    141  |  102  |  30<H>  ----------------------------<  138<H>  5.7<H>   |  22  |  1.1    Ca    9.1      21 Dec 2018 20:50  Mg     1.4     12-21    TPro  7.0  /  Alb  4.3  /  TBili  0.9  /  DBili  x   /  AST  20  /  ALT  17  /  AlkPhos  123<H>  12-21    PT/INR - ( 21 Dec 2018 18:52 )   PT: >40.00 sec;   INR: 3.55 ratio         PTT - ( 21 Dec 2018 18:52 )  PTT:73.3 sec      Creatine Kinase, Serum: 137 U/L (12-22-18 @ 00:33)  Troponin T, Serum: 0.05 ng/mL <HH> (12-22-18 @ 00:33)  Troponin T, Serum: 0.03 ng/mL <HH> (12-21-18 @ 18:52)      CARDIAC MARKERS ( 22 Dec 2018 00:33 )  x     / 0.05 ng/mL / 137 U/L / x     / 5.6 ng/mL  CARDIAC MARKERS ( 21 Dec 2018 18:52 )  x     / 0.03 ng/mL / x     / x     / x          RADIOLOGY:    < from: Xray Chest 1 View AP/PA (04.09.18 @ 14:25) >  Impression:      No radiographic evidence of acute cardiopulmonary disease.      < end of copied text >      < from: 12 Lead ECG (12.21.18 @ 20:13) >  Diagnosis Line Ventricular pacing with PVCs  Right bundle branch block  Left anterior fascicular block  *** Bifascicular block ***  Lateral infarct , age undetermined  Abnormal ECG    < end of copied text >      PHYSICAL EXAM:  GEN: No acute distress  LUNGS: Clear to auscultation bilaterally   HEART: S1/S2 present. RRR.   ABD: Soft, non-tender, non-distended. Bowel sounds present  EXT: NC/NC/NE/2+PP/JAMES  NEURO: AAOX3 SUBJECTIVE:    Patient is a 78y old Male who presents with a chief complaint of chest pain (22 Dec 2018 04:16)  Currently admitted to medicine with the primary diagnosis of Chest pain  Today is hospital day 1d. This morning he is resting comfortably in bed.  this am pt reports improving in chest pain this morning.     PAST MEDICAL & SURGICAL HISTORY  COPD (chronic obstructive pulmonary disease)  Diabetes mellitus  Hypothyroidism  Hypertension  Dyslipidemia  Benign prostate hyperplasia  Atrial fibrillation  Heart failure: chronic systolic heart failure  Coronary artery disease: s/p CABG 2014  AICD (automatic cardioverter/defibrillator) present  S/P CABG (coronary artery bypass graft)    SOCIAL HISTORY:  Negative for smoking/alcohol/drug use.     ALLERGIES:  No Known Allergies    MEDICATIONS:  STANDING MEDICATIONS  atorvastatin 40 milliGRAM(s) Oral at bedtime  buDESOnide 160 MICROgram(s)/formoterol 4.5 MICROgram(s) Inhaler 2 Puff(s) Inhalation two times a day  dextrose 5%. 1000 milliLiter(s) IV Continuous <Continuous>  dextrose 50% Injectable 12.5 Gram(s) IV Push once  dextrose 50% Injectable 25 Gram(s) IV Push once  dextrose 50% Injectable 25 Gram(s) IV Push once  furosemide    Tablet 40 milliGRAM(s) Oral daily  insulin glargine Injectable (LANTUS) 28 Unit(s) SubCutaneous at bedtime  insulin lispro Injectable (HumaLOG) 10 Unit(s) SubCutaneous before dinner  latanoprost 0.005% Ophthalmic Solution 1 Drop(s) Both EYES at bedtime  levothyroxine 250 MICROGram(s) Oral daily  losartan 100 milliGRAM(s) Oral daily  metoprolol succinate ER 50 milliGRAM(s) Oral daily  tamsulosin 0.4 milliGRAM(s) Oral at bedtime    PRN MEDICATIONS  ALBUTerol    90 MICROgram(s) HFA Inhaler 2 Puff(s) Inhalation every 6 hours PRN  dextrose 40% Gel 15 Gram(s) Oral once PRN  glucagon  Injectable 1 milliGRAM(s) IntraMuscular once PRN    VITALS:   T(F): 96.7  HR: 60  BP: 125/62  RR: 19  SpO2: 98%    LABS:                        12.8   9.38  )-----------( 132      ( 21 Dec 2018 18:52 )             38.6     12-21    141  |  102  |  30<H>  ----------------------------<  138<H>  5.7<H>   |  22  |  1.1    Ca    9.1      21 Dec 2018 20:50  Mg     1.4     12-21    TPro  7.0  /  Alb  4.3  /  TBili  0.9  /  DBili  x   /  AST  20  /  ALT  17  /  AlkPhos  123<H>  12-21    PT/INR - ( 21 Dec 2018 18:52 )   PT: >40.00 sec;   INR: 3.55 ratio         PTT - ( 21 Dec 2018 18:52 )  PTT:73.3 sec      Creatine Kinase, Serum: 137 U/L (12-22-18 @ 00:33)  Troponin T, Serum: 0.05 ng/mL <HH> (12-22-18 @ 00:33)  Troponin T, Serum: 0.03 ng/mL <HH> (12-21-18 @ 18:52)      CARDIAC MARKERS ( 22 Dec 2018 00:33 )  x     / 0.05 ng/mL / 137 U/L / x     / 5.6 ng/mL  CARDIAC MARKERS ( 21 Dec 2018 18:52 )  x     / 0.03 ng/mL / x     / x     / x          RADIOLOGY:    < from: Xray Chest 1 View AP/PA (04.09.18 @ 14:25) >  Impression:      No radiographic evidence of acute cardiopulmonary disease.      < end of copied text >      < from: 12 Lead ECG (12.21.18 @ 20:13) >  Diagnosis Line Ventricular pacing with PVCs  Right bundle branch block  Left anterior fascicular block  *** Bifascicular block ***  Lateral infarct , age undetermined  Abnormal ECG    < end of copied text >      PHYSICAL EXAM:  GEN: No acute distress  LUNGS: Clear to auscultation bilaterally   HEART: S1/S2 present. irregular rhythm , rate controlled   ABD: Soft, non-tender, non-distended. Bowel sounds present  EXT: NC/NC/NE/2+PP   NEURO: AAOX3

## 2018-12-23 LAB
ALBUMIN SERPL ELPH-MCNC: 4.2 G/DL — SIGNIFICANT CHANGE UP (ref 3.5–5.2)
ALP SERPL-CCNC: 134 U/L — HIGH (ref 30–115)
ALT FLD-CCNC: 15 U/L — SIGNIFICANT CHANGE UP (ref 0–41)
ANION GAP SERPL CALC-SCNC: 15 MMOL/L — HIGH (ref 7–14)
APTT BLD: 44.3 SEC — HIGH (ref 27–39.2)
AST SERPL-CCNC: 19 U/L — SIGNIFICANT CHANGE UP (ref 0–41)
BASOPHILS # BLD AUTO: 0.02 K/UL — SIGNIFICANT CHANGE UP (ref 0–0.2)
BASOPHILS NFR BLD AUTO: 0.3 % — SIGNIFICANT CHANGE UP (ref 0–1)
BILIRUB SERPL-MCNC: 2.3 MG/DL — HIGH (ref 0.2–1.2)
BUN SERPL-MCNC: 29 MG/DL — HIGH (ref 10–20)
CALCIUM SERPL-MCNC: 9.6 MG/DL — SIGNIFICANT CHANGE UP (ref 8.5–10.1)
CHLORIDE SERPL-SCNC: 98 MMOL/L — SIGNIFICANT CHANGE UP (ref 98–110)
CK MB CFR SERPL CALC: 6.2 NG/ML — SIGNIFICANT CHANGE UP (ref 0.6–6.3)
CK MB CFR SERPL CALC: 6.3 NG/ML — SIGNIFICANT CHANGE UP (ref 0.6–6.3)
CK SERPL-CCNC: 154 U/L — SIGNIFICANT CHANGE UP (ref 0–225)
CO2 SERPL-SCNC: 26 MMOL/L — SIGNIFICANT CHANGE UP (ref 17–32)
CREAT SERPL-MCNC: 1.1 MG/DL — SIGNIFICANT CHANGE UP (ref 0.7–1.5)
EOSINOPHIL # BLD AUTO: 0.15 K/UL — SIGNIFICANT CHANGE UP (ref 0–0.7)
EOSINOPHIL NFR BLD AUTO: 1.9 % — SIGNIFICANT CHANGE UP (ref 0–8)
GLUCOSE BLDC GLUCOMTR-MCNC: 100 MG/DL — HIGH (ref 70–99)
GLUCOSE BLDC GLUCOMTR-MCNC: 118 MG/DL — HIGH (ref 70–99)
GLUCOSE BLDC GLUCOMTR-MCNC: 128 MG/DL — HIGH (ref 70–99)
GLUCOSE BLDC GLUCOMTR-MCNC: 178 MG/DL — HIGH (ref 70–99)
GLUCOSE SERPL-MCNC: 124 MG/DL — HIGH (ref 70–99)
HCT VFR BLD CALC: 42.3 % — SIGNIFICANT CHANGE UP (ref 42–52)
HGB BLD-MCNC: 14 G/DL — SIGNIFICANT CHANGE UP (ref 14–18)
IMM GRANULOCYTES NFR BLD AUTO: 0.3 % — SIGNIFICANT CHANGE UP (ref 0.1–0.3)
INR BLD: 2.26 RATIO — HIGH (ref 0.65–1.3)
LYMPHOCYTES # BLD AUTO: 1.7 K/UL — SIGNIFICANT CHANGE UP (ref 1.2–3.4)
LYMPHOCYTES # BLD AUTO: 21.7 % — SIGNIFICANT CHANGE UP (ref 20.5–51.1)
MCHC RBC-ENTMCNC: 30.6 PG — SIGNIFICANT CHANGE UP (ref 27–31)
MCHC RBC-ENTMCNC: 33.1 G/DL — SIGNIFICANT CHANGE UP (ref 32–37)
MCV RBC AUTO: 92.4 FL — SIGNIFICANT CHANGE UP (ref 80–94)
MONOCYTES # BLD AUTO: 1.34 K/UL — HIGH (ref 0.1–0.6)
MONOCYTES NFR BLD AUTO: 17.1 % — HIGH (ref 1.7–9.3)
NEUTROPHILS # BLD AUTO: 4.62 K/UL — SIGNIFICANT CHANGE UP (ref 1.4–6.5)
NEUTROPHILS NFR BLD AUTO: 58.7 % — SIGNIFICANT CHANGE UP (ref 42.2–75.2)
NRBC # BLD: 0 /100 WBCS — SIGNIFICANT CHANGE UP (ref 0–0)
PLATELET # BLD AUTO: 127 K/UL — LOW (ref 130–400)
POTASSIUM SERPL-MCNC: 4.9 MMOL/L — SIGNIFICANT CHANGE UP (ref 3.5–5)
POTASSIUM SERPL-SCNC: 4.9 MMOL/L — SIGNIFICANT CHANGE UP (ref 3.5–5)
PROT SERPL-MCNC: 7.3 G/DL — SIGNIFICANT CHANGE UP (ref 6–8)
PROTHROM AB SERPL-ACNC: 25.8 SEC — HIGH (ref 9.95–12.87)
RBC # BLD: 4.58 M/UL — LOW (ref 4.7–6.1)
RBC # FLD: 15.3 % — HIGH (ref 11.5–14.5)
SODIUM SERPL-SCNC: 139 MMOL/L — SIGNIFICANT CHANGE UP (ref 135–146)
TROPONIN T SERPL-MCNC: 0.04 NG/ML — CRITICAL HIGH
TROPONIN T SERPL-MCNC: 0.04 NG/ML — CRITICAL HIGH
WBC # BLD: 7.85 K/UL — SIGNIFICANT CHANGE UP (ref 4.8–10.8)
WBC # FLD AUTO: 7.85 K/UL — SIGNIFICANT CHANGE UP (ref 4.8–10.8)

## 2018-12-23 RX ADMIN — INSULIN GLARGINE 28 UNIT(S): 100 INJECTION, SOLUTION SUBCUTANEOUS at 21:33

## 2018-12-23 RX ADMIN — Medication 250 MICROGRAM(S): at 05:46

## 2018-12-23 RX ADMIN — LATANOPROST 1 DROP(S): 0.05 SOLUTION/ DROPS OPHTHALMIC; TOPICAL at 21:31

## 2018-12-23 RX ADMIN — Medication 50 MILLIGRAM(S): at 05:46

## 2018-12-23 RX ADMIN — ATORVASTATIN CALCIUM 40 MILLIGRAM(S): 80 TABLET, FILM COATED ORAL at 21:32

## 2018-12-23 RX ADMIN — TAMSULOSIN HYDROCHLORIDE 0.4 MILLIGRAM(S): 0.4 CAPSULE ORAL at 21:31

## 2018-12-23 RX ADMIN — Medication 40 MILLIGRAM(S): at 05:46

## 2018-12-23 NOTE — PROGRESS NOTE ADULT - SUBJECTIVE AND OBJECTIVE BOX
MYRNA TOVAR  78y  Male    Patient is a 78y old  Male who presents with a chief complaint of chest pain (22 Dec 2018 08:28)      INTERVAL HPI/OVERNIGHT EVENTS: Non- sleeping comforatbly    T(C): 36.6 (12-22-18 @ 20:58), Max: 36.6 (12-22-18 @ 20:58)  HR: 76 (12-22-18 @ 20:58) (60 - 76)  BP: 99/71 (12-22-18 @ 20:58) (99/71 - 125/62)  RR: 18 (12-22-18 @ 20:58) (18 - 19)  SpO2: --  Wt(kg): --Vital Signs Last 24 Hrs  T(C): 36.6 (22 Dec 2018 20:58), Max: 36.6 (22 Dec 2018 20:58)  T(F): 97.8 (22 Dec 2018 20:58), Max: 97.8 (22 Dec 2018 20:58)  HR: 76 (22 Dec 2018 20:58) (60 - 76)  BP: 99/71 (22 Dec 2018 20:58) (99/71 - 125/62)  BP(mean): --  RR: 18 (22 Dec 2018 20:58) (18 - 19)  SpO2: --    PHYSICAL EXAM:  GENERAL: NAD, well-groomed, well-developed  HEAD:  Atraumatic, Normocephalic  NECK: Supple, No JVD, Normal thyroid  NERVOUS SYSTEM:  Alert & Oriented X3, Good concentration; Motor Strength 5/5 B/L upper and lower extremities; DTRs 2+ intact and symmetric  CHEST/LUNG: Clear to percussion bilaterally; No rales, rhonchi, wheezing, or rubs  HEART: Regular rate and rhythm; No murmurs, rubs, or gallops  ABDOMEN: Soft, Nontender, Nondistended; Bowel sounds present  EXTREMITIES:  2+ Peripheral Pulses, No clubbing, cyanosis, or edema    Consultant(s) Notes Reviewed:  [x ] YES  [ ] NO    Discussed with Consultants/Other Providers/Residents [ x] YES     LABS                         13.1   8.76  )-----------( 127      ( 22 Dec 2018 07:51 )             39.0     12-22    142  |  103  |  27<H>  ----------------------------<  102<H>  4.8   |  22  |  1.0    Ca    9.2      22 Dec 2018 07:51  Mg     1.6     12-22    TPro  6.6  /  Alb  4.0  /  TBili  1.7<H>  /  DBili  x   /  AST  19  /  ALT  15  /  AlkPhos  112  12-22    PT/INR - ( 22 Dec 2018 07:51 )   PT: 33.20 sec;   INR: 2.92 ratio         PTT - ( 22 Dec 2018 07:51 )  PTT:49.7 sec      LIVER FUNCTIONS - ( 22 Dec 2018 07:51 )  Alb: 4.0 g/dL / Pro: 6.6 g/dL / ALK PHOS: 112 U/L / ALT: 15 U/L / AST: 19 U/L / GGT: x           CAPILLARY BLOOD GLUCOSE      POCT Blood Glucose.: 156 mg/dL (22 Dec 2018 21:23)        RADIOLOGY & ADDITIONAL TESTS:    Imaging Personally Reviewed:  [x ] YES  [ ] NO    MEDICATIONS  (STANDING):  atorvastatin 40 milliGRAM(s) Oral at bedtime  buDESOnide 160 MICROgram(s)/formoterol 4.5 MICROgram(s) Inhaler 2 Puff(s) Inhalation two times a day  dextrose 5%. 1000 milliLiter(s) (50 mL/Hr) IV Continuous <Continuous>  dextrose 50% Injectable 12.5 Gram(s) IV Push once  dextrose 50% Injectable 25 Gram(s) IV Push once  dextrose 50% Injectable 25 Gram(s) IV Push once  furosemide    Tablet 40 milliGRAM(s) Oral daily  insulin glargine Injectable (LANTUS) 28 Unit(s) SubCutaneous at bedtime  insulin lispro Injectable (HumaLOG) 10 Unit(s) SubCutaneous before dinner  latanoprost 0.005% Ophthalmic Solution 1 Drop(s) Both EYES at bedtime  levothyroxine 250 MICROGram(s) Oral daily  losartan 100 milliGRAM(s) Oral daily  metoprolol succinate ER 50 milliGRAM(s) Oral daily  tamsulosin 0.4 milliGRAM(s) Oral at bedtime    MEDICATIONS  (PRN):  ALBUTerol    90 MICROgram(s) HFA Inhaler 2 Puff(s) Inhalation every 6 hours PRN Bronchospasm  dextrose 40% Gel 15 Gram(s) Oral once PRN Blood Glucose LESS THAN 70 milliGRAM(s)/deciliter  glucagon  Injectable 1 milliGRAM(s) IntraMuscular once PRN Glucose LESS THAN 70 milligrams/deciliter      HEALTH ISSUES - PROBLEM Dx:

## 2018-12-24 ENCOUNTER — TRANSCRIPTION ENCOUNTER (OUTPATIENT)
Age: 78
End: 2018-12-24

## 2018-12-24 VITALS — WEIGHT: 238.32 LBS

## 2018-12-24 LAB
ALBUMIN SERPL ELPH-MCNC: 4 G/DL — SIGNIFICANT CHANGE UP (ref 3.5–5.2)
ALP SERPL-CCNC: 114 U/L — SIGNIFICANT CHANGE UP (ref 30–115)
ALT FLD-CCNC: 15 U/L — SIGNIFICANT CHANGE UP (ref 0–41)
ANION GAP SERPL CALC-SCNC: 16 MMOL/L — HIGH (ref 7–14)
APTT BLD: 40.4 SEC — HIGH (ref 27–39.2)
AST SERPL-CCNC: 19 U/L — SIGNIFICANT CHANGE UP (ref 0–41)
BASOPHILS # BLD AUTO: 0.02 K/UL — SIGNIFICANT CHANGE UP (ref 0–0.2)
BASOPHILS NFR BLD AUTO: 0.3 % — SIGNIFICANT CHANGE UP (ref 0–1)
BILIRUB SERPL-MCNC: 2.1 MG/DL — HIGH (ref 0.2–1.2)
BUN SERPL-MCNC: 35 MG/DL — HIGH (ref 10–20)
CALCIUM SERPL-MCNC: 9.4 MG/DL — SIGNIFICANT CHANGE UP (ref 8.5–10.1)
CHLORIDE SERPL-SCNC: 99 MMOL/L — SIGNIFICANT CHANGE UP (ref 98–110)
CO2 SERPL-SCNC: 24 MMOL/L — SIGNIFICANT CHANGE UP (ref 17–32)
CREAT SERPL-MCNC: 1.1 MG/DL — SIGNIFICANT CHANGE UP (ref 0.7–1.5)
EOSINOPHIL # BLD AUTO: 0.14 K/UL — SIGNIFICANT CHANGE UP (ref 0–0.7)
EOSINOPHIL NFR BLD AUTO: 1.9 % — SIGNIFICANT CHANGE UP (ref 0–8)
GLUCOSE BLDC GLUCOMTR-MCNC: 126 MG/DL — HIGH (ref 70–99)
GLUCOSE BLDC GLUCOMTR-MCNC: 131 MG/DL — HIGH (ref 70–99)
GLUCOSE SERPL-MCNC: 121 MG/DL — HIGH (ref 70–99)
HCT VFR BLD CALC: 40.4 % — LOW (ref 42–52)
HGB BLD-MCNC: 13.2 G/DL — LOW (ref 14–18)
IMM GRANULOCYTES NFR BLD AUTO: 0.3 % — SIGNIFICANT CHANGE UP (ref 0.1–0.3)
INR BLD: 2.25 RATIO — HIGH (ref 0.65–1.3)
LYMPHOCYTES # BLD AUTO: 1.53 K/UL — SIGNIFICANT CHANGE UP (ref 1.2–3.4)
LYMPHOCYTES # BLD AUTO: 21.2 % — SIGNIFICANT CHANGE UP (ref 20.5–51.1)
MAGNESIUM SERPL-MCNC: 1.5 MG/DL — LOW (ref 1.8–2.4)
MCHC RBC-ENTMCNC: 29.9 PG — SIGNIFICANT CHANGE UP (ref 27–31)
MCHC RBC-ENTMCNC: 32.7 G/DL — SIGNIFICANT CHANGE UP (ref 32–37)
MCV RBC AUTO: 91.4 FL — SIGNIFICANT CHANGE UP (ref 80–94)
MONOCYTES # BLD AUTO: 1.21 K/UL — HIGH (ref 0.1–0.6)
MONOCYTES NFR BLD AUTO: 16.8 % — HIGH (ref 1.7–9.3)
NEUTROPHILS # BLD AUTO: 4.29 K/UL — SIGNIFICANT CHANGE UP (ref 1.4–6.5)
NEUTROPHILS NFR BLD AUTO: 59.5 % — SIGNIFICANT CHANGE UP (ref 42.2–75.2)
NRBC # BLD: 0 /100 WBCS — SIGNIFICANT CHANGE UP (ref 0–0)
PLATELET # BLD AUTO: 124 K/UL — LOW (ref 130–400)
POTASSIUM SERPL-MCNC: 4.3 MMOL/L — SIGNIFICANT CHANGE UP (ref 3.5–5)
POTASSIUM SERPL-SCNC: 4.3 MMOL/L — SIGNIFICANT CHANGE UP (ref 3.5–5)
PROT SERPL-MCNC: 6.7 G/DL — SIGNIFICANT CHANGE UP (ref 6–8)
PROTHROM AB SERPL-ACNC: 25.7 SEC — HIGH (ref 9.95–12.87)
RBC # BLD: 4.42 M/UL — LOW (ref 4.7–6.1)
RBC # FLD: 15 % — HIGH (ref 11.5–14.5)
SODIUM SERPL-SCNC: 139 MMOL/L — SIGNIFICANT CHANGE UP (ref 135–146)
WBC # BLD: 7.21 K/UL — SIGNIFICANT CHANGE UP (ref 4.8–10.8)
WBC # FLD AUTO: 7.21 K/UL — SIGNIFICANT CHANGE UP (ref 4.8–10.8)

## 2018-12-24 RX ORDER — WARFARIN SODIUM 2.5 MG/1
1 TABLET ORAL
Qty: 0 | Refills: 0 | DISCHARGE
Start: 2018-12-24 | End: 2019-01-22

## 2018-12-24 RX ORDER — WARFARIN SODIUM 2.5 MG/1
1 TABLET ORAL
Qty: 30 | Refills: 0
Start: 2018-12-24 | End: 2019-01-22

## 2018-12-24 RX ORDER — REGADENOSON 0.08 MG/ML
0.4 INJECTION, SOLUTION INTRAVENOUS ONCE
Qty: 0 | Refills: 0 | Status: DISCONTINUED | OUTPATIENT
Start: 2018-12-24 | End: 2018-12-24

## 2018-12-24 RX ORDER — WARFARIN SODIUM 2.5 MG/1
1 TABLET ORAL
Qty: 0 | Refills: 0 | COMMUNITY

## 2018-12-24 RX ADMIN — Medication 50 MILLIGRAM(S): at 06:28

## 2018-12-24 RX ADMIN — Medication 250 MICROGRAM(S): at 06:28

## 2018-12-24 RX ADMIN — Medication 40 MILLIGRAM(S): at 06:28

## 2018-12-24 NOTE — DISCHARGE NOTE ADULT - HOSPITAL COURSE
78 year old male with a past medical history of Heart Failure with reduced Ejection Fraction status post AICD placement, Atrial Fibrillation on Coumadin, Benign Prostatic Hyperplasia, Coronary Artery Disease status post Coronary Artery Bypass Grafting 2014, Diabetes Mellitus, Dyslipidemia, Hypertension, Hypothyroidism presenting with chest pain. Chest pain is described pressure like, "different than usual", saw that his heart rate was between , 5/10, non radiating, not associated with diaphoresis, increased shortness of breath from baseline, dizziness, lightheadedness, loss of consciousness, lower extremity edema.  Patient reports that he gets short of breath on ambulation less than a block.  Denies fevers, chills, nausea, vomiting, diarrhea, abdominal pain, urinary or fecal incontinence, focal neurological deficits.    Pt admitted to tele , cardio consulted , pt has stress test.  pt cleared to be discharged by cardio and medicine attending to follow up with out patient Dr. Laughlin and Dr. Green.

## 2018-12-24 NOTE — PROGRESS NOTE ADULT - SUBJECTIVE AND OBJECTIVE BOX
SUBJECTIVE:    Patient is a 78y old Male who presents with a chief complaint of chest pain (24 Dec 2018 08:27)  Currently admitted to medicine with the primary diagnosis of Chest pain r/o ACS  Today is hospital day 3d. This morning he is resting comfortably in bed.  pt is upset this morning because it's holiday and he still here in the hospital.  i explained to the patient the medical necessity to get the stress test per cardiology team recommendations , and the plan is to go home if stress test is negative to.  has no complains this am , denies chest pain or SOB.    PAST MEDICAL & SURGICAL HISTORY  COPD (chronic obstructive pulmonary disease)  Diabetes mellitus  Hypothyroidism  Hypertension  Dyslipidemia  Benign prostate hyperplasia  Atrial fibrillation  Heart failure: chronic systolic heart failure  Coronary artery disease: s/p CABG 2014  AICD (automatic cardioverter/defibrillator) present  S/P CABG (coronary artery bypass graft)    SOCIAL HISTORY:  Negative for smoking/alcohol/drug use.     ALLERGIES:  No Known Allergies    MEDICATIONS:  STANDING MEDICATIONS  atorvastatin 40 milliGRAM(s) Oral at bedtime  buDESOnide 160 MICROgram(s)/formoterol 4.5 MICROgram(s) Inhaler 2 Puff(s) Inhalation two times a day  dextrose 5%. 1000 milliLiter(s) IV Continuous <Continuous>  dextrose 50% Injectable 12.5 Gram(s) IV Push once  dextrose 50% Injectable 25 Gram(s) IV Push once  dextrose 50% Injectable 25 Gram(s) IV Push once  furosemide    Tablet 40 milliGRAM(s) Oral daily  insulin glargine Injectable (LANTUS) 28 Unit(s) SubCutaneous at bedtime  insulin lispro Injectable (HumaLOG) 10 Unit(s) SubCutaneous before dinner  latanoprost 0.005% Ophthalmic Solution 1 Drop(s) Both EYES at bedtime  levothyroxine 250 MICROGram(s) Oral daily  losartan 100 milliGRAM(s) Oral daily  metoprolol succinate ER 50 milliGRAM(s) Oral daily  regadenoson Injectable 0.4 milliGRAM(s) IV Push once  tamsulosin 0.4 milliGRAM(s) Oral at bedtime    PRN MEDICATIONS  ALBUTerol    90 MICROgram(s) HFA Inhaler 2 Puff(s) Inhalation every 6 hours PRN  dextrose 40% Gel 15 Gram(s) Oral once PRN  glucagon  Injectable 1 milliGRAM(s) IntraMuscular once PRN    VITALS:   T(F): 95.8  HR: 60  BP: 131/66  RR: 18  SpO2: --    LABS:                        14.0   7.85  )-----------( 127      ( 23 Dec 2018 11:40 )             42.3     12-23    139  |  98  |  29<H>  ----------------------------<  124<H>  4.9   |  26  |  1.1    Ca    9.6      23 Dec 2018 11:40    TPro  7.3  /  Alb  4.2  /  TBili  2.3<H>  /  DBili  x   /  AST  19  /  ALT  15  /  AlkPhos  134<H>  12-23    PT/INR - ( 23 Dec 2018 11:40 )   PT: 25.80 sec;   INR: 2.26 ratio         PTT - ( 23 Dec 2018 11:40 )  PTT:44.3 sec      Troponin T, Serum: 0.04 ng/mL <HH> (12-23-18 @ 20:56)  Creatine Kinase, Serum: 154 U/L (12-23-18 @ 20:56)  Troponin T, Serum: 0.04 ng/mL <HH> (12-23-18 @ 11:40)      CARDIAC MARKERS ( 23 Dec 2018 20:56 )  x     / 0.04 ng/mL / 154 U/L / x     / 6.2 ng/mL  CARDIAC MARKERS ( 23 Dec 2018 11:40 )  x     / 0.04 ng/mL / x     / x     / 6.3 ng/mL      RADIOLOGY:    < from: Xray Chest 1 View-PORTABLE IMMEDIATE (12.21.18 @ 20:26) >  Impression:      No radiographic evidence of acute cardiopulmonary disease.    < end of copied text >      < from: 12 Lead ECG (12.21.18 @ 23:26) >  Diagnosis Line Ventricular-paced rhythm with occasional atrial-paced complexes  Abnormal ECG    < end of copied text >      PHYSICAL EXAM:  GEN: No acute distress  LUNGS: Clear to auscultation bilaterally   HEART: S1/S2 present. irregular rhythm , rate controlled   ABD: Soft, non-tender, non-distended. Bowel sounds present  EXT: NC/NC/NE/2+PP   NEURO: AAOX3

## 2018-12-24 NOTE — PROGRESS NOTE ADULT - ATTENDING COMMENTS
78 year old male with a past medical history of Heart Failure with reduced Ejection Fraction status post AICD placement, Atrial Fibrillation on Coumadin, Benign Prostatic Hyperplasia, Coronary Artery Disease status post Coronary Artery Bypass Grafting 2014, Chronic Obstructive Pulmonary Disease, Diabetes Mellitus, Dyslipidemia, Hypertension, Hypothyroidism presenting with chest pain.    1. CP h/o SCHF, CAD post CABG 2014      r/o ACS     EKG NSR     TTE 4/10/2018        cardiac enzymes  neg     c/w  medical management including lasix     Cardio eval appreciated: if neg stress test the no further inpatient w/u     2. Atrial Fibrillation on Coumadin     resume metoprolol Coumadin     f/u INR      3. Hypomagnesemia      replete, f/u BMP    4. COPD      c/w incruse and ventolin    5. BPH       c/w  tamsulosin    6. Diabetes Mellitus     c/w insulin protocol    7. HTN      c/w  losartan and metoprolol    8. Hyperlipidemia     c/w atorvastatin    9. Hypothyroidism     c/w  levothyroxine    DVT prophylaxis; vte measures placed, supratherapeutic INR, hold coumadin tonight  Activity; Ambulate as tolerated  Diet; low sodium, carb consistent, DASH (hold diet if troponin increases and cardio decides on intervention)  DC is neg stress test
78 year old male with a past medical history of Heart Failure with reduced Ejection Fraction status post AICD placement, Atrial Fibrillation on Coumadin, Benign Prostatic Hyperplasia, Coronary Artery Disease status post Coronary Artery Bypass Grafting 2014, Chronic Obstructive Pulmonary Disease, Diabetes Mellitus, Dyslipidemia, Hypertension, Hypothyroidism presenting with chest pain.    1. CP h/o SCHF, CAD post CABG 2014      r/o ACS     EKG NSR     TTE 4/10/2018         cardiac enzymes  neg     c/w  medical management including lasix     Cardio eval appreciated: stress test in am     2. Atrial Fibrillation on Coumadin     INR supratherapeutic, hold tonight's dose of Coumadin     resume metoprolol    3. Hypomagnesemia      repleting, f/u BMP    4. COPD      c/w incruse and ventolin    5. BPH       c/w  tamsulosin    6. Diabetes Mellitus     c/w insulin protocol    7. HTN      c/w  losartan and metoprolol    8. Hyperlipidemia     c/w atorvastatin    9. Hypothyroidism     c/w  levothyroxine    DVT prophylaxis; vte measures placed, supratherapeutic INR, hold coumadin tonight  Activity; Ambulate as tolerated  Diet; low sodium, carb consistent, DASH (hold diet if troponin increases and cardio decides on intervention)  Dispo; 24-48 hours.
78 year old male with a past medical history of Heart Failure with reduced Ejection Fraction status post AICD placement, Atrial Fibrillation on Coumadin, Benign Prostatic Hyperplasia, Coronary Artery Disease status post Coronary Artery Bypass Grafting 2014, Chronic Obstructive Pulmonary Disease, Diabetes Mellitus, Dyslipidemia, Hypertension, Hypothyroidism presenting with chest pain.    1. CP h/o SCHF, CAD post CABG 2014      r/o ACS     Troponin 0.03 at 7pm, BNP 1659     EKG NSR     TTE 4/10/2018 available      F/u cardiac enzymes       c/w e medical management including lasix     Cardiolo eval appreciated     2. Atrial Fibrillation on Coumadin     INR supratherapeutic, hold tonight's dose of Coumadin     resume metoprolol    3. Hypomagnesemia      repleting, f/u BMP    4. COPD      c/w incruse and ventolin    5. BPH       c/w  tamsulosin    6. Diabetes Mellitus     c/w insulin protocol    7. HTN      c/w  losartan and metoprolol    8. Hyperlipidemia     c/w atorvastatin    9. Hypothyroidism     c/w  levothyroxine    DVT prophylaxis; vte measures placed, supratherapeutic INR, hold coumadin tonight  Activity; Ambulate as tolerated  Diet; low sodium, carb consistent, DASH (hold diet if troponin increases and cardio decides on intervention)  Dispo; 24-48 hours

## 2018-12-24 NOTE — PROGRESS NOTE ADULT - SUBJECTIVE AND OBJECTIVE BOX
SUBJ:No chest pain or shortness of breath      MEDICATIONS  (STANDING):  atorvastatin 40 milliGRAM(s) Oral at bedtime  buDESOnide 160 MICROgram(s)/formoterol 4.5 MICROgram(s) Inhaler 2 Puff(s) Inhalation two times a day  dextrose 5%. 1000 milliLiter(s) (50 mL/Hr) IV Continuous <Continuous>  dextrose 50% Injectable 12.5 Gram(s) IV Push once  dextrose 50% Injectable 25 Gram(s) IV Push once  dextrose 50% Injectable 25 Gram(s) IV Push once  furosemide    Tablet 40 milliGRAM(s) Oral daily  insulin glargine Injectable (LANTUS) 28 Unit(s) SubCutaneous at bedtime  insulin lispro Injectable (HumaLOG) 10 Unit(s) SubCutaneous before dinner  latanoprost 0.005% Ophthalmic Solution 1 Drop(s) Both EYES at bedtime  levothyroxine 250 MICROGram(s) Oral daily  losartan 100 milliGRAM(s) Oral daily  metoprolol succinate ER 50 milliGRAM(s) Oral daily  regadenoson Injectable 0.4 milliGRAM(s) IV Push once  tamsulosin 0.4 milliGRAM(s) Oral at bedtime    MEDICATIONS  (PRN):  ALBUTerol    90 MICROgram(s) HFA Inhaler 2 Puff(s) Inhalation every 6 hours PRN Bronchospasm  dextrose 40% Gel 15 Gram(s) Oral once PRN Blood Glucose LESS THAN 70 milliGRAM(s)/deciliter  glucagon  Injectable 1 milliGRAM(s) IntraMuscular once PRN Glucose LESS THAN 70 milligrams/deciliter            Vital Signs Last 24 Hrs  T(C): 35.4 (24 Dec 2018 04:09), Max: 36.6 (23 Dec 2018 14:18)  T(F): 95.8 (24 Dec 2018 04:09), Max: 97.9 (23 Dec 2018 14:18)  HR: 60 (24 Dec 2018 04:09) (60 - 62)  BP: 131/66 (24 Dec 2018 04:09) (108/57 - 131/66)  BP(mean): --  RR: 18 (24 Dec 2018 04:09) (18 - 18)  SpO2: --    REVIEW OF SYSTEMS:  CONSTITUTIONAL: No fever, weight loss, or fatigue  EYES: No eye pain, visual disturbances, or discharge  ENMT:  No difficulty hearing, tinnitus, vertigo; No sinus or throat pain  NECK: No pain or stiffness  RESPIRATORY: No cough, wheezing, chills or hemoptysis; (+) shortness of breath  CARDIOVASCULAR: (+)chest pain, No palpitations, dizziness, (+) leg swelling  GASTROINTESTINAL: No abdominal or epigastric pain. No nausea, vomiting, or hematemesis; No diarrhea or constipation. No melena or hematochezia.  GENITOURINARY: No dysuria, frequency, hematuria, or incontinence  NEUROLOGICAL: No headaches, memory loss, loss of strength, numbness, or tremors  SKIN: No itching, burning, rashes, or lesions   LYMPH NODES: No enlarged glands  ENDOCRINE: No heat or cold intolerance; No hair loss  MUSCULOSKELETAL: No joint pain or swelling; No muscle, back, or extremity pain  PSYCHIATRIC: No depression, anxiety, mood swings, or difficulty sleeping  HEME/LYMPH: No easy bruising, or bleeding gums   PHYSICAL EXAM:    · CONSTITUTIONAL: Well-developed, well nourished    AAO x 3  · EYES:	EOMI; PERRL; no drainage or redness  · HEENMT: No oral lesions; no gross abnormalities  · NECK:	No bruits; no thyromegaly or nodules  · BACK:	No deformity or limitation of movement  · RESPIRATORY:   airway patent; breath sounds equal; good air movement; respirations non-labored; clear to auscultation bilaterally; no chest wall tenderness; no intercostal retractions; no rales, rhonchi or wheeze  · CARDIOVASCULAR: S1\S2 regular rate and rhythm  no rub  no murmur  normal PMI  . GASTROINTESTINAL:  no distention; no masses palpable; bowel sounds normal; no rebound tenderness; no guarding; no rigidity; no organomegaly  · EXTREMITIES: No cyanosis, clubbing (+) edema  · VASCULAR: 	Equal and normal pulses (carotid, femoral)  · NEUROLOGICAL:   alert and oriented x 3; normal strength  · SKIN:	No lesions; no rash  · MUSCULOSKELETAL:   No calf tenderness  no joint swelling	      LABS:                        14.0   7.85  )-----------( 127      ( 23 Dec 2018 11:40 )             42.3     12-23    139  |  98  |  29<H>  ----------------------------<  124<H>  4.9   |  26  |  1.1    Ca    9.6      23 Dec 2018 11:40    TPro  7.3  /  Alb  4.2  /  TBili  2.3<H>  /  DBili  x   /  AST  19  /  ALT  15  /  AlkPhos  134<H>  12-23    CARDIAC MARKERS ( 23 Dec 2018 20:56 )  x     / 0.04 ng/mL / 154 U/L / x     / 6.2 ng/mL  CARDIAC MARKERS ( 23 Dec 2018 11:40 )  x     / 0.04 ng/mL / x     / x     / 6.3 ng/mL      PT/INR - ( 23 Dec 2018 11:40 )   PT: 25.80 sec;   INR: 2.26 ratio         PTT - ( 23 Dec 2018 11:40 )  PTT:44.3 sec    I&O's Summary    23 Dec 2018 07:01  -  24 Dec 2018 07:00  --------------------------------------------------------  IN: 0 mL / OUT: 450 mL / NET: -450 mL      BNP  RADIOLOGY & ADDITIONAL STUDIES:    IMPRESSION AND PLAN:    h/o CAD s/p CABG patent grafts 4/2017  Atypical chest apain  Chronic Afib  h/o AV darline ablation s/p CRT-D  PPM dependent  HFrEF  DM    Tele  Trend CE  c/w lasix, coumadin, statin, BB  Obtain nuclear stress test(scheduled fo taday)  risk factor modification    No CP / dyspnea at present.  Hemodynamically stable.    RECOMMEND:  - ASA  - Cont Coumadin.  - Cont Lipitor and Metoprolol.  - Nuclear Stress test today- If no large areas of ischemia, no further inpatient cardiac workup

## 2018-12-24 NOTE — DISCHARGE NOTE ADULT - CARE PROVIDERS DIRECT ADDRESSES
[email or web address],DirectAddress_Unknown

## 2018-12-24 NOTE — DISCHARGE NOTE ADULT - CARE PROVIDER_API CALL
Jah Laughlin), Cardiovascular Disease; Internal Medicine  [address]  [address]  [place] [address]  Phone: [phone]  Fax: [phone]    Susan Maher), Internal Medicine  [address] [place] [id number]  Phone: [phone]  Fax: [phone]    [employer]), Internal Medicine  [address] [place] [address] [address]  [place] [address]  Phone: [phone]  Fax: [phone]

## 2018-12-24 NOTE — DISCHARGE NOTE ADULT - PLAN OF CARE
medically optimize the condition 1- take medications as prescribed  2- your inr has been supra therapeutic on admission , you have been given coumadin 2.5 mg PO daily   follow up with INR measurement as out patient and adjust accordingly with your Primary doctor.   3- follow up with Dr. Laughlin in 1 month

## 2018-12-24 NOTE — DISCHARGE NOTE ADULT - PATIENT PORTAL LINK FT
You can access the [facility] Portal, offered by [facility], by registering with the following website: [email or web address]

## 2018-12-24 NOTE — PROGRESS NOTE ADULT - ASSESSMENT
78 year old male with a past medical history of Heart Failure with reduced Ejection Fraction status post AICD placement, Atrial Fibrillation on Coumadin, Benign Prostatic Hyperplasia, Coronary Artery Disease status post Coronary Artery Bypass Grafting 2014, Chronic Obstructive Pulmonary Disease, Diabetes Mellitus, Dyslipidemia, Hypertension, Hypothyroidism presenting with chest pain.    # Chest pain in the setting of Heart Failure with reduced Ejection Fraction, Coronary Artery Disease status post CABG in 2014; r/o ACS:   - cardiology consulted , nuclear stress test today  - Troponin 0.03 then 0.05 , and 0.05 with negative ckmb  - BNP 1659, EKG NSR  - TTE 4/10/2018 with LVEF 30%    # Atrial Fibrillation on Coumadin:   - home dose of coumadin is 2.5 , confirmed with the patient   - rate control with Toprol-xl  50 mg PO daily     # Hypomagnesemia:  - AM 1.6 from 1.4  - will replete and follow repeat     # COPD:  - c/w  incruse and ventolin    # Benign Prostatic Hyperplasia;  - c/w     # Diabetes Mellitus;  -  Lantus: 28 units at bedtime  - Lispro: 10 units before dinner   - f/u FS    Hypertension; resume losartan and metoprolol    Hyperlipidemia; resume atorvastatin    Hypothyroidism; resume levothyroxine    DVT prophylaxis: coumadin  Activity; Ambulate as tolerated  Diet; low sodium, carb consistent, DASH    Dispo; home today if stress test is negative.

## 2018-12-24 NOTE — DISCHARGE NOTE ADULT - CARE PLAN
Principal Discharge DX:	Chest pain  Goal:	medically optimize the condition  Assessment and plan of treatment:	1- take medications as prescribed  2- your inr has been supra therapeutic on admission , you have been given coumadin 2.5 mg PO daily   follow up with INR measurement as out patient and adjust accordingly with your Primary doctor.   3- follow up with Dr. Laughlin in 1 month Principal Discharge DX:	Coronary artery disease  Goal:	medically optimize the condition  Assessment and plan of treatment:	1- take medications as prescribed  2- your inr has been supra therapeutic on admission , you have been given coumadin 2.5 mg PO daily   follow up with INR measurement as out patient and adjust accordingly with your Primary doctor.   3- follow up with Dr. Laughlin in 1 month

## 2018-12-24 NOTE — DISCHARGE NOTE ADULT - MEDICATION SUMMARY - MEDICATIONS TO TAKE
I will START or STAY ON the medications listed below when I get home from the hospital:    losartan 100 mg oral tablet  -- 1 tab(s) by mouth once a day   -- Do not take this drug if you are pregnant.  It is very important that you take or use this exactly as directed.  Do not skip doses or discontinue unless directed by your doctor.  Some non-prescription drugs may aggravate your condition.  Read all labels carefully.  If a warning appears, check with your doctor before taking.    -- Indication: For HTN    Flomax 0.4 mg oral capsule  -- 1 cap(s) by mouth once a day (at bedtime)  -- Indication: For BPH    warfarin 2.5 mg oral tablet  -- 1 tab(s) by mouth once a day (at bedtime)   -- Do not take this drug if you are pregnant.  It is very important that you take or use this exactly as directed.  Do not skip doses or discontinue unless directed by your doctor.  Obtain medical advice before taking any non-prescription drugs as some may affect the action of this medication.    -- Indication: For AF    Toujeo SoloStar 300 units/mL subcutaneous solution  -- 16 unit(s) subcutaneous once a day  -- Indication: For DM    metFORMIN 1000 mg oral tablet  -- 1 tab(s) by mouth 2 times a day  -- Indication: For DM    atorvastatin 40 mg oral tablet  -- 1 tab(s) by mouth once a day (at bedtime)  -- Indication: For DLD    metoprolol succinate 50 mg oral tablet, extended release  -- 1 tab(s) by mouth once a day  -- Indication: For AF    Incruse Ellipta 62.5 mcg/inh inhalation powder  -- 1 inhaler(s) inhaled once a day  -- Indication: For COPD (chronic obstructive pulmonary disease)    Ventolin HFA 90 mcg/inh inhalation aerosol  -- 2 puff(s) inhaled 4 times a day, As Needed  -- Indication: For COPD (chronic obstructive pulmonary disease)    Lasix 40 mg oral tablet  -- 1 tab(s) by mouth once a day  -- Indication: For Coronary artery disease    latanoprost 0.005% ophthalmic solution  -- 1 drop(s) to each affected eye once a day (in the evening)  -- Indication: For eyes drye     levothyroxine 125 mcg (0.125 mg) oral capsule  -- 2 cap(s) by mouth once a day  -- Indication: For hypoyhtroid

## 2018-12-24 NOTE — DISCHARGE NOTE ADULT - MEDICATION SUMMARY - MEDICATIONS TO CHANGE
I will SWITCH the dose or number of times a day I take the medications listed below when I get home from the hospital:    warfarin 7.5 mg oral tablet  -- 1 tab(s) by mouth once a day

## 2018-12-26 ENCOUNTER — INPATIENT (INPATIENT)
Facility: HOSPITAL | Age: 78
LOS: 1 days | Discharge: HOME | End: 2018-12-28
Attending: INTERNAL MEDICINE | Admitting: INTERNAL MEDICINE

## 2018-12-26 VITALS
SYSTOLIC BLOOD PRESSURE: 141 MMHG | TEMPERATURE: 96 F | RESPIRATION RATE: 18 BRPM | OXYGEN SATURATION: 98 % | HEART RATE: 55 BPM | HEIGHT: 71 IN | DIASTOLIC BLOOD PRESSURE: 94 MMHG | WEIGHT: 248.02 LBS

## 2018-12-26 DIAGNOSIS — I50.22 CHRONIC SYSTOLIC (CONGESTIVE) HEART FAILURE: ICD-10-CM

## 2018-12-26 DIAGNOSIS — Z95.810 PRESENCE OF AUTOMATIC (IMPLANTABLE) CARDIAC DEFIBRILLATOR: ICD-10-CM

## 2018-12-26 DIAGNOSIS — R07.89 OTHER CHEST PAIN: ICD-10-CM

## 2018-12-26 DIAGNOSIS — Z95.5 PRESENCE OF CORONARY ANGIOPLASTY IMPLANT AND GRAFT: ICD-10-CM

## 2018-12-26 DIAGNOSIS — Z87.891 PERSONAL HISTORY OF NICOTINE DEPENDENCE: ICD-10-CM

## 2018-12-26 DIAGNOSIS — Z95.1 PRESENCE OF AORTOCORONARY BYPASS GRAFT: Chronic | ICD-10-CM

## 2018-12-26 DIAGNOSIS — I11.0 HYPERTENSIVE HEART DISEASE WITH HEART FAILURE: ICD-10-CM

## 2018-12-26 DIAGNOSIS — Z79.01 LONG TERM (CURRENT) USE OF ANTICOAGULANTS: ICD-10-CM

## 2018-12-26 DIAGNOSIS — I48.2 CHRONIC ATRIAL FIBRILLATION: ICD-10-CM

## 2018-12-26 DIAGNOSIS — E87.5 HYPERKALEMIA: ICD-10-CM

## 2018-12-26 DIAGNOSIS — E11.9 TYPE 2 DIABETES MELLITUS WITHOUT COMPLICATIONS: ICD-10-CM

## 2018-12-26 DIAGNOSIS — Z95.810 PRESENCE OF AUTOMATIC (IMPLANTABLE) CARDIAC DEFIBRILLATOR: Chronic | ICD-10-CM

## 2018-12-26 DIAGNOSIS — L40.9 PSORIASIS, UNSPECIFIED: ICD-10-CM

## 2018-12-26 DIAGNOSIS — I21.4 NON-ST ELEVATION (NSTEMI) MYOCARDIAL INFARCTION: ICD-10-CM

## 2018-12-26 DIAGNOSIS — I25.10 ATHEROSCLEROTIC HEART DISEASE OF NATIVE CORONARY ARTERY WITHOUT ANGINA PECTORIS: ICD-10-CM

## 2018-12-26 DIAGNOSIS — J44.9 CHRONIC OBSTRUCTIVE PULMONARY DISEASE, UNSPECIFIED: ICD-10-CM

## 2018-12-26 DIAGNOSIS — E03.9 HYPOTHYROIDISM, UNSPECIFIED: ICD-10-CM

## 2018-12-26 DIAGNOSIS — E78.5 HYPERLIPIDEMIA, UNSPECIFIED: ICD-10-CM

## 2018-12-26 DIAGNOSIS — H40.9 UNSPECIFIED GLAUCOMA: ICD-10-CM

## 2018-12-26 DIAGNOSIS — N40.0 BENIGN PROSTATIC HYPERPLASIA WITHOUT LOWER URINARY TRACT SYMPTOMS: ICD-10-CM

## 2018-12-26 DIAGNOSIS — Z96.653 PRESENCE OF ARTIFICIAL KNEE JOINT, BILATERAL: ICD-10-CM

## 2018-12-26 DIAGNOSIS — Z95.1 PRESENCE OF AORTOCORONARY BYPASS GRAFT: ICD-10-CM

## 2018-12-26 DIAGNOSIS — Z79.4 LONG TERM (CURRENT) USE OF INSULIN: ICD-10-CM

## 2018-12-26 LAB
ALBUMIN SERPL ELPH-MCNC: 4.2 G/DL — SIGNIFICANT CHANGE UP (ref 3.5–5.2)
ALP SERPL-CCNC: 126 U/L — HIGH (ref 30–115)
ALT FLD-CCNC: 15 U/L — SIGNIFICANT CHANGE UP (ref 0–41)
ANION GAP SERPL CALC-SCNC: 15 MMOL/L — HIGH (ref 7–14)
APTT BLD: 26.9 SEC — LOW (ref 27–39.2)
AST SERPL-CCNC: 33 U/L — SIGNIFICANT CHANGE UP (ref 0–41)
BASE EXCESS BLDV CALC-SCNC: 0.4 MMOL/L — SIGNIFICANT CHANGE UP (ref -2–2)
BASOPHILS # BLD AUTO: 0.02 K/UL — SIGNIFICANT CHANGE UP (ref 0–0.2)
BASOPHILS NFR BLD AUTO: 0.3 % — SIGNIFICANT CHANGE UP (ref 0–1)
BILIRUB SERPL-MCNC: 1.1 MG/DL — SIGNIFICANT CHANGE UP (ref 0.2–1.2)
BUN SERPL-MCNC: 40 MG/DL — HIGH (ref 10–20)
CA-I SERPL-SCNC: 1.26 MMOL/L — SIGNIFICANT CHANGE UP (ref 1.12–1.3)
CALCIUM SERPL-MCNC: 9.1 MG/DL — SIGNIFICANT CHANGE UP (ref 8.5–10.1)
CHLORIDE SERPL-SCNC: 98 MMOL/L — SIGNIFICANT CHANGE UP (ref 98–110)
CO2 SERPL-SCNC: 23 MMOL/L — SIGNIFICANT CHANGE UP (ref 17–32)
CREAT SERPL-MCNC: 1 MG/DL — SIGNIFICANT CHANGE UP (ref 0.7–1.5)
EOSINOPHIL # BLD AUTO: 0.1 K/UL — SIGNIFICANT CHANGE UP (ref 0–0.7)
EOSINOPHIL NFR BLD AUTO: 1.5 % — SIGNIFICANT CHANGE UP (ref 0–8)
GAS PNL BLDV: 138 MMOL/L — SIGNIFICANT CHANGE UP (ref 136–145)
GAS PNL BLDV: SIGNIFICANT CHANGE UP
GLUCOSE BLDC GLUCOMTR-MCNC: 139 MG/DL — HIGH (ref 70–99)
GLUCOSE SERPL-MCNC: 178 MG/DL — HIGH (ref 70–99)
HCO3 BLDV-SCNC: 26 MMOL/L — SIGNIFICANT CHANGE UP (ref 22–29)
HCT VFR BLD CALC: 40.2 % — LOW (ref 42–52)
HCT VFR BLDA CALC: 42.9 % — SIGNIFICANT CHANGE UP (ref 34–44)
HGB BLD CALC-MCNC: 14 G/DL — SIGNIFICANT CHANGE UP (ref 14–18)
HGB BLD-MCNC: 13.6 G/DL — LOW (ref 14–18)
IMM GRANULOCYTES NFR BLD AUTO: 0.3 % — SIGNIFICANT CHANGE UP (ref 0.1–0.3)
INR BLD: 1.85 RATIO — HIGH (ref 0.65–1.3)
LACTATE BLDV-MCNC: 1.1 MMOL/L — SIGNIFICANT CHANGE UP (ref 0.5–1.6)
LYMPHOCYTES # BLD AUTO: 1.17 K/UL — LOW (ref 1.2–3.4)
LYMPHOCYTES # BLD AUTO: 17.1 % — LOW (ref 20.5–51.1)
MCHC RBC-ENTMCNC: 31.4 PG — HIGH (ref 27–31)
MCHC RBC-ENTMCNC: 33.8 G/DL — SIGNIFICANT CHANGE UP (ref 32–37)
MCV RBC AUTO: 92.8 FL — SIGNIFICANT CHANGE UP (ref 80–94)
MONOCYTES # BLD AUTO: 1.02 K/UL — HIGH (ref 0.1–0.6)
MONOCYTES NFR BLD AUTO: 14.9 % — HIGH (ref 1.7–9.3)
NEUTROPHILS # BLD AUTO: 4.53 K/UL — SIGNIFICANT CHANGE UP (ref 1.4–6.5)
NEUTROPHILS NFR BLD AUTO: 65.9 % — SIGNIFICANT CHANGE UP (ref 42.2–75.2)
NRBC # BLD: 0 /100 WBCS — SIGNIFICANT CHANGE UP (ref 0–0)
PCO2 BLDV: 43 MMHG — SIGNIFICANT CHANGE UP (ref 41–51)
PH BLDV: 7.38 — SIGNIFICANT CHANGE UP (ref 7.26–7.43)
PLATELET # BLD AUTO: 107 K/UL — LOW (ref 130–400)
PO2 BLDV: 30 MMHG — SIGNIFICANT CHANGE UP (ref 20–40)
POTASSIUM BLDV-SCNC: 4.7 MMOL/L — SIGNIFICANT CHANGE UP (ref 3.3–5.6)
POTASSIUM SERPL-MCNC: 5.7 MMOL/L — HIGH (ref 3.5–5)
POTASSIUM SERPL-SCNC: 5.7 MMOL/L — HIGH (ref 3.5–5)
PROT SERPL-MCNC: 7.3 G/DL — SIGNIFICANT CHANGE UP (ref 6–8)
PROTHROM AB SERPL-ACNC: 21.2 SEC — HIGH (ref 9.95–12.87)
RBC # BLD: 4.33 M/UL — LOW (ref 4.7–6.1)
RBC # FLD: 15 % — HIGH (ref 11.5–14.5)
SAO2 % BLDV: 50 % — SIGNIFICANT CHANGE UP
SODIUM SERPL-SCNC: 136 MMOL/L — SIGNIFICANT CHANGE UP (ref 135–146)
TROPONIN T SERPL-MCNC: 0.01 NG/ML — SIGNIFICANT CHANGE UP
TROPONIN T SERPL-MCNC: 0.1 NG/ML — CRITICAL HIGH
TROPONIN T SERPL-MCNC: 0.23 NG/ML — CRITICAL HIGH
WBC # BLD: 6.86 K/UL — SIGNIFICANT CHANGE UP (ref 4.8–10.8)
WBC # FLD AUTO: 6.86 K/UL — SIGNIFICANT CHANGE UP (ref 4.8–10.8)

## 2018-12-26 NOTE — ED ADULT NURSE NOTE - OBJECTIVE STATEMENT
Pt presents to ED c/o left underarm/upper arm pain and midsternal chest pain since this morning. Pt reports the pain resolved upon arrival to hospital, but states he was concerned d/t his extensive cardiac history.

## 2018-12-26 NOTE — ED PROVIDER NOTE - NS ED ROS FT
Constitutional: no fever, chills, no recent weight loss, change in appetite or malaise  Cardiac: + left sided chest/axillary pain  Respiratory: No cough or respiratory distress  GI: No nausea, vomiting, diarrhea or abdominal pain.  : No dysuria, frequency, urgency or hematuria  MS: no pain to back or extremities, no loss of ROM, no weakness  Neuro: No headache or weakness. No LOC.  Skin: No skin rash.  Endocrine: + hx of DM  Except as documented in the HPI, all other systems are negative.

## 2018-12-26 NOTE — ED PROVIDER NOTE - PHYSICAL EXAMINATION
CONSTITUTIONAL: Well-appearing; well-nourished; in no apparent distress.   NECK: Supple; non-tender; no cervical lymphadenopathy. No JVD.   CARDIOVASCULAR: Normal S1, S2; no murmurs, rubs, or gallops.   CHEST: no chest wall tenderness  RESPIRATORY: Normal chest excursion with respiration; breath sounds clear and equal bilaterally; no wheezes, rhonchi, or rales.  GI/: Normal bowel sounds; non-distended; non-tender; no palpable organomegaly.   MS: No evidence of trauma or deformity.  Normal ROM in all four extremities; non-tender to palpation  SKIN: Normal for age and race; warm; dry; good turgor; no apparent lesions or exudate.   NEURO/PSYCH: A & O x 4; grossly unremarkable. mood and manner are appropriate.

## 2018-12-26 NOTE — ED PROVIDER NOTE - CARE PLAN
Principal Discharge DX:	NSTEMI (non-ST elevated myocardial infarction)  Secondary Diagnosis:	Elevated troponin  Secondary Diagnosis:	Chest pain, unspecified type

## 2018-12-26 NOTE — ED PROVIDER NOTE - OBJECTIVE STATEMENT
78 year old male with pmhx of CHF s/p AICD, a fib on coumadin, BPH, CAD s/p cardiac stent s/p CABG in 2014, DM, DLD, HTN, COPD, hypothyroidism, c/o pain under his left axilla and mid sternal chest pain that lasted for two hours this morning. Pt was recent d/c on 12/24 for CP. He had a nuclear stress test on 12/24. His last cath was one year ago and did not have any stents placed (follows with Dr. Gorman). Denies any sob, leg pain/swelling, cough, fever/chills/recent illness. Pt former smoker.

## 2018-12-26 NOTE — ED PROVIDER NOTE - PROGRESS NOTE DETAILS
Discussed with Dr. Pleitez, aware of admission Discussed with cardiology, pt evaluated by fellow in ED and Dr. Brooke aware. Plan for monitoring and second set troponin and d/c home with close follow up if negative.  reassess, Pt seen by Dr. Brooke, feels noncardiac and cleared for d/c from cardiac perspective. Aware of elevated troponin. Given such, will CTA to r/o PE as other potential cause of elevated troponin with sx. If negative feel pt can likely be discharged if feeling well upon reassessment VS and sx. Pt s/o to Dr. Elizabeth to f/u imaging, reassess and dispo. signout [place] to Jessica. f/u trop

## 2018-12-26 NOTE — ED ADULT NURSE NOTE - PMH
Atrial fibrillation    Benign prostate hyperplasia    COPD (chronic obstructive pulmonary disease)    Coronary artery disease  s/p CABG 2014  Diabetes mellitus    Dyslipidemia    Heart failure  chronic systolic heart failure  Hypertension    Hypothyroidism

## 2018-12-26 NOTE — CONSULT NOTE ADULT - ASSESSMENT
# HFrEF s/p ICD  # A. fib  # chest pain?, atypical  # HTN  # COPD  # HLD  # CAD s/p CABG  # COPD    - c/w telemetry monitoring  - f/u serial troponins q4-6 h to r/o ACS  - recent nuclear stress test reviewed, consider starting on low dose imdur  - evaluate for gastric etiology as cause of Sx's   - refer for OP cardiology f/u for further care. # HFrEF s/p ICD  # A. fib  # chest pain?, atypical  # HTN  # COPD  # HLD  # CAD s/p CABG  # COPD    - all imaging and lab testing reviewed  - atypical cp, hx of extensive cad/cabg  - recent nuclear stress test - as above  - no further inpatient card testing or w/up  - refer for OP cardiology f/u wt Dr. Laughlin.

## 2018-12-26 NOTE — ED PROVIDER NOTE - ATTENDING CONTRIBUTION TO CARE
79 yo male with PH CHF s/p AICD, COPD, CAD s/p CABG, DM, HTN, hypothyroidism, Afib on coumadin presents c/o episode substernal chest pressure and left axillary pain this AM which occurred while on the toilet. Sx lasted about 2 hours. Pt states sx resolved. Recently discharged after workup 12/24. Denies any fevers, chills, cough or URI sx, no N/V/D or abdominal pain. Cardiologist is Dr. Laughlin.    VITAL SIGNS: noted  CONSTITUTIONAL: Well-developed; well-nourished; in no acute distress  HEAD: Normocephalic; atraumatic  EYES: PERRL, EOM intact; conjunctiva and sclera clear  ENT: No nasal discharge; airway clear. MMM  NECK: Supple; non tender. No JVD  CARD: S1, S2 normal; no murmurs, gallops, or rubs. Regular rate and rhythm  CHEST: no chest wall tenderness  RESP: CTAB/L, no wheezes, rales or rhonchi  ABD: Normal bowel sounds; soft; non-distended; non-tender; no hepatosplenomegaly. No CVA tenderness  EXT: Normal ROM. No calf tenderness or edema. Distal pulses intact  NEURO: Alert, oriented. Grossly unremarkable. No focal deficits

## 2018-12-26 NOTE — ED ADULT NURSE NOTE - NSIMPLEMENTINTERV_GEN_ALL_ED
Implemented All Universal Safety Interventions:  [place] to call system. Call bell, personal items and telephone within reach. Instruct patient to call for assistance. Room bathroom lighting operational. Non-slip footwear when patient is off stretcher. Physically safe environment: no spills, clutter or unnecessary equipment. Stretcher in lowest position, wheels locked, appropriate side rails in place.

## 2018-12-27 DIAGNOSIS — Z98.890 OTHER SPECIFIED POSTPROCEDURAL STATES: Chronic | ICD-10-CM

## 2018-12-27 DIAGNOSIS — Z96.653 PRESENCE OF ARTIFICIAL KNEE JOINT, BILATERAL: Chronic | ICD-10-CM

## 2018-12-27 LAB
ANION GAP SERPL CALC-SCNC: 15 MMOL/L — HIGH (ref 7–14)
BUN SERPL-MCNC: 36 MG/DL — HIGH (ref 10–20)
CALCIUM SERPL-MCNC: 9 MG/DL — SIGNIFICANT CHANGE UP (ref 8.5–10.1)
CHLORIDE SERPL-SCNC: 98 MMOL/L — SIGNIFICANT CHANGE UP (ref 98–110)
CK MB CFR SERPL CALC: 12.7 NG/ML — HIGH (ref 0.6–6.3)
CK MB CFR SERPL CALC: 9.5 NG/ML — HIGH (ref 0.6–6.3)
CK SERPL-CCNC: 162 U/L — SIGNIFICANT CHANGE UP (ref 0–225)
CK SERPL-CCNC: 164 U/L — SIGNIFICANT CHANGE UP (ref 0–225)
CO2 SERPL-SCNC: 24 MMOL/L — SIGNIFICANT CHANGE UP (ref 17–32)
CREAT SERPL-MCNC: 1.2 MG/DL — SIGNIFICANT CHANGE UP (ref 0.7–1.5)
GLUCOSE BLDC GLUCOMTR-MCNC: 109 MG/DL — HIGH (ref 70–99)
GLUCOSE BLDC GLUCOMTR-MCNC: 183 MG/DL — HIGH (ref 70–99)
GLUCOSE BLDC GLUCOMTR-MCNC: 209 MG/DL — HIGH (ref 70–99)
GLUCOSE BLDC GLUCOMTR-MCNC: 210 MG/DL — HIGH (ref 70–99)
GLUCOSE SERPL-MCNC: 216 MG/DL — HIGH (ref 70–99)
HCT VFR BLD CALC: 39.9 % — LOW (ref 42–52)
HGB BLD-MCNC: 13.3 G/DL — LOW (ref 14–18)
INR BLD: 2.52 RATIO — HIGH (ref 0.65–1.3)
MAGNESIUM SERPL-MCNC: 1.4 MG/DL — LOW (ref 1.8–2.4)
MCHC RBC-ENTMCNC: 30.6 PG — SIGNIFICANT CHANGE UP (ref 27–31)
MCHC RBC-ENTMCNC: 33.3 G/DL — SIGNIFICANT CHANGE UP (ref 32–37)
MCV RBC AUTO: 91.9 FL — SIGNIFICANT CHANGE UP (ref 80–94)
NRBC # BLD: 0 /100 WBCS — SIGNIFICANT CHANGE UP (ref 0–0)
PLATELET # BLD AUTO: 133 K/UL — SIGNIFICANT CHANGE UP (ref 130–400)
POTASSIUM SERPL-MCNC: 4.6 MMOL/L — SIGNIFICANT CHANGE UP (ref 3.5–5)
POTASSIUM SERPL-SCNC: 4.6 MMOL/L — SIGNIFICANT CHANGE UP (ref 3.5–5)
PROTHROM AB SERPL-ACNC: 28.7 SEC — HIGH (ref 9.95–12.87)
RBC # BLD: 4.34 M/UL — LOW (ref 4.7–6.1)
RBC # FLD: 14.8 % — HIGH (ref 11.5–14.5)
SODIUM SERPL-SCNC: 137 MMOL/L — SIGNIFICANT CHANGE UP (ref 135–146)
TROPONIN T SERPL-MCNC: 0.24 NG/ML — CRITICAL HIGH
TROPONIN T SERPL-MCNC: 0.27 NG/ML — CRITICAL HIGH
WBC # BLD: 7.56 K/UL — SIGNIFICANT CHANGE UP (ref 4.8–10.8)
WBC # FLD AUTO: 7.56 K/UL — SIGNIFICANT CHANGE UP (ref 4.8–10.8)

## 2018-12-27 RX ORDER — ASPIRIN/CALCIUM CARB/MAGNESIUM 324 MG
81 TABLET ORAL DAILY
Qty: 0 | Refills: 0 | Status: DISCONTINUED | OUTPATIENT
Start: 2018-12-27 | End: 2018-12-28

## 2018-12-27 RX ORDER — ALBUTEROL 90 UG/1
2 AEROSOL, METERED ORAL EVERY 6 HOURS
Qty: 0 | Refills: 0 | Status: DISCONTINUED | OUTPATIENT
Start: 2018-12-27 | End: 2018-12-28

## 2018-12-27 RX ORDER — GLUCAGON INJECTION, SOLUTION 0.5 MG/.1ML
1 INJECTION, SOLUTION SUBCUTANEOUS ONCE
Qty: 0 | Refills: 0 | Status: DISCONTINUED | OUTPATIENT
Start: 2018-12-27 | End: 2018-12-28

## 2018-12-27 RX ORDER — DEXTROSE 50 % IN WATER 50 %
25 SYRINGE (ML) INTRAVENOUS ONCE
Qty: 0 | Refills: 0 | Status: DISCONTINUED | OUTPATIENT
Start: 2018-12-27 | End: 2018-12-28

## 2018-12-27 RX ORDER — INSULIN HUMAN 100 [IU]/ML
10 INJECTION, SOLUTION SUBCUTANEOUS ONCE
Qty: 0 | Refills: 0 | Status: DISCONTINUED | OUTPATIENT
Start: 2018-12-27 | End: 2018-12-27

## 2018-12-27 RX ORDER — CHLORHEXIDINE GLUCONATE 213 G/1000ML
1 SOLUTION TOPICAL
Qty: 0 | Refills: 0 | Status: DISCONTINUED | OUTPATIENT
Start: 2018-12-27 | End: 2018-12-28

## 2018-12-27 RX ORDER — WARFARIN SODIUM 2.5 MG/1
2.5 TABLET ORAL ONCE
Qty: 0 | Refills: 0 | Status: COMPLETED | OUTPATIENT
Start: 2018-12-27 | End: 2018-12-27

## 2018-12-27 RX ORDER — DEXTROSE 50 % IN WATER 50 %
12.5 SYRINGE (ML) INTRAVENOUS ONCE
Qty: 0 | Refills: 0 | Status: DISCONTINUED | OUTPATIENT
Start: 2018-12-27 | End: 2018-12-28

## 2018-12-27 RX ORDER — TAMSULOSIN HYDROCHLORIDE 0.4 MG/1
0.4 CAPSULE ORAL AT BEDTIME
Qty: 0 | Refills: 0 | Status: DISCONTINUED | OUTPATIENT
Start: 2018-12-27 | End: 2018-12-28

## 2018-12-27 RX ORDER — LEVOTHYROXINE SODIUM 125 MCG
250 TABLET ORAL DAILY
Qty: 0 | Refills: 0 | Status: DISCONTINUED | OUTPATIENT
Start: 2018-12-27 | End: 2018-12-28

## 2018-12-27 RX ORDER — FUROSEMIDE 40 MG
40 TABLET ORAL DAILY
Qty: 0 | Refills: 0 | Status: DISCONTINUED | OUTPATIENT
Start: 2018-12-27 | End: 2018-12-28

## 2018-12-27 RX ORDER — ATORVASTATIN CALCIUM 80 MG/1
40 TABLET, FILM COATED ORAL AT BEDTIME
Qty: 0 | Refills: 0 | Status: DISCONTINUED | OUTPATIENT
Start: 2018-12-27 | End: 2018-12-28

## 2018-12-27 RX ORDER — DEXTROSE 50 % IN WATER 50 %
50 SYRINGE (ML) INTRAVENOUS ONCE
Qty: 0 | Refills: 0 | Status: DISCONTINUED | OUTPATIENT
Start: 2018-12-27 | End: 2018-12-27

## 2018-12-27 RX ORDER — WARFARIN SODIUM 2.5 MG/1
2.5 TABLET ORAL AT BEDTIME
Qty: 0 | Refills: 0 | Status: DISCONTINUED | OUTPATIENT
Start: 2018-12-27 | End: 2018-12-28

## 2018-12-27 RX ORDER — LOSARTAN POTASSIUM 100 MG/1
100 TABLET, FILM COATED ORAL DAILY
Qty: 0 | Refills: 0 | Status: DISCONTINUED | OUTPATIENT
Start: 2018-12-27 | End: 2018-12-28

## 2018-12-27 RX ORDER — SODIUM CHLORIDE 9 MG/ML
1000 INJECTION, SOLUTION INTRAVENOUS
Qty: 0 | Refills: 0 | Status: DISCONTINUED | OUTPATIENT
Start: 2018-12-27 | End: 2018-12-28

## 2018-12-27 RX ORDER — LATANOPROST 0.05 MG/ML
1 SOLUTION/ DROPS OPHTHALMIC; TOPICAL AT BEDTIME
Qty: 0 | Refills: 0 | Status: DISCONTINUED | OUTPATIENT
Start: 2018-12-27 | End: 2018-12-28

## 2018-12-27 RX ORDER — ALPRAZOLAM 0.25 MG
0.25 TABLET ORAL AT BEDTIME
Qty: 0 | Refills: 0 | Status: DISCONTINUED | OUTPATIENT
Start: 2018-12-27 | End: 2018-12-28

## 2018-12-27 RX ORDER — DEXTROSE 50 % IN WATER 50 %
15 SYRINGE (ML) INTRAVENOUS ONCE
Qty: 0 | Refills: 0 | Status: DISCONTINUED | OUTPATIENT
Start: 2018-12-27 | End: 2018-12-28

## 2018-12-27 RX ORDER — METOPROLOL TARTRATE 50 MG
50 TABLET ORAL DAILY
Qty: 0 | Refills: 0 | Status: DISCONTINUED | OUTPATIENT
Start: 2018-12-27 | End: 2018-12-28

## 2018-12-27 RX ADMIN — Medication 250 MICROGRAM(S): at 06:50

## 2018-12-27 RX ADMIN — WARFARIN SODIUM 2.5 MILLIGRAM(S): 2.5 TABLET ORAL at 06:50

## 2018-12-27 RX ADMIN — WARFARIN SODIUM 2.5 MILLIGRAM(S): 2.5 TABLET ORAL at 22:03

## 2018-12-27 RX ADMIN — Medication 50 MILLIGRAM(S): at 06:49

## 2018-12-27 RX ADMIN — TAMSULOSIN HYDROCHLORIDE 0.4 MILLIGRAM(S): 0.4 CAPSULE ORAL at 22:03

## 2018-12-27 RX ADMIN — LOSARTAN POTASSIUM 100 MILLIGRAM(S): 100 TABLET, FILM COATED ORAL at 06:50

## 2018-12-27 RX ADMIN — Medication 81 MILLIGRAM(S): at 11:40

## 2018-12-27 RX ADMIN — ATORVASTATIN CALCIUM 40 MILLIGRAM(S): 80 TABLET, FILM COATED ORAL at 22:03

## 2018-12-27 RX ADMIN — Medication 40 MILLIGRAM(S): at 06:50

## 2018-12-27 RX ADMIN — LATANOPROST 1 DROP(S): 0.05 SOLUTION/ DROPS OPHTHALMIC; TOPICAL at 22:03

## 2018-12-27 NOTE — H&P ADULT - ASSESSMENT
78 M w/ chronic Afib on coumadin and metoprolol, HFrEF w/AICD, CAD s/p stent and CABG, COPD (ex smoker), HTN, DLD, atypical CP p/w LUE pain and elevated troponins on admission    # NSTEMI  - trend Maria Eugenia; thus far, they are trending up. Trops 0.1--> 0.21-->0.27  - CKMB 12.7, doubled from last admission 6.2  - f/u am Maria Eugenia  - f/u cards recs  - pt already on AC, on coumadin  - Add ASA 81 mg  - Repeat ECG in am  - Last echo done in 04/18 showed EF 30%, severely dec LV fxn, w/normal RV function 78 M w/ chronic Afib on coumadin and metoprolol, HFrEF w/AICD, CAD s/p stent and CABG, COPD (ex smoker), HTN, DLD, atypical CP p/w LUE pain and elevated troponins on admission    # NSTEMI; atypical CP  - trend Maria Eugenia; thus far, they are trending up. Trops 0.1--> 0.21-->0.27  - CKMB 12.7, doubled from last admission 6.2  - f/u am Maria Eugenia  - f/u cards recs  - pt already on AC, on coumadin  - Add ASA 81 mg  - Repeat ECG in am  - Last echo done in 04/18 showed EF 30%, severely dec LV fxn, w/normal RV function  - CTA chest done to r/o PE: negative for PE however demonstrated reflux IV contrast into IVC suggestive of R heart dysfxn. Would re-evaluate RV fxn with echo  - Nuclear stress test on last admission negative per Dr. Brooke    # Afib (chronic)  - c/w coumadin   - goal 2-3  - check daily am INR  - dose coumadin accordingly   - c/w metoprolol for rate control     # HFrEF (30% w/AICD)  - questionable RV dysfunction, repeat TTE  - c/w lasix     # DM  - metformin and Tejeao at home  - insulin regimen if FS >180    # DLD  - c/w atorvastatin    # HTN  - c/w losartan    # Glaucoma  - c/w latanoprost eye drops qhs    # psoriasis  - not on any meds, consider adding hydrocortisone to psoriatic plaques    # BPH  - c/w tamsulosin    # hypothyroidism  - pt does not recall taking levothyroxine  - Check  TSH     # DVT ppx: on coumadin  # GI ppx: ptx    Diet: DASH  Full Code   Dispo: home once stable 78 M w/ chronic Afib on coumadin and metoprolol, HFrEF w/AICD, CAD s/p stent and CABG, COPD (ex smoker), HTN, DLD, atypical CP p/w LUE pain and elevated troponins on admission    # NSTEMI; atypical CP  - trend Maria Eugenia; thus far, they are trending up. Trops 0.1--> 0.21-->0.27  - CKMB 12.7, doubled from last admission 6.2  - f/u am Maria Eugenia  - f/u cards recs  - pt already on AC, on coumadin  - Add ASA 81 mg  - Repeat ECG in am  - Last echo done in 04/18 showed EF 30%, severely dec LV fxn, w/normal RV function  - CTA chest done to r/o PE: negative for PE however demonstrated reflux IV contrast into IVC suggestive of R heart dysfxn. Would re-evaluate RV fxn with echo  - Nuclear stress test on last admission negative per Dr. Brooke    # Afib (chronic)  - c/w coumadin   - goal 2-3  - check daily am INR  - dose coumadin accordingly   - c/w metoprolol for rate control     # HFrEF (30% w/AICD)  - questionable RV dysfunction, repeat TTE  - c/w lasix     # DM  - metformin and Toujeo at home  - insulin regimen if FS >180    # DLD  - c/w atorvastatin    # HTN  - c/w losartan    # Glaucoma  - c/w latanoprost eye drops qhs    # psoriasis  - not on any meds, consider adding hydrocortisone to psoriatic plaques    # BPH  - c/w tamsulosin    # hypothyroidism  - pt does not recall taking levothyroxine  - Check  TSH     # DVT ppx: on coumadin  # GI ppx: ptx    Diet: DASH  Full Code   Dispo: home once stable 78 M w/ chronic Afib on coumadin and metoprolol, HFrEF w/AICD, CAD s/p stent and CABG, COPD (ex smoker), HTN, DLD, atypical CP p/w LUE pain and elevated troponins on admission    # NSTEMI; atypical CP  - trend Maria Eugenia; thus far, they are trending up. Trops 0.1--> 0.21-->0.27  - CKMB 12.7, doubled from last admission 6.2  - f/u am Maria Eugenia  - f/u cards recs  - pt already on AC, on coumadin  - Add ASA 81 mg  - Repeat ECG in am  - Last echo done in 04/18 showed EF 30%, severely dec LV fxn, w/normal RV function  - CTA chest done to r/o PE: negative for PE however demonstrated reflux IV contrast into IVC suggestive of R heart dysfxn. Would re-evaluate RV fxn with echo  - Nuclear stress test on last admission negative per Dr. Brooke    # Afib (chronic)  - c/w coumadin   - goal 2-3  - check daily am INR  - dose coumadin accordingly   - c/w metoprolol for rate control     # HFrEF (30% w/AICD)  - questionable RV dysfunction, repeat TTE  - c/w lasix     # DM  - metformin and Toujeo at home  - insulin regimen if FS >180    # Hyperkalemia  - 5.7 on admission  - given insulin 10 U + D50  - f/u repeat K    # DLD  - c/w atorvastatin    # HTN  - c/w losartan    # Glaucoma  - c/w latanoprost eye drops qhs    # psoriasis  - not on any meds, consider adding hydrocortisone to psoriatic plaques    # BPH  - c/w tamsulosin    # hypothyroidism  - pt does not recall taking levothyroxine  - Check  TSH     # DVT ppx: on coumadin  # GI ppx: ptx    Diet: DASH  Full Code   Dispo: home once stable 78 M w/ chronic Afib on coumadin and metoprolol, HFrEF w/AICD, CAD s/p stent and CABG, COPD (ex smoker), HTN, DLD, atypical CP p/w LUE pain and elevated troponins on admission      # NSTEMI; atypical CP  - trend Maria Eugenia; thus far, they are trending up. Trops 0.1--> 0.21-->0.27  - CKMB 12.7, doubled from last admission 6.2  - f/u am Maria Eugenia  - f/u cards recs  - pt already on AC, on coumadin  - Add ASA 81 mg  - Repeat ECG in am  - Last echo done in 04/18 showed EF 30%, severely dec LV fxn, w/normal RV function  - CTA chest done to r/o PE: negative for PE however demonstrated reflux IV contrast into IVC suggestive of R heart dysfxn. Would re-evaluate RV fxn with echo  - Nuclear stress test on last admission negative per Dr. Brooke    # Afib (chronic)  - c/w coumadin   - goal 2-3  - check daily am INR  - dose coumadin accordingly   - c/w metoprolol for rate control     # HFrEF (30% w/AICD)  - questionable RV dysfunction, repeat TTE  - c/w lasix     # DM  - metformin and Toujeo at home  - insulin regimen if FS >180    # Hyperkalemia- hemolyzed  - 5.7 on admission  - On VBG, K 4.6   - f/u repeat K    # DLD  - c/w atorvastatin    # HTN  - c/w losartan    # Glaucoma  - c/w latanoprost eye drops qhs    # psoriasis  - not on any meds, consider adding hydrocortisone to psoriatic plaques    # BPH  - c/w tamsulosin    # hypothyroidism  - pt does not recall taking levothyroxine  - Check  TSH     # DVT ppx: on coumadin  # GI ppx: ptx    Diet: DASH  Full Code   Dispo: home after cardiac c/s f/u 78 M w/ chronic Afib on coumadin and metoprolol, HFrEF w/AICD, CAD s/p stent and CABG, COPD (ex smoker), HTN, DLD, atypical CP p/w LUE pain and elevated troponins on admission      # NSTEMI; atypical CP  - trend Maria Eugenia; thus far, they are trending up. Trops 0.1--> 0.21-->0.27  - CKMB 12.7, doubled from last admission 6.2, CK total 164   - f/u am Maria Eugenia  - f/u cards recs  - pt already on AC, on coumadin  - Add ASA 81 mg, c/w metoprolol and atorvastatin  - Repeat ECG in am  - Last echo done in 04/18 showed EF 30%, severely dec LV fxn, w/normal RV function  - CTA chest done to r/o PE: negative for PE however demonstrated reflux IV contrast into IVC suggestive of R heart dysfxn. Would re-evaluate RV fxn with echo  - f/u re: possible cath?  - Nuclear stress test on last admission negative per Dr. Brooke,    # Afib (chronic)  - c/w coumadin   - goal 2-3  - check daily am INR  - dose coumadin accordingly   - c/w metoprolol for rate control     # HFrEF (30% w/AICD)  - questionable RV dysfunction, repeat TTE  - c/w lasix     # DM  - hold metformin and Toujeo at home  - insulin regimen if FS >180    # Hyperkalemia- hemolyzed  - 5.7 on admission  - On VBG, K 4.6   - f/u repeat K    # DLD  - c/w atorvastatin    # HTN  - c/w losartan    # Glaucoma  - c/w latanoprost eye drops qhs    # psoriasis  - not on any meds, consider adding hydrocortisone to psoriatic plaques    # BPH  - c/w tamsulosin    # hypothyroidism  - pt does not recall taking levothyroxine  - Check  TSH     # DVT ppx: on coumadin  # GI ppx: ptx    Diet: DASH  Full Code   Dispo: home after cardiac c/s f/u

## 2018-12-27 NOTE — H&P ADULT - NSHPREVIEWOFSYSTEMS_GEN_ALL_CORE
CONSTITUTIONAL: No weakness, fevers or chills  RESPIRATORY: No cough, wheezing, hemoptysis; + shortness of breath on exertion  CARDIOVASCULAR: No chest pain or palpitations, pt states HR amandeep up to 110 bpm at home, usually in the 40s, per pt.  GASTROINTESTINAL: No abdominal or epigastric pain. No nausea, vomiting, or hematemesis; No diarrhea or constipation. No melena or hematochezia.  GENITOURINARY: No dysuria, frequency or hematuria  NEUROLOGICAL: No numbness or weakness  SKIN: No itching, rashes, + psoriasis in multiple locations

## 2018-12-27 NOTE — H&P ADULT - ATTENDING COMMENTS
78 M w/ chronic Afib on coumadin and metoprolol, HFrEF w/AICD, CAD s/p stent and CABG, COPD (ex smoker), HTN, DLD, atypical CP p/w LUE pain and elevated troponins on admission      1.  atypical CP     NSTEMI;      trend Maria Eugenia;  Trops 0.1--> 0.21-->0.27      CKMB 12.7, doubled from last admission 6.2, CK total 164       Cardio eval      Add ASA 81 mg, c/w BB and atorvastatin     Repeat ECG       echo  04/18: EF 30%, severely dec LV fxn, w/normal RV function     CTA chest: negative for PE;  R heart dysfxn      Nuclear stress test on last admission negative per Dr. Brooke,    2. Afib (chronic)     c/w coumadin for INR 2-3     c/w metoprolol     3. HFrEF (30% w/AICD)       RV dysfunction, repeat TTE     c/w lasix     4. DM      hold metformin and Toujeo        insulin regimen if FS >180    5. Hyperkalemia     hemolyzed   On VBG  4.6      repeat K    6.  DLD     c/w atorvastatin    7. HTN     c/w losartan    8.  Glaucoma     c/w latanoprost eye drops qhs    9. psoriasis      consider adding hydrocortisone to psoriatic plaques    10. BPH     c/w tamsulosin    11. hypothyroidism      Not on levothyroxine     Check  TFTs    DVT ppx: on coumadin  GI ppx: ptx  DASH  Full Code   Dispo: home after cardiac c/s f/u

## 2018-12-27 NOTE — H&P ADULT - NSHPSOCIALHISTORY_GEN_ALL_CORE
Occupation: Retired Respiratory Therapist   Ex-smoker ( from age 12 years-49 years old, 2ppd) Occupation: Retired Respiratory Therapist   Ex-smoker ( from age 12 years-49 years old, 2ppd)    Social EtOH consumption  No ilicit drugs

## 2018-12-27 NOTE — CHART NOTE - NSCHARTNOTEFT_GEN_A_CORE
pt this morning endorses no chest pain at all.  spoke to Dr. Brooke this afternoon regarding the cardiac input for the case in the light of cardiac enzymes trending up per Medicine Attending request.  per Dr. Brooke , no cardiac work up needed at this point.  No need to follow cardiac enzymes.  physiatry updated about the situation, consulted replaced. pt this morning endorses no chest pain at all.  spoke to Dr. Brooke this afternoon regarding the cardiac input for the case in the light of cardiac enzymes trending up per Medicine Attending request.  per Dr. Brooke , no cardiac work up needed at this point.  No need to follow cardiac enzymes.  pt to be discharged from cardiac stand point to f/u with Dr. Laughlin as out patient.   physiatry updated about the situation, consulted replaced.

## 2018-12-27 NOTE — H&P ADULT - NSHPPHYSICALEXAM_GEN_ALL_CORE
GENERAL: No acute distress, well-developed  HEAD:  Atraumatic, Normocephalic  EYES: EOMI, PERRLA, conjunctiva and sclera clear  NECK: Supple, no lymphadenopathy, no JVD  CHEST/LUNG: CTAB; No wheezes, rales, or rhonchi, decreased breath sounds   HEART: Regular rate and rhythm; No murmurs, rubs, or gallops  ABDOMEN: Soft, non-tender, non-distended; normal bowel sounds, no organomegaly  EXTREMITIES:  1+/3 DP, could not palpate PTs b/l, b/l, No clubbing, cyanosis,  pitting edema 3+ b/l to the knee  NEUROLOGY: A&O x 3, no focal deficits  SKIN: psoriatic lesions on abdomen, elbows

## 2018-12-27 NOTE — H&P ADULT - PSH
AICD (automatic cardioverter/defibrillator) present    H/O total knee replacement, bilateral    S/P CABG (coronary artery bypass graft)    S/P thoracotomy  pt states he had a VATS w/lung decortication

## 2018-12-27 NOTE — H&P ADULT - NSHPLABSRESULTS_GEN_ALL_CORE
Labs:      12-26    136  |  98  |  40<H>  ----------------------------<  178<H>  5.7<H>   |  23  |  1.0    Ca    9.1      26 Dec 2018 10:09    TPro  7.3  /  Alb  4.2  /  TBili  1.1  /  DBili  x   /  AST  33  /  ALT  15  /  AlkPhos  126<H>  12-26      PT/INR - ( 26 Dec 2018 10:09 )   PT: 21.20 sec;   INR: 1.85 ratio         PTT - ( 26 Dec 2018 10:09 )  PTT:26.9 sec    CARDIAC MARKERS ( 27 Dec 2018 00:41 )  x     / 0.27 ng/mL / 164 U/L / x     / 12.7 ng/mL  CARDIAC MARKERS ( 26 Dec 2018 19:15 )  x     / 0.23 ng/mL / x     / x     / x      CARDIAC MARKERS ( 26 Dec 2018 14:21 )  x     / 0.10 ng/mL / x     / x     / x      CARDIAC MARKERS ( 26 Dec 2018 10:09 )  x     / 0.01 ng/mL / x     / x     / x            LIVER FUNCTIONS - ( 26 Dec 2018 10:09 )  Alb: 4.2 g/dL / Pro: 7.3 g/dL / ALK PHOS: 126 U/L / ALT: 15 U/L / AST: 33 U/L / GGT: x           < from: Xray Chest 1 View-PORTABLE IMMEDIATE (12.26.18 @ 11:03) >    Impression:      No lobar consolidation, effusion or pneumothorax.    < from: 12 Lead ECG (12.26.18 @ 14:38) >    Ventricular Rate 52 BPM    Atrial Rate 340 BPM    QRS Duration 150 ms    Q-T Interval 444 ms    QTC Calculation(Bezet) 412 ms    R Axis -86 degrees    T Axis 73 degrees    Diagnosis Line Atrial fibrillation  Electronic pacemaker  Abnormal ECG    < from: NM Nuclear Stress Pharmacologic Multiple (12.24.18 @ 12:18) >      IMPRESSION:     1. SMALL IN SIZE AND MILD IN INTENSITY STRESS-INDUCED ISCHEMIA IN THE   INFERIOR WALL OF THE LEFT VENTRICLE WITH PATIENT HAVING A SIGNIFICANT   MOTION AT THE TIME OF ACQUISITION.    2. THERE IS MODERATE IN SIZE AND SEVERE IN INTENSITY FIXED DEFECTS ARE   SEEN INVOLVING APEX, ANTERIOR WALL, AND LATERAL WALL OF THE LEFT   VENTRICLE. MODERATE IN SIZE ANDMILD IN INTENSITY FIXED DEFECT IS SEEN   INVOLVING THE INFERIOR WALL OF THE LEFT VENTRICLE. FINDING MOST LIKELY   RELATED TO OLD MYOCARDIAL INFARCTION.    3. DILATED LEFT VENTRICLE WITH GENERALIZED GLOBAL HYPOKINESIA AND   DECREASED WALL THICKENING MOST MARKED IN THE APEX.    4. LEFT VENTRICULAR EJECTION FRACTION OF  30 % WHICH IS DECREASED, NORMAL   RANGE 50% AND ABOVE.    < from: CT Angio Chest w/ IV Cont (12.26.18 @ 17:43) >      IMPRESSION:    1.  No central or lobar pulmonary embolus.    2.  No CT evidence of an acute intrathoracic abnormality.    3.  Cardiomegaly with reflux of intravenous contrast into the IVC and   hepatic veins, suggestive of right heart dysfunction.

## 2018-12-27 NOTE — H&P ADULT - PMH
Atrial fibrillation    Benign prostate hyperplasia    COPD (chronic obstructive pulmonary disease)    Coronary artery disease  s/p CABG 2014  Diabetes mellitus    Dyslipidemia    Glaucoma    Heart failure  chronic systolic heart failure  Hypertension    Hypothyroidism Atrial fibrillation    Benign prostate hyperplasia    COPD (chronic obstructive pulmonary disease)    Coronary artery disease  s/p CABG 2014  Diabetes mellitus    Dyslipidemia    Glaucoma    Heart failure  chronic systolic heart failure  Hypertension    Hypothyroidism    Psoriasis

## 2018-12-27 NOTE — H&P ADULT - HISTORY OF PRESENT ILLNESS
78 M w/PMH Heart Failure with reduced Ejection Fraction status post AICD placement, Atrial Fibrillation on Coumadin and Metoprolol, AV darline ablation s/p CRT-D, Benign Prostatic Hyperplasia, Coronary Artery Disease s/p PCI w/stent and CABG 2014, Diabetes Mellitus, Dyslipidemia, Hypertension, Hypothyroidism, COPD on Ellipta and Ventolin (w/SOB on exertion) p/w LUE pain that was new in onset and constant, non radiating that last for 30 minutes. The pt did not take any medications at the time, however, he was concerned enough to call EMS who took him to the ED. The pt assessed his hear rate at home which he states was high, 115 bpm. Normal BP runs low in the 40s, per pt.	The pt denied associated CP, heart palpitations, SOB, diaphoresis, dizziness, n/v, trauma to the arm.  Admission labs demonstrated a troponin of 0.1 -->0.23--> 0.27 which was concerning, thus the pt was admitted for elevated troponins. CKMB was high as well at 12.7.  An ECG was done which showed Afib w/o signs of STEMI. The pt was seen by Dr. Brooke in the ED who did not think the pt required further in-patient cardiac w/u. The pt was recently admitted for CP w/elevated troponin which the pt states are chronically elevated to 0.04-0.05. On his last admission, the pt had a nuclear stress test which demonstrated small and mild stress induced ischemia in inferior wall of LV w/motion present (interpreted as negative) and EF 30%. The pt was d/c on 12/24 and told to f/u w/cards OP. 78 M w/PMH Heart Failure with reduced Ejection Fraction status post AICD placement, Atrial Fibrillation on Coumadin and Metoprolol, AV darline ablation s/p CRT-D, Benign Prostatic Hyperplasia, Coronary Artery Disease s/p PCI w/stent and CABG 2014, Diabetes Mellitus, Dyslipidemia, Hypertension, Hypothyroidism, COPD on Ellipta and Ventolin (w/SOB on exertion) p/w LUE pain that was new in onset and constant, non radiating that last for 30 minutes. The pt did not take any medications at the time, however, he was concerned enough to call EMS who took him to the ED. The pt assessed his hear rate at home which he states was high, 115 bpm. Normal BP runs low in the 40s, per pt.	The pt denied associated CP, heart palpitations, SOB, diaphoresis, dizziness, n/v, trauma to the arm.  Admission labs demonstrated a troponin of 0.1 -->0.23--> 0.27 which was concerning, thus the pt was admitted for elevated troponins. CKMB was high as well at 12.7.  An ECG was done which showed Afib w/o signs of STEMI. The pt was seen by Dr. Brooke in the ED who did not think the pt required further in-patient cardiac w/u.     The pt was recently admitted for CP w/elevated troponin which the pt states are chronically elevated to 0.04-0.05. On his last admission, the pt had a nuclear stress test which demonstrated small and mild stress induced ischemia in inferior wall of LV w/motion present (interpreted as negative) and EF 30%. The pt was d/c on 12/24 and told to f/u w/cards OP.

## 2018-12-28 ENCOUNTER — TRANSCRIPTION ENCOUNTER (OUTPATIENT)
Age: 78
End: 2018-12-28

## 2018-12-28 VITALS
SYSTOLIC BLOOD PRESSURE: 142 MMHG | HEART RATE: 81 BPM | RESPIRATION RATE: 20 BRPM | TEMPERATURE: 96 F | DIASTOLIC BLOOD PRESSURE: 65 MMHG

## 2018-12-28 LAB
ANION GAP SERPL CALC-SCNC: 17 MMOL/L — HIGH (ref 7–14)
APTT BLD: 37.7 SEC — SIGNIFICANT CHANGE UP (ref 27–39.2)
BUN SERPL-MCNC: 43 MG/DL — HIGH (ref 10–20)
CALCIUM SERPL-MCNC: 9.2 MG/DL — SIGNIFICANT CHANGE UP (ref 8.5–10.1)
CHLORIDE SERPL-SCNC: 101 MMOL/L — SIGNIFICANT CHANGE UP (ref 98–110)
CO2 SERPL-SCNC: 22 MMOL/L — SIGNIFICANT CHANGE UP (ref 17–32)
CREAT SERPL-MCNC: 1.1 MG/DL — SIGNIFICANT CHANGE UP (ref 0.7–1.5)
GLUCOSE BLDC GLUCOMTR-MCNC: 183 MG/DL — HIGH (ref 70–99)
GLUCOSE BLDC GLUCOMTR-MCNC: 259 MG/DL — HIGH (ref 70–99)
GLUCOSE SERPL-MCNC: 173 MG/DL — HIGH (ref 70–99)
HCT VFR BLD CALC: 39.7 % — LOW (ref 42–52)
HGB BLD-MCNC: 13.3 G/DL — LOW (ref 14–18)
INR BLD: 2.01 RATIO — HIGH (ref 0.65–1.3)
MAGNESIUM SERPL-MCNC: 1.4 MG/DL — LOW (ref 1.8–2.4)
MCHC RBC-ENTMCNC: 30.6 PG — SIGNIFICANT CHANGE UP (ref 27–31)
MCHC RBC-ENTMCNC: 33.5 G/DL — SIGNIFICANT CHANGE UP (ref 32–37)
MCV RBC AUTO: 91.5 FL — SIGNIFICANT CHANGE UP (ref 80–94)
NRBC # BLD: 0 /100 WBCS — SIGNIFICANT CHANGE UP (ref 0–0)
PLATELET # BLD AUTO: 125 K/UL — LOW (ref 130–400)
POTASSIUM SERPL-MCNC: 4.4 MMOL/L — SIGNIFICANT CHANGE UP (ref 3.5–5)
POTASSIUM SERPL-SCNC: 4.4 MMOL/L — SIGNIFICANT CHANGE UP (ref 3.5–5)
PROTHROM AB SERPL-ACNC: 22.9 SEC — HIGH (ref 9.95–12.87)
RBC # BLD: 4.34 M/UL — LOW (ref 4.7–6.1)
RBC # FLD: 14.8 % — HIGH (ref 11.5–14.5)
SODIUM SERPL-SCNC: 140 MMOL/L — SIGNIFICANT CHANGE UP (ref 135–146)
TSH SERPL-MCNC: 0.23 UIU/ML — LOW (ref 0.27–4.2)
WBC # BLD: 6.61 K/UL — SIGNIFICANT CHANGE UP (ref 4.8–10.8)
WBC # FLD AUTO: 6.61 K/UL — SIGNIFICANT CHANGE UP (ref 4.8–10.8)

## 2018-12-28 RX ORDER — ASPIRIN/CALCIUM CARB/MAGNESIUM 324 MG
1 TABLET ORAL
Qty: 0 | Refills: 0 | DISCHARGE
Start: 2018-12-28

## 2018-12-28 RX ADMIN — Medication 250 MICROGRAM(S): at 06:11

## 2018-12-28 RX ADMIN — Medication 50 MILLIGRAM(S): at 06:10

## 2018-12-28 RX ADMIN — Medication 40 MILLIGRAM(S): at 06:11

## 2018-12-28 RX ADMIN — LOSARTAN POTASSIUM 100 MILLIGRAM(S): 100 TABLET, FILM COATED ORAL at 06:10

## 2018-12-28 RX ADMIN — Medication 81 MILLIGRAM(S): at 11:02

## 2018-12-28 NOTE — DISCHARGE NOTE ADULT - CARE PROVIDERS DIRECT ADDRESSES
[email or web address]

## 2018-12-28 NOTE — DISCHARGE NOTE ADULT - PATIENT PORTAL LINK FT
You can access the [facility] Portal, offered by [facility], by registering with the following website: [email or web address]

## 2018-12-28 NOTE — PROGRESS NOTE ADULT - SUBJECTIVE AND OBJECTIVE BOX
MYRNA TOVAR  78y  Male    Patient is a 78y old  Male who presents with a chief complaint of Elevated troponins (27 Dec 2018 13:49)      INTERVAL HPI/OVERNIGHT EVENTS: NOne- sleeping comfortably    T(C): 36 (12-27-18 @ 20:15), Max: 36.1 (12-27-18 @ 05:50)  HR: 68 (12-27-18 @ 20:15) (60 - 68)  BP: 131/67 (12-27-18 @ 20:15) (117/65 - 134/65)  RR: 18 (12-27-18 @ 20:15) (18 - 18)  SpO2: 98% (12-27-18 @ 06:53) (98% - 98%)  Wt(kg): --Vital Signs Last 24 Hrs  T(C): 36 (27 Dec 2018 20:15), Max: 36.1 (27 Dec 2018 05:50)  T(F): 96.8 (27 Dec 2018 20:15), Max: 97 (27 Dec 2018 05:50)  HR: 68 (27 Dec 2018 20:15) (60 - 68)  BP: 131/67 (27 Dec 2018 20:15) (117/65 - 134/65)  BP(mean): --  RR: 18 (27 Dec 2018 20:15) (18 - 18)  SpO2: 98% (27 Dec 2018 06:53) (98% - 98%)    PHYSICAL EXAM:  GENERAL: NAD, well-groomed, well-developed  HEAD:  Atraumatic, Normocephalic  NECK: Supple, No JVD, Normal thyroid  NERVOUS SYSTEM:  Alert & Oriented X3, Good concentration; Motor Strength 5/5 B/L upper and lower extremities; DTRs 2+ intact and symmetric  CHEST/LUNG: Clear to percussion bilaterally; No rales, rhonchi, wheezing, or rubs  HEART: Regular rate and rhythm; No murmurs, rubs, or gallops  ABDOMEN: Soft, Nontender, Nondistended; Bowel sounds present  EXTREMITIES:  2+ Peripheral Pulses, No clubbing, cyanosis, or edema    Consultant(s) Notes Reviewed:  [x ] YES  [ ] NO    Discussed with Consultants/Other Providers/Residents [ x] YES     LABS                         13.3   7.56  )-----------( 133      ( 27 Dec 2018 11:10 )             39.9     12-27    137  |  98  |  36<H>  ----------------------------<  216<H>  4.6   |  24  |  1.2    Ca    9.0      27 Dec 2018 11:10  Mg     1.4     12-27    TPro  7.3  /  Alb  4.2  /  TBili  1.1  /  DBili  x   /  AST  33  /  ALT  15  /  AlkPhos  126<H>  12-26    PT/INR - ( 27 Dec 2018 11:10 )   PT: 28.70 sec;   INR: 2.52 ratio         PTT - ( 26 Dec 2018 10:09 )  PTT:26.9 sec      LIVER FUNCTIONS - ( 26 Dec 2018 10:09 )  Alb: 4.2 g/dL / Pro: 7.3 g/dL / ALK PHOS: 126 U/L / ALT: 15 U/L / AST: 33 U/L / GGT: x           CAPILLARY BLOOD GLUCOSE      POCT Blood Glucose.: 209 mg/dL (27 Dec 2018 21:32)        RADIOLOGY & ADDITIONAL TESTS:    Imaging Personally Reviewed:  [x ] YES  [ ] NO    MEDICATIONS  (STANDING):  aspirin  chewable 81 milliGRAM(s) Oral daily  atorvastatin 40 milliGRAM(s) Oral at bedtime  chlorhexidine 4% Liquid 1 Application(s) Topical <User Schedule>  dextrose 5%. 1000 milliLiter(s) (50 mL/Hr) IV Continuous <Continuous>  dextrose 50% Injectable 12.5 Gram(s) IV Push once  dextrose 50% Injectable 25 Gram(s) IV Push once  dextrose 50% Injectable 25 Gram(s) IV Push once  furosemide    Tablet 40 milliGRAM(s) Oral daily  latanoprost 0.005% Ophthalmic Solution 1 Drop(s) Both EYES at bedtime  levothyroxine 250 MICROGram(s) Oral daily  losartan 100 milliGRAM(s) Oral daily  metoprolol succinate ER 50 milliGRAM(s) Oral daily  tamsulosin 0.4 milliGRAM(s) Oral at bedtime  warfarin 2.5 milliGRAM(s) Oral at bedtime    MEDICATIONS  (PRN):  ALBUTerol    90 MICROgram(s) HFA Inhaler 2 Puff(s) Inhalation every 6 hours PRN Shortness of Breath and/or Wheezing  ALPRAZolam 0.25 milliGRAM(s) Oral at bedtime PRN anxiety  dextrose 40% Gel 15 Gram(s) Oral once PRN Blood Glucose LESS THAN 70 milliGRAM(s)/deciliter  glucagon  Injectable 1 milliGRAM(s) IntraMuscular once PRN Glucose LESS THAN 70 milligrams/deciliter      HEALTH ISSUES - PROBLEM Dx:
Cardio F/U is required  Cardiac enzymes are elev.ated.  He had a bit worse of dyspnea on exertion with ambulation for the last 2 weeks.   He had had some left upper arm pain yesterday but not today.   PT and Physiatry are premature.  Adjustment of anti-anginals may be needed before starting PT safely at the minimum..

## 2018-12-28 NOTE — PROGRESS NOTE ADULT - ATTENDING COMMENTS
78 M w/ chronic Afib on coumadin and metoprolol, HFrEF w/AICD, CAD s/p stent and CABG, COPD (ex smoker), HTN, DLD, atypical CP p/w LUE pain and elevated troponins on admission      1.  atypical CP     NSTEMI;      trend Maria Eugenia ;  CKMB       Cardio eval: no need to trend troponins; f/u Dr Laughlin as OP     c/w ASA 81 mg, c/w BB and atorvastatin     Repeat ECG       echo  04/18: EF 30%, severely dec LV fxn, w/normal RV function     CTA chest: negative for PE;  R heart dysfxn      Nuclear stress test on last admission negative per Dr. Brooke,    2. Afib (chronic)     c/w coumadin for INR 2-3     c/w metoprolol     3. HFrEF (30% w/AICD)       RV dysfunction, repeat TTE     c/w lasix     4. DM      hold metformin and Toujeo        insulin regimen if FS >180    5. Hyperkalemia     hemolyzed   On VBG  4.6      repeat K    6.  DLD     c/w atorvastatin    7. HTN     c/w losartan    8.  Glaucoma     c/w latanoprost eye drops qhs    9. psoriasis      consider adding hydrocortisone to psoriatic plaques    10. BPH     c/w tamsulosin    11. hypothyroidism      Not on levothyroxine     Check  TFTs    12. Physiatry eval appreciated: please reeval    DVT ppx: on coumadin  GI ppx: ptx  DASH  Full Code   Dc asince no cardiac c/s

## 2018-12-28 NOTE — DISCHARGE NOTE ADULT - CARE PROVIDER_API CALL
Jah Laughlin), Cardiovascular Disease; Internal Medicine  [address] [place] [address] [address]  [place] [address]  Phone: [phone]  Fax: [phone]    [employer]), Internal Medicine  [address] [place] [address] [address]  [place] [address]  Phone: [phone]  Fax: [phone]

## 2018-12-28 NOTE — DISCHARGE NOTE ADULT - MEDICATION SUMMARY - MEDICATIONS TO TAKE
I will START or STAY ON the medications listed below when I get home from the hospital:    aspirin 81 mg oral tablet, chewable  -- 1 tab(s) by mouth once a day  -- Indication: For CAD    losartan 100 mg oral tablet  -- 1 tab(s) by mouth once a day   -- Do not take this drug if you are pregnant.  It is very important that you take or use this exactly as directed.  Do not skip doses or discontinue unless directed by your doctor.  Some non-prescription drugs may aggravate your condition.  Read all labels carefully.  If a warning appears, check with your doctor before taking.    -- Indication: For HTN    Flomax 0.4 mg oral capsule  -- 1 cap(s) by mouth once a day (at bedtime)  -- Indication: For BPH    warfarin 2.5 mg oral tablet  -- 1 tab(s) by mouth once a day (at bedtime)   -- Do not take this drug if you are pregnant.  It is very important that you take or use this exactly as directed.  Do not skip doses or discontinue unless directed by your doctor.  Obtain medical advice before taking any non-prescription drugs as some may affect the action of this medication.    -- Indication: For AF    Toujeo SoloStar 300 units/mL subcutaneous solution  -- 16 unit(s) subcutaneous once a day  -- Indication: For DM    metFORMIN 1000 mg oral tablet  -- 1 tab(s) by mouth 2 times a day  -- Indication: For DM    atorvastatin 40 mg oral tablet  -- 1 tab(s) by mouth once a day (at bedtime)  -- Indication: For DLD    metoprolol succinate 50 mg oral tablet, extended release  -- 1 tab(s) by mouth once a day  -- Indication: For AF    Incruse Ellipta 62.5 mcg/inh inhalation powder  -- 1 inhaler(s) inhaled once a day  -- Indication: For COPD    Ventolin HFA 90 mcg/inh inhalation aerosol  -- 2 puff(s) inhaled 4 times a day, As Needed  -- Indication: For COPD    Lasix 40 mg oral tablet  -- 1 tab(s) by mouth once a day  -- Indication: For HF    latanoprost 0.005% ophthalmic solution  -- 1 drop(s) to each affected eye once a day (in the evening)  -- Indication: For dry eye    levothyroxine 125 mcg (0.125 mg) oral capsule  -- 2 cap(s) by mouth once a day  -- Indication: For Hypothyroid

## 2018-12-28 NOTE — CHART NOTE - NSCHARTNOTEFT_GEN_A_CORE
<<<RESIDENT DISCHARGE NOTE>>>     MYRNA TOVAR  MRN-[id number]    VITAL SIGNS:  T(F): 96.4 (12-28-18 @ 13:53), Max: 97 (12-28-18 @ 05:52)  HR: 81 (12-28-18 @ 13:53)  BP: 142/65 (12-28-18 @ 13:53)  SpO2: 93% (12-28-18 @ 06:14)      PHYSICAL EXAMINATION:  General: NAD  Pulmonary: clear breath sounds bilaterally   Cardiovascular: normal s1/s2 , irregular rhythm . rate controlled  Gastrointestinal/Abdomen & Pelvis: non distended , normal bs  Neurologic/Motor: AO*3 , non focal    TEST RESULTS:                        13.3   6.61  )-----------( 125      ( 28 Dec 2018 07:09 )             39.7       12-28    140  |  101  |  43<H>  ----------------------------<  173<H>  4.4   |  22  |  1.1    Ca    9.2      28 Dec 2018 07:09  Mg     1.4     12-28        FINAL DISCHARGE INTERVIEW:  Resident(s) Present: (Name: Dr. Jacy Singh), RN Present: (Name: Nicole )    DISCHARGE MEDICATION RECONCILIATION  reviewed with Attending (Name: Dr. Pleitez)    DISPOSITION:  Home

## 2018-12-28 NOTE — DISCHARGE NOTE ADULT - HOSPITAL COURSE
78 M w/PMH Heart Failure with reduced Ejection Fraction status post AICD placement, Atrial Fibrillation on Coumadin and Metoprolol, AV darline ablation s/p CRT-D, Benign Prostatic Hyperplasia, Coronary Artery Disease s/p PCI w/stent and CABG 2014, Diabetes Mellitus, Dyslipidemia, Hypertension, Hypothyroidism, COPD on Ellipta and Ventolin (w/SOB on exertion) p/w LUE pain that was new in onset and constant, non radiating that last for 30 minutes. The pt did not take any medications at the time, however, he was concerned enough to call EMS who took him to the ED. The pt assessed his hear rate at home which he states was high, 115 bpm. Normal BP runs low in the 40s, per pt.	The pt denied associated CP, heart palpitations, SOB, diaphoresis, dizziness, n/v, trauma to the arm.  Admission labs demonstrated a troponin of 0.1 -->0.23--> 0.27 which was concerning, thus the pt was admitted for elevated troponins. CKMB was high as well at 12.7.  An ECG was done which showed Afib w/o signs of STEMI. The pt was seen by Dr. Brooke in the ED who did not think the pt required further in-patient cardiac w/u.     pt was admitted , echo was repeated , cardio cleared the patient for dicharge with suzi alonzo follow up with Dr. escobar in 1 week.

## 2018-12-28 NOTE — DISCHARGE NOTE ADULT - CARE PLAN
Principal Discharge DX:	Coronary artery disease  Goal:	medically optimize  Assessment and plan of treatment:	1- take medications as prescribed  2- follow up with Dr. Laughlin in 1 week

## 2018-12-31 ENCOUNTER — APPOINTMENT (OUTPATIENT)
Dept: CARDIOLOGY | Facility: CLINIC | Age: 78
End: 2018-12-31

## 2018-12-31 VITALS — HEART RATE: 76 BPM | SYSTOLIC BLOOD PRESSURE: 110 MMHG | DIASTOLIC BLOOD PRESSURE: 80 MMHG | RESPIRATION RATE: 18 BRPM

## 2018-12-31 VITALS — BODY MASS INDEX: 34.02 KG/M2 | WEIGHT: 243 LBS | HEIGHT: 71 IN

## 2018-12-31 PROBLEM — H40.9 UNSPECIFIED GLAUCOMA: Chronic | Status: ACTIVE | Noted: 2018-12-27

## 2018-12-31 PROBLEM — L40.9 PSORIASIS, UNSPECIFIED: Chronic | Status: ACTIVE | Noted: 2018-12-27

## 2018-12-31 RX ORDER — WARFARIN 1 MG/1
1 TABLET ORAL
Refills: 0 | Status: DISCONTINUED | COMMUNITY
Start: 2017-11-03 | End: 2018-12-31

## 2018-12-31 NOTE — REASON FOR VISIT
[Atrial Fibrillation] : atrial fibrillation [Coronary Artery Disease] : coronary artery disease [CABG Follow-up] : bypass graft

## 2018-12-31 NOTE — PHYSICAL EXAM
[General Appearance - Well Developed] : well developed [Normal Appearance] : normal appearance [Well Groomed] : well groomed [General Appearance - Well Nourished] : well nourished [No Deformities] : no deformities [General Appearance - In No Acute Distress] : no acute distress [Normal Conjunctiva] : the conjunctiva exhibited no abnormalities [Eyelids - No Xanthelasma] : the eyelids demonstrated no xanthelasmas [Normal Oral Mucosa] : normal oral mucosa [No Oral Pallor] : no oral pallor [No Oral Cyanosis] : no oral cyanosis [Normal Jugular Venous A Waves Present] : normal jugular venous A waves present [Normal Jugular Venous V Waves Present] : normal jugular venous V waves present [No Jugular Venous Peterson A Waves] : no jugular venous peterson A waves [Respiration, Rhythm And Depth] : normal respiratory rhythm and effort [Exaggerated Use Of Accessory Muscles For Inspiration] : no accessory muscle use [Auscultation Breath Sounds / Voice Sounds] : lungs were clear to auscultation bilaterally [Heart Sounds] : normal S1 and S2 [Murmurs] : no murmurs present [Irregularly Irregular] : the rhythm was irregularly irregular [Systolic grade ___/6] : A grade [unfilled]/6 systolic murmur was heard. [Bowel Sounds] : normal bowel sounds [Abdomen Soft] : soft [Abdomen Tenderness] : non-tender [Abdomen Mass (___ Cm)] : no abdominal mass palpated [Abnormal Walk] : normal gait [Nail Clubbing] : no clubbing of the fingernails [Cyanosis, Localized] : no localized cyanosis [Petechial Hemorrhages (___cm)] : no petechial hemorrhages [Skin Color & Pigmentation] : normal skin color and pigmentation [] : no rash [No Venous Stasis] : no venous stasis [Skin Lesions] : no skin lesions [No Skin Ulcers] : no skin ulcer [No Xanthoma] : no  xanthoma was observed [Oriented To Time, Place, And Person] : oriented to person, place, and time

## 2019-01-02 DIAGNOSIS — I11.0 HYPERTENSIVE HEART DISEASE WITH HEART FAILURE: ICD-10-CM

## 2019-01-02 DIAGNOSIS — N40.0 BENIGN PROSTATIC HYPERPLASIA WITHOUT LOWER URINARY TRACT SYMPTOMS: ICD-10-CM

## 2019-01-02 DIAGNOSIS — Z79.01 LONG TERM (CURRENT) USE OF ANTICOAGULANTS: ICD-10-CM

## 2019-01-02 DIAGNOSIS — Z95.810 PRESENCE OF AUTOMATIC (IMPLANTABLE) CARDIAC DEFIBRILLATOR: ICD-10-CM

## 2019-01-02 DIAGNOSIS — I48.2 CHRONIC ATRIAL FIBRILLATION: ICD-10-CM

## 2019-01-02 DIAGNOSIS — R07.89 OTHER CHEST PAIN: ICD-10-CM

## 2019-01-02 DIAGNOSIS — E83.42 HYPOMAGNESEMIA: ICD-10-CM

## 2019-01-02 DIAGNOSIS — I50.22 CHRONIC SYSTOLIC (CONGESTIVE) HEART FAILURE: ICD-10-CM

## 2019-01-02 DIAGNOSIS — I25.10 ATHEROSCLEROTIC HEART DISEASE OF NATIVE CORONARY ARTERY WITHOUT ANGINA PECTORIS: ICD-10-CM

## 2019-01-02 DIAGNOSIS — Z95.1 PRESENCE OF AORTOCORONARY BYPASS GRAFT: ICD-10-CM

## 2019-01-03 ENCOUNTER — OUTPATIENT (OUTPATIENT)
Dept: OUTPATIENT SERVICES | Facility: HOSPITAL | Age: 79
LOS: 1 days | Discharge: HOME | End: 2019-01-03

## 2019-01-03 DIAGNOSIS — Z96.653 PRESENCE OF ARTIFICIAL KNEE JOINT, BILATERAL: Chronic | ICD-10-CM

## 2019-01-03 DIAGNOSIS — Z95.1 PRESENCE OF AORTOCORONARY BYPASS GRAFT: Chronic | ICD-10-CM

## 2019-01-03 DIAGNOSIS — Z95.810 PRESENCE OF AUTOMATIC (IMPLANTABLE) CARDIAC DEFIBRILLATOR: Chronic | ICD-10-CM

## 2019-01-03 DIAGNOSIS — Z98.890 OTHER SPECIFIED POSTPROCEDURAL STATES: Chronic | ICD-10-CM

## 2019-01-03 DIAGNOSIS — Z79.01 LONG TERM (CURRENT) USE OF ANTICOAGULANTS: ICD-10-CM

## 2019-01-03 DIAGNOSIS — I48.91 UNSPECIFIED ATRIAL FIBRILLATION: ICD-10-CM

## 2019-01-03 LAB
POCT INR: 2.1 RATIO — HIGH (ref 0.9–1.2)
POCT PT: 24.9 SEC — HIGH (ref 10–13.4)

## 2019-01-17 ENCOUNTER — OUTPATIENT (OUTPATIENT)
Dept: OUTPATIENT SERVICES | Facility: HOSPITAL | Age: 79
LOS: 1 days | Discharge: HOME | End: 2019-01-17

## 2019-01-17 DIAGNOSIS — Z95.810 PRESENCE OF AUTOMATIC (IMPLANTABLE) CARDIAC DEFIBRILLATOR: Chronic | ICD-10-CM

## 2019-01-17 DIAGNOSIS — Z96.653 PRESENCE OF ARTIFICIAL KNEE JOINT, BILATERAL: Chronic | ICD-10-CM

## 2019-01-17 DIAGNOSIS — Z95.1 PRESENCE OF AORTOCORONARY BYPASS GRAFT: Chronic | ICD-10-CM

## 2019-01-17 DIAGNOSIS — I48.91 UNSPECIFIED ATRIAL FIBRILLATION: ICD-10-CM

## 2019-01-17 DIAGNOSIS — Z79.01 LONG TERM (CURRENT) USE OF ANTICOAGULANTS: ICD-10-CM

## 2019-01-17 DIAGNOSIS — Z98.890 OTHER SPECIFIED POSTPROCEDURAL STATES: Chronic | ICD-10-CM

## 2019-01-17 LAB
POCT INR: 2.5 RATIO — HIGH (ref 0.9–1.2)
POCT PT: 30.4 SEC — HIGH (ref 10–13.4)

## 2019-01-29 ENCOUNTER — APPOINTMENT (OUTPATIENT)
Dept: CARDIOLOGY | Facility: CLINIC | Age: 79
End: 2019-01-29

## 2019-01-31 ENCOUNTER — APPOINTMENT (OUTPATIENT)
Dept: CARDIOLOGY | Facility: CLINIC | Age: 79
End: 2019-01-31

## 2019-02-07 ENCOUNTER — OUTPATIENT (OUTPATIENT)
Dept: OUTPATIENT SERVICES | Facility: HOSPITAL | Age: 79
LOS: 1 days | Discharge: HOME | End: 2019-02-07

## 2019-02-07 DIAGNOSIS — Z96.653 PRESENCE OF ARTIFICIAL KNEE JOINT, BILATERAL: Chronic | ICD-10-CM

## 2019-02-07 DIAGNOSIS — Z98.890 OTHER SPECIFIED POSTPROCEDURAL STATES: Chronic | ICD-10-CM

## 2019-02-07 DIAGNOSIS — Z95.1 PRESENCE OF AORTOCORONARY BYPASS GRAFT: Chronic | ICD-10-CM

## 2019-02-07 DIAGNOSIS — I48.91 UNSPECIFIED ATRIAL FIBRILLATION: ICD-10-CM

## 2019-02-07 DIAGNOSIS — Z95.810 PRESENCE OF AUTOMATIC (IMPLANTABLE) CARDIAC DEFIBRILLATOR: Chronic | ICD-10-CM

## 2019-02-07 DIAGNOSIS — Z79.01 LONG TERM (CURRENT) USE OF ANTICOAGULANTS: ICD-10-CM

## 2019-02-07 LAB
POCT INR: 2 RATIO — HIGH (ref 0.9–1.2)
POCT PT: 23.5 SEC — HIGH (ref 10–13.4)

## 2019-03-04 ENCOUNTER — OUTPATIENT (OUTPATIENT)
Dept: OUTPATIENT SERVICES | Facility: HOSPITAL | Age: 79
LOS: 1 days | Discharge: HOME | End: 2019-03-04

## 2019-03-04 DIAGNOSIS — Z96.653 PRESENCE OF ARTIFICIAL KNEE JOINT, BILATERAL: Chronic | ICD-10-CM

## 2019-03-04 DIAGNOSIS — Z95.1 PRESENCE OF AORTOCORONARY BYPASS GRAFT: Chronic | ICD-10-CM

## 2019-03-04 DIAGNOSIS — Z79.01 LONG TERM (CURRENT) USE OF ANTICOAGULANTS: ICD-10-CM

## 2019-03-04 DIAGNOSIS — Z95.810 PRESENCE OF AUTOMATIC (IMPLANTABLE) CARDIAC DEFIBRILLATOR: Chronic | ICD-10-CM

## 2019-03-04 DIAGNOSIS — I48.91 UNSPECIFIED ATRIAL FIBRILLATION: ICD-10-CM

## 2019-03-04 DIAGNOSIS — Z98.890 OTHER SPECIFIED POSTPROCEDURAL STATES: Chronic | ICD-10-CM

## 2019-03-04 LAB
POCT INR: 2.1 RATIO — HIGH (ref 0.9–1.2)
POCT PT: 25.4 SEC — HIGH (ref 10–13.4)

## 2019-04-02 ENCOUNTER — OUTPATIENT (OUTPATIENT)
Dept: OUTPATIENT SERVICES | Facility: HOSPITAL | Age: 79
LOS: 1 days | Discharge: HOME | End: 2019-04-02

## 2019-04-02 DIAGNOSIS — Z79.01 LONG TERM (CURRENT) USE OF ANTICOAGULANTS: ICD-10-CM

## 2019-04-02 DIAGNOSIS — Z95.1 PRESENCE OF AORTOCORONARY BYPASS GRAFT: Chronic | ICD-10-CM

## 2019-04-02 DIAGNOSIS — Z98.890 OTHER SPECIFIED POSTPROCEDURAL STATES: Chronic | ICD-10-CM

## 2019-04-02 DIAGNOSIS — I48.91 UNSPECIFIED ATRIAL FIBRILLATION: ICD-10-CM

## 2019-04-02 DIAGNOSIS — Z96.653 PRESENCE OF ARTIFICIAL KNEE JOINT, BILATERAL: Chronic | ICD-10-CM

## 2019-04-02 DIAGNOSIS — Z95.810 PRESENCE OF AUTOMATIC (IMPLANTABLE) CARDIAC DEFIBRILLATOR: Chronic | ICD-10-CM

## 2019-04-02 LAB
POCT INR: 2.1 RATIO — HIGH (ref 0.9–1.2)
POCT PT: 25.4 SEC — HIGH (ref 10–13.4)

## 2019-04-10 NOTE — ED ADULT NURSE NOTE - GENITOURINARY ASSESSMENT
Expected Date Of Service: 04/10/2019
Billing Type: Third-Party Bill
Performing Laboratory: [id number]
Bill For Surgical Tray: no
WDL

## 2019-04-11 ENCOUNTER — APPOINTMENT (OUTPATIENT)
Dept: CARDIOLOGY | Facility: CLINIC | Age: 79
End: 2019-04-11
Payer: MEDICARE

## 2019-04-11 VITALS — SYSTOLIC BLOOD PRESSURE: 120 MMHG | DIASTOLIC BLOOD PRESSURE: 60 MMHG

## 2019-04-11 VITALS — WEIGHT: 241 LBS | BODY MASS INDEX: 33.61 KG/M2

## 2019-04-11 DIAGNOSIS — Z45.02 ENCOUNTER FOR ADJUSTMENT AND MANAGEMENT OF AUTOMATIC IMPLANTABLE CARDIAC DEFIBRILLATOR: ICD-10-CM

## 2019-04-11 PROCEDURE — 93000 ELECTROCARDIOGRAM COMPLETE: CPT | Mod: 59

## 2019-04-11 PROCEDURE — 93284 PRGRMG EVAL IMPLANTABLE DFB: CPT

## 2019-04-11 PROCEDURE — 99214 OFFICE O/P EST MOD 30 MIN: CPT | Mod: 25

## 2019-04-11 NOTE — PHYSICAL EXAM
[General Appearance - Well Developed] : well developed [General Appearance - Well Nourished] : well nourished [Heart Sounds] : normal S1 and S2 [Arterial Pulses Normal] : the arterial pulses were normal [Respiration, Rhythm And Depth] : normal respiratory rhythm and effort [Auscultation Breath Sounds / Voice Sounds] : lungs were clear to auscultation bilaterally [Left Infraclavicular] : left infraclavicular area [Clean] : clean [Dry] : dry [Well-Healed] : well-healed [Bowel Sounds] : normal bowel sounds [Abdomen Soft] : soft [No Deformities] : no deformities [Normal Appearance] : normal appearance [Well Groomed] : well groomed [Heart Rate And Rhythm] : heart rate and rhythm were normal [General Appearance - In No Acute Distress] : no acute distress [Exaggerated Use Of Accessory Muscles For Inspiration] : no accessory muscle use [Murmurs] : no murmurs present [Abdomen Tenderness] : non-tender [Abdomen Mass (___ Cm)] : no abdominal mass palpated [Nail Clubbing] : no clubbing of the fingernails [Cyanosis, Localized] : no localized cyanosis [Petechial Hemorrhages (___cm)] : no petechial hemorrhages [] : no ischemic changes

## 2019-04-11 NOTE — PROCEDURE
[Complete Heart Block] : complete heart block [CRT-D] : Cardiac resynchronization therapy defibrillator [VVIR] : VVIR [Charge Time: ___ sec] : charge time was [unfilled] seconds [Threshold Testing Performed] : Threshold testing was performed [Longevity: ___ months] : The estimated remaining battery life is [unfilled] months [Sensing Amplitude ___mv] : sensing amplitude was [unfilled] mv [___ ms] : [unfilled] ms [___V @] : [unfilled] V [Sense ___ %] : Sense [unfilled]% [Programmed for Longevity] : output reprogrammed for improved battery longevity [Pace ___ %] : Pace [unfilled]% [See Scanned Paceart Report] : See scanned paceart report [See Device Printout] : See device printout [Lead Imp:  ___ohms] : lead impedance was [unfilled] ohms [Voltage: ___ volts] : Voltage was [unfilled] volts [de-identified] : [employer] MRI CTRD [de-identified] : [id number] [de-identified] : [employer]tronic [de-identified] : 60 [de-identified] : 7/17/17 [de-identified] : T wave oversensing , R wave sensitivity  to 0.45 mv ( T wave oversensing resolved ) \par No arrhythmias \par No pulmonary congestion

## 2019-04-11 NOTE — ASSESSMENT
[FreeTextEntry1] : AICD interrogation today showed , T wave oversensing, with tracking and intermittent inhibition ,possibly causing GOLD ,   T wave sensitivity reprogramed , which corrected oversensing issues . No pulmonary congestion , No ventricular arrhythmias . Patient is pacer dependent, no escape rhythm . \par \par He reports c/w meds . On warfarin for CVA prevention, no s/s bleeding reported \par \par plan \par -continue current meds/ diuretics /  thoracic impedance is stable  as per optivolt index \par -Continue remote monitoring.\par -Advised to call back if GOLD not improved after device reprogramming.  \par -F/u in 4 months with Dr. Gipson .\par -F/U with DR. Laughlin as scheduled. \par \par

## 2019-04-11 NOTE — HISTORY OF PRESENT ILLNESS
[de-identified] : 79 y/o male with a CRT-D for ICM/ CHB , presents for a routine device interrogation . \par \par He reports noticing  worsening of GOLD  lately , denies CP/SOB or palpitations. Occasional dizziness no syncope . No shocks. \par \par ECG# 1  ( 4/11/2019 ) Afib , BiV paced rhythm at 92 bpm  \par ECG #2 After reprogramming ( 4/11/2019 ) Afib with BiV paced rhythm at 67 bpm , PVCs \par ECHO (1/29/2019)- EF 40-45%

## 2019-04-11 NOTE — DISCUSSION/SUMMARY
[AICD Function Normal] : normal AICD function [Pacemaker Function Normal] : normal pacemaker function [Family] : the patient's family [Patient] : the patient

## 2019-04-25 ENCOUNTER — RX RENEWAL (OUTPATIENT)
Age: 79
End: 2019-04-25

## 2019-04-25 ENCOUNTER — MEDICATION RENEWAL (OUTPATIENT)
Age: 79
End: 2019-04-25

## 2019-04-26 ENCOUNTER — OUTPATIENT (OUTPATIENT)
Dept: OUTPATIENT SERVICES | Facility: HOSPITAL | Age: 79
LOS: 1 days | Discharge: HOME | End: 2019-04-26

## 2019-04-26 DIAGNOSIS — Z96.653 PRESENCE OF ARTIFICIAL KNEE JOINT, BILATERAL: Chronic | ICD-10-CM

## 2019-04-26 DIAGNOSIS — Z98.890 OTHER SPECIFIED POSTPROCEDURAL STATES: Chronic | ICD-10-CM

## 2019-04-26 DIAGNOSIS — J44.9 CHRONIC OBSTRUCTIVE PULMONARY DISEASE, UNSPECIFIED: ICD-10-CM

## 2019-04-26 DIAGNOSIS — Z95.1 PRESENCE OF AORTOCORONARY BYPASS GRAFT: Chronic | ICD-10-CM

## 2019-04-26 DIAGNOSIS — Z95.810 PRESENCE OF AUTOMATIC (IMPLANTABLE) CARDIAC DEFIBRILLATOR: Chronic | ICD-10-CM

## 2019-04-30 ENCOUNTER — OUTPATIENT (OUTPATIENT)
Dept: OUTPATIENT SERVICES | Facility: HOSPITAL | Age: 79
LOS: 1 days | Discharge: HOME | End: 2019-04-30

## 2019-04-30 DIAGNOSIS — Z96.653 PRESENCE OF ARTIFICIAL KNEE JOINT, BILATERAL: Chronic | ICD-10-CM

## 2019-04-30 DIAGNOSIS — Z79.01 LONG TERM (CURRENT) USE OF ANTICOAGULANTS: ICD-10-CM

## 2019-04-30 DIAGNOSIS — Z95.810 PRESENCE OF AUTOMATIC (IMPLANTABLE) CARDIAC DEFIBRILLATOR: Chronic | ICD-10-CM

## 2019-04-30 DIAGNOSIS — Z95.1 PRESENCE OF AORTOCORONARY BYPASS GRAFT: Chronic | ICD-10-CM

## 2019-04-30 DIAGNOSIS — Z98.890 OTHER SPECIFIED POSTPROCEDURAL STATES: Chronic | ICD-10-CM

## 2019-04-30 DIAGNOSIS — I48.91 UNSPECIFIED ATRIAL FIBRILLATION: ICD-10-CM

## 2019-04-30 LAB
POCT INR: 1.8 RATIO — HIGH (ref 0.9–1.2)
POCT PT: 21.6 SEC — HIGH (ref 10–13.4)

## 2019-05-07 DIAGNOSIS — J44.9 CHRONIC OBSTRUCTIVE PULMONARY DISEASE, UNSPECIFIED: ICD-10-CM

## 2019-05-10 ENCOUNTER — OUTPATIENT (OUTPATIENT)
Dept: OUTPATIENT SERVICES | Facility: HOSPITAL | Age: 79
LOS: 1 days | Discharge: HOME | End: 2019-05-10

## 2019-05-10 DIAGNOSIS — Z95.1 PRESENCE OF AORTOCORONARY BYPASS GRAFT: Chronic | ICD-10-CM

## 2019-05-10 DIAGNOSIS — Z98.890 OTHER SPECIFIED POSTPROCEDURAL STATES: Chronic | ICD-10-CM

## 2019-05-10 DIAGNOSIS — Z95.810 PRESENCE OF AUTOMATIC (IMPLANTABLE) CARDIAC DEFIBRILLATOR: Chronic | ICD-10-CM

## 2019-05-10 DIAGNOSIS — Z79.01 LONG TERM (CURRENT) USE OF ANTICOAGULANTS: ICD-10-CM

## 2019-05-10 DIAGNOSIS — I48.91 UNSPECIFIED ATRIAL FIBRILLATION: ICD-10-CM

## 2019-05-10 DIAGNOSIS — Z96.653 PRESENCE OF ARTIFICIAL KNEE JOINT, BILATERAL: Chronic | ICD-10-CM

## 2019-05-10 LAB
POCT INR: 2.4 RATIO — HIGH (ref 0.9–1.2)
POCT PT: 28.6 SEC — HIGH (ref 10–13.4)

## 2019-05-28 ENCOUNTER — OUTPATIENT (OUTPATIENT)
Dept: OUTPATIENT SERVICES | Facility: HOSPITAL | Age: 79
LOS: 1 days | Discharge: HOME | End: 2019-05-28

## 2019-05-28 DIAGNOSIS — I48.91 UNSPECIFIED ATRIAL FIBRILLATION: ICD-10-CM

## 2019-05-28 DIAGNOSIS — Z79.01 LONG TERM (CURRENT) USE OF ANTICOAGULANTS: ICD-10-CM

## 2019-05-28 DIAGNOSIS — Z95.810 PRESENCE OF AUTOMATIC (IMPLANTABLE) CARDIAC DEFIBRILLATOR: Chronic | ICD-10-CM

## 2019-05-28 DIAGNOSIS — Z95.1 PRESENCE OF AORTOCORONARY BYPASS GRAFT: Chronic | ICD-10-CM

## 2019-05-28 DIAGNOSIS — Z96.653 PRESENCE OF ARTIFICIAL KNEE JOINT, BILATERAL: Chronic | ICD-10-CM

## 2019-05-28 DIAGNOSIS — Z98.890 OTHER SPECIFIED POSTPROCEDURAL STATES: Chronic | ICD-10-CM

## 2019-05-28 LAB
POCT INR: 4.2 RATIO — HIGH (ref 0.9–1.2)
POCT PT: 50.9 SEC — HIGH (ref 10–13.4)

## 2019-06-05 ENCOUNTER — OUTPATIENT (OUTPATIENT)
Dept: OUTPATIENT SERVICES | Facility: HOSPITAL | Age: 79
LOS: 1 days | Discharge: HOME | End: 2019-06-05

## 2019-06-05 DIAGNOSIS — Z96.653 PRESENCE OF ARTIFICIAL KNEE JOINT, BILATERAL: Chronic | ICD-10-CM

## 2019-06-05 DIAGNOSIS — Z98.890 OTHER SPECIFIED POSTPROCEDURAL STATES: Chronic | ICD-10-CM

## 2019-06-05 DIAGNOSIS — Z79.01 LONG TERM (CURRENT) USE OF ANTICOAGULANTS: ICD-10-CM

## 2019-06-05 DIAGNOSIS — Z95.1 PRESENCE OF AORTOCORONARY BYPASS GRAFT: Chronic | ICD-10-CM

## 2019-06-05 DIAGNOSIS — I48.91 UNSPECIFIED ATRIAL FIBRILLATION: ICD-10-CM

## 2019-06-05 DIAGNOSIS — Z95.810 PRESENCE OF AUTOMATIC (IMPLANTABLE) CARDIAC DEFIBRILLATOR: Chronic | ICD-10-CM

## 2019-06-05 LAB
POCT INR: 1.9 RATIO — HIGH (ref 0.9–1.2)
POCT PT: 23 SEC — HIGH (ref 10–13.4)

## 2019-06-11 ENCOUNTER — OUTPATIENT (OUTPATIENT)
Dept: OUTPATIENT SERVICES | Facility: HOSPITAL | Age: 79
LOS: 1 days | Discharge: HOME | End: 2019-06-11

## 2019-06-11 DIAGNOSIS — Z79.01 LONG TERM (CURRENT) USE OF ANTICOAGULANTS: ICD-10-CM

## 2019-06-11 DIAGNOSIS — Z95.810 PRESENCE OF AUTOMATIC (IMPLANTABLE) CARDIAC DEFIBRILLATOR: Chronic | ICD-10-CM

## 2019-06-11 DIAGNOSIS — Z96.653 PRESENCE OF ARTIFICIAL KNEE JOINT, BILATERAL: Chronic | ICD-10-CM

## 2019-06-11 DIAGNOSIS — Z98.890 OTHER SPECIFIED POSTPROCEDURAL STATES: Chronic | ICD-10-CM

## 2019-06-11 DIAGNOSIS — I48.91 UNSPECIFIED ATRIAL FIBRILLATION: ICD-10-CM

## 2019-06-11 DIAGNOSIS — Z95.1 PRESENCE OF AORTOCORONARY BYPASS GRAFT: Chronic | ICD-10-CM

## 2019-06-11 LAB
POCT INR: 1.8 RATIO — HIGH (ref 0.9–1.2)
POCT PT: 21.5 SEC — HIGH (ref 10–13.4)

## 2019-06-13 ENCOUNTER — APPOINTMENT (OUTPATIENT)
Dept: CARDIOLOGY | Facility: CLINIC | Age: 79
End: 2019-06-13

## 2019-06-19 ENCOUNTER — OUTPATIENT (OUTPATIENT)
Dept: OUTPATIENT SERVICES | Facility: HOSPITAL | Age: 79
LOS: 1 days | Discharge: HOME | End: 2019-06-19

## 2019-06-19 DIAGNOSIS — Z79.01 LONG TERM (CURRENT) USE OF ANTICOAGULANTS: ICD-10-CM

## 2019-06-19 DIAGNOSIS — Z95.1 PRESENCE OF AORTOCORONARY BYPASS GRAFT: Chronic | ICD-10-CM

## 2019-06-19 DIAGNOSIS — Z98.890 OTHER SPECIFIED POSTPROCEDURAL STATES: Chronic | ICD-10-CM

## 2019-06-19 DIAGNOSIS — Z95.810 PRESENCE OF AUTOMATIC (IMPLANTABLE) CARDIAC DEFIBRILLATOR: Chronic | ICD-10-CM

## 2019-06-19 DIAGNOSIS — Z96.653 PRESENCE OF ARTIFICIAL KNEE JOINT, BILATERAL: Chronic | ICD-10-CM

## 2019-06-19 DIAGNOSIS — I48.91 UNSPECIFIED ATRIAL FIBRILLATION: ICD-10-CM

## 2019-06-19 LAB
POCT INR: 2 RATIO — HIGH (ref 0.9–1.2)
POCT PT: 23.7 SEC — HIGH (ref 10–13.4)

## 2019-06-21 ENCOUNTER — MEDICATION RENEWAL (OUTPATIENT)
Age: 79
End: 2019-06-21

## 2019-07-01 ENCOUNTER — OUTPATIENT (OUTPATIENT)
Dept: OUTPATIENT SERVICES | Facility: HOSPITAL | Age: 79
LOS: 1 days | Discharge: HOME | End: 2019-07-01

## 2019-07-01 DIAGNOSIS — Z98.890 OTHER SPECIFIED POSTPROCEDURAL STATES: Chronic | ICD-10-CM

## 2019-07-01 DIAGNOSIS — Z95.1 PRESENCE OF AORTOCORONARY BYPASS GRAFT: Chronic | ICD-10-CM

## 2019-07-01 DIAGNOSIS — I48.91 UNSPECIFIED ATRIAL FIBRILLATION: ICD-10-CM

## 2019-07-01 DIAGNOSIS — Z79.01 LONG TERM (CURRENT) USE OF ANTICOAGULANTS: ICD-10-CM

## 2019-07-01 DIAGNOSIS — Z96.653 PRESENCE OF ARTIFICIAL KNEE JOINT, BILATERAL: Chronic | ICD-10-CM

## 2019-07-01 DIAGNOSIS — Z95.810 PRESENCE OF AUTOMATIC (IMPLANTABLE) CARDIAC DEFIBRILLATOR: Chronic | ICD-10-CM

## 2019-07-01 LAB
POCT INR: 2.2 RATIO — HIGH (ref 0.9–1.2)
POCT PT: 26.2 SEC — HIGH (ref 10–13.4)

## 2019-07-23 ENCOUNTER — OUTPATIENT (OUTPATIENT)
Dept: OUTPATIENT SERVICES | Facility: HOSPITAL | Age: 79
LOS: 1 days | Discharge: HOME | End: 2019-07-23

## 2019-07-23 DIAGNOSIS — I48.91 UNSPECIFIED ATRIAL FIBRILLATION: ICD-10-CM

## 2019-07-23 DIAGNOSIS — Z95.810 PRESENCE OF AUTOMATIC (IMPLANTABLE) CARDIAC DEFIBRILLATOR: Chronic | ICD-10-CM

## 2019-07-23 DIAGNOSIS — Z95.1 PRESENCE OF AORTOCORONARY BYPASS GRAFT: Chronic | ICD-10-CM

## 2019-07-23 DIAGNOSIS — Z79.01 LONG TERM (CURRENT) USE OF ANTICOAGULANTS: ICD-10-CM

## 2019-07-23 DIAGNOSIS — Z98.890 OTHER SPECIFIED POSTPROCEDURAL STATES: Chronic | ICD-10-CM

## 2019-07-23 DIAGNOSIS — Z96.653 PRESENCE OF ARTIFICIAL KNEE JOINT, BILATERAL: Chronic | ICD-10-CM

## 2019-07-23 LAB
POCT INR: 2 RATIO — HIGH (ref 0.9–1.2)
POCT PT: 24.4 SEC — HIGH (ref 10–13.4)

## 2019-08-01 ENCOUNTER — APPOINTMENT (OUTPATIENT)
Dept: CARDIOLOGY | Facility: CLINIC | Age: 79
End: 2019-08-01
Payer: MEDICARE

## 2019-08-01 VITALS — RESPIRATION RATE: 18 BRPM | SYSTOLIC BLOOD PRESSURE: 110 MMHG | DIASTOLIC BLOOD PRESSURE: 70 MMHG | HEART RATE: 76 BPM

## 2019-08-01 VITALS — BODY MASS INDEX: 33.04 KG/M2 | WEIGHT: 236 LBS | HEIGHT: 71 IN

## 2019-08-01 PROCEDURE — 99214 OFFICE O/P EST MOD 30 MIN: CPT

## 2019-08-01 PROCEDURE — 93000 ELECTROCARDIOGRAM COMPLETE: CPT

## 2019-08-01 RX ORDER — LOSARTAN POTASSIUM AND HYDROCHLOROTHIAZIDE 12.5; 1 MG/1; MG/1
100-12.5 TABLET ORAL
Qty: 90 | Refills: 0 | Status: DISCONTINUED | COMMUNITY
Start: 2018-01-16 | End: 2019-08-01

## 2019-08-01 NOTE — PHYSICAL EXAM
[Normal Appearance] : normal appearance [General Appearance - Well Developed] : well developed [Well Groomed] : well groomed [General Appearance - Well Nourished] : well nourished [No Deformities] : no deformities [General Appearance - In No Acute Distress] : no acute distress [Normal Conjunctiva] : the conjunctiva exhibited no abnormalities [Eyelids - No Xanthelasma] : the eyelids demonstrated no xanthelasmas [Normal Oral Mucosa] : normal oral mucosa [Normal Jugular Venous A Waves Present] : normal jugular venous A waves present [No Oral Cyanosis] : no oral cyanosis [No Oral Pallor] : no oral pallor [Normal Jugular Venous V Waves Present] : normal jugular venous V waves present [No Jugular Venous Peterson A Waves] : no jugular venous peterson A waves [Heart Sounds] : normal S1 and S2 [Murmurs] : no murmurs present [Irregularly Irregular] : the rhythm was irregularly irregular [Systolic grade ___/6] : A grade [unfilled]/6 systolic murmur was heard. [Respiration, Rhythm And Depth] : normal respiratory rhythm and effort [Exaggerated Use Of Accessory Muscles For Inspiration] : no accessory muscle use [Auscultation Breath Sounds / Voice Sounds] : lungs were clear to auscultation bilaterally [Bowel Sounds] : normal bowel sounds [Abdomen Soft] : soft [Abdomen Tenderness] : non-tender [Abnormal Walk] : normal gait [Abdomen Mass (___ Cm)] : no abdominal mass palpated [Nail Clubbing] : no clubbing of the fingernails [Cyanosis, Localized] : no localized cyanosis [Petechial Hemorrhages (___cm)] : no petechial hemorrhages [] : no rash [Skin Color & Pigmentation] : normal skin color and pigmentation [No Venous Stasis] : no venous stasis [Skin Lesions] : no skin lesions [No Skin Ulcers] : no skin ulcer [No Xanthoma] : no  xanthoma was observed [Oriented To Time, Place, And Person] : oriented to person, place, and time

## 2019-08-12 ENCOUNTER — OUTPATIENT (OUTPATIENT)
Dept: OUTPATIENT SERVICES | Facility: HOSPITAL | Age: 79
LOS: 1 days | Discharge: HOME | End: 2019-08-12

## 2019-08-12 DIAGNOSIS — Z95.1 PRESENCE OF AORTOCORONARY BYPASS GRAFT: Chronic | ICD-10-CM

## 2019-08-12 DIAGNOSIS — Z98.890 OTHER SPECIFIED POSTPROCEDURAL STATES: Chronic | ICD-10-CM

## 2019-08-12 DIAGNOSIS — Z79.01 LONG TERM (CURRENT) USE OF ANTICOAGULANTS: ICD-10-CM

## 2019-08-12 DIAGNOSIS — Z96.653 PRESENCE OF ARTIFICIAL KNEE JOINT, BILATERAL: Chronic | ICD-10-CM

## 2019-08-12 DIAGNOSIS — Z95.810 PRESENCE OF AUTOMATIC (IMPLANTABLE) CARDIAC DEFIBRILLATOR: Chronic | ICD-10-CM

## 2019-08-12 DIAGNOSIS — I48.91 UNSPECIFIED ATRIAL FIBRILLATION: ICD-10-CM

## 2019-08-12 LAB
POCT INR: 2.3 RATIO — HIGH (ref 0.9–1.2)
POCT PT: 27.5 SEC — HIGH (ref 10–13.4)

## 2019-08-15 ENCOUNTER — RX RENEWAL (OUTPATIENT)
Age: 79
End: 2019-08-15

## 2019-08-20 RX ORDER — METFORMIN HYDROCHLORIDE 1000 MG/1
1000 TABLET, COATED ORAL TWICE DAILY
Qty: 180 | Refills: 3 | Status: DISCONTINUED | COMMUNITY
Start: 2017-08-31 | End: 2019-08-20

## 2019-09-10 ENCOUNTER — OUTPATIENT (OUTPATIENT)
Dept: OUTPATIENT SERVICES | Facility: HOSPITAL | Age: 79
LOS: 1 days | Discharge: HOME | End: 2019-09-10

## 2019-09-10 DIAGNOSIS — I48.91 UNSPECIFIED ATRIAL FIBRILLATION: ICD-10-CM

## 2019-09-10 DIAGNOSIS — Z96.653 PRESENCE OF ARTIFICIAL KNEE JOINT, BILATERAL: Chronic | ICD-10-CM

## 2019-09-10 DIAGNOSIS — Z95.1 PRESENCE OF AORTOCORONARY BYPASS GRAFT: Chronic | ICD-10-CM

## 2019-09-10 DIAGNOSIS — Z79.01 LONG TERM (CURRENT) USE OF ANTICOAGULANTS: ICD-10-CM

## 2019-09-10 DIAGNOSIS — Z95.810 PRESENCE OF AUTOMATIC (IMPLANTABLE) CARDIAC DEFIBRILLATOR: Chronic | ICD-10-CM

## 2019-09-10 DIAGNOSIS — Z98.890 OTHER SPECIFIED POSTPROCEDURAL STATES: Chronic | ICD-10-CM

## 2019-09-10 LAB
POCT INR: 2.7 RATIO — HIGH (ref 0.9–1.2)
POCT PT: 33 SEC — HIGH (ref 10–13.4)

## 2019-09-10 NOTE — PATIENT PROFILE ADULT - PRIMARY SOURCE OF SUPPORT/COMFORT
[address]  Urgent Care Encounter       CHIEF COMPLAINT       Chief Complaint   Patient presents with    Medication Refill     levimir pen       Nurses Notes reviewed and I agree except as noted in the HPI. HISTORY OF PRESENT ILLNESS   Kinza Mary is a 47 y.o. female who presents the urgent care center stating that she saw her diabetic doctor today and they gave her insulin in a vial and she cannot use it and she needs a flex pen. She is apparently was unable to get a hold of her doctor. The patient is requesting to change the prescription from a vial to a flex pen so she can give her the amount prescribed which is 16 units at bedtime. HPI    REVIEW OF SYSTEMS     Review of Systems   Constitutional: Negative for activity change, appetite change, chills, fatigue and fever. HENT: Negative for congestion, ear pain, rhinorrhea, sinus pressure, sore throat and trouble swallowing. Respiratory: Negative for cough and shortness of breath. Cardiovascular: Negative for chest pain. Gastrointestinal: Negative for diarrhea, nausea and vomiting. Genitourinary: Negative for difficulty urinating. Musculoskeletal: Negative for back pain and neck stiffness. Skin: Negative for rash. Allergic/Immunologic: Negative for environmental allergies. Neurological: Negative for dizziness, light-headedness and headaches. Hematological: Negative for adenopathy. PAST MEDICAL HISTORY         Diagnosis Date    CAD (coronary artery disease)     Depression     Diabetes mellitus ([address]     Headache     Hyperlipidemia        SURGICALHISTORY     Patient  has a past surgical history that includes Coronary angioplasty with stent and Tubal ligation.     CURRENT MEDICATIONS       Previous Medications    ASPIRIN 81 MG CHEWABLE TABLET    Take 81 mg by mouth daily    ATORVASTATIN (LIPITOR) 40 MG TABLET    Take 1 tablet by mouth daily    BLOOD GLUCOSE TEST STRIPS (FREESTYLE LITE) STRIP    Test blood child(eddie)

## 2019-10-03 ENCOUNTER — RX RENEWAL (OUTPATIENT)
Age: 79
End: 2019-10-03

## 2019-10-08 ENCOUNTER — OUTPATIENT (OUTPATIENT)
Dept: OUTPATIENT SERVICES | Facility: HOSPITAL | Age: 79
LOS: 1 days | Discharge: HOME | End: 2019-10-08

## 2019-10-08 DIAGNOSIS — Z96.653 PRESENCE OF ARTIFICIAL KNEE JOINT, BILATERAL: Chronic | ICD-10-CM

## 2019-10-08 DIAGNOSIS — Z95.810 PRESENCE OF AUTOMATIC (IMPLANTABLE) CARDIAC DEFIBRILLATOR: Chronic | ICD-10-CM

## 2019-10-08 DIAGNOSIS — Z95.1 PRESENCE OF AORTOCORONARY BYPASS GRAFT: Chronic | ICD-10-CM

## 2019-10-08 DIAGNOSIS — I48.91 UNSPECIFIED ATRIAL FIBRILLATION: ICD-10-CM

## 2019-10-08 DIAGNOSIS — Z79.01 LONG TERM (CURRENT) USE OF ANTICOAGULANTS: ICD-10-CM

## 2019-10-08 DIAGNOSIS — Z98.890 OTHER SPECIFIED POSTPROCEDURAL STATES: Chronic | ICD-10-CM

## 2019-10-08 LAB
POCT INR: 2.4 RATIO — HIGH (ref 0.9–1.2)
POCT PT: 29 SEC — HIGH (ref 10–13.4)

## 2019-10-23 ENCOUNTER — APPOINTMENT (OUTPATIENT)
Dept: ENDOCRINOLOGY | Facility: CLINIC | Age: 79
End: 2019-10-23

## 2019-10-23 ENCOUNTER — OUTPATIENT (OUTPATIENT)
Dept: OUTPATIENT SERVICES | Facility: HOSPITAL | Age: 79
LOS: 1 days | Discharge: HOME | End: 2019-10-23

## 2019-10-23 VITALS
BODY MASS INDEX: 33.04 KG/M2 | HEART RATE: 69 BPM | WEIGHT: 236 LBS | SYSTOLIC BLOOD PRESSURE: 128 MMHG | DIASTOLIC BLOOD PRESSURE: 72 MMHG | HEIGHT: 71 IN

## 2019-10-23 DIAGNOSIS — E11.65 TYPE 2 DIABETES MELLITUS WITH HYPERGLYCEMIA: ICD-10-CM

## 2019-10-23 DIAGNOSIS — Z98.890 OTHER SPECIFIED POSTPROCEDURAL STATES: Chronic | ICD-10-CM

## 2019-10-23 DIAGNOSIS — Z95.1 PRESENCE OF AORTOCORONARY BYPASS GRAFT: Chronic | ICD-10-CM

## 2019-10-23 DIAGNOSIS — Z96.653 PRESENCE OF ARTIFICIAL KNEE JOINT, BILATERAL: Chronic | ICD-10-CM

## 2019-10-23 DIAGNOSIS — Z95.810 PRESENCE OF AUTOMATIC (IMPLANTABLE) CARDIAC DEFIBRILLATOR: Chronic | ICD-10-CM

## 2019-10-23 NOTE — PHYSICAL EXAM
[No Acute Distress] : no acute distress [Alert] : alert [Well Nourished] : well nourished [Well Developed] : well developed [Normal Sclera/Conjunctiva] : normal sclera/conjunctiva [EOMI] : extra ocular movement intact [No Proptosis] : no proptosis [Normal Oropharynx] : the oropharynx was normal [Thyroid Not Enlarged] : the thyroid was not enlarged [No Thyroid Nodules] : there were no palpable thyroid nodules [No Accessory Muscle Use] : no accessory muscle use [No Respiratory Distress] : no respiratory distress [Clear to Auscultation] : lungs were clear to auscultation bilaterally [Normal Rate] : heart rate was normal  [Normal S1, S2] : normal S1 and S2 [Regular Rhythm] : with a regular rhythm [Pedal Pulses Normal] : the pedal pulses are present [Normal Bowel Sounds] : normal bowel sounds [No Edema] : there was no peripheral edema [Not Tender] : non-tender [Soft] : abdomen soft [Not Distended] : not distended [Post Cervical Nodes] : posterior cervical nodes [Anterior Cervical Nodes] : anterior cervical nodes [Axillary Nodes] : axillary nodes [Normal] : normal and non tender [No Spinal Tenderness] : no spinal tenderness [Spine Straight] : spine straight [No Stigmata of Cushings Syndrome] : no stigmata of cushings syndrome [Normal Gait] : normal gait [No Rash] : no rash [Normal Strength/Tone] : muscle strength and tone were normal [Normal Reflexes] : deep tendon reflexes were 2+ and symmetric [Acanthosis Nigricans] : no acanthosis nigricans [No Tremors] : no tremors [Oriented x3] : oriented to person, place, and time

## 2019-10-23 NOTE — ASSESSMENT
[Carbohydrate Consistent Diet] : carbohydrate consistent diet [FreeTextEntry1] : needs a repeat thyroid function, continue on current therapy. [Diabetes Foot Care] : diabetes foot care [Long Term Vascular Complications] : long term vascular complications of diabetes [Importance of Diet and Exercise] : importance of diet and exercise to improve glycemic control, achieve weight loss and improve cardiovascular health

## 2019-10-23 NOTE — HISTORY OF PRESENT ILLNESS
[FreeTextEntry1] : patient has not been seen for 1.5 years, but is taking part in research study with a weekly GLP-1 agonist for type 2 diabetes. which is randomized, and double blind.

## 2019-10-24 ENCOUNTER — APPOINTMENT (OUTPATIENT)
Dept: CARDIOLOGY | Facility: CLINIC | Age: 79
End: 2019-10-24
Payer: MEDICARE

## 2019-10-24 VITALS
HEART RATE: 75 BPM | BODY MASS INDEX: 32.78 KG/M2 | DIASTOLIC BLOOD PRESSURE: 71 MMHG | SYSTOLIC BLOOD PRESSURE: 100 MMHG | WEIGHT: 235 LBS

## 2019-10-24 PROCEDURE — 93290 INTERROG DEV EVAL ICPMS IP: CPT

## 2019-10-24 PROCEDURE — 93284 PRGRMG EVAL IMPLANTABLE DFB: CPT

## 2019-10-24 PROCEDURE — 99213 OFFICE O/P EST LOW 20 MIN: CPT

## 2019-10-24 NOTE — PHYSICAL EXAM
[General Appearance - Well Developed] : well developed [Normal Appearance] : normal appearance [Well Groomed] : well groomed [General Appearance - Well Nourished] : well nourished [No Deformities] : no deformities [General Appearance - In No Acute Distress] : no acute distress [Heart Sounds] : normal S1 and S2 [Heart Rate And Rhythm] : heart rate and rhythm were normal [Arterial Pulses Normal] : the arterial pulses were normal [Murmurs] : no murmurs present [Exaggerated Use Of Accessory Muscles For Inspiration] : no accessory muscle use [Respiration, Rhythm And Depth] : normal respiratory rhythm and effort [Auscultation Breath Sounds / Voice Sounds] : lungs were clear to auscultation bilaterally [Left Infraclavicular] : left infraclavicular area [Clean] : clean [Dry] : dry [Well-Healed] : well-healed [Bowel Sounds] : normal bowel sounds [Abdomen Soft] : soft [Abdomen Tenderness] : non-tender [Abdomen Mass (___ Cm)] : no abdominal mass palpated [Nail Clubbing] : no clubbing of the fingernails [] : no ischemic changes [Petechial Hemorrhages (___cm)] : no petechial hemorrhages [Cyanosis, Localized] : no localized cyanosis

## 2019-10-24 NOTE — DISCUSSION/SUMMARY
[AICD Function Normal] : normal AICD function [Pacemaker Function Normal] : normal pacemaker function [Patient] : the patient [Family] : the patient's family

## 2019-10-25 NOTE — PROGRESS NOTE ADULT - SUBJECTIVE AND OBJECTIVE BOX
77 year old male with ongoing chief complaint of bleeding from the gums. Patient conveyed he had a tooth recently extracted.     PE: Liver clot in extraction site of #26. Hemostasis. PAIN/NUMBNESS/BACK PAIN/DIFFICULTY BEARING WEIGHT/STIFFNESS/TENDERNESS

## 2019-10-28 DIAGNOSIS — E78.00 PURE HYPERCHOLESTEROLEMIA, UNSPECIFIED: ICD-10-CM

## 2019-10-28 DIAGNOSIS — E66.01 MORBID (SEVERE) OBESITY DUE TO EXCESS CALORIES: ICD-10-CM

## 2019-10-28 DIAGNOSIS — E11.65 TYPE 2 DIABETES MELLITUS WITH HYPERGLYCEMIA: ICD-10-CM

## 2019-11-05 ENCOUNTER — OUTPATIENT (OUTPATIENT)
Dept: OUTPATIENT SERVICES | Facility: HOSPITAL | Age: 79
LOS: 1 days | Discharge: HOME | End: 2019-11-05

## 2019-11-05 DIAGNOSIS — Z98.890 OTHER SPECIFIED POSTPROCEDURAL STATES: Chronic | ICD-10-CM

## 2019-11-05 DIAGNOSIS — Z95.810 PRESENCE OF AUTOMATIC (IMPLANTABLE) CARDIAC DEFIBRILLATOR: Chronic | ICD-10-CM

## 2019-11-05 DIAGNOSIS — Z95.1 PRESENCE OF AORTOCORONARY BYPASS GRAFT: Chronic | ICD-10-CM

## 2019-11-05 DIAGNOSIS — Z79.01 LONG TERM (CURRENT) USE OF ANTICOAGULANTS: ICD-10-CM

## 2019-11-05 DIAGNOSIS — I48.91 UNSPECIFIED ATRIAL FIBRILLATION: ICD-10-CM

## 2019-11-05 DIAGNOSIS — Z96.653 PRESENCE OF ARTIFICIAL KNEE JOINT, BILATERAL: Chronic | ICD-10-CM

## 2019-11-05 LAB
POCT INR: 3.8 RATIO — HIGH (ref 0.9–1.2)
POCT PT: 45.5 SEC — HIGH (ref 10–13.4)

## 2019-11-12 ENCOUNTER — OUTPATIENT (OUTPATIENT)
Dept: OUTPATIENT SERVICES | Facility: HOSPITAL | Age: 79
LOS: 1 days | Discharge: HOME | End: 2019-11-12

## 2019-11-12 DIAGNOSIS — Z95.810 PRESENCE OF AUTOMATIC (IMPLANTABLE) CARDIAC DEFIBRILLATOR: Chronic | ICD-10-CM

## 2019-11-12 DIAGNOSIS — Z95.1 PRESENCE OF AORTOCORONARY BYPASS GRAFT: Chronic | ICD-10-CM

## 2019-11-12 DIAGNOSIS — Z79.1 LONG TERM (CURRENT) USE OF NON-STEROIDAL ANTI-INFLAMMATORIES (NSAID): ICD-10-CM

## 2019-11-12 DIAGNOSIS — Z79.01 LONG TERM (CURRENT) USE OF ANTICOAGULANTS: ICD-10-CM

## 2019-11-12 DIAGNOSIS — Z98.890 OTHER SPECIFIED POSTPROCEDURAL STATES: Chronic | ICD-10-CM

## 2019-11-12 DIAGNOSIS — Z96.653 PRESENCE OF ARTIFICIAL KNEE JOINT, BILATERAL: Chronic | ICD-10-CM

## 2019-11-12 LAB
POCT INR: 2.9 RATIO — HIGH (ref 0.9–1.2)
POCT PT: 35.2 SEC — HIGH (ref 10–13.4)

## 2019-11-29 ENCOUNTER — OUTPATIENT (OUTPATIENT)
Dept: OUTPATIENT SERVICES | Facility: HOSPITAL | Age: 79
LOS: 1 days | Discharge: HOME | End: 2019-11-29

## 2019-11-29 DIAGNOSIS — Z95.1 PRESENCE OF AORTOCORONARY BYPASS GRAFT: Chronic | ICD-10-CM

## 2019-11-29 DIAGNOSIS — Z98.890 OTHER SPECIFIED POSTPROCEDURAL STATES: Chronic | ICD-10-CM

## 2019-11-29 DIAGNOSIS — I48.91 UNSPECIFIED ATRIAL FIBRILLATION: ICD-10-CM

## 2019-11-29 DIAGNOSIS — Z79.01 LONG TERM (CURRENT) USE OF ANTICOAGULANTS: ICD-10-CM

## 2019-11-29 DIAGNOSIS — Z95.810 PRESENCE OF AUTOMATIC (IMPLANTABLE) CARDIAC DEFIBRILLATOR: Chronic | ICD-10-CM

## 2019-11-29 DIAGNOSIS — Z96.653 PRESENCE OF ARTIFICIAL KNEE JOINT, BILATERAL: Chronic | ICD-10-CM

## 2019-11-29 LAB
POCT INR: 2.3 RATIO — HIGH (ref 0.9–1.2)
POCT PT: 27.8 SEC — HIGH (ref 10–13.4)

## 2019-12-01 NOTE — HISTORY OF PRESENT ILLNESS
[de-identified] : 77 y/o male with a CRT-D for ICM/ CHB , presents for a routine device interrogation . \par \par He reports noticing  worsening of GOLD  lately , denies CP/SOB or palpitations. Occasional dizziness no syncope . No shocks. \par \par ECG# 1  ( 4/11/2019 ) Afib , BiV paced rhythm at 92 bpm  \par ECG #2 After reprogramming ( 4/11/2019 ) Afib with BiV paced rhythm at 67 bpm , PVCs \par ECHO (1/29/2019)- EF 40-45%

## 2019-12-01 NOTE — ASSESSMENT
[FreeTextEntry1] : AICD interrogation today showed , T wave oversensing, with tracking and intermittent inhibition ,possibly causing GOLD ,   T wave sensitivity reprogramed , which corrected oversensing issues . No pulmonary congestion , No ventricular arrhythmias . Patient is pacer dependent, no escape rhythm . \par \par He reports c/w meds . On warfarin for CVA prevention, no s/s bleeding reported \par \par plan \par -continue current meds/ diuretics /  thoracic impedance is stable  as per optivolt index \par -Continue remote monitoring.\par  \par \par

## 2019-12-01 NOTE — PROCEDURE
[Complete Heart Block] : complete heart block [See Scanned Paceart Report] : See scanned paceart report [CRT-D] : Cardiac resynchronization therapy defibrillator [See Device Printout] : See device printout [Voltage: ___ volts] : Voltage was [unfilled] volts [Charge Time: ___ sec] : charge time was [unfilled] seconds [VVIR] : VVIR [Threshold Testing Performed] : Threshold testing was performed [Longevity: ___ months] : The estimated remaining battery life is [unfilled] months [Sensing Amplitude ___mv] : sensing amplitude was [unfilled] mv [___V @] : [unfilled] V [Lead Imp:  ___ohms] : lead impedance was [unfilled] ohms [___ ms] : [unfilled] ms [Programmed for Longevity] : output reprogrammed for improved battery longevity [Sense ___ %] : Sense [unfilled]% [Pace ___ %] : Pace [unfilled]% [de-identified] : [employer] MRI CTRD [de-identified] : [id number] [de-identified] : [employer]onic [de-identified] : No arrhythmias \par No pulmonary congestion  [de-identified] : 60 [de-identified] : 7/17/17

## 2019-12-12 ENCOUNTER — OUTPATIENT (OUTPATIENT)
Dept: OUTPATIENT SERVICES | Facility: HOSPITAL | Age: 79
LOS: 1 days | Discharge: HOME | End: 2019-12-12

## 2019-12-12 DIAGNOSIS — Z95.1 PRESENCE OF AORTOCORONARY BYPASS GRAFT: Chronic | ICD-10-CM

## 2019-12-12 DIAGNOSIS — I48.91 UNSPECIFIED ATRIAL FIBRILLATION: ICD-10-CM

## 2019-12-12 DIAGNOSIS — Z96.653 PRESENCE OF ARTIFICIAL KNEE JOINT, BILATERAL: Chronic | ICD-10-CM

## 2019-12-12 DIAGNOSIS — Z95.810 PRESENCE OF AUTOMATIC (IMPLANTABLE) CARDIAC DEFIBRILLATOR: Chronic | ICD-10-CM

## 2019-12-12 DIAGNOSIS — Z98.890 OTHER SPECIFIED POSTPROCEDURAL STATES: Chronic | ICD-10-CM

## 2019-12-12 DIAGNOSIS — Z79.01 LONG TERM (CURRENT) USE OF ANTICOAGULANTS: ICD-10-CM

## 2019-12-12 LAB
POCT INR: 3.5 RATIO — HIGH (ref 0.9–1.2)
POCT PT: 41.5 SEC — HIGH (ref 10–13.4)

## 2019-12-20 ENCOUNTER — OUTPATIENT (OUTPATIENT)
Dept: OUTPATIENT SERVICES | Facility: HOSPITAL | Age: 79
LOS: 1 days | Discharge: HOME | End: 2019-12-20

## 2019-12-20 DIAGNOSIS — Z96.653 PRESENCE OF ARTIFICIAL KNEE JOINT, BILATERAL: Chronic | ICD-10-CM

## 2019-12-20 DIAGNOSIS — I48.91 UNSPECIFIED ATRIAL FIBRILLATION: ICD-10-CM

## 2019-12-20 DIAGNOSIS — Z79.01 LONG TERM (CURRENT) USE OF ANTICOAGULANTS: ICD-10-CM

## 2019-12-20 DIAGNOSIS — Z95.810 PRESENCE OF AUTOMATIC (IMPLANTABLE) CARDIAC DEFIBRILLATOR: Chronic | ICD-10-CM

## 2019-12-20 DIAGNOSIS — Z98.890 OTHER SPECIFIED POSTPROCEDURAL STATES: Chronic | ICD-10-CM

## 2019-12-20 DIAGNOSIS — Z95.1 PRESENCE OF AORTOCORONARY BYPASS GRAFT: Chronic | ICD-10-CM

## 2019-12-20 LAB
POCT INR: 2.8 RATIO — HIGH (ref 0.9–1.2)
POCT PT: 33.9 SEC — HIGH (ref 10–13.4)

## 2020-01-03 ENCOUNTER — OUTPATIENT (OUTPATIENT)
Dept: OUTPATIENT SERVICES | Facility: HOSPITAL | Age: 80
LOS: 1 days | Discharge: HOME | End: 2020-01-03

## 2020-01-03 DIAGNOSIS — Z79.01 LONG TERM (CURRENT) USE OF ANTICOAGULANTS: ICD-10-CM

## 2020-01-03 DIAGNOSIS — I48.91 UNSPECIFIED ATRIAL FIBRILLATION: ICD-10-CM

## 2020-01-03 DIAGNOSIS — Z96.653 PRESENCE OF ARTIFICIAL KNEE JOINT, BILATERAL: Chronic | ICD-10-CM

## 2020-01-03 DIAGNOSIS — Z98.890 OTHER SPECIFIED POSTPROCEDURAL STATES: Chronic | ICD-10-CM

## 2020-01-03 DIAGNOSIS — Z95.810 PRESENCE OF AUTOMATIC (IMPLANTABLE) CARDIAC DEFIBRILLATOR: Chronic | ICD-10-CM

## 2020-01-03 DIAGNOSIS — Z95.1 PRESENCE OF AORTOCORONARY BYPASS GRAFT: Chronic | ICD-10-CM

## 2020-01-03 LAB
POCT INR: 1.7 RATIO — HIGH (ref 0.9–1.2)
POCT PT: 20 SEC — HIGH (ref 10–13.4)

## 2020-01-14 ENCOUNTER — OUTPATIENT (OUTPATIENT)
Dept: OUTPATIENT SERVICES | Facility: HOSPITAL | Age: 80
LOS: 1 days | Discharge: HOME | End: 2020-01-14

## 2020-01-14 DIAGNOSIS — I48.91 UNSPECIFIED ATRIAL FIBRILLATION: ICD-10-CM

## 2020-01-14 DIAGNOSIS — Z95.1 PRESENCE OF AORTOCORONARY BYPASS GRAFT: Chronic | ICD-10-CM

## 2020-01-14 DIAGNOSIS — Z79.01 LONG TERM (CURRENT) USE OF ANTICOAGULANTS: ICD-10-CM

## 2020-01-14 DIAGNOSIS — Z98.890 OTHER SPECIFIED POSTPROCEDURAL STATES: Chronic | ICD-10-CM

## 2020-01-14 DIAGNOSIS — Z96.653 PRESENCE OF ARTIFICIAL KNEE JOINT, BILATERAL: Chronic | ICD-10-CM

## 2020-01-14 DIAGNOSIS — Z95.810 PRESENCE OF AUTOMATIC (IMPLANTABLE) CARDIAC DEFIBRILLATOR: Chronic | ICD-10-CM

## 2020-01-14 LAB
INR PPP: 1.6 RATIO
POCT INR: 1.6 RATIO — HIGH (ref 0.9–1.2)
POCT PT: 19.8 SEC — HIGH (ref 10–13.4)
POCT-PROTHROMBIN TIME: 19.8 SECS

## 2020-01-24 ENCOUNTER — OUTPATIENT (OUTPATIENT)
Dept: OUTPATIENT SERVICES | Facility: HOSPITAL | Age: 80
LOS: 1 days | Discharge: HOME | End: 2020-01-24

## 2020-01-24 ENCOUNTER — APPOINTMENT (OUTPATIENT)
Dept: CARDIOLOGY | Facility: CLINIC | Age: 80
End: 2020-01-24
Payer: MEDICARE

## 2020-01-24 DIAGNOSIS — Z96.653 PRESENCE OF ARTIFICIAL KNEE JOINT, BILATERAL: Chronic | ICD-10-CM

## 2020-01-24 DIAGNOSIS — Z98.890 OTHER SPECIFIED POSTPROCEDURAL STATES: Chronic | ICD-10-CM

## 2020-01-24 DIAGNOSIS — Z95.810 PRESENCE OF AUTOMATIC (IMPLANTABLE) CARDIAC DEFIBRILLATOR: Chronic | ICD-10-CM

## 2020-01-24 DIAGNOSIS — Z95.1 PRESENCE OF AORTOCORONARY BYPASS GRAFT: Chronic | ICD-10-CM

## 2020-01-24 DIAGNOSIS — Z79.01 LONG TERM (CURRENT) USE OF ANTICOAGULANTS: ICD-10-CM

## 2020-01-24 DIAGNOSIS — I48.91 UNSPECIFIED ATRIAL FIBRILLATION: ICD-10-CM

## 2020-01-24 LAB
INR PPP: 1.6 RATIO
POCT INR: 1.6 RATIO — HIGH (ref 0.9–1.2)
POCT PT: 19.4 SEC — HIGH (ref 10–13.4)
POCT-PROTHROMBIN TIME: 19.4 SECS

## 2020-01-24 PROCEDURE — 93296 REM INTERROG EVL PM/IDS: CPT

## 2020-01-24 PROCEDURE — 93295 DEV INTERROG REMOTE 1/2/MLT: CPT

## 2020-02-03 ENCOUNTER — APPOINTMENT (OUTPATIENT)
Dept: CARDIOLOGY | Facility: CLINIC | Age: 80
End: 2020-02-03
Payer: MEDICARE

## 2020-02-03 VITALS — BODY MASS INDEX: 29.4 KG/M2 | HEIGHT: 71 IN | WEIGHT: 210 LBS

## 2020-02-03 VITALS — DIASTOLIC BLOOD PRESSURE: 80 MMHG | SYSTOLIC BLOOD PRESSURE: 120 MMHG | HEART RATE: 68 BPM | RESPIRATION RATE: 18 BRPM

## 2020-02-03 VITALS — WEIGHT: 225 LBS | HEIGHT: 71 IN | BODY MASS INDEX: 31.5 KG/M2

## 2020-02-03 DIAGNOSIS — E78.00 PURE HYPERCHOLESTEROLEMIA, UNSPECIFIED: ICD-10-CM

## 2020-02-03 DIAGNOSIS — Z01.810 ENCOUNTER FOR PREPROCEDURAL CARDIOVASCULAR EXAMINATION: ICD-10-CM

## 2020-02-03 DIAGNOSIS — I25.10 ATHEROSCLEROTIC HEART DISEASE OF NATIVE CORONARY ARTERY W/OUT ANGINA PECTORIS: ICD-10-CM

## 2020-02-03 PROCEDURE — 93000 ELECTROCARDIOGRAM COMPLETE: CPT

## 2020-02-03 PROCEDURE — 99214 OFFICE O/P EST MOD 30 MIN: CPT

## 2020-02-03 NOTE — PHYSICAL EXAM
[General Appearance - Well Developed] : well developed [Normal Appearance] : normal appearance [Well Groomed] : well groomed [General Appearance - Well Nourished] : well nourished [Normal Conjunctiva] : the conjunctiva exhibited no abnormalities [General Appearance - In No Acute Distress] : no acute distress [No Deformities] : no deformities [Normal Oral Mucosa] : normal oral mucosa [No Oral Pallor] : no oral pallor [Eyelids - No Xanthelasma] : the eyelids demonstrated no xanthelasmas [No Oral Cyanosis] : no oral cyanosis [Normal Jugular Venous A Waves Present] : normal jugular venous A waves present [No Jugular Venous Peterson A Waves] : no jugular venous peterson A waves [Normal Jugular Venous V Waves Present] : normal jugular venous V waves present [Irregularly Irregular] : the rhythm was irregularly irregular [Heart Sounds] : normal S1 and S2 [Murmurs] : no murmurs present [Systolic grade ___/6] : A grade [unfilled]/6 systolic murmur was heard. [Respiration, Rhythm And Depth] : normal respiratory rhythm and effort [Auscultation Breath Sounds / Voice Sounds] : lungs were clear to auscultation bilaterally [Bowel Sounds] : normal bowel sounds [Exaggerated Use Of Accessory Muscles For Inspiration] : no accessory muscle use [Abdomen Tenderness] : non-tender [Abdomen Soft] : soft [Nail Clubbing] : no clubbing of the fingernails [Abdomen Mass (___ Cm)] : no abdominal mass palpated [Abnormal Walk] : normal gait [Petechial Hemorrhages (___cm)] : no petechial hemorrhages [Cyanosis, Localized] : no localized cyanosis [] : no rash [Skin Color & Pigmentation] : normal skin color and pigmentation [No Venous Stasis] : no venous stasis [Skin Lesions] : no skin lesions [No Skin Ulcers] : no skin ulcer [No Xanthoma] : no  xanthoma was observed [Oriented To Time, Place, And Person] : oriented to person, place, and time

## 2020-02-04 ENCOUNTER — LABORATORY RESULT (OUTPATIENT)
Age: 80
End: 2020-02-04

## 2020-02-04 ENCOUNTER — OUTPATIENT (OUTPATIENT)
Dept: OUTPATIENT SERVICES | Facility: HOSPITAL | Age: 80
LOS: 1 days | Discharge: HOME | End: 2020-02-04

## 2020-02-04 DIAGNOSIS — Z95.1 PRESENCE OF AORTOCORONARY BYPASS GRAFT: Chronic | ICD-10-CM

## 2020-02-04 DIAGNOSIS — Z96.653 PRESENCE OF ARTIFICIAL KNEE JOINT, BILATERAL: Chronic | ICD-10-CM

## 2020-02-04 DIAGNOSIS — Z98.890 OTHER SPECIFIED POSTPROCEDURAL STATES: Chronic | ICD-10-CM

## 2020-02-04 DIAGNOSIS — Z95.810 PRESENCE OF AUTOMATIC (IMPLANTABLE) CARDIAC DEFIBRILLATOR: Chronic | ICD-10-CM

## 2020-02-04 DIAGNOSIS — I48.91 UNSPECIFIED ATRIAL FIBRILLATION: ICD-10-CM

## 2020-02-04 DIAGNOSIS — Z79.01 LONG TERM (CURRENT) USE OF ANTICOAGULANTS: ICD-10-CM

## 2020-02-04 LAB
INR PPP: 2.3 RATIO
POCT INR: 2.3 RATIO — HIGH (ref 0.9–1.2)
POCT PT: 27.2 SEC — HIGH (ref 10–13.4)
POCT-PROTHROMBIN TIME: 27.2 SECS

## 2020-02-10 ENCOUNTER — OUTPATIENT (OUTPATIENT)
Dept: OUTPATIENT SERVICES | Facility: HOSPITAL | Age: 80
LOS: 1 days | Discharge: HOME | End: 2020-02-10

## 2020-02-10 DIAGNOSIS — Z95.1 PRESENCE OF AORTOCORONARY BYPASS GRAFT: Chronic | ICD-10-CM

## 2020-02-10 DIAGNOSIS — Z96.653 PRESENCE OF ARTIFICIAL KNEE JOINT, BILATERAL: Chronic | ICD-10-CM

## 2020-02-10 DIAGNOSIS — Z95.810 PRESENCE OF AUTOMATIC (IMPLANTABLE) CARDIAC DEFIBRILLATOR: Chronic | ICD-10-CM

## 2020-02-10 DIAGNOSIS — I48.91 UNSPECIFIED ATRIAL FIBRILLATION: ICD-10-CM

## 2020-02-10 DIAGNOSIS — Z98.890 OTHER SPECIFIED POSTPROCEDURAL STATES: Chronic | ICD-10-CM

## 2020-02-10 DIAGNOSIS — Z79.01 LONG TERM (CURRENT) USE OF ANTICOAGULANTS: ICD-10-CM

## 2020-02-10 LAB
INR PPP: 1.6 RATIO
POCT INR: 1.6 RATIO — HIGH (ref 0.9–1.2)
POCT PT: 19.8 SEC — HIGH (ref 10–13.4)
POCT-PROTHROMBIN TIME: 19.8 SECS
QUALITY CONTROL: YES

## 2020-02-11 ENCOUNTER — INPATIENT (INPATIENT)
Facility: HOSPITAL | Age: 80
LOS: 0 days | Discharge: HOME | End: 2020-02-12
Attending: INTERNAL MEDICINE | Admitting: INTERNAL MEDICINE
Payer: MEDICARE

## 2020-02-11 VITALS
OXYGEN SATURATION: 100 % | HEART RATE: 80 BPM | DIASTOLIC BLOOD PRESSURE: 73 MMHG | RESPIRATION RATE: 18 BRPM | TEMPERATURE: 97 F | SYSTOLIC BLOOD PRESSURE: 131 MMHG

## 2020-02-11 DIAGNOSIS — Z95.1 PRESENCE OF AORTOCORONARY BYPASS GRAFT: Chronic | ICD-10-CM

## 2020-02-11 DIAGNOSIS — Z98.890 OTHER SPECIFIED POSTPROCEDURAL STATES: Chronic | ICD-10-CM

## 2020-02-11 DIAGNOSIS — Z95.810 PRESENCE OF AUTOMATIC (IMPLANTABLE) CARDIAC DEFIBRILLATOR: Chronic | ICD-10-CM

## 2020-02-11 DIAGNOSIS — Z96.653 PRESENCE OF ARTIFICIAL KNEE JOINT, BILATERAL: Chronic | ICD-10-CM

## 2020-02-11 LAB
ALBUMIN SERPL ELPH-MCNC: 4.3 G/DL — SIGNIFICANT CHANGE UP (ref 3.5–5.2)
ALP SERPL-CCNC: 113 U/L — SIGNIFICANT CHANGE UP (ref 30–115)
ALT FLD-CCNC: 17 U/L — SIGNIFICANT CHANGE UP (ref 0–41)
ANION GAP SERPL CALC-SCNC: 11 MMOL/L — SIGNIFICANT CHANGE UP (ref 7–14)
APTT BLD: 33.6 SEC — SIGNIFICANT CHANGE UP (ref 27–39.2)
AST SERPL-CCNC: 24 U/L — SIGNIFICANT CHANGE UP (ref 0–41)
BASOPHILS # BLD AUTO: 0.04 K/UL — SIGNIFICANT CHANGE UP (ref 0–0.2)
BASOPHILS NFR BLD AUTO: 0.4 % — SIGNIFICANT CHANGE UP (ref 0–1)
BILIRUB SERPL-MCNC: 1.1 MG/DL — SIGNIFICANT CHANGE UP (ref 0.2–1.2)
BUN SERPL-MCNC: 38 MG/DL — HIGH (ref 10–20)
CALCIUM SERPL-MCNC: 9.2 MG/DL — SIGNIFICANT CHANGE UP (ref 8.5–10.1)
CHLORIDE SERPL-SCNC: 105 MMOL/L — SIGNIFICANT CHANGE UP (ref 98–110)
CO2 SERPL-SCNC: 25 MMOL/L — SIGNIFICANT CHANGE UP (ref 17–32)
CREAT SERPL-MCNC: 0.9 MG/DL — SIGNIFICANT CHANGE UP (ref 0.7–1.5)
EOSINOPHIL # BLD AUTO: 0.07 K/UL — SIGNIFICANT CHANGE UP (ref 0–0.7)
EOSINOPHIL NFR BLD AUTO: 0.7 % — SIGNIFICANT CHANGE UP (ref 0–8)
GLUCOSE BLDC GLUCOMTR-MCNC: 160 MG/DL — HIGH (ref 70–99)
GLUCOSE SERPL-MCNC: 156 MG/DL — HIGH (ref 70–99)
HCT VFR BLD CALC: 41.4 % — LOW (ref 42–52)
HGB BLD-MCNC: 13.5 G/DL — LOW (ref 14–18)
IMM GRANULOCYTES NFR BLD AUTO: 0.5 % — HIGH (ref 0.1–0.3)
INR BLD: 1.59 RATIO — HIGH (ref 0.65–1.3)
LYMPHOCYTES # BLD AUTO: 1.5 K/UL — SIGNIFICANT CHANGE UP (ref 1.2–3.4)
LYMPHOCYTES # BLD AUTO: 16.1 % — LOW (ref 20.5–51.1)
MAGNESIUM SERPL-MCNC: 1.6 MG/DL — LOW (ref 1.8–2.4)
MCHC RBC-ENTMCNC: 32.5 PG — HIGH (ref 27–31)
MCHC RBC-ENTMCNC: 32.6 G/DL — SIGNIFICANT CHANGE UP (ref 32–37)
MCV RBC AUTO: 99.8 FL — HIGH (ref 80–94)
MONOCYTES # BLD AUTO: 1.05 K/UL — HIGH (ref 0.1–0.6)
MONOCYTES NFR BLD AUTO: 11.2 % — HIGH (ref 1.7–9.3)
NEUTROPHILS # BLD AUTO: 6.63 K/UL — HIGH (ref 1.4–6.5)
NEUTROPHILS NFR BLD AUTO: 71.1 % — SIGNIFICANT CHANGE UP (ref 42.2–75.2)
NRBC # BLD: 0 /100 WBCS — SIGNIFICANT CHANGE UP (ref 0–0)
NT-PROBNP SERPL-SCNC: 3943 PG/ML — HIGH (ref 0–300)
PLATELET # BLD AUTO: 127 K/UL — LOW (ref 130–400)
POTASSIUM SERPL-MCNC: 5.1 MMOL/L — HIGH (ref 3.5–5)
POTASSIUM SERPL-SCNC: 5.1 MMOL/L — HIGH (ref 3.5–5)
PROT SERPL-MCNC: 6.9 G/DL — SIGNIFICANT CHANGE UP (ref 6–8)
PROTHROM AB SERPL-ACNC: 18.3 SEC — HIGH (ref 9.95–12.87)
RBC # BLD: 4.15 M/UL — LOW (ref 4.7–6.1)
RBC # FLD: 13.1 % — SIGNIFICANT CHANGE UP (ref 11.5–14.5)
SODIUM SERPL-SCNC: 141 MMOL/L — SIGNIFICANT CHANGE UP (ref 135–146)
TROPONIN T SERPL-MCNC: 0.05 NG/ML — CRITICAL HIGH
WBC # BLD: 9.34 K/UL — SIGNIFICANT CHANGE UP (ref 4.8–10.8)
WBC # FLD AUTO: 9.34 K/UL — SIGNIFICANT CHANGE UP (ref 4.8–10.8)

## 2020-02-11 PROCEDURE — 71045 X-RAY EXAM CHEST 1 VIEW: CPT | Mod: 26

## 2020-02-11 PROCEDURE — 99291 CRITICAL CARE FIRST HOUR: CPT

## 2020-02-11 PROCEDURE — 93280 PM DEVICE PROGR EVAL DUAL: CPT | Mod: 26

## 2020-02-11 PROCEDURE — 93010 ELECTROCARDIOGRAM REPORT: CPT

## 2020-02-11 RX ORDER — MAGNESIUM SULFATE 500 MG/ML
2 VIAL (ML) INJECTION ONCE
Refills: 0 | Status: COMPLETED | OUTPATIENT
Start: 2020-02-11 | End: 2020-02-11

## 2020-02-11 RX ORDER — ALBUTEROL 90 UG/1
2 AEROSOL, METERED ORAL
Qty: 0 | Refills: 0 | DISCHARGE

## 2020-02-11 RX ORDER — TAMSULOSIN HYDROCHLORIDE 0.4 MG/1
0.4 CAPSULE ORAL AT BEDTIME
Refills: 0 | Status: DISCONTINUED | OUTPATIENT
Start: 2020-02-11 | End: 2020-02-12

## 2020-02-11 RX ORDER — FUROSEMIDE 40 MG
40 TABLET ORAL DAILY
Refills: 0 | Status: DISCONTINUED | OUTPATIENT
Start: 2020-02-11 | End: 2020-02-12

## 2020-02-11 RX ORDER — LEVOTHYROXINE SODIUM 125 MCG
125 TABLET ORAL DAILY
Refills: 0 | Status: DISCONTINUED | OUTPATIENT
Start: 2020-02-11 | End: 2020-02-12

## 2020-02-11 RX ORDER — LATANOPROST 0.05 MG/ML
1 SOLUTION/ DROPS OPHTHALMIC; TOPICAL
Qty: 0 | Refills: 0 | DISCHARGE

## 2020-02-11 RX ORDER — LOSARTAN POTASSIUM 100 MG/1
100 TABLET, FILM COATED ORAL DAILY
Refills: 0 | Status: DISCONTINUED | OUTPATIENT
Start: 2020-02-11 | End: 2020-02-12

## 2020-02-11 RX ORDER — CHLORHEXIDINE GLUCONATE 213 G/1000ML
1 SOLUTION TOPICAL
Refills: 0 | Status: DISCONTINUED | OUTPATIENT
Start: 2020-02-11 | End: 2020-02-12

## 2020-02-11 RX ORDER — LEVOTHYROXINE SODIUM 125 MCG
2 TABLET ORAL
Qty: 0 | Refills: 0 | DISCHARGE

## 2020-02-11 RX ORDER — WARFARIN SODIUM 2.5 MG/1
2.5 TABLET ORAL ONCE
Refills: 0 | Status: COMPLETED | OUTPATIENT
Start: 2020-02-11 | End: 2020-02-11

## 2020-02-11 RX ORDER — ALBUTEROL 90 UG/1
2 AEROSOL, METERED ORAL EVERY 6 HOURS
Refills: 0 | Status: DISCONTINUED | OUTPATIENT
Start: 2020-02-11 | End: 2020-02-12

## 2020-02-11 RX ORDER — INSULIN GLARGINE 100 [IU]/ML
16 INJECTION, SOLUTION SUBCUTANEOUS
Qty: 0 | Refills: 0 | DISCHARGE

## 2020-02-11 RX ORDER — METFORMIN HYDROCHLORIDE 850 MG/1
1 TABLET ORAL
Qty: 0 | Refills: 0 | DISCHARGE

## 2020-02-11 RX ORDER — ATORVASTATIN CALCIUM 80 MG/1
40 TABLET, FILM COATED ORAL AT BEDTIME
Refills: 0 | Status: DISCONTINUED | OUTPATIENT
Start: 2020-02-11 | End: 2020-02-12

## 2020-02-11 RX ADMIN — WARFARIN SODIUM 2.5 MILLIGRAM(S): 2.5 TABLET ORAL at 22:14

## 2020-02-11 RX ADMIN — TAMSULOSIN HYDROCHLORIDE 0.4 MILLIGRAM(S): 0.4 CAPSULE ORAL at 22:14

## 2020-02-11 RX ADMIN — Medication 50 GRAM(S): at 14:50

## 2020-02-11 RX ADMIN — ATORVASTATIN CALCIUM 40 MILLIGRAM(S): 80 TABLET, FILM COATED ORAL at 22:14

## 2020-02-11 NOTE — ED PROVIDER NOTE - PHYSICAL EXAMINATION
CONSTITUTIONAL: Well-developed; well-nourished; in no acute distress. Speaking in full sentences. AAOx3.  SKIN: warm, dry  HEAD: Normocephalic; atraumatic.  EYES: no conjunctival injection. PERRL.   ENT: No nasal discharge; airway clear.  NECK: Supple; non tender.  CARD: S1, S2 normal; no murmurs, gallops, or rubs. Irregular.   RESP: No wheezes, rales or rhonchi.  ABD: soft ntnd  EXT: Normal ROM.  No clubbing, cyanosis or edema.   LYMPH: No acute cervical adenopathy.  NEURO: Alert, oriented, grossly unremarkable  PSYCH: Cooperative, appropriate.

## 2020-02-11 NOTE — H&P ADULT - ASSESSMENT
79 year old male with PMHx of Afib on coumadin, HFrEF (s/p CRT-D), CAD s/p CABG, hypothyroidism, Psoriasis, PVD presenting after found to be bradycardic at surgeon's office.    #Bradycardia  F/u EP for device interrogation  recently seen by Dr. Gipson in the office, device interrogation was wnl  Asymptomatic, HR currently wnl  Hold Toprol for now  tele monitoring    #Elevated troponin  Previous baseline also elevated  No chest pain, ECG changes  trend    #Atrial fibrillation  2.5 mg Coumadin tonight, f/u AM INR     #HFrEF  No acute signs of heart failure, BNP 3k  continue home dose Lasix 40mg qd  EF 40-45% in 12/18    #CAD s/p CABG  Continue Aspirin 81mg qd, Lipitor 40 qhs  Hold Toprol for now    #DM2  Hold Metformin, check fingersticks    #Hypomagnesemia  S/p 1 Mg rider, f/u repeat    #COPD  Continue inhalers    #Hypothyroidism  Continue synthroid    #Dispo: from home, anticipate d/c once cleared by EP  #GI PPX: Not indicated  #DVT PPX: On Warfarin  #Activity: As tolerated  #Diet, DASH, Carb consistent  #Full code 79 year old male with PMHx of Afib on coumadin, HFrEF (s/p CRT-D), CAD s/p CABG, hypothyroidism, Psoriasis, PVD presenting after found to be bradycardic at surgeon's office.    #Bradycardia  F/u EP for device interrogation  recently seen by Dr. Gipson in the office, device interrogation was wnl  Asymptomatic, HR currently wnl  Hold Toprol for now  tele monitoring    #Elevated troponin  Previous baseline also elevated  No chest pain, ECG changes  trend    #Atrial fibrillation  2.5 mg Coumadin tonight, f/u AM INR     #HFrEF  No acute signs of heart failure, BNP 3k  continue home dose Lasix 40mg qd  EF 40-45% in 12/18    #CAD s/p CABG  Continue Lipitor 40 qhs  Hold Toprol for now  Not taking Aspirin or Plavix    #DM2  Hold Metformin, check fingersticks    #Hypomagnesemia  S/p 1 Mg rider, f/u repeat    #COPD  Continue inhalers    #Hypothyroidism  Continue synthroid    #Dispo: from home, anticipate d/c once cleared by EP  #GI PPX: Not indicated  #DVT PPX: On Warfarin  #Activity: As tolerated  #Diet, DASH, Carb consistent  #Full code

## 2020-02-11 NOTE — H&P ADULT - NSICDXPASTSURGICALHX_GEN_ALL_CORE_FT
PAST SURGICAL HISTORY:  AICD (automatic cardioverter/defibrillator) present     H/O total knee replacement, bilateral     S/P CABG (coronary artery bypass graft)     S/P thoracotomy pt states he had a VATS w/lung decortication

## 2020-02-11 NOTE — ED ADULT NURSE NOTE - OBJECTIVE STATEMENT
Pt sent from [facility] for bradycardia (30s) after getting versed 1.5mg for the procedure (L tibial vessel occlusion and heel ulcer). Pt denies pain or other symptoms. AICD in place but not capturing

## 2020-02-11 NOTE — ED PROVIDER NOTE - NS ED ROS FT
Review of Systems:  •CONSTITUTIONAL - No fever,  •SKIN - No rash  •HEMATOLOGIC - No abnormal bleeding or bruising  •EYES - No eye pain, No blurred vision  •ENT - No change in hearing, No sore throat, No neck pain, No rhinorrhea  •RESPIRATORY - No shortness of breath, No cough  •GI - No abdominal pain, No nausea, No vomiting, No diarrhea, No constipation  •- No dysuria, frequency, hematuria.  •MUSCULOSKELETAL - No joint paint, No swelling, No back pain  •NEUROLOGIC - No numbness, No focal weakness, No headache, No dizziness  All other systems negative, unless specified in HPI

## 2020-02-11 NOTE — ED PROVIDER NOTE - CLINICAL SUMMARY MEDICAL DECISION MAKING FREE TEXT BOX
78yo M history of HTN DL Hypothyroidism COPD DM CAD PCI CABG CHF AICD AFib on Coumadin and Metoprolol BIBEMS with bradycardia. Per pt, he was having a vascular stent placed in his LLE, was given 1.5mg Versed IV, then became bradycardic to 20s and 30s. Pt currently asymptomatic, no complaints. No syncope.

## 2020-02-11 NOTE — H&P ADULT - NSICDXPASTMEDICALHX_GEN_ALL_CORE_FT
PAST MEDICAL HISTORY:  Atrial fibrillation     Benign prostate hyperplasia     COPD (chronic obstructive pulmonary disease)     Coronary artery disease s/p CABG 2014    Diabetes mellitus     Dyslipidemia     Glaucoma     Heart failure chronic systolic heart failure    Hypertension     Hypothyroidism     Psoriasis

## 2020-02-11 NOTE — H&P ADULT - NSHPLABSRESULTS_GEN_ALL_CORE
13.5   9.34  )-----------( 127      ( 11 Feb 2020 13:59 )             41.4         02-11    141  |  105  |  38<H>  ----------------------------<  156<H>  5.1<H>   |  25  |  0.9    Ca    9.2      11 Feb 2020 13:59  Mg     1.6     02-11    TPro  6.9  /  Alb  4.3  /  TBili  1.1  /  DBili  x   /  AST  24  /  ALT  17  /  AlkPhos  113  02-11        < from: 12 Lead ECG (02.11.20 @ 13:21) >      Ventricular Rate 96 BPM    Atrial Rate 375 BPM    QRS Duration 142 ms    Q-T Interval 408 ms    QTC Calculation(Bezet) 515 ms    R Axis -73 degrees    T Axis 66 degrees    Diagnosis Line Atrial fibrillation with frequent ventricular-paced complexesand with  premature ventricular or aberrantly conducted complexes  Left axis deviation  Right bundle branch block  Left anterior fascicular block  Possible Lateral infarct , age undetermined  Abnormal ECG    Confirmed by JEFERSON WARE MD ([id number]) on 2/11/2020 2:49:44 PM    < end of copied text >

## 2020-02-11 NOTE — ED PROVIDER NOTE - ATTENDING CONTRIBUTION TO CARE
80yo M history of HTN DL Hypothyroidism COPD DM CAD PCI CABG CHF AICD AFib on Coumadin and Metoprolol BIBEMS with bradycardia. Per pt, he was having a vascular stent placed in his LLE, was given 1.5mg Versed IV, then became bradycardic to 20s and 30s. Pt currently asymptomatic, no complaints. No syncope.   Constitutional: Well appearing. No acute distress. Non toxic.   Eyes: PERRLA. Extraocular movements intact, no entrapment. Conjunctiva normal.   ENT: No nasal discharge. Moist mucus membranes.  Neck: Supple, non tender, full range of motion.  CV: RRR no murmurs, rubs, or gallops. +S1S2.   Pulm: Clear to auscultation bilaterally. Normal work of breathing.  Abd: soft NT ND +BS.   Ext: Warm and well perfused x4, moving all extremities, no edema. +dressing to LLE which pt is refusing to allow me to examine  Psy: Cooperative, appropriate.   Skin: Warm, dry, no rash  Neuro: CN2-12 grossly intact no sensory or motor deficits throughout, no drift

## 2020-02-11 NOTE — ED ADULT NURSE NOTE - PMH
Atrial fibrillation    Benign prostate hyperplasia    COPD (chronic obstructive pulmonary disease)    Coronary artery disease  s/p CABG 2014  Diabetes mellitus    Dyslipidemia    Glaucoma    Heart failure  chronic systolic heart failure  Hypertension    Hypothyroidism    Psoriasis

## 2020-02-11 NOTE — ED ADULT NURSE NOTE - CHIEF COMPLAINT QUOTE
pt was at [facility] getting a stent placed in his left leg dt poor blood flow. pt received versed 1.5 mg for the procedure and became bradycardic. AICD is not working properly

## 2020-02-11 NOTE — ED ADULT TRIAGE NOTE - CHIEF COMPLAINT QUOTE
pt was at [facility] getting a stent placed in his left leg dt poor blood flow. pt received versed 1.5 mg for the procedure and became bradycardic. AICD is not working properly

## 2020-02-11 NOTE — H&P ADULT - ATTENDING COMMENTS
Patient seen and evaluated. agree with the resident as above except as noted in my note from 2/12/2020

## 2020-02-11 NOTE — ED PROVIDER NOTE - OBJECTIVE STATEMENT
79Y M with PMH of Atrial fibrillation, BPH, COPD, CAD s/p CABG 2014, DM, Dyslipidemia, Glaucoma, chronic systolic heart failure, Hypertension Hypothyroidism Psoriasis presents from Dr. Yap's office for low heart rate. Patient has had low circulation in his left leg 2/2 arterial clot likely was getting a stent, given 1.5 mg versed for procedure but then his HR dropped to low 20s. With EMS HR was always between . AICD did not fire according to EMS, no shock was observed. Patient denies HA, fevers, chills, chest pain, SOB, abdominal pain, N/V/D. 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 79Y M with PMH of Atrial fibrillation, BPH, COPD, CAD s/p CABG 2014, DM, Dyslipidemia, Glaucoma, chronic systolic heart failure, Hypertension Hypothyroidism Psoriasis presents from Dr. Yap's office for low heart rate. Patient has had low circulation in his left leg 2/2 arterial clot likely was getting a stent, given 1.5 mg versed for procedure but then his HR dropped to low 20s, AICD did not fire, HR came up shortly after, procedure was stopped. With EMS HR was always between . AICD did not fire according to EMS, no shock was observed. Patient denies HA, fevers, chills, chest pain, SOB, abdominal pain, N/V/D. 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

## 2020-02-11 NOTE — ED ADULT NURSE NOTE - NSIMPLEMENTINTERV_GEN_ALL_ED
Implemented All Fall with Harm Risk Interventions:  [place] to call system. Call bell, personal items and telephone within reach. Instruct patient to call for assistance. Room bathroom lighting operational. Non-slip footwear when patient is off stretcher. Physically safe environment: no spills, clutter or unnecessary equipment. Stretcher in lowest position, wheels locked, appropriate side rails in place. Provide visual cue, wrist band, yellow gown, etc. Monitor gait and stability. Monitor for mental status changes and reorient to person, place, and time. Review medications for side effects contributing to fall risk. Reinforce activity limits and safety measures with patient and family. Provide visual clues: red socks.

## 2020-02-11 NOTE — H&P ADULT - NSICDXFAMILYHX_GEN_ALL_CORE_FT
FAMILY HISTORY:  Father  Still living? No  Family history of coronary artery disease, Age at diagnosis: Age Unknown

## 2020-02-11 NOTE — H&P ADULT - HISTORY OF PRESENT ILLNESS
79 year old male with PMHx of Afib on coumadin, HFrEF (s/p CRT-D), CAD s/p CABG, hypothyroidism, Psoriasis, PVD presenting after found to be bradycardic at surgeon's office. Patient was scheduled for procedure, received a dose of Versed, and HR on the monitor dropped to 20s. In ambulance patient's HR was normal.   Throughout patient was asymptomatic, no chest pain, shortness of breath, nausea, dizziness, no other complaints.  Per patient her was seen in EP in ED, who interrogated device and found no episodes of bradycardia. Suggested that 20s read was a error in the HR monitor in the surgeon's office.

## 2020-02-11 NOTE — ED PROVIDER NOTE - PROGRESS NOTE DETAILS
hadley EP, will eval Informed patient of lab/radiology results. To be admitted to tele. EP aware, will interrogate pacer. Informed patient of lab/radiology results. To be admitted to tele. EP aware, will interrogate AICD.

## 2020-02-11 NOTE — H&P ADULT - NSHPPHYSICALEXAM_GEN_ALL_CORE
PHYSICAL EXAM:T(C): 36 (02-11-20 @ 13:41), Max: 36 (02-11-20 @ 13:41)  HR: 80 (02-11-20 @ 13:41) (80 - 80)  BP: 131/73 (02-11-20 @ 13:41) (131/73 - 131/73)  RR: 18 (02-11-20 @ 13:41) (18 - 18)  SpO2: 100% (02-11-20 @ 13:41) (100% - 100%)  GENERAL: NAD, well-developed  HEAD:  Atraumatic, Normocephalic  EYES: EOMI, PERRLA, conjunctiva and sclera clear  NECK: Supple, No JVD  CHEST/LUNG: Clear to auscultation bilaterally; No wheeze  HEART: Irregular rate and rhythm; No murmurs, rubs, or gallops  ABDOMEN: Soft, Nontender, Nondistended; Bowel sounds present  EXTREMITIES:  2+ Peripheral Pulses, No clubbing, cyanosis, or edema  PSYCH: AAOx3  NEUROLOGY: non-focal  SKIN: No rashes or lesions

## 2020-02-12 ENCOUNTER — TRANSCRIPTION ENCOUNTER (OUTPATIENT)
Age: 80
End: 2020-02-12

## 2020-02-12 VITALS
SYSTOLIC BLOOD PRESSURE: 163 MMHG | TEMPERATURE: 96 F | HEART RATE: 83 BPM | RESPIRATION RATE: 18 BRPM | DIASTOLIC BLOOD PRESSURE: 72 MMHG

## 2020-02-12 LAB
ANION GAP SERPL CALC-SCNC: 11 MMOL/L — SIGNIFICANT CHANGE UP (ref 7–14)
ANION GAP SERPL CALC-SCNC: 12 MMOL/L — SIGNIFICANT CHANGE UP (ref 7–14)
BUN SERPL-MCNC: 40 MG/DL — HIGH (ref 10–20)
BUN SERPL-MCNC: 42 MG/DL — HIGH (ref 10–20)
CALCIUM SERPL-MCNC: 9.1 MG/DL — SIGNIFICANT CHANGE UP (ref 8.5–10.1)
CALCIUM SERPL-MCNC: 9.3 MG/DL — SIGNIFICANT CHANGE UP (ref 8.5–10.1)
CHLORIDE SERPL-SCNC: 104 MMOL/L — SIGNIFICANT CHANGE UP (ref 98–110)
CHLORIDE SERPL-SCNC: 105 MMOL/L — SIGNIFICANT CHANGE UP (ref 98–110)
CO2 SERPL-SCNC: 22 MMOL/L — SIGNIFICANT CHANGE UP (ref 17–32)
CO2 SERPL-SCNC: 24 MMOL/L — SIGNIFICANT CHANGE UP (ref 17–32)
CREAT SERPL-MCNC: 1 MG/DL — SIGNIFICANT CHANGE UP (ref 0.7–1.5)
CREAT SERPL-MCNC: 1 MG/DL — SIGNIFICANT CHANGE UP (ref 0.7–1.5)
GLUCOSE BLDC GLUCOMTR-MCNC: 137 MG/DL — HIGH (ref 70–99)
GLUCOSE BLDC GLUCOMTR-MCNC: 176 MG/DL — HIGH (ref 70–99)
GLUCOSE BLDC GLUCOMTR-MCNC: 219 MG/DL — HIGH (ref 70–99)
GLUCOSE SERPL-MCNC: 153 MG/DL — HIGH (ref 70–99)
GLUCOSE SERPL-MCNC: 178 MG/DL — HIGH (ref 70–99)
INR BLD: 1.65 RATIO — HIGH (ref 0.65–1.3)
MAGNESIUM SERPL-MCNC: 1.8 MG/DL — SIGNIFICANT CHANGE UP (ref 1.8–2.4)
MAGNESIUM SERPL-MCNC: 1.8 MG/DL — SIGNIFICANT CHANGE UP (ref 1.8–2.4)
POTASSIUM SERPL-MCNC: 4.3 MMOL/L — SIGNIFICANT CHANGE UP (ref 3.5–5)
POTASSIUM SERPL-MCNC: 4.4 MMOL/L — SIGNIFICANT CHANGE UP (ref 3.5–5)
POTASSIUM SERPL-SCNC: 4.3 MMOL/L — SIGNIFICANT CHANGE UP (ref 3.5–5)
POTASSIUM SERPL-SCNC: 4.4 MMOL/L — SIGNIFICANT CHANGE UP (ref 3.5–5)
PROTHROM AB SERPL-ACNC: 19 SEC — HIGH (ref 9.95–12.87)
SODIUM SERPL-SCNC: 138 MMOL/L — SIGNIFICANT CHANGE UP (ref 135–146)
SODIUM SERPL-SCNC: 140 MMOL/L — SIGNIFICANT CHANGE UP (ref 135–146)
TROPONIN T SERPL-MCNC: 0.06 NG/ML — CRITICAL HIGH
TROPONIN T SERPL-MCNC: 0.07 NG/ML — CRITICAL HIGH

## 2020-02-12 RX ORDER — TAMSULOSIN HYDROCHLORIDE 0.4 MG/1
1 CAPSULE ORAL
Qty: 0 | Refills: 0 | DISCHARGE

## 2020-02-12 RX ORDER — TAMSULOSIN HYDROCHLORIDE 0.4 MG/1
1 CAPSULE ORAL
Qty: 30 | Refills: 2
Start: 2020-02-12 | End: 2020-05-11

## 2020-02-12 RX ADMIN — LOSARTAN POTASSIUM 100 MILLIGRAM(S): 100 TABLET, FILM COATED ORAL at 05:42

## 2020-02-12 RX ADMIN — Medication 40 MILLIGRAM(S): at 05:42

## 2020-02-12 RX ADMIN — Medication 125 MICROGRAM(S): at 05:42

## 2020-02-12 RX ADMIN — CHLORHEXIDINE GLUCONATE 1 APPLICATION(S): 213 SOLUTION TOPICAL at 05:42

## 2020-02-12 NOTE — PROGRESS NOTE ADULT - SUBJECTIVE AND OBJECTIVE BOX
Patient was seen and examined. Spoke with RN. Chart reviewed.  No events overnight.  Vital Signs Last 24 Hrs  T(F): 96.7 (12 Feb 2020 05:40), Max: 96.8 (11 Feb 2020 13:41)  HR: 104 (12 Feb 2020 05:40) (80 - 104)  BP: 151/91 (12 Feb 2020 05:40) (131/73 - 151/91)  SpO2: 97% (12 Feb 2020 05:40) (97% - 100%)  MEDICATIONS  (STANDING):  atorvastatin 40 milliGRAM(s) Oral at bedtime  chlorhexidine 4% Liquid 1 Application(s) Topical <User Schedule>  furosemide    Tablet 40 milliGRAM(s) Oral daily  levothyroxine 125 MICROGram(s) Oral daily  losartan 100 milliGRAM(s) Oral daily  tamsulosin 0.4 milliGRAM(s) Oral at bedtime    MEDICATIONS  (PRN):  ALBUTerol    90 MICROgram(s) HFA Inhaler 2 Puff(s) Inhalation every 6 hours PRN Shortness of Breath and/or Wheezing    Labs:                        13.5   9.34  )-----------( 127      ( 11 Feb 2020 13:59 )             41.4     12 Feb 2020 07:11    140    |  105    |  40     ----------------------------<  153    4.4     |  24     |  1.0    12 Feb 2020 00:32    138    |  104    |  42     ----------------------------<  178    4.3     |  22     |  1.0      Ca    9.3        12 Feb 2020 07:11  Ca    9.1        12 Feb 2020 00:32  Mg     1.8       12 Feb 2020 07:11  Mg     1.8       12 Feb 2020 00:32    TPro  6.9    /  Alb  4.3    /  TBili  1.1    /  DBili  x      /  AST  24     /  ALT  17     /  AlkPhos  113    11 Feb 2020 13:59    PT/INR - ( 12 Feb 2020 07:11 )   PT: 19.00 sec;   INR: 1.65 ratio         PTT - ( 11 Feb 2020 13:59 )  PTT:33.6 sec      General: comfortable, NAD  Neurology: A&Ox3, nonfocal  Head:  Normocephalic, atraumatic  ENT:  Mucosa moist, no ulcerations  Neck:  Supple, no JVD,   Skin: no breakdowns (as per RN)  Resp: CTA B/L  CV: RRR, S1S2,   GI: Soft, NT, bowel sounds  MS: No edema, + peripheral pulses, FROM all 4 extremity      A/P:  79 year old male with PMHx of Afib on coumadin, HFrEF (s/p CRT-D), CAD s/p CABG, hypothyroidism, Psoriasis, PVD presenting after found to be bradycardic at surgeon's office.  chronic HFrEF  troponemia   a.fib     clinically not in CHF   tele   EPS f/u   monitor INR, dose coumadin as per INR   Lasix PO   monitor glycemic status   monitor electrolytes correct as needed   rate control as per EPS   d/c planning once cleared by EPS   o/p cardiology f/u   d/w resident physician   DVT prophylaxis  Decubitus prevention- all measures as per RN protocol  Please call or text me with any questions or updates

## 2020-02-12 NOTE — DISCHARGE NOTE NURSING/CASE MANAGEMENT/SOCIAL WORK - PATIENT PORTAL LINK FT
You can access the FollowMyHealth Patient Portal offered by [facility] by registering at the following website: [email or web address]. By joining [employer]’s FollowMyHealth portal, you will also be able to view your health information using other applications (apps) compatible with our system.

## 2020-02-12 NOTE — DISCHARGE NOTE PROVIDER - HOSPITAL COURSE
79 year old male with PMHx of Afib on coumadin, HFrEF (s/p CRT-D), CAD s/p CABG, hypothyroidism, Psoriasis, PVD presenting after found to be bradycardic at surgeon's office. Patient was scheduled for procedure, received a dose of Versed, and HR on the monitor dropped to 20s. In ambulance patient's HR was normal.         Throughout patient was asymptomatic, no chest pain, shortness of breath, nausea, dizziness, no other complaints.        Per patient her was seen in EP in ED, who interrogated device and found no episodes of bradycardia. Suggested that 20s read was a error in the HR monitor in the surgeon's office.         #Bradycardia    - Recently seen by Dr. Gipson in the office, device interrogation was wnl    - EP interrogated device in ED and found no episodes of bradycardia        #Elevated troponin    - Previous baseline also elevated    - No chest pain or ECG changes        #Atrial fibrillation    - Coumadin         #HFrEF    - EF 40-45% in 12/18    - Home dose Lasix 40mg Daily    - BNP 3k        #CAD s/p CABG    - Not on Aspirin or Plavix    - Lipitor 40 qhs        #COPD    - Inhalers        #Hypothyroidism    - Synthroid

## 2020-02-12 NOTE — DISCHARGE NOTE PROVIDER - NSDCQMPCI_CARD_ALL_CORE
Date of Service: 03/18/2019    Patient is a 68-year-old man here requesting skin cancer check.  Has not noted any concerning lesions.  No lesions have changed or become symptomatic.    PHYSICAL EXAMINATION:  Today, fair complexioned elderly man with multiple keratotic and hyperkeratotic papules scattered on the nasal bridge, nasal malar junction, medial canthus, medial infraorbital rim, bilateral temples, especially on the left, left malar frontal scalp and the right posterior mandible.  The patient has no other suspicious lesions seen for malignancy involving the scalp, face, ears, neck, trunk, upper and lower extremities including hands, feet, nails, hip, buttock and eyelids show no other concerning lesions for malignancy.  Hyperkeratotic erythematous papules also noticed involving the right medial upper back.    DIAGNOSIS:  1.  Multiple actinic keratoses.  Discussed the diagnosis with the patient.  Liquid nitrogen treatment recommended and carried out to those that show hypertrophic or hyperkeratotic.  A total of 11 lesions were treated today.  The remaining lesions that are seen throughout the frontal scalp and bilateral cheek and along the forehead and temple best treated with either Fluorouracil cream b.i.d. for 2 weeks or photodynamic therapy.  The treatment options were discussed with him.  Advised the patient to contact us for the referral to Dr. Almeida for the photodynamic therapy if he decides to go that route.  2.  History of skin cancer, doing well, no evidence of recurrence.  Continue sun protection, self-examination, yearly checkup.      Dictated By: Romi Dumas MD  Signing Provider: MD LIV Minaya/frank ([id number])  DD: 03/18/2019 11:55:52 TD: 03/19/2019 10:01:42    Copy Sent To:    No

## 2020-02-12 NOTE — DISCHARGE NOTE PROVIDER - NSDCFUSCHEDAPPT_GEN_ALL_CORE_FT
MYRNA TOVAR ; 04/23/2020 ; [facility] [address] MYRNA TOVAR ; 04/23/2020 ; [facility] [address]

## 2020-02-12 NOTE — DISCHARGE NOTE PROVIDER - NSDCCPCAREPLAN_GEN_ALL_CORE_FT
PRINCIPAL DISCHARGE DIAGNOSIS  Diagnosis: AICD malfunction  Assessment and Plan of Treatment: You were admitted to the hospital for an episode of suspected bradycardia. Your device was interrogated by the electrophysiology team who found no episodes of bradycardia. Please follow up with your primary doctor after you are discharged from the hospital.

## 2020-02-12 NOTE — DISCHARGE NOTE PROVIDER - CARE PROVIDER_API CALL
[facility])  Internal Medicine  [address] [place] [address]  Phone: [phone]  Fax: [phone]  Follow Up Time:

## 2020-02-12 NOTE — DISCHARGE NOTE PROVIDER - NSDCMRMEDTOKEN_GEN_ALL_CORE_FT
atorvastatin 40 mg oral tablet: 1 tab(s) orally once a day (at bedtime)  Flomax 0.4 mg oral capsule: 1 cap(s) orally once a day (at bedtime)  Incruse Ellipta 62.5 mcg/inh inhalation powder: 1 inhaler(s) inhaled once a day  Lasix 40 mg oral tablet: 1 tab(s) orally once a day  levothyroxine 125 mcg (0.125 mg) oral capsule: 1 cap(s) orally once a day  losartan 100 mg oral tablet: 1 tab(s) orally once a day   metFORMIN 750 mg oral tablet, extended release: 1 tab(s) orally once a day  metoprolol succinate 50 mg oral tablet, extended release: 1 tab(s) orally once a day  warfarin 2.5 mg oral tablet: 1 tab(s) orally once a day (at bedtime)

## 2020-02-18 DIAGNOSIS — Z95.810 PRESENCE OF AUTOMATIC (IMPLANTABLE) CARDIAC DEFIBRILLATOR: ICD-10-CM

## 2020-02-18 DIAGNOSIS — I50.22 CHRONIC SYSTOLIC (CONGESTIVE) HEART FAILURE: ICD-10-CM

## 2020-02-18 DIAGNOSIS — N40.0 BENIGN PROSTATIC HYPERPLASIA WITHOUT LOWER URINARY TRACT SYMPTOMS: ICD-10-CM

## 2020-02-18 DIAGNOSIS — T82.518A BREAKDOWN (MECHANICAL) OF OTHER CARDIAC AND VASCULAR DEVICES AND IMPLANTS, INITIAL ENCOUNTER: ICD-10-CM

## 2020-02-18 DIAGNOSIS — I11.0 HYPERTENSIVE HEART DISEASE WITH HEART FAILURE: ICD-10-CM

## 2020-02-18 DIAGNOSIS — Z96.653 PRESENCE OF ARTIFICIAL KNEE JOINT, BILATERAL: ICD-10-CM

## 2020-02-18 DIAGNOSIS — E83.42 HYPOMAGNESEMIA: ICD-10-CM

## 2020-02-18 DIAGNOSIS — I48.91 UNSPECIFIED ATRIAL FIBRILLATION: ICD-10-CM

## 2020-02-18 DIAGNOSIS — H40.9 UNSPECIFIED GLAUCOMA: ICD-10-CM

## 2020-02-18 DIAGNOSIS — E03.9 HYPOTHYROIDISM, UNSPECIFIED: ICD-10-CM

## 2020-02-18 DIAGNOSIS — R00.1 BRADYCARDIA, UNSPECIFIED: ICD-10-CM

## 2020-02-18 DIAGNOSIS — E78.5 HYPERLIPIDEMIA, UNSPECIFIED: ICD-10-CM

## 2020-02-18 DIAGNOSIS — E11.9 TYPE 2 DIABETES MELLITUS WITHOUT COMPLICATIONS: ICD-10-CM

## 2020-02-18 DIAGNOSIS — I25.10 ATHEROSCLEROTIC HEART DISEASE OF NATIVE CORONARY ARTERY WITHOUT ANGINA PECTORIS: ICD-10-CM

## 2020-02-18 DIAGNOSIS — R79.89 OTHER SPECIFIED ABNORMAL FINDINGS OF BLOOD CHEMISTRY: ICD-10-CM

## 2020-02-18 DIAGNOSIS — Z87.891 PERSONAL HISTORY OF NICOTINE DEPENDENCE: ICD-10-CM

## 2020-02-18 DIAGNOSIS — J44.9 CHRONIC OBSTRUCTIVE PULMONARY DISEASE, UNSPECIFIED: ICD-10-CM

## 2020-02-18 DIAGNOSIS — Z95.1 PRESENCE OF AORTOCORONARY BYPASS GRAFT: ICD-10-CM

## 2020-02-19 ENCOUNTER — APPOINTMENT (OUTPATIENT)
Dept: ENDOCRINOLOGY | Facility: CLINIC | Age: 80
End: 2020-02-19

## 2020-02-19 ENCOUNTER — OUTPATIENT (OUTPATIENT)
Dept: OUTPATIENT SERVICES | Facility: HOSPITAL | Age: 80
LOS: 1 days | Discharge: HOME | End: 2020-02-19

## 2020-02-19 VITALS
HEIGHT: 71 IN | HEART RATE: 69 BPM | SYSTOLIC BLOOD PRESSURE: 127 MMHG | WEIGHT: 225 LBS | BODY MASS INDEX: 31.5 KG/M2 | DIASTOLIC BLOOD PRESSURE: 73 MMHG

## 2020-02-19 DIAGNOSIS — Z95.810 PRESENCE OF AUTOMATIC (IMPLANTABLE) CARDIAC DEFIBRILLATOR: Chronic | ICD-10-CM

## 2020-02-19 DIAGNOSIS — Z96.653 PRESENCE OF ARTIFICIAL KNEE JOINT, BILATERAL: Chronic | ICD-10-CM

## 2020-02-19 DIAGNOSIS — Z98.890 OTHER SPECIFIED POSTPROCEDURAL STATES: Chronic | ICD-10-CM

## 2020-02-19 DIAGNOSIS — Z95.1 PRESENCE OF AORTOCORONARY BYPASS GRAFT: Chronic | ICD-10-CM

## 2020-02-19 DIAGNOSIS — E66.01 MORBID (SEVERE) OBESITY DUE TO EXCESS CALORIES: ICD-10-CM

## 2020-02-19 NOTE — HISTORY OF PRESENT ILLNESS
[FreeTextEntry1] : 78 y/o M with PMH of DM II, A fib, HTN, DLD, PAD, hypothyroidism who presents for follow up. Since he was last seen in Oct 2019 he has been hospitalized for bradycardia while being put under anesthesia for revascularization surgery 1.5 weeks ago. Overall he feels well. \par \par His AM fingersticks have been in the mid 80s to low 100s. He is still taking metformin and Toujeo and does not need refills. He went to podiatry today for check up and will see ophtho later in a few months.

## 2020-02-19 NOTE — REASON FOR VISIT
[Follow-Up: _____] : a [unfilled] follow-up visit [FreeTextEntry1] : obesity, diffuse atherosclerosis, and uncontroled type 2 diabetes, and hypothyroidism.

## 2020-02-19 NOTE — ASSESSMENT
[FreeTextEntry1] : 80 y/o M with PMH of DM II, A fib, HTN, DLD, PAD, hypothyroidism who presents for follow up. \par \par # DM II \par - continue with current therapy (metformin and Toujeo)\par - UTD with podiatry and will undergo ophto screening later this year \par \par # Hypothyroidism \par - continue levothyroxine 125mcg daily\par patient is taking part in a research study with a weekly glp-1 agonist, that is randomized, and double blind. [Carbohydrate Consistent Diet] : carbohydrate consistent diet [Hypoglycemia Management] : hypoglycemia management [Diabetes Foot Care] : diabetes foot care [Long Term Vascular Complications] : long term vascular complications of diabetes [Importance of Diet and Exercise] : importance of diet and exercise to improve glycemic control, achieve weight loss and improve cardiovascular health

## 2020-02-19 NOTE — PHYSICAL EXAM
[Supple] : the neck was supple [Well Nourished] : well nourished [No Accessory Muscle Use] : no accessory muscle use [No LAD] : no lymphadenopathy [Normal Rate] : heart rate was normal  [Clear to Auscultation] : lungs were clear to auscultation bilaterally [Not Tender] : non-tender [Normal S1, S2] : normal S1 and S2 [Soft] : abdomen soft [Not Distended] : not distended [No Motor Deficits] : the motor exam was normal

## 2020-02-21 ENCOUNTER — OUTPATIENT (OUTPATIENT)
Dept: OUTPATIENT SERVICES | Facility: HOSPITAL | Age: 80
LOS: 1 days | Discharge: HOME | End: 2020-02-21

## 2020-02-21 DIAGNOSIS — Z95.1 PRESENCE OF AORTOCORONARY BYPASS GRAFT: Chronic | ICD-10-CM

## 2020-02-21 DIAGNOSIS — Z95.810 PRESENCE OF AUTOMATIC (IMPLANTABLE) CARDIAC DEFIBRILLATOR: Chronic | ICD-10-CM

## 2020-02-21 DIAGNOSIS — Z98.890 OTHER SPECIFIED POSTPROCEDURAL STATES: Chronic | ICD-10-CM

## 2020-02-21 DIAGNOSIS — Z79.01 LONG TERM (CURRENT) USE OF ANTICOAGULANTS: ICD-10-CM

## 2020-02-21 DIAGNOSIS — I48.91 UNSPECIFIED ATRIAL FIBRILLATION: ICD-10-CM

## 2020-02-21 DIAGNOSIS — Z96.653 PRESENCE OF ARTIFICIAL KNEE JOINT, BILATERAL: Chronic | ICD-10-CM

## 2020-02-21 LAB
INR PPP: 1.6 RATIO
INR PPP: 1.6 RATIO
POCT INR: 1.6 RATIO — HIGH (ref 0.9–1.2)
POCT PT: 19.5 SEC — HIGH (ref 10–13.4)
POCT-PROTHROMBIN TIME: 19.5 SECS
POCT-PROTHROMBIN TIME: 19.5 SECS

## 2020-02-26 DIAGNOSIS — E78.00 PURE HYPERCHOLESTEROLEMIA, UNSPECIFIED: ICD-10-CM

## 2020-02-26 DIAGNOSIS — E66.01 MORBID (SEVERE) OBESITY DUE TO EXCESS CALORIES: ICD-10-CM

## 2020-02-26 DIAGNOSIS — E11.65 TYPE 2 DIABETES MELLITUS WITH HYPERGLYCEMIA: ICD-10-CM

## 2020-02-28 ENCOUNTER — OUTPATIENT (OUTPATIENT)
Dept: OUTPATIENT SERVICES | Facility: HOSPITAL | Age: 80
LOS: 1 days | Discharge: HOME | End: 2020-02-28

## 2020-02-28 DIAGNOSIS — I48.91 UNSPECIFIED ATRIAL FIBRILLATION: ICD-10-CM

## 2020-02-28 DIAGNOSIS — Z79.01 LONG TERM (CURRENT) USE OF ANTICOAGULANTS: ICD-10-CM

## 2020-02-28 DIAGNOSIS — Z95.1 PRESENCE OF AORTOCORONARY BYPASS GRAFT: Chronic | ICD-10-CM

## 2020-02-28 DIAGNOSIS — Z96.653 PRESENCE OF ARTIFICIAL KNEE JOINT, BILATERAL: Chronic | ICD-10-CM

## 2020-02-28 DIAGNOSIS — Z95.810 PRESENCE OF AUTOMATIC (IMPLANTABLE) CARDIAC DEFIBRILLATOR: Chronic | ICD-10-CM

## 2020-02-28 DIAGNOSIS — Z98.890 OTHER SPECIFIED POSTPROCEDURAL STATES: Chronic | ICD-10-CM

## 2020-02-28 LAB
INR PPP: 2.8 RATIO
POCT INR: 2.8 RATIO — HIGH (ref 0.9–1.2)
POCT PT: 33.8 SEC — HIGH (ref 10–13.4)
POCT-PROTHROMBIN TIME: 33.8 SECS

## 2020-03-06 ENCOUNTER — OUTPATIENT (OUTPATIENT)
Dept: OUTPATIENT SERVICES | Facility: HOSPITAL | Age: 80
LOS: 1 days | Discharge: HOME | End: 2020-03-06

## 2020-03-06 DIAGNOSIS — Z96.653 PRESENCE OF ARTIFICIAL KNEE JOINT, BILATERAL: Chronic | ICD-10-CM

## 2020-03-06 DIAGNOSIS — Z98.890 OTHER SPECIFIED POSTPROCEDURAL STATES: Chronic | ICD-10-CM

## 2020-03-06 DIAGNOSIS — Z95.810 PRESENCE OF AUTOMATIC (IMPLANTABLE) CARDIAC DEFIBRILLATOR: Chronic | ICD-10-CM

## 2020-03-06 DIAGNOSIS — Z79.01 LONG TERM (CURRENT) USE OF ANTICOAGULANTS: ICD-10-CM

## 2020-03-06 DIAGNOSIS — Z95.1 PRESENCE OF AORTOCORONARY BYPASS GRAFT: Chronic | ICD-10-CM

## 2020-03-06 DIAGNOSIS — I48.91 UNSPECIFIED ATRIAL FIBRILLATION: ICD-10-CM

## 2020-03-06 LAB
INR PPP: 2 RATIO
POCT INR: 2 RATIO — HIGH (ref 0.9–1.2)
POCT PT: 23.6 SEC — HIGH (ref 10–13.4)
POCT-PROTHROMBIN TIME: 23.6 SECS

## 2020-04-02 ENCOUNTER — OUTPATIENT (OUTPATIENT)
Dept: OUTPATIENT SERVICES | Facility: HOSPITAL | Age: 80
LOS: 1 days | Discharge: HOME | End: 2020-04-02

## 2020-04-02 DIAGNOSIS — Z95.810 PRESENCE OF AUTOMATIC (IMPLANTABLE) CARDIAC DEFIBRILLATOR: Chronic | ICD-10-CM

## 2020-04-02 DIAGNOSIS — Z96.653 PRESENCE OF ARTIFICIAL KNEE JOINT, BILATERAL: Chronic | ICD-10-CM

## 2020-04-02 DIAGNOSIS — I48.91 UNSPECIFIED ATRIAL FIBRILLATION: ICD-10-CM

## 2020-04-02 DIAGNOSIS — Z95.1 PRESENCE OF AORTOCORONARY BYPASS GRAFT: Chronic | ICD-10-CM

## 2020-04-02 DIAGNOSIS — Z98.890 OTHER SPECIFIED POSTPROCEDURAL STATES: Chronic | ICD-10-CM

## 2020-04-02 DIAGNOSIS — Z79.01 LONG TERM (CURRENT) USE OF ANTICOAGULANTS: ICD-10-CM

## 2020-04-02 LAB
INR PPP: 2.6 RATIO
POCT INR: 2.6 RATIO — HIGH (ref 0.9–1.2)
POCT PT: 31.4 SEC — HIGH (ref 10–13.4)
POCT-PROTHROMBIN TIME: 31.4 SECS
QUALITY CONTROL: YES

## 2020-04-24 ENCOUNTER — APPOINTMENT (OUTPATIENT)
Dept: CARDIOLOGY | Facility: CLINIC | Age: 80
End: 2020-04-24
Payer: MEDICARE

## 2020-04-24 PROCEDURE — 93295 DEV INTERROG REMOTE 1/2/MLT: CPT

## 2020-04-24 PROCEDURE — 93296 REM INTERROG EVL PM/IDS: CPT

## 2020-04-28 ENCOUNTER — OUTPATIENT (OUTPATIENT)
Dept: OUTPATIENT SERVICES | Facility: HOSPITAL | Age: 80
LOS: 1 days | Discharge: HOME | End: 2020-04-28

## 2020-04-28 DIAGNOSIS — Z95.1 PRESENCE OF AORTOCORONARY BYPASS GRAFT: Chronic | ICD-10-CM

## 2020-04-28 DIAGNOSIS — Z96.653 PRESENCE OF ARTIFICIAL KNEE JOINT, BILATERAL: Chronic | ICD-10-CM

## 2020-04-28 DIAGNOSIS — Z79.01 LONG TERM (CURRENT) USE OF ANTICOAGULANTS: ICD-10-CM

## 2020-04-28 DIAGNOSIS — Z98.890 OTHER SPECIFIED POSTPROCEDURAL STATES: Chronic | ICD-10-CM

## 2020-04-28 DIAGNOSIS — I48.91 UNSPECIFIED ATRIAL FIBRILLATION: ICD-10-CM

## 2020-04-28 DIAGNOSIS — Z95.810 PRESENCE OF AUTOMATIC (IMPLANTABLE) CARDIAC DEFIBRILLATOR: Chronic | ICD-10-CM

## 2020-04-28 LAB
INR PPP: 2.9 RATIO
POCT INR: 2.9 RATIO — HIGH (ref 0.9–1.2)
POCT PT: 34.3 SEC — HIGH (ref 10–13.4)
POCT-PROTHROMBIN TIME: 34.3 SECS
QUALITY CONTROL: YES

## 2020-05-18 RX ORDER — LANCETS
EACH MISCELLANEOUS
Qty: 300 | Refills: 2 | Status: DISCONTINUED | COMMUNITY
Start: 2017-04-04 | End: 2020-05-18

## 2020-05-27 ENCOUNTER — OUTPATIENT (OUTPATIENT)
Dept: OUTPATIENT SERVICES | Facility: HOSPITAL | Age: 80
LOS: 1 days | Discharge: HOME | End: 2020-05-27

## 2020-05-27 DIAGNOSIS — Z95.810 PRESENCE OF AUTOMATIC (IMPLANTABLE) CARDIAC DEFIBRILLATOR: Chronic | ICD-10-CM

## 2020-05-27 DIAGNOSIS — Z95.1 PRESENCE OF AORTOCORONARY BYPASS GRAFT: Chronic | ICD-10-CM

## 2020-05-27 DIAGNOSIS — I48.91 UNSPECIFIED ATRIAL FIBRILLATION: ICD-10-CM

## 2020-05-27 DIAGNOSIS — Z98.890 OTHER SPECIFIED POSTPROCEDURAL STATES: Chronic | ICD-10-CM

## 2020-05-27 DIAGNOSIS — Z79.01 LONG TERM (CURRENT) USE OF ANTICOAGULANTS: ICD-10-CM

## 2020-05-27 DIAGNOSIS — Z96.653 PRESENCE OF ARTIFICIAL KNEE JOINT, BILATERAL: Chronic | ICD-10-CM

## 2020-05-27 LAB
POCT INR: 1.7 RATIO — HIGH (ref 0.9–1.2)
POCT PT: 20.9 SEC — HIGH (ref 10–13.4)

## 2020-05-29 NOTE — H&P ADULT - ASSESSMENT
Fax Received from [email or web address] with an INR history of:    5/29 - 1.9  5/20 - 3.1  5/6 - 2.4  4/21 - 2.4  4/15 - 2.5  4/8 - 2.8  3/13 - 2.5  3/4 - 3.1   76 yo male patient with PMHx of CAD s/p CABG (2014), repeat cath in 2017 showed patent grafts, Atrial fibrillation on coumadin, chronic systolic CHF s/P CRT-D, uncontrolled DM II (last HbA1c 10.4%), hypothyroidism, HTN, DL, BPH, obesity, presented because of worsening lightheadedness over the last month.  # Working diagnosis: Likely symptoms are related to orthostatic hypotension, Possibly secondary to medications (HCTZ and Flomax)  No evidence of myocardial ischemia, or decompensated heart failure    Admitted to medicine for management and evaluation  Will consult cardiology for eval (Dr. Laughlin)  Will hold HCTZ, change flomax to bedtime  Will check ECG, 2nd set trop and 2d echo    # CAD s/p CABG  No evidence of myocardial ischemia, patient is asymptomatic  F/U cardiac enzymes    # Chronic systolic CHF  continue with metoprolol, ARB  No evidence of decompensation    # Atrial fibrillation  Subtherapeutic INR  Patient was at coumadin clinic today, was prescribed bridging with lovenox and increase in coumadin dose    # Hypothyroidism  Patient was off levothyroxine for the last month  will check TSH and resume synthroid at a lower dose for now, and increase slowly to 125mcg (his usual dose)    # Uncontrolled diabetes mellitus  Last Hba1c 10.4%  continue with insulin S/C  F/U endocrinology as outpatient    # Miscellaneous  DVT proph (already on coumadin)  GI proph  ambulate as tolerated  2gr Na DASH diet, carb consistent  Full code  dispo home 76 yo male patient with dizziness on bending over    admit to tele  Will consult cardiology for eval (Dr. Laughlin)  Will hold HCTZ, change flomax to bedtime  get echo    Chronic systolic CHF  continue with metoprolol, ARB       Atrial fibrillation  Subtherapeutic INR because coumadin on hold for dental work (teeth pulling)  being followed  at coumadin clinic today,bridging with lovenox     gait ataxia  get pt eval

## 2020-06-19 ENCOUNTER — OUTPATIENT (OUTPATIENT)
Dept: OUTPATIENT SERVICES | Facility: HOSPITAL | Age: 80
LOS: 1 days | Discharge: HOME | End: 2020-06-19

## 2020-06-19 DIAGNOSIS — I48.91 UNSPECIFIED ATRIAL FIBRILLATION: ICD-10-CM

## 2020-06-19 DIAGNOSIS — Z96.653 PRESENCE OF ARTIFICIAL KNEE JOINT, BILATERAL: Chronic | ICD-10-CM

## 2020-06-19 DIAGNOSIS — Z98.890 OTHER SPECIFIED POSTPROCEDURAL STATES: Chronic | ICD-10-CM

## 2020-06-19 DIAGNOSIS — Z95.1 PRESENCE OF AORTOCORONARY BYPASS GRAFT: Chronic | ICD-10-CM

## 2020-06-19 DIAGNOSIS — Z79.01 LONG TERM (CURRENT) USE OF ANTICOAGULANTS: ICD-10-CM

## 2020-06-19 DIAGNOSIS — Z95.810 PRESENCE OF AUTOMATIC (IMPLANTABLE) CARDIAC DEFIBRILLATOR: Chronic | ICD-10-CM

## 2020-06-19 LAB
INR PPP: 2.3 RATIO
POCT INR: 2.3 RATIO — HIGH (ref 0.9–1.2)
POCT PT: 27.6 SEC — HIGH (ref 10–13.4)
POCT-PROTHROMBIN TIME: 27.6 SECS
QUALITY CONTROL: YES

## 2020-06-26 ENCOUNTER — RX RENEWAL (OUTPATIENT)
Age: 80
End: 2020-06-26

## 2020-07-17 ENCOUNTER — OUTPATIENT (OUTPATIENT)
Dept: OUTPATIENT SERVICES | Facility: HOSPITAL | Age: 80
LOS: 1 days | Discharge: HOME | End: 2020-07-17

## 2020-07-17 DIAGNOSIS — Z95.810 PRESENCE OF AUTOMATIC (IMPLANTABLE) CARDIAC DEFIBRILLATOR: Chronic | ICD-10-CM

## 2020-07-17 DIAGNOSIS — I48.91 UNSPECIFIED ATRIAL FIBRILLATION: ICD-10-CM

## 2020-07-17 DIAGNOSIS — Z95.1 PRESENCE OF AORTOCORONARY BYPASS GRAFT: Chronic | ICD-10-CM

## 2020-07-17 DIAGNOSIS — Z96.653 PRESENCE OF ARTIFICIAL KNEE JOINT, BILATERAL: Chronic | ICD-10-CM

## 2020-07-17 DIAGNOSIS — Z79.01 LONG TERM (CURRENT) USE OF ANTICOAGULANTS: ICD-10-CM

## 2020-07-17 DIAGNOSIS — Z98.890 OTHER SPECIFIED POSTPROCEDURAL STATES: Chronic | ICD-10-CM

## 2020-07-17 LAB
INR PPP: 2.5 RATIO
POCT INR: 2.5 RATIO — HIGH (ref 0.9–1.2)
POCT PT: 29.5 SEC — HIGH (ref 10–13.4)
POCT-PROTHROMBIN TIME: 29.5 SECS
QUALITY CONTROL: YES

## 2020-07-24 ENCOUNTER — APPOINTMENT (OUTPATIENT)
Dept: CARDIOLOGY | Facility: CLINIC | Age: 80
End: 2020-07-24
Payer: MEDICARE

## 2020-07-24 PROCEDURE — 93296 REM INTERROG EVL PM/IDS: CPT

## 2020-07-24 PROCEDURE — 93295 DEV INTERROG REMOTE 1/2/MLT: CPT

## 2020-08-03 ENCOUNTER — APPOINTMENT (OUTPATIENT)
Dept: CARDIOLOGY | Facility: CLINIC | Age: 80
End: 2020-08-03

## 2020-08-07 ENCOUNTER — RECORD ABSTRACTING (OUTPATIENT)
Age: 80
End: 2020-08-07

## 2020-08-07 DIAGNOSIS — Z95.1 PRESENCE OF AORTOCORONARY BYPASS GRAFT: ICD-10-CM

## 2020-08-07 DIAGNOSIS — Z86.39 PERSONAL HISTORY OF OTHER ENDOCRINE, NUTRITIONAL AND METABOLIC DISEASE: ICD-10-CM

## 2020-08-07 DIAGNOSIS — Z86.79 PERSONAL HISTORY OF OTHER DISEASES OF THE CIRCULATORY SYSTEM: ICD-10-CM

## 2020-08-07 DIAGNOSIS — Z95.9 PRESENCE OF CARDIAC AND VASCULAR IMPLANT AND GRAFT, UNSPECIFIED: ICD-10-CM

## 2020-08-07 DIAGNOSIS — E11.9 TYPE 2 DIABETES MELLITUS W/OUT COMPLICATIONS: ICD-10-CM

## 2020-08-10 ENCOUNTER — RX RENEWAL (OUTPATIENT)
Age: 80
End: 2020-08-10

## 2020-08-12 ENCOUNTER — OUTPATIENT (OUTPATIENT)
Dept: OUTPATIENT SERVICES | Facility: HOSPITAL | Age: 80
LOS: 1 days | Discharge: HOME | End: 2020-08-12

## 2020-08-12 ENCOUNTER — APPOINTMENT (OUTPATIENT)
Dept: MEDICATION MANAGEMENT | Facility: CLINIC | Age: 80
End: 2020-08-12

## 2020-08-12 VITALS — HEART RATE: 78 BPM | RESPIRATION RATE: 16 BRPM | OXYGEN SATURATION: 99 %

## 2020-08-12 DIAGNOSIS — Z79.01 LONG TERM (CURRENT) USE OF ANTICOAGULANTS: ICD-10-CM

## 2020-08-12 DIAGNOSIS — Z51.81 ENCOUNTER FOR THERAPEUTIC DRUG LVL MONITORING: ICD-10-CM

## 2020-08-12 DIAGNOSIS — Z96.653 PRESENCE OF ARTIFICIAL KNEE JOINT, BILATERAL: Chronic | ICD-10-CM

## 2020-08-12 DIAGNOSIS — I48.91 UNSPECIFIED ATRIAL FIBRILLATION: ICD-10-CM

## 2020-08-12 DIAGNOSIS — Z95.1 PRESENCE OF AORTOCORONARY BYPASS GRAFT: Chronic | ICD-10-CM

## 2020-08-12 DIAGNOSIS — Z95.810 PRESENCE OF AUTOMATIC (IMPLANTABLE) CARDIAC DEFIBRILLATOR: Chronic | ICD-10-CM

## 2020-08-12 DIAGNOSIS — Z79.01 ENCOUNTER FOR THERAPEUTIC DRUG LVL MONITORING: ICD-10-CM

## 2020-08-12 DIAGNOSIS — Z98.890 OTHER SPECIFIED POSTPROCEDURAL STATES: Chronic | ICD-10-CM

## 2020-08-12 LAB
INR PPP: 3 RATIO
POCT-PROTHROMBIN TIME: 36.2 SECS
QUALITY CONTROL: YES

## 2020-08-26 NOTE — ED PROVIDER NOTE - NS ED NOTE AC HIGH RISK COUNTRIES
Called to check in 1 week post op from bariatric surgery - Sleeve.    Dehydration assessment:  Urine output/color: good  Chest pain: n  Persistent increased heart rate: n  Fatigue: n  N/V: n  Dizzy/weak: n  BM: y  Protein and fluid intake assessment: (food diary)  Fluid intake: 24floz+  Protein supplements: Started with Protein powder + water and then almond milk. Started RTD shakes today - [facility] nutrition plan  Protein intake yesterday: 30-60g prot  Vitamins  -What vitamins are you taking? FC, super b-complex, Ca + D, B12 liquid dropper  -Are you tolerating well? y  Medications  Omeprazole: y  Hycet: n  How are you tolerating pain at this time? 3-4 (rate on a scale from 1 to 10; >7 notify PA/MD)  Are you having any problems? (f/u with PCP, cardiologist, endocrinologist)  How is your support system at home? awesome  Exercise reminder (light exercise at this time, no lifting above 10 lbs)     Questions for nurse/MA/PA: no, talked to nurse yesterday     Assessment:  Doing well.     Discussion:  Continue to work on fluid and protein intake.     Confirmed date and time for 2 week po labs and clinic visit       No

## 2020-09-09 ENCOUNTER — APPOINTMENT (OUTPATIENT)
Dept: MEDICATION MANAGEMENT | Facility: CLINIC | Age: 80
End: 2020-09-09

## 2020-09-14 ENCOUNTER — OUTPATIENT (OUTPATIENT)
Dept: OUTPATIENT SERVICES | Facility: HOSPITAL | Age: 80
LOS: 1 days | Discharge: HOME | End: 2020-09-14

## 2020-09-14 ENCOUNTER — APPOINTMENT (OUTPATIENT)
Dept: MEDICATION MANAGEMENT | Facility: CLINIC | Age: 80
End: 2020-09-14

## 2020-09-14 VITALS
OXYGEN SATURATION: 100 % | RESPIRATION RATE: 16 BRPM | HEART RATE: 80 BPM | SYSTOLIC BLOOD PRESSURE: 146 MMHG | DIASTOLIC BLOOD PRESSURE: 70 MMHG

## 2020-09-14 DIAGNOSIS — I48.91 UNSPECIFIED ATRIAL FIBRILLATION: ICD-10-CM

## 2020-09-14 DIAGNOSIS — Z95.1 PRESENCE OF AORTOCORONARY BYPASS GRAFT: Chronic | ICD-10-CM

## 2020-09-14 DIAGNOSIS — Z96.653 PRESENCE OF ARTIFICIAL KNEE JOINT, BILATERAL: Chronic | ICD-10-CM

## 2020-09-14 DIAGNOSIS — Z95.810 PRESENCE OF AUTOMATIC (IMPLANTABLE) CARDIAC DEFIBRILLATOR: Chronic | ICD-10-CM

## 2020-09-14 DIAGNOSIS — Z79.01 LONG TERM (CURRENT) USE OF ANTICOAGULANTS: ICD-10-CM

## 2020-09-14 DIAGNOSIS — Z98.890 OTHER SPECIFIED POSTPROCEDURAL STATES: Chronic | ICD-10-CM

## 2020-09-14 LAB
INR PPP: 2.5 RATIO
POCT-PROTHROMBIN TIME: 29.9 SECS
QUALITY CONTROL: YES

## 2020-10-06 ENCOUNTER — OUTPATIENT (OUTPATIENT)
Dept: OUTPATIENT SERVICES | Facility: HOSPITAL | Age: 80
LOS: 1 days | Discharge: HOME | End: 2020-10-06

## 2020-10-06 ENCOUNTER — APPOINTMENT (OUTPATIENT)
Dept: BURN CARE | Facility: CLINIC | Age: 80
End: 2020-10-06
Payer: MEDICARE

## 2020-10-06 DIAGNOSIS — S91.302A UNSPECIFIED OPEN WOUND, LEFT FOOT, INITIAL ENCOUNTER: ICD-10-CM

## 2020-10-06 DIAGNOSIS — Z95.810 PRESENCE OF AUTOMATIC (IMPLANTABLE) CARDIAC DEFIBRILLATOR: Chronic | ICD-10-CM
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
## 2020-10-06 DIAGNOSIS — Z96.653 PRESENCE OF ARTIFICIAL KNEE JOINT, BILATERAL: Chronic | ICD-10-CM

## 2020-10-06 DIAGNOSIS — Z98.890 OTHER SPECIFIED POSTPROCEDURAL STATES: Chronic | ICD-10-CM

## 2020-10-06 DIAGNOSIS — Z95.1 PRESENCE OF AORTOCORONARY BYPASS GRAFT: Chronic | ICD-10-CM
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
## 2020-10-06 PROCEDURE — 97597 DBRDMT OPN WND 1ST 20 CM/<: CPT

## 2020-10-10 LAB — BACTERIA SPEC CULT: ABNORMAL

## 2020-10-20 ENCOUNTER — APPOINTMENT (OUTPATIENT)
Dept: MEDICATION MANAGEMENT | Facility: CLINIC | Age: 80
End: 2020-10-20

## 2020-10-20 ENCOUNTER — OUTPATIENT (OUTPATIENT)
Dept: OUTPATIENT SERVICES | Facility: HOSPITAL | Age: 80
LOS: 1 days | Discharge: HOME | End: 2020-10-20

## 2020-10-20 VITALS — HEART RATE: 90 BPM | RESPIRATION RATE: 16 BRPM | OXYGEN SATURATION: 100 %

## 2020-10-20 DIAGNOSIS — I48.91 UNSPECIFIED ATRIAL FIBRILLATION: ICD-10-CM

## 2020-10-20 DIAGNOSIS — Z96.653 PRESENCE OF ARTIFICIAL KNEE JOINT, BILATERAL: Chronic | ICD-10-CM

## 2020-10-20 DIAGNOSIS — Z79.01 LONG TERM (CURRENT) USE OF ANTICOAGULANTS: ICD-10-CM

## 2020-10-20 DIAGNOSIS — Z95.810 PRESENCE OF AUTOMATIC (IMPLANTABLE) CARDIAC DEFIBRILLATOR: Chronic | ICD-10-CM

## 2020-10-20 DIAGNOSIS — Z98.890 OTHER SPECIFIED POSTPROCEDURAL STATES: Chronic | ICD-10-CM

## 2020-10-20 DIAGNOSIS — Z95.1 PRESENCE OF AORTOCORONARY BYPASS GRAFT: Chronic | ICD-10-CM

## 2020-10-20 LAB
INR PPP: 2.2 RATIO
POCT-PROTHROMBIN TIME: 27 SECS
QUALITY CONTROL: YES

## 2020-10-22 ENCOUNTER — APPOINTMENT (OUTPATIENT)
Dept: BURN CARE | Facility: CLINIC | Age: 80
End: 2020-10-22
Payer: MEDICARE

## 2020-10-22 ENCOUNTER — OUTPATIENT (OUTPATIENT)
Dept: OUTPATIENT SERVICES | Facility: HOSPITAL | Age: 80
LOS: 1 days | Discharge: HOME | End: 2020-10-22
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
## 2020-10-22 DIAGNOSIS — Z95.1 PRESENCE OF AORTOCORONARY BYPASS GRAFT: Chronic | ICD-10-CM
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
## 2020-10-22 DIAGNOSIS — Z95.810 PRESENCE OF AUTOMATIC (IMPLANTABLE) CARDIAC DEFIBRILLATOR: Chronic | ICD-10-CM

## 2020-10-22 DIAGNOSIS — Z98.890 OTHER SPECIFIED POSTPROCEDURAL STATES: Chronic | ICD-10-CM
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
## 2020-10-22 DIAGNOSIS — S91.302D UNSPECIFIED OPEN WOUND, LEFT FOOT, SUBSEQUENT ENCOUNTER: ICD-10-CM

## 2020-10-22 DIAGNOSIS — Z96.653 PRESENCE OF ARTIFICIAL KNEE JOINT, BILATERAL: Chronic | ICD-10-CM
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
## 2020-10-22 DIAGNOSIS — S91.302A UNSPECIFIED OPEN WOUND, LEFT FOOT, INITIAL ENCOUNTER: ICD-10-CM

## 2020-10-22 PROCEDURE — 99213 OFFICE O/P EST LOW 20 MIN: CPT | Mod: 25

## 2020-10-22 PROCEDURE — 16025 DRESS/DEBRID P-THICK BURN M: CPT

## 2020-10-23 ENCOUNTER — APPOINTMENT (OUTPATIENT)
Dept: CARDIOLOGY | Facility: CLINIC | Age: 80
End: 2020-10-23
Payer: MEDICARE

## 2020-10-23 PROCEDURE — 93296 REM INTERROG EVL PM/IDS: CPT

## 2020-10-23 PROCEDURE — 93295 DEV INTERROG REMOTE 1/2/MLT: CPT

## 2020-10-25 ENCOUNTER — INPATIENT (INPATIENT)
Facility: HOSPITAL | Age: 80
LOS: 7 days | Discharge: HOME HEALTH | End: 2020-11-02
Attending: PLASTIC SURGERY | Admitting: PLASTIC SURGERY
Payer: MEDICARE

## 2020-10-25 VITALS
WEIGHT: 240.08 LBS | HEIGHT: 71 IN | TEMPERATURE: 98 F | HEART RATE: 68 BPM | RESPIRATION RATE: 19 BRPM | DIASTOLIC BLOOD PRESSURE: 65 MMHG | SYSTOLIC BLOOD PRESSURE: 140 MMHG | OXYGEN SATURATION: 100 %

## 2020-10-25 DIAGNOSIS — Z95.1 PRESENCE OF AORTOCORONARY BYPASS GRAFT: Chronic | ICD-10-CM

## 2020-10-25 DIAGNOSIS — Z96.653 PRESENCE OF ARTIFICIAL KNEE JOINT, BILATERAL: Chronic | ICD-10-CM

## 2020-10-25 DIAGNOSIS — Z95.810 PRESENCE OF AUTOMATIC (IMPLANTABLE) CARDIAC DEFIBRILLATOR: Chronic | ICD-10-CM

## 2020-10-25 DIAGNOSIS — Z98.890 OTHER SPECIFIED POSTPROCEDURAL STATES: Chronic | ICD-10-CM

## 2020-10-25 LAB
ALBUMIN SERPL ELPH-MCNC: 4.3 G/DL — SIGNIFICANT CHANGE UP (ref 3.5–5.2)
ALP SERPL-CCNC: 155 U/L — HIGH (ref 30–115)
ALT FLD-CCNC: 17 U/L — SIGNIFICANT CHANGE UP (ref 0–41)
ANION GAP SERPL CALC-SCNC: 10 MMOL/L — SIGNIFICANT CHANGE UP (ref 7–14)
APTT BLD: 39.4 SEC — HIGH (ref 27–39.2)
AST SERPL-CCNC: 26 U/L — SIGNIFICANT CHANGE UP (ref 0–41)
BASOPHILS # BLD AUTO: 0.02 K/UL — SIGNIFICANT CHANGE UP (ref 0–0.2)
BASOPHILS NFR BLD AUTO: 0.3 % — SIGNIFICANT CHANGE UP (ref 0–1)
BILIRUB SERPL-MCNC: 1.4 MG/DL — HIGH (ref 0.2–1.2)
BLD GP AB SCN SERPL QL: SIGNIFICANT CHANGE UP
BUN SERPL-MCNC: 46 MG/DL — HIGH (ref 10–20)
CALCIUM SERPL-MCNC: 9.1 MG/DL — SIGNIFICANT CHANGE UP (ref 8.5–10.1)
CHLORIDE SERPL-SCNC: 103 MMOL/L — SIGNIFICANT CHANGE UP (ref 98–110)
CO2 SERPL-SCNC: 24 MMOL/L — SIGNIFICANT CHANGE UP (ref 17–32)
CREAT SERPL-MCNC: 1.5 MG/DL — SIGNIFICANT CHANGE UP (ref 0.7–1.5)
EOSINOPHIL # BLD AUTO: 0.09 K/UL — SIGNIFICANT CHANGE UP (ref 0–0.7)
EOSINOPHIL NFR BLD AUTO: 1.2 % — SIGNIFICANT CHANGE UP (ref 0–8)
GLUCOSE BLDC GLUCOMTR-MCNC: 106 MG/DL — HIGH (ref 70–99)
GLUCOSE BLDC GLUCOMTR-MCNC: 208 MG/DL — HIGH (ref 70–99)
GLUCOSE SERPL-MCNC: 157 MG/DL — HIGH (ref 70–99)
HCT VFR BLD CALC: 42.3 % — SIGNIFICANT CHANGE UP (ref 42–52)
HGB BLD-MCNC: 13.7 G/DL — LOW (ref 14–18)
IMM GRANULOCYTES NFR BLD AUTO: 0.4 % — HIGH (ref 0.1–0.3)
INR BLD: 2.33 RATIO — HIGH (ref 0.65–1.3)
LYMPHOCYTES # BLD AUTO: 1.1 K/UL — LOW (ref 1.2–3.4)
LYMPHOCYTES # BLD AUTO: 15 % — LOW (ref 20.5–51.1)
MCHC RBC-ENTMCNC: 31 PG — SIGNIFICANT CHANGE UP (ref 27–31)
MCHC RBC-ENTMCNC: 32.4 G/DL — SIGNIFICANT CHANGE UP (ref 32–37)
MCV RBC AUTO: 95.7 FL — HIGH (ref 80–94)
MONOCYTES # BLD AUTO: 1.09 K/UL — HIGH (ref 0.1–0.6)
MONOCYTES NFR BLD AUTO: 14.9 % — HIGH (ref 1.7–9.3)
NEUTROPHILS # BLD AUTO: 5.01 K/UL — SIGNIFICANT CHANGE UP (ref 1.4–6.5)
NEUTROPHILS NFR BLD AUTO: 68.2 % — SIGNIFICANT CHANGE UP (ref 42.2–75.2)
NRBC # BLD: 0 /100 WBCS — SIGNIFICANT CHANGE UP (ref 0–0)
PLATELET # BLD AUTO: 130 K/UL — SIGNIFICANT CHANGE UP (ref 130–400)
POTASSIUM SERPL-MCNC: 5.3 MMOL/L — HIGH (ref 3.5–5)
POTASSIUM SERPL-SCNC: 5.3 MMOL/L — HIGH (ref 3.5–5)
PROT SERPL-MCNC: 6.8 G/DL — SIGNIFICANT CHANGE UP (ref 6–8)
PROTHROM AB SERPL-ACNC: 26.8 SEC — HIGH (ref 9.95–12.87)
RBC # BLD: 4.42 M/UL — LOW (ref 4.7–6.1)
RBC # FLD: 15.9 % — HIGH (ref 11.5–14.5)
SODIUM SERPL-SCNC: 137 MMOL/L — SIGNIFICANT CHANGE UP (ref 135–146)
WBC # BLD: 7.34 K/UL — SIGNIFICANT CHANGE UP (ref 4.8–10.8)
WBC # FLD AUTO: 7.34 K/UL — SIGNIFICANT CHANGE UP (ref 4.8–10.8)

## 2020-10-25 PROCEDURE — 73630 X-RAY EXAM OF FOOT: CPT | Mod: 26,LT

## 2020-10-25 PROCEDURE — 71045 X-RAY EXAM CHEST 1 VIEW: CPT | Mod: 26

## 2020-10-25 PROCEDURE — 99285 EMERGENCY DEPT VISIT HI MDM: CPT

## 2020-10-25 PROCEDURE — 93010 ELECTROCARDIOGRAM REPORT: CPT

## 2020-10-25 PROCEDURE — 93925 LOWER EXTREMITY STUDY: CPT | Mod: 26

## 2020-10-25 PROCEDURE — 93970 EXTREMITY STUDY: CPT | Mod: 26

## 2020-10-25 RX ORDER — DEXTROSE 50 % IN WATER 50 %
25 SYRINGE (ML) INTRAVENOUS ONCE
Refills: 0 | Status: DISCONTINUED | OUTPATIENT
Start: 2020-10-25 | End: 2020-10-27

## 2020-10-25 RX ORDER — GLUCAGON INJECTION, SOLUTION 0.5 MG/.1ML
1 INJECTION, SOLUTION SUBCUTANEOUS ONCE
Refills: 0 | Status: DISCONTINUED | OUTPATIENT
Start: 2020-10-25 | End: 2020-10-27

## 2020-10-25 RX ORDER — AMPICILLIN SODIUM AND SULBACTAM SODIUM 250; 125 MG/ML; MG/ML
3 INJECTION, POWDER, FOR SUSPENSION INTRAMUSCULAR; INTRAVENOUS EVERY 6 HOURS
Refills: 0 | Status: DISCONTINUED | OUTPATIENT
Start: 2020-10-25 | End: 2020-10-27

## 2020-10-25 RX ORDER — INSULIN LISPRO 100/ML
8 VIAL (ML) SUBCUTANEOUS
Refills: 0 | Status: DISCONTINUED | OUTPATIENT
Start: 2020-10-25 | End: 2020-10-25

## 2020-10-25 RX ORDER — INSULIN LISPRO 100/ML
5 VIAL (ML) SUBCUTANEOUS
Refills: 0 | Status: DISCONTINUED | OUTPATIENT
Start: 2020-10-25 | End: 2020-10-27

## 2020-10-25 RX ORDER — HEPARIN SODIUM 5000 [USP'U]/ML
5000 INJECTION INTRAVENOUS; SUBCUTANEOUS EVERY 8 HOURS
Refills: 0 | Status: DISCONTINUED | OUTPATIENT
Start: 2020-10-25 | End: 2020-10-27

## 2020-10-25 RX ORDER — SENNA PLUS 8.6 MG/1
2 TABLET ORAL DAILY
Refills: 0 | Status: DISCONTINUED | OUTPATIENT
Start: 2020-10-25 | End: 2020-10-27

## 2020-10-25 RX ORDER — COLLAGENASE CLOSTRIDIUM HIST. 250 UNIT/G
1 OINTMENT (GRAM) TOPICAL
Refills: 0 | Status: DISCONTINUED | OUTPATIENT
Start: 2020-10-25 | End: 2020-10-27

## 2020-10-25 RX ORDER — INSULIN GLARGINE 100 [IU]/ML
15 INJECTION, SOLUTION SUBCUTANEOUS AT BEDTIME
Refills: 0 | Status: DISCONTINUED | OUTPATIENT
Start: 2020-10-25 | End: 2020-10-27

## 2020-10-25 RX ORDER — SODIUM POLYSTYRENE SULFONATE 4.1 MEQ/G
30 POWDER, FOR SUSPENSION ORAL ONCE
Refills: 0 | Status: COMPLETED | OUTPATIENT
Start: 2020-10-25 | End: 2020-10-25

## 2020-10-25 RX ORDER — SODIUM CHLORIDE 9 MG/ML
1000 INJECTION INTRAMUSCULAR; INTRAVENOUS; SUBCUTANEOUS
Refills: 0 | Status: DISCONTINUED | OUTPATIENT
Start: 2020-10-25 | End: 2020-10-26

## 2020-10-25 RX ORDER — DEXTROSE 50 % IN WATER 50 %
12.5 SYRINGE (ML) INTRAVENOUS ONCE
Refills: 0 | Status: DISCONTINUED | OUTPATIENT
Start: 2020-10-25 | End: 2020-10-27

## 2020-10-25 RX ORDER — CHLORHEXIDINE GLUCONATE 213 G/1000ML
1 SOLUTION TOPICAL
Refills: 0 | Status: DISCONTINUED | OUTPATIENT
Start: 2020-10-25 | End: 2020-10-27

## 2020-10-25 RX ORDER — PANTOPRAZOLE SODIUM 20 MG/1
40 TABLET, DELAYED RELEASE ORAL
Refills: 0 | Status: DISCONTINUED | OUTPATIENT
Start: 2020-10-25 | End: 2020-10-27

## 2020-10-25 RX ORDER — INSULIN LISPRO 100/ML
VIAL (ML) SUBCUTANEOUS
Refills: 0 | Status: DISCONTINUED | OUTPATIENT
Start: 2020-10-25 | End: 2020-10-27

## 2020-10-25 RX ORDER — SODIUM CHLORIDE 9 MG/ML
1000 INJECTION, SOLUTION INTRAVENOUS
Refills: 0 | Status: DISCONTINUED | OUTPATIENT
Start: 2020-10-25 | End: 2020-10-27

## 2020-10-25 RX ORDER — LATANOPROST 0.05 MG/ML
1 SOLUTION/ DROPS OPHTHALMIC; TOPICAL AT BEDTIME
Refills: 0 | Status: DISCONTINUED | OUTPATIENT
Start: 2020-10-25 | End: 2020-10-27

## 2020-10-25 RX ORDER — FUROSEMIDE 40 MG
40 TABLET ORAL DAILY
Refills: 0 | Status: DISCONTINUED | OUTPATIENT
Start: 2020-10-25 | End: 2020-10-27

## 2020-10-25 RX ORDER — IPRATROPIUM/ALBUTEROL SULFATE 18-103MCG
3 AEROSOL WITH ADAPTER (GRAM) INHALATION EVERY 6 HOURS
Refills: 0 | Status: DISCONTINUED | OUTPATIENT
Start: 2020-10-25 | End: 2020-10-27

## 2020-10-25 RX ORDER — INSULIN GLARGINE 100 [IU]/ML
20 INJECTION, SOLUTION SUBCUTANEOUS AT BEDTIME
Refills: 0 | Status: DISCONTINUED | OUTPATIENT
Start: 2020-10-25 | End: 2020-10-25

## 2020-10-25 RX ORDER — BUDESONIDE AND FORMOTEROL FUMARATE DIHYDRATE 160; 4.5 UG/1; UG/1
2 AEROSOL RESPIRATORY (INHALATION)
Refills: 0 | Status: DISCONTINUED | OUTPATIENT
Start: 2020-10-25 | End: 2020-10-27

## 2020-10-25 RX ORDER — LOSARTAN POTASSIUM 100 MG/1
100 TABLET, FILM COATED ORAL DAILY
Refills: 0 | Status: DISCONTINUED | OUTPATIENT
Start: 2020-10-25 | End: 2020-10-27

## 2020-10-25 RX ORDER — POLYETHYLENE GLYCOL 3350 17 G/17G
17 POWDER, FOR SOLUTION ORAL DAILY
Refills: 0 | Status: DISCONTINUED | OUTPATIENT
Start: 2020-10-25 | End: 2020-10-27

## 2020-10-25 RX ORDER — MORPHINE SULFATE 50 MG/1
2 CAPSULE, EXTENDED RELEASE ORAL
Refills: 0 | Status: DISCONTINUED | OUTPATIENT
Start: 2020-10-25 | End: 2020-10-27

## 2020-10-25 RX ORDER — TAMSULOSIN HYDROCHLORIDE 0.4 MG/1
0.4 CAPSULE ORAL AT BEDTIME
Refills: 0 | Status: DISCONTINUED | OUTPATIENT
Start: 2020-10-25 | End: 2020-10-27

## 2020-10-25 RX ORDER — OXYCODONE AND ACETAMINOPHEN 5; 325 MG/1; MG/1
1 TABLET ORAL EVERY 6 HOURS
Refills: 0 | Status: DISCONTINUED | OUTPATIENT
Start: 2020-10-25 | End: 2020-10-27

## 2020-10-25 RX ORDER — SODIUM HYPOCHLORITE 0.125 %
1 SOLUTION, NON-ORAL MISCELLANEOUS
Refills: 0 | Status: DISCONTINUED | OUTPATIENT
Start: 2020-10-25 | End: 2020-10-27

## 2020-10-25 RX ORDER — ZINC SULFATE TAB 220 MG (50 MG ZINC EQUIVALENT) 220 (50 ZN) MG
220 TAB ORAL DAILY
Refills: 0 | Status: DISCONTINUED | OUTPATIENT
Start: 2020-10-25 | End: 2020-10-27

## 2020-10-25 RX ORDER — ATORVASTATIN CALCIUM 80 MG/1
40 TABLET, FILM COATED ORAL AT BEDTIME
Refills: 0 | Status: DISCONTINUED | OUTPATIENT
Start: 2020-10-25 | End: 2020-10-27

## 2020-10-25 RX ORDER — LEVOTHYROXINE SODIUM 125 MCG
125 TABLET ORAL DAILY
Refills: 0 | Status: DISCONTINUED | OUTPATIENT
Start: 2020-10-25 | End: 2020-10-26

## 2020-10-25 RX ORDER — DEXTROSE 50 % IN WATER 50 %
15 SYRINGE (ML) INTRAVENOUS ONCE
Refills: 0 | Status: DISCONTINUED | OUTPATIENT
Start: 2020-10-25 | End: 2020-10-27

## 2020-10-25 RX ADMIN — TAMSULOSIN HYDROCHLORIDE 0.4 MILLIGRAM(S): 0.4 CAPSULE ORAL at 21:41

## 2020-10-25 RX ADMIN — ATORVASTATIN CALCIUM 40 MILLIGRAM(S): 80 TABLET, FILM COATED ORAL at 21:41

## 2020-10-25 RX ADMIN — SODIUM CHLORIDE 75 MILLILITER(S): 9 INJECTION INTRAMUSCULAR; INTRAVENOUS; SUBCUTANEOUS at 19:09

## 2020-10-25 RX ADMIN — BUDESONIDE AND FORMOTEROL FUMARATE DIHYDRATE 2 PUFF(S): 160; 4.5 AEROSOL RESPIRATORY (INHALATION) at 21:40

## 2020-10-25 RX ADMIN — AMPICILLIN SODIUM AND SULBACTAM SODIUM 200 GRAM(S): 250; 125 INJECTION, POWDER, FOR SUSPENSION INTRAMUSCULAR; INTRAVENOUS at 17:02

## 2020-10-25 RX ADMIN — LATANOPROST 1 DROP(S): 0.05 SOLUTION/ DROPS OPHTHALMIC; TOPICAL at 21:41

## 2020-10-25 RX ADMIN — SODIUM POLYSTYRENE SULFONATE 30 GRAM(S): 4.1 POWDER, FOR SUSPENSION ORAL at 17:03

## 2020-10-25 NOTE — ED PROVIDER NOTE - PHYSICAL EXAMINATION
Vital Signs: I have reviewed the initial vital signs.  Constitutional: well-nourished, appears stated age, no acute distress  Cardiovascular: regular rate, regular rhythm, well-perfused extremities  Respiratory: unlabored respiratory effort, clear to auscultation bilaterally  Gastrointestinal: soft, non-tender abdomen  Musculoskeletal: supple neck, no lower extremity edema. (+) open wound on L foot medial sole; no surrounding swelling, erythema, warmth. Wound re-dressed.   Integumentary: warm, dry, no rash  Neurologic: awake, alert, extremities’ motor and sensory functions grossly intact  Psychiatric: appropriate mood, appropriate affect

## 2020-10-25 NOTE — H&P ADULT - ASSESSMENT
80 year old male with PMHx significant for Afib on coumadin, HFrEF (s/p AICD placement), CAD s/p CABG, BPH, hypothyroidism, DM, HLD, HTN, COPD, Psoriasis, PVD presenting to the ED for left heel wound.     #Left heel full thickness wound  -Admit to burn service  -LW: santyl/dakins, wrap with kerlix and ACE twice a day  -OR tomorrow for debridement  -NPO after midnight, pre-op labs  -IVF  -IV abx  -pain management  -PT consult  -Vascular studies ordered  -f/u x-ray of left foot  -GI/DVT ppx  -ambulate as tolerated.    #Cards  Patient has a hx of Atrial fibrillation, HFrEF, CAD s/p CABG  Patient takes 2.5 mg of Coumadin tonight, INR 2.33  will held tonight's dose for OR tomorrow.   No acute signs of heart failure,   will continue home dose Lasix 40mg qd, Continue Lipitor 40 qhs  will order echo  DASH/TLC diet    #Resp  hx of COPD (denies Home O2 use)  neb tx PRN  will Continue inhalers  incentive spirometer     #Endo  Patient has a hx of DM2, hypothyroidism   will Hold Metformin, will cont levothyroxine   will start bolus insulin plus ISS inpatient   monitor FS, adjust insulin accordingly   carb consistent diet    #hyperkalemia  K 5.3 on admission  -will order Kayexalate and repeat BMP  -ekg ordered, f/u results          Plan of care discussed with patient, in agreement with plan. all concerns addressed.    80 year old male with PMHx significant for Afib on coumadin, HFrEF (s/p AICD placement), CAD s/p CABG, BPH, hypothyroidism, DM, HLD, HTN, COPD, Psoriasis, PVD presenting to the ED for left heel wound.     #Left heel full thickness wound  -Admit to burn service  -LWC: santyl/dakins, wrap with kerlix and ACE twice a day  -OR tomorrow for debridement  -NPO after midnight, pre-op labs  -IVF  -IV abx  -pain management  -PT consult  -Vascular studies ordered  -f/u x-ray of left foot  -GI/DVT ppx  -ambulate as tolerated.    #Cards  Patient has a hx of Atrial fibrillation, HFrEF, CAD s/p CABG  Patient takes 2.5 mg of Coumadin tonight, INR 2.33  will held tonight's dose for OR tomorrow. restart coumadin after OR.  No acute signs of heart failure at this time,   will continue home dose Lasix 40mg qd, Continue Lipitor 40 qhs  will order echo  DASH/TLC diet    #Resp  hx of COPD (denies Home O2 use)  neb tx PRN  will Continue inhalers  incentive spirometer     #Endo  Patient has a hx of DM2, hypothyroidism   will Hold Metformin, will cont levothyroxine   will start bolus insulin plus ISS inpatient   monitor FS, adjust insulin accordingly   carb consistent diet    #hyperkalemia  K 5.3 on admission  -will order Kayexalate and repeat BMP  -ekg ordered, f/u results          Plan of care discussed with patient, in agreement with plan. all concerns addressed.    80 year old male with PMHx significant for Afib on coumadin, HFrEF (s/p AICD placement), CAD s/p CABG, BPH, hypothyroidism, DM, HLD, HTN, COPD, Psoriasis, PVD presenting to the ED for left heel wound.     #Left heel full thickness wound  -Admit to burn service  -LWC: santyl/dakins, wrap with kerlix and ACE twice a day  -OR tomorrow for debridement  -NPO after midnight, pre-op labs  -IVF  -IV abx  -pain management  -PT consult  -Vascular studies ordered  -f/u final read of  x-ray of left foot  -GI/DVT ppx  -ambulate as tolerated.    #Cards  Patient has a hx of Atrial fibrillation, HFrEF, CAD s/p CABG  Patient takes 2.5 mg of Coumadin tonight, INR 2.33  will held tonight's dose for OR tomorrow. restart coumadin after OR.  No acute signs of heart failure at this time,   will continue home dose Lasix 40mg qd, losartan,  Lipitor 40 qhs  will order echo  DASH/TLC diet    #Resp  hx of COPD (denies Home O2 use)  neb tx PRN  will Continue inhalers  incentive spirometer     #Endo  Patient has a hx of DM2, hypothyroidism   will Hold Metformin, will cont levothyroxine   will start bolus insulin plus ISS inpatient   monitor FS, adjust insulin accordingly   -hgA1c ordered  carb consistent diet    #hyperkalemia  K 5.3 on admission  -will order Kayexalate and repeat BMP  -ekg ordered, f/u results          Plan of care discussed with patient, in agreement with plan. all concerns addressed.    80 year old male with PMHx significant for Afib on coumadin, HFrEF (s/p AICD placement), CAD s/p CABG, BPH, hypothyroidism, DM, HLD, HTN, COPD, Psoriasis, PVD presenting to the ED for left heel wound.     #Left heel full thickness wound  -Admit to burn service  -LWC: santyl/dakins, wrap with kerlix and ACE twice a day  -OR tomorrow for debridement  -NPO after midnight, pre-op labs  -IVF  -IV abx  -pain management  -PT consult  -Vascular studies ordered  -f/u final read of  x-ray of left foot  -GI/DVT ppx  -ambulate as tolerated.    #Cards  Patient has a hx of Atrial fibrillation, HFrEF, CAD s/p CABG  Patient takes 2.5 mg of Coumadin tonight, INR 2.33  will held tonight's dose for OR tomorrow. restart coumadin after OR.  No acute signs of heart failure at this time,   will continue home dose Lasix 40mg qd, losartan,  Lipitor 40 qhs  will order echo  -will place cards consult for cards clearance   DASH/TLC diet    #Resp  hx of COPD (denies Home O2 use)  neb tx PRN  will Continue inhalers  incentive spirometer     #Endo  Patient has a hx of DM2, hypothyroidism   will Hold Metformin, will cont levothyroxine   will start bolus insulin plus ISS inpatient   monitor FS, adjust insulin accordingly   -hgA1c ordered  carb consistent diet    #hyperkalemia  K 5.3 on admission  -will order Kayexalate and repeat BMP  -ekg ordered, f/u results          Plan of care discussed with patient, in agreement with plan. all concerns addressed.

## 2020-10-25 NOTE — ED ADULT TRIAGE NOTE - ESI TRIAGE ACUITY LEVEL, MLM
EXAM DESCRIPTION:  3D SCREENING MAMMO BILAT



COMPLETED DATE/TIME:  9/18/2019 9:56 am



REASON FOR STUDY:  Z12.31 ENCOUNTER FOR SCREENING MAMMOGRAM FOR MALIGNANT NEOPLASM OF BREAST Z12.31  
ENCNTR SCREEN MAMMOGRAM FOR MALIGNANT NEOPLASM OF CAROLYNN



COMPARISON:  Multiple since 2012



EXAM PARAMETERS:  Views: Standard craniocaudal and mediolateral oblique views of each breast recorded
 using digital acquisition and breast tomosynthesis.

Read with the assistance of CAD.

.[facility] - [employer]  Version 9.2



LIMITATIONS:  None.



FINDINGS:  No suspicious masses, suspicious calcifications or architectural distortion. No areas of c
oncern.



IMPRESSION:   NEGATIVE MAMMOGRAM. BIRADS 1.



BREAST DENSITY:  b. There are scattered areas of fibroglandular density.



BIRAD:  ASSESSMENT:  1 NEGATIVE



RECOMMENDATION:  ROUTINE SCREENING



COMMENT:  The patient has been notified of the results by letter per MQSA requirements. Additional no
tification policies are in place for contacting patient with suspicious or incomplete findings.

Quality ID #225: The American College of Radiology recommends an annual screening mammogram for women
 aged 40 years or over. This facility utilizes a reminder system to ensure that all patients receive 
reminder letters, and/or direct phone calls for appointments. This includes reminders for routine scr
eening mammograms, diagnostic mammograms, or other Breast Imaging Interventions when appropriate.  Th
is patient will be placed in the appropriate reminder system.



TECHNICAL DOCUMENTATION:  FINDING NUMBER: (1)

ASSESSMENT:  (1)

JOB ID:  [id number]

 2011 [employer]- All Rights Reserved



Reading location - IP/workstation name: [email or web address] [id number]

## 2020-10-25 NOTE — ED ADULT NURSE REASSESSMENT NOTE - NS ED NURSE REASSESS COMMENT FT1
Call bell not working. charge nurse made aware. work ordered placed. will frequently assess patient.

## 2020-10-25 NOTE — ED PROVIDER NOTE - PROGRESS NOTE DETAILS
All pre-op labs, imaging completed. Pt afebrile with no elevated WBC in the ED. Pt admitted under Dr. Billy.

## 2020-10-25 NOTE — H&P ADULT - HISTORY OF PRESENT ILLNESS
Patient is a 80 year old male with PMHx significant for Afib on coumadin, HFrEF (s/p AICD placement), CAD s/p CABG, BPH, hypothyroidism, DM, HLD, HTN, Psoriasis, PVD presenting to the ED for left heel wound. Patient states the wound has been there since June of this year due to diabetes. patient endosres that he was following a podiatrist for the heel wound and was referred to see Dr. Billy for further wound care management. Patient was seen by Dr. Billy on 10/22/20 in burn clinic and was told to come in for admission for IV antibiotic and debridement of left heel wound. Patient reports increased drainage for heel wound,  foul smell and some tenderness to palpation of wound. Patient denies any fever, chills, paresthesia edema, chest pain, shortness of breath, abdominal pain, nausea, vomiting, or diarrhea Patient is a 80 year old male with PMHx significant for Afib on coumadin, HFrEF (s/p AICD placement), CAD s/p CABG, BPH, hypothyroidism, DM, HLD, HTN, COPD, Psoriasis, PVD presenting to the ED for left heel wound. Patient states the wound has been there since June of this year due to diabetes. Patient endorses that he was following a podiatrist for the heel wound and was referred to see Dr. Billy for further wound care management. Patient was seen by Dr. Billy on 10/22/20 in burn clinic and was told to come in for admission for IV antibiotic and debridement of left heel wound. Patient reports increased drainage for heel wound,  foul smell and some tenderness to palpation of wound. Patient denies any fever, chills, paresthesia edema, chest pain, shortness of breath, abdominal pain, nausea, vomiting, or diarrhea Patient is a 80 year old male with PMHx significant of Afib on coumadin, HFrEF (s/p AICD placement), CAD s/p CABG, BPH, hypothyroidism, DM, HLD, HTN, COPD, Psoriasis, PVD presenting to the ED for left heel wound. Patient states the wound has been there since June of this year due to diabetes. Patient endorses that he was following a podiatrist for the heel wound and was referred to see Dr. Billy for further wound care management. Patient was seen by Dr. Billy on 10/22/20 in burn clinic and was told to come in for admission for IV antibiotic and debridement of left heel wound. Patient reports increased drainage of left heel wound,  foul smell and some tenderness to palpation of wound. Patient denies any fever, chills, paresthesia edema, chest pain, shortness of breath, abdominal pain, nausea, vomiting, or diarrhea Patient is a 80 year old male with PMHx significant of Afib on coumadin, HFrEF (s/p AICD placement), CAD s/p CABG, BPH, hypothyroidism, DM, HLD, HTN, COPD, Psoriasis, PVD presenting to the ED for chronic left heel wound. Patient states the wound has been there since June of this year due to diabetes. Patient endorses that he was following a podiatrist for the heel wound and was referred to see Dr. Billy for further wound care management. Patient was seen by Dr. Billy on 10/22/20 in burn clinic and was told to come in for admission for IV antibiotic and debridement of left heel wound. Patient reports increased drainage of left heel wound,  foul smell and some tenderness to palpation of wound. Patient denies any fever, chills, paresthesia edema, chest pain, shortness of breath, abdominal pain, nausea, vomiting, or diarrhea Patient is a 80 year old male with PMHx significant of Afib on coumadin, HFrEF (s/p AICD placement), CAD s/p CABG, BPH, hypothyroidism, DM, HLD, HTN, COPD, Psoriasis, PVD presenting to the ED for chronic left heel wound. Patient states the wound has been there since June of this year due to diabetes. Patient endorses that he was following a podiatrist for the heel wound and was referred to see Dr. Billy for further wound care management. Patient was seen by Dr. Billy on 10/22/20 in burn clinic and was told to come in for admission for IV antibiotic and debridement of left heel wound. Wcx from clinic 10/22 showed num proteus, num E. Faecalis, Num streptococcus gordonii. Patient reports increased drainage of left heel wound,  foul smell and some tenderness to palpation of wound. Patient denies any fever, chills, paresthesia edema, chest pain, shortness of breath, abdominal pain, nausea, vomiting, or diarrhea

## 2020-10-25 NOTE — ED PROVIDER NOTE - ATTENDING CONTRIBUTION TO CARE
79 yo male [facility] as noted sent for admission for treatment of a non-healing left foot ulcer.  Exam as noted, will check labs and admit to Dr Billy.

## 2020-10-25 NOTE — ED ADULT TRIAGE NOTE - CHIEF COMPLAINT QUOTE
pt presents from Dr Billy for admission for surgical admission and debridement w/ IV antibiotics of left heel of wound , wound currently wrapped

## 2020-10-25 NOTE — ED PROVIDER NOTE - NS ED ROS FT
Constitutional: (-) fever  Eyes/ENT: (-) blurry vision, (-) epistaxis  Cardiovascular: (-) chest pain, (-) syncope  Respiratory: (-) cough, (-) shortness of breath  Gastrointestinal: (-) vomiting, (-) diarrhea  Musculoskeletal: (-) neck pain, (-) back pain, (-) joint pain, (+) chronic foot wound   Integumentary: (-) rash, (-) edema  Neurological: (-) headache, (-) altered mental status  Psychiatric: (-) hallucinations  Allergic/Immunologic: (-) pruritus

## 2020-10-25 NOTE — H&P ADULT - NSHPPHYSICALEXAM_GEN_ALL_CORE
PHYSICAL EXAM:  GENERAL: NAD, sitting in wheelchair comfortably.   HEAD:  Atraumatic, Normocephalic  CHEST/LUNG: speaking in full sentences, breathing comfortably in room air, symmetrical chest expansion.   HEART: in no acute cardiopulmonary distress.   EXTREMITIES:  2+ Peripheral Pulses, No clubbing, cyanosis, or edema noted. cap refill <3.   PSYCH: AAOx3, cooperative, pleasant   SKIN: Left PHYSICAL EXAM:  GENERAL: NAD, sitting in wheelchair comfortably.   HEAD:  Atraumatic, Normocephalic  CHEST/LUNG: speaking in full sentences, breathing comfortably in room air, symmetrical chest expansion.   HEART: in no acute cardiopulmonary distress.   EXTREMITIES:  No clubbing, cyanosis, or edema noted. cap refill <3.   PSYCH: AAOx3, cooperative, pleasant   SKIN: LLE: left medial heel full thickness wound ~ 2.5cm x2.5cm with yellow necrotic tissues to wound base and surrounding pinking granulating tissue to wound edges. mild drainage noted. no malodor noted. +DP pulses noted. no significant edema noted. PHYSICAL EXAM:  GENERAL: NAD, sitting in wheelchair comfortably.   HEAD:  Atraumatic, Normocephalic  CHEST/LUNG: speaking in full sentences, breathing comfortably in room air, symmetrical chest expansion.   HEART: in no acute cardiopulmonary distress.   EXTREMITIES:  No clubbing, cyanosis, or edema noted. cap refill <3.   PSYCH: AAOx3, cooperative, pleasant   SKIN: LLE: left medial heel full thickness wound ~ 2.5cm x2.5cm with yellow necrotic tissue to wound base and surrounding pink granulating tissue to wound edges. mild drainage noted. no malodor noted. +DP pulses noted. no significant edema noted. PHYSICAL EXAM:  GENERAL: NAD, sitting in wheelchair comfortably.   HEAD:  Atraumatic, Normocephalic  CHEST/LUNG: speaking in full sentences, breathing comfortably in room air, symmetrical chest expansion.   HEART: in no acute cardiopulmonary distress.   EXTREMITIES:  No clubbing, cyanosis, or edema noted. cap refill <3.   PSYCH: AAOx3, cooperative, pleasant   SKIN: LLE: left medial heel full thickness wound ~ 2.5cm x2.5cm with yellow necrotic tissue to wound base and surrounding pink granulating tissue to wound edges. mild drainage noted. no malodor noted. +DP pulses noted. no significant erythema or edema noted. PHYSICAL EXAM:  GENERAL: NAD, sitting in wheelchair comfortably.   HEAD:  Atraumatic, Normocephalic  CHEST/LUNG: speaking in full sentences, breathing comfortably in room air, symmetrical chest expansion.   HEART: in no acute cardiopulmonary distress.   EXTREMITIES:  No clubbing, cyanosis, or edema noted. cap refill <3.   PSYCH: AAOx3, cooperative, pleasant   SKIN: LLE: left medial heel full thickness wound ~ 2.5cm x2.5cm with yellow necrotic tissue to wound base and surrounding pink granulating tissue to wound edges. mild drainage noted. no malodor noted. +DP pulses noted. no significant erythema or edema noted.    Dressing changed performed in the ED. Patient tolerated well.

## 2020-10-25 NOTE — ED PROVIDER NOTE - OBJECTIVE STATEMENT
The pt is an 80y M w/ hx of DM, HTN, afib on Coumadin, COPD not on home O2 sent in by Dr. Billy for IV antibiotics and possible surgical debridement of chronic L foot wound. Pt has had this wound on the medial sole since June and has seen his foot doctor Dr. Law daily. Afebrile at home. Denies headache, chest pain, SOB, N/V, abdominal pain, diarrhea, dysuria. The pt is an 80y M w/ hx of DM, HTN, afib on Coumadin, pacemaker, COPD not on home O2 sent in by Dr. Billy for IV antibiotics and possible surgical debridement of chronic L foot wound. Pt has had this wound on the medial sole since June and has seen his foot doctor Dr. Law daily. Afebrile at home. Denies headache, chest pain, SOB, N/V, abdominal pain, diarrhea, dysuria.

## 2020-10-25 NOTE — H&P ADULT - NSHPLABSRESULTS_GEN_ALL_CORE
13.7   7.34  )-----------( 130      ( 25 Oct 2020 12:45 )             42.3   10-25    137  |  103  |  46<H>  ----------------------------<  157<H>  5.3<H>   |  24  |  1.5    Ca    9.1      25 Oct 2020 12:45    TPro  6.8  /  Alb  4.3  /  TBili  1.4<H>  /  DBili  x   /  AST  26  /  ALT  17  /  AlkPhos  155<H>  10-25    PT/INR - ( 25 Oct 2020 12:45 )   PT: 26.80 sec;   INR: 2.33 ratio         PTT - ( 25 Oct 2020 12:45 )  PTT:39.4 sec

## 2020-10-26 LAB
A1C WITH ESTIMATED AVERAGE GLUCOSE RESULT: 8.6 % — HIGH (ref 4–5.6)
ANION GAP SERPL CALC-SCNC: 10 MMOL/L — SIGNIFICANT CHANGE UP (ref 7–14)
ANION GAP SERPL CALC-SCNC: 12 MMOL/L — SIGNIFICANT CHANGE UP (ref 7–14)
APTT BLD: 36.2 SEC — SIGNIFICANT CHANGE UP (ref 27–39.2)
APTT BLD: 42.3 SEC — HIGH (ref 27–39.2)
BASOPHILS # BLD AUTO: 0.02 K/UL — SIGNIFICANT CHANGE UP (ref 0–0.2)
BASOPHILS # BLD AUTO: 0.03 K/UL — SIGNIFICANT CHANGE UP (ref 0–0.2)
BASOPHILS NFR BLD AUTO: 0.3 % — SIGNIFICANT CHANGE UP (ref 0–1)
BASOPHILS NFR BLD AUTO: 0.4 % — SIGNIFICANT CHANGE UP (ref 0–1)
BUN SERPL-MCNC: 41 MG/DL — HIGH (ref 10–20)
BUN SERPL-MCNC: 48 MG/DL — HIGH (ref 10–20)
CALCIUM SERPL-MCNC: 8.5 MG/DL — SIGNIFICANT CHANGE UP (ref 8.5–10.1)
CALCIUM SERPL-MCNC: 9.1 MG/DL — SIGNIFICANT CHANGE UP (ref 8.5–10.1)
CHLORIDE SERPL-SCNC: 103 MMOL/L — SIGNIFICANT CHANGE UP (ref 98–110)
CHLORIDE SERPL-SCNC: 107 MMOL/L — SIGNIFICANT CHANGE UP (ref 98–110)
CO2 SERPL-SCNC: 24 MMOL/L — SIGNIFICANT CHANGE UP (ref 17–32)
CO2 SERPL-SCNC: 27 MMOL/L — SIGNIFICANT CHANGE UP (ref 17–32)
CREAT SERPL-MCNC: 1.2 MG/DL — SIGNIFICANT CHANGE UP (ref 0.7–1.5)
CREAT SERPL-MCNC: 1.3 MG/DL — SIGNIFICANT CHANGE UP (ref 0.7–1.5)
EOSINOPHIL # BLD AUTO: 0.07 K/UL — SIGNIFICANT CHANGE UP (ref 0–0.7)
EOSINOPHIL # BLD AUTO: 0.1 K/UL — SIGNIFICANT CHANGE UP (ref 0–0.7)
EOSINOPHIL NFR BLD AUTO: 0.9 % — SIGNIFICANT CHANGE UP (ref 0–8)
EOSINOPHIL NFR BLD AUTO: 1.4 % — SIGNIFICANT CHANGE UP (ref 0–8)
ESTIMATED AVERAGE GLUCOSE: 200 MG/DL — HIGH (ref 68–114)
GLUCOSE BLDC GLUCOMTR-MCNC: 115 MG/DL — HIGH (ref 70–99)
GLUCOSE BLDC GLUCOMTR-MCNC: 125 MG/DL — HIGH (ref 70–99)
GLUCOSE BLDC GLUCOMTR-MCNC: 143 MG/DL — HIGH (ref 70–99)
GLUCOSE SERPL-MCNC: 122 MG/DL — HIGH (ref 70–99)
GLUCOSE SERPL-MCNC: 208 MG/DL — HIGH (ref 70–99)
HCT VFR BLD CALC: 41.8 % — LOW (ref 42–52)
HCT VFR BLD CALC: 42.3 % — SIGNIFICANT CHANGE UP (ref 42–52)
HGB BLD-MCNC: 13.4 G/DL — LOW (ref 14–18)
HGB BLD-MCNC: 13.4 G/DL — LOW (ref 14–18)
IMM GRANULOCYTES NFR BLD AUTO: 0.1 % — SIGNIFICANT CHANGE UP (ref 0.1–0.3)
IMM GRANULOCYTES NFR BLD AUTO: 0.4 % — HIGH (ref 0.1–0.3)
INR BLD: 2.18 RATIO — HIGH (ref 0.65–1.3)
INR BLD: 2.34 RATIO — HIGH (ref 0.65–1.3)
LYMPHOCYTES # BLD AUTO: 1.16 K/UL — LOW (ref 1.2–3.4)
LYMPHOCYTES # BLD AUTO: 1.24 K/UL — SIGNIFICANT CHANGE UP (ref 1.2–3.4)
LYMPHOCYTES # BLD AUTO: 15.2 % — LOW (ref 20.5–51.1)
LYMPHOCYTES # BLD AUTO: 17.6 % — LOW (ref 20.5–51.1)
MAGNESIUM SERPL-MCNC: 1.6 MG/DL — LOW (ref 1.8–2.4)
MAGNESIUM SERPL-MCNC: 1.7 MG/DL — LOW (ref 1.8–2.4)
MCHC RBC-ENTMCNC: 30.7 PG — SIGNIFICANT CHANGE UP (ref 27–31)
MCHC RBC-ENTMCNC: 30.8 PG — SIGNIFICANT CHANGE UP (ref 27–31)
MCHC RBC-ENTMCNC: 31.7 G/DL — LOW (ref 32–37)
MCHC RBC-ENTMCNC: 32.1 G/DL — SIGNIFICANT CHANGE UP (ref 32–37)
MCV RBC AUTO: 96.1 FL — HIGH (ref 80–94)
MCV RBC AUTO: 96.8 FL — HIGH (ref 80–94)
MONOCYTES # BLD AUTO: 1.27 K/UL — HIGH (ref 0.1–0.6)
MONOCYTES # BLD AUTO: 1.28 K/UL — HIGH (ref 0.1–0.6)
MONOCYTES NFR BLD AUTO: 16.8 % — HIGH (ref 1.7–9.3)
MONOCYTES NFR BLD AUTO: 18 % — HIGH (ref 1.7–9.3)
NEUTROPHILS # BLD AUTO: 4.39 K/UL — SIGNIFICANT CHANGE UP (ref 1.4–6.5)
NEUTROPHILS # BLD AUTO: 5.07 K/UL — SIGNIFICANT CHANGE UP (ref 1.4–6.5)
NEUTROPHILS NFR BLD AUTO: 62.3 % — SIGNIFICANT CHANGE UP (ref 42.2–75.2)
NEUTROPHILS NFR BLD AUTO: 66.6 % — SIGNIFICANT CHANGE UP (ref 42.2–75.2)
NRBC # BLD: 0 /100 WBCS — SIGNIFICANT CHANGE UP (ref 0–0)
NRBC # BLD: 0 /100 WBCS — SIGNIFICANT CHANGE UP (ref 0–0)
PHOSPHATE SERPL-MCNC: 3.3 MG/DL — SIGNIFICANT CHANGE UP (ref 2.1–4.9)
PHOSPHATE SERPL-MCNC: 3.7 MG/DL — SIGNIFICANT CHANGE UP (ref 2.1–4.9)
PLATELET # BLD AUTO: 119 K/UL — LOW (ref 130–400)
PLATELET # BLD AUTO: 122 K/UL — LOW (ref 130–400)
POTASSIUM SERPL-MCNC: 4.2 MMOL/L — SIGNIFICANT CHANGE UP (ref 3.5–5)
POTASSIUM SERPL-MCNC: 4.4 MMOL/L — SIGNIFICANT CHANGE UP (ref 3.5–5)
POTASSIUM SERPL-SCNC: 4.2 MMOL/L — SIGNIFICANT CHANGE UP (ref 3.5–5)
POTASSIUM SERPL-SCNC: 4.4 MMOL/L — SIGNIFICANT CHANGE UP (ref 3.5–5)
PROTHROM AB SERPL-ACNC: 25.1 SEC — HIGH (ref 9.95–12.87)
PROTHROM AB SERPL-ACNC: 26.9 SEC — HIGH (ref 9.95–12.87)
RAPID RVP RESULT: SIGNIFICANT CHANGE UP
RBC # BLD: 4.35 M/UL — LOW (ref 4.7–6.1)
RBC # BLD: 4.37 M/UL — LOW (ref 4.7–6.1)
RBC # FLD: 16 % — HIGH (ref 11.5–14.5)
RBC # FLD: 16.2 % — HIGH (ref 11.5–14.5)
SARS-COV-2 RNA SPEC QL NAA+PROBE: SIGNIFICANT CHANGE UP
SARS-COV-2 RNA SPEC QL NAA+PROBE: SIGNIFICANT CHANGE UP
SODIUM SERPL-SCNC: 141 MMOL/L — SIGNIFICANT CHANGE UP (ref 135–146)
SODIUM SERPL-SCNC: 142 MMOL/L — SIGNIFICANT CHANGE UP (ref 135–146)
WBC # BLD: 7.05 K/UL — SIGNIFICANT CHANGE UP (ref 4.8–10.8)
WBC # BLD: 7.62 K/UL — SIGNIFICANT CHANGE UP (ref 4.8–10.8)
WBC # FLD AUTO: 7.05 K/UL — SIGNIFICANT CHANGE UP (ref 4.8–10.8)
WBC # FLD AUTO: 7.62 K/UL — SIGNIFICANT CHANGE UP (ref 4.8–10.8)

## 2020-10-26 PROCEDURE — 99232 SBSQ HOSP IP/OBS MODERATE 35: CPT

## 2020-10-26 RX ORDER — LEVOTHYROXINE SODIUM 125 MCG
250 TABLET ORAL DAILY
Refills: 0 | Status: DISCONTINUED | OUTPATIENT
Start: 2020-10-26 | End: 2020-10-27

## 2020-10-26 RX ORDER — METFORMIN HYDROCHLORIDE 850 MG/1
1 TABLET ORAL
Qty: 0 | Refills: 0 | DISCHARGE

## 2020-10-26 RX ORDER — MAGNESIUM SULFATE 500 MG/ML
2 VIAL (ML) INJECTION ONCE
Refills: 0 | Status: COMPLETED | OUTPATIENT
Start: 2020-10-26 | End: 2020-10-26

## 2020-10-26 RX ORDER — LEVOTHYROXINE SODIUM 125 MCG
250 TABLET ORAL
Qty: 0 | Refills: 0 | DISCHARGE

## 2020-10-26 RX ORDER — LEVOTHYROXINE SODIUM 125 MCG
1 TABLET ORAL
Qty: 0 | Refills: 0 | DISCHARGE

## 2020-10-26 RX ADMIN — BUDESONIDE AND FORMOTEROL FUMARATE DIHYDRATE 2 PUFF(S): 160; 4.5 AEROSOL RESPIRATORY (INHALATION) at 08:00

## 2020-10-26 RX ADMIN — ATORVASTATIN CALCIUM 40 MILLIGRAM(S): 80 TABLET, FILM COATED ORAL at 22:55

## 2020-10-26 RX ADMIN — AMPICILLIN SODIUM AND SULBACTAM SODIUM 200 GRAM(S): 250; 125 INJECTION, POWDER, FOR SUSPENSION INTRAMUSCULAR; INTRAVENOUS at 13:01

## 2020-10-26 RX ADMIN — SODIUM CHLORIDE 75 MILLILITER(S): 9 INJECTION INTRAMUSCULAR; INTRAVENOUS; SUBCUTANEOUS at 18:25

## 2020-10-26 RX ADMIN — HEPARIN SODIUM 5000 UNIT(S): 5000 INJECTION INTRAVENOUS; SUBCUTANEOUS at 13:04

## 2020-10-26 RX ADMIN — Medication 5 UNIT(S): at 17:42

## 2020-10-26 RX ADMIN — LOSARTAN POTASSIUM 100 MILLIGRAM(S): 100 TABLET, FILM COATED ORAL at 05:48

## 2020-10-26 RX ADMIN — LATANOPROST 1 DROP(S): 0.05 SOLUTION/ DROPS OPHTHALMIC; TOPICAL at 21:37

## 2020-10-26 RX ADMIN — ZINC SULFATE TAB 220 MG (50 MG ZINC EQUIVALENT) 220 MILLIGRAM(S): 220 (50 ZN) TAB at 13:02

## 2020-10-26 RX ADMIN — Medication 40 MILLIGRAM(S): at 05:48

## 2020-10-26 RX ADMIN — AMPICILLIN SODIUM AND SULBACTAM SODIUM 200 GRAM(S): 250; 125 INJECTION, POWDER, FOR SUSPENSION INTRAMUSCULAR; INTRAVENOUS at 00:07

## 2020-10-26 RX ADMIN — BUDESONIDE AND FORMOTEROL FUMARATE DIHYDRATE 2 PUFF(S): 160; 4.5 AEROSOL RESPIRATORY (INHALATION) at 21:17

## 2020-10-26 RX ADMIN — Medication 125 MICROGRAM(S): at 05:49

## 2020-10-26 RX ADMIN — TAMSULOSIN HYDROCHLORIDE 0.4 MILLIGRAM(S): 0.4 CAPSULE ORAL at 21:37

## 2020-10-26 RX ADMIN — Medication 50 GRAM(S): at 21:35

## 2020-10-26 RX ADMIN — HEPARIN SODIUM 5000 UNIT(S): 5000 INJECTION INTRAVENOUS; SUBCUTANEOUS at 21:37

## 2020-10-26 RX ADMIN — AMPICILLIN SODIUM AND SULBACTAM SODIUM 200 GRAM(S): 250; 125 INJECTION, POWDER, FOR SUSPENSION INTRAMUSCULAR; INTRAVENOUS at 23:08

## 2020-10-26 RX ADMIN — INSULIN GLARGINE 15 UNIT(S): 100 INJECTION, SOLUTION SUBCUTANEOUS at 21:35

## 2020-10-26 RX ADMIN — AMPICILLIN SODIUM AND SULBACTAM SODIUM 200 GRAM(S): 250; 125 INJECTION, POWDER, FOR SUSPENSION INTRAMUSCULAR; INTRAVENOUS at 06:02

## 2020-10-26 RX ADMIN — AMPICILLIN SODIUM AND SULBACTAM SODIUM 200 GRAM(S): 250; 125 INJECTION, POWDER, FOR SUSPENSION INTRAMUSCULAR; INTRAVENOUS at 17:41

## 2020-10-26 NOTE — PATIENT PROFILE ADULT - DIABETES EDUCATION
Spine appears normal, range of motion is not limited, no muscle or joint tenderness diabetes education

## 2020-10-26 NOTE — CONSULT NOTE ADULT - SUBJECTIVE AND OBJECTIVE BOX
HPI:  Patient is a 80 year old male with PMHx significant of Afib on coumadin, HFrEF (s/p AICD placement), CAD s/p CABG, BPH, hypothyroidism, DM, HLD, HTN, COPD, Psoriasis, PVD presenting to the ED for chronic left heel wound. Patient states the wound has been there since  of this year due to diabetes. Patient endorses that he was following a podiatrist for the heel wound and was referred to see Dr. Billy for further wound care management. Patient was seen by Dr. Billy on 10/22/20 in burn clinic and was told to come in for admission for IV antibiotic and debridement of left heel wound. Wcx from clinic 10/22 showed num proteus, num E. Faecalis, Num streptococcus gordonii. Patient reports increased drainage of left heel wound,  foul smell and some tenderness to palpation of wound. Patient denies any fever, chills, paresthesia edema, chest pain, shortness of breath, abdominal pain, nausea, vomiting, or diarrhea  (25 Oct 2020 15:37)      PAST MEDICAL & SURGICAL HISTORY  Psoriasis  Glaucoma  COPD (chronic obstructive pulmonary disease)  Diabetes mellitus  Hypothyroidism  Hypertension  Dyslipidemia  Benign prostate hyperplasia  Atrial fibrillation  Heart failure  chronic systolic heart failure  Coronary artery disease s/p CABG     S/P thoracotomy  pt states he had a VATS w/lung decortication  H/O total knee replacement, bilateral  AICD (automatic cardioverter/defibrillator) present    FAMILY HISTORY:  FAMILY HISTORY:  Family history of coronary artery disease (Father)    SOCIAL HISTORY:  Former Smoker  No Alcohol  No recreational drug use    ALLERGIES:  No Known Allergies      MEDICATIONS:  MEDICATIONS  (STANDING):  ampicillin/sulbactam  IVPB 3 Gram(s) IV Intermittent every 6 hours  atorvastatin 40 milliGRAM(s) Oral at bedtime  budesonide 160 MICROgram(s)/formoterol 4.5 MICROgram(s) Inhaler 2 Puff(s) Inhalation two times a day  chlorhexidine 4% Liquid 1 Application(s) Topical <User Schedule>  collagenase Ointment 1 Application(s) Topical two times a day  Dakins Solution - 1/2 Strength 1 Application(s) Topical two times a day  dextrose 5%. 1000 milliLiter(s) (50 mL/Hr) IV Continuous <Continuous>  dextrose 50% Injectable 12.5 Gram(s) IV Push once  dextrose 50% Injectable 25 Gram(s) IV Push once  dextrose 50% Injectable 25 Gram(s) IV Push once  furosemide    Tablet 40 milliGRAM(s) Oral daily  heparin   Injectable 5000 Unit(s) SubCutaneous every 8 hours  insulin glargine Injectable (LANTUS) 15 Unit(s) SubCutaneous at bedtime  insulin lispro (ADMELOG) corrective regimen sliding scale   SubCutaneous three times a day before meals  insulin lispro Injectable (ADMELOG) 5 Unit(s) SubCutaneous three times a day before meals  latanoprost 0.005% Ophthalmic Solution 1 Drop(s) Both EYES at bedtime  levothyroxine  Oral Tab/Cap - Peds 125 MICROGram(s) Oral daily  losartan 100 milliGRAM(s) Oral daily  pantoprazole    Tablet 40 milliGRAM(s) Oral before breakfast  sodium chloride 0.9%. 1000 milliLiter(s) (75 mL/Hr) IV Continuous <Continuous>  tamsulosin Oral Tab/Cap - Peds 0.4 milliGRAM(s) Oral at bedtime  zinc sulfate 220 milliGRAM(s) Oral daily    MEDICATIONS  (PRN):  albuterol/ipratropium for Nebulization 3 milliLiter(s) Nebulizer every 6 hours PRN Shortness of Breath and/or Wheezing  dextrose 40% Gel 15 Gram(s) Oral once PRN Blood Glucose LESS THAN 70 milliGRAM(s)/deciliter  glucagon  Injectable 1 milliGRAM(s) IntraMuscular once PRN Glucose LESS THAN 70 milligrams/deciliter  morphine  - Injectable 2 milliGRAM(s) IV Push two times a day PRN wound care  oxycodone    5 mG/acetaminophen 325 mG 1 Tablet(s) Oral every 6 hours PRN Moderate Pain (4 - 6)  polyethylene glycol 3350 17 Gram(s) Oral daily PRN Constipation  senna 2 Tablet(s) Oral daily PRN Constipation      HOME MEDICATIONS:  Home Medications:  atorvastatin 40 mg oral tablet: 1 tab(s) orally once a day (at bedtime) (25 Oct 2020 19:03)  Incruse Ellipta 62.5 mcg/inh inhalation powder: 1 inhaler(s) inhaled once a day (25 Oct 2020 19:03)  Lasix 40 mg oral tablet: 1 tab(s) orally once a day (25 Oct 2020 19:03)  latanoprost 0.005% ophthalmic solution: 1 drop(s) to each affected eye once a day (in the evening) (25 Oct 2020 19:03)  levothyroxine 125 mcg (0.125 mg) oral capsule: 1 cap(s) orally once a day (25 Oct 2020 19:03)  metFORMIN 750 mg oral tablet, extended release: 1 tab(s) orally once a day (2020 20:06)  metoprolol succinate 50 mg oral tablet, extended release: 1 tab(s) orally once a day (21 Dec 2018 21:53)      VITALS:   T(F): 96.9 (10-26 @ 07:45), Max: 98.1 (10-25 @ 12:17)  HR: 62 (10-26 @ 07:45) (60 - 68)  BP: 118/57 (10-26 @ 07:45) (100/50 - 140/65)  BP(mean): --  RR: 18 (10-26 @ 07:45) (18 - 19)  SpO2: 99% (10-26 @ 07:45) (98% - 100%)    I&O's Summary      REVIEW OF SYSTEMS:  CONSTITUTIONAL: No weakness, fevers or chills  EYES: No visual changes  ENT: No vertigo or throat pain   NECK: No pain or stiffness  RESPIRATORY: No cough, wheezing, hemoptysis; Chronic shorness of breath on exertion   CARDIOVASCULAR: No chest pain or palpitations  GASTROINTESTINAL: No abdominal or epigastric pain. No nausea, vomiting, or hematemesis; No diarrhea or constipation. No melena or hematochezia.  GENITOURINARY: No dysuria, frequency or hematuria  NEUROLOGICAL: No numbness or weakness  SKIN: No itching, no rashes  MSK: no    PHYSICAL EXAM:  NEURO: patient is awake , alert and oriented, moves all extremities   GEN: Not in acute distress  NECK: no thyroid enlargement, no JVD  LUNGS: Breath sounds diminished bilaterally, No wheezing, rhonchi, rales  CARDIOVASCULAR: S1/S2 present, RRR , Grade 3/6 systolic murmur heard best or rubs, no carotid bruits,  + PP bilaterally  ABD: Soft, non-tender, non-distended, +BS  EXT: 1-2+ Pitting Edema, LLE Heel ulcer  SKIN: Left heel ulcer noted     LABS:                        13.4   7.05  )-----------( 119      ( 26 Oct 2020 06:12 )             42.3     10-26    141  |  107  |  48<H>  ----------------------------<  208<H>  4.4   |  24  |  1.3    Ca    8.5      26 Oct 2020 00:26  Phos  3.7     10-  Mg     1.7     10-    TPro  6.8  /  Alb  4.3  /  TBili  1.4<H>  /  DBili  x   /  AST  26  /  ALT  17  /  AlkPhos  155<H>  10-    PT/INR - ( 26 Oct 2020 06:12 )   PT: 26.90 sec;   INR: 2.34 ratio    PTT - ( 26 Oct 2020 06:12 )  PTT:42.3 sec    RADIOLOGY:  -CXR:  No radiographic evidence of cardiopulmonary disease. AICD noted    -TTE (2019):  EF 40-45%, severe LA enlargement, Mild AR, Moderate to severe AS with peak gradient 29.98 and area 1.1, Mild MR, Moderate MS, Mild TR    -STRESS TEST ():  EF 30%, small mild intensity stress induced ischemia in the inferior wall with significant motion artifact. Moderate in size and severe in intensity fixed defects involving the apex/anterior and lateral wall defect, fixed defect of inferior wall    -CATHETERIZATION:    EC Lead ECG (10.25.20 @ 13:47)  Ventricular Rate 67 BPM  Atrial Rate 78 BPM  QRS Duration 150 ms  Q-T Interval 452 ms  QTC Calculation(Bazett) 477 ms  R Axis -75 degrees  T Axis 72 degrees    Diagnosis Line Wide QRS rhythm  Right bundle branch block  Left anterior fascicular block  *** Bifascicular block *** HPI:  Patient is a 80 year old male with PMHx significant of Afib on coumadin, HFrEF (s/p AICD placement), CAD s/p CABG, BPH, hypothyroidism, DM, HLD, HTN, COPD, Psoriasis, PVD presenting to the ED for chronic left heel wound. Patient states the wound has been there since  of this year due to diabetes. Patient endorses that he was following a podiatrist for the heel wound and was referred to see Dr. Bilyl for further wound care management. Patient was seen by Dr. Billy on 10/22/20 in burn clinic and was told to come in for admission for IV antibiotic and debridement of left heel wound. Wcx from clinic 10/22 showed num proteus, num E. Faecalis, Num streptococcus gordonii. Patient reports increased drainage of left heel wound,  foul smell and some tenderness to palpation of wound. Patient denies any fever, chills, paresthesia edema, chest pain, shortness of breath, abdominal pain, nausea, vomiting, or diarrhea  (25 Oct 2020 15:37)    Cardiology consulted for OR clearance. He was seen and examined at bedside. He sess Dr. Laughlin for cardiology and Dr. Gipson for EPS. He reports that he has been feeling well. Last seen by Qian in 2020 prior to the pandemic. He reports that he has no chest pain or palpitations. He endores and intentional 30lb weight loss. States that he ambulates with a cane and reports living alone and still performing his ADLs and IADLs. States he gets short of breath on exertion secondary to his COPD which limits him from walking up a flight of stairs or even one city block without having to stop and catch his breath.       PAST MEDICAL & SURGICAL HISTORY  Psoriasis  Glaucoma  COPD (chronic obstructive pulmonary disease)  Diabetes mellitus  Hypothyroidism  Hypertension  Dyslipidemia  Benign prostate hyperplasia  Atrial fibrillation  Heart failure  chronic systolic heart failure  Coronary artery disease s/p CABG     S/P thoracotomy  pt states he had a VATS w/lung decortication  H/O total knee replacement, bilateral  AICD (automatic cardioverter/defibrillator) present    FAMILY HISTORY:  FAMILY HISTORY:  Family history of coronary artery disease (Father)    SOCIAL HISTORY:  Former Smoker  No Alcohol  No recreational drug use    ALLERGIES:  No Known Allergies      MEDICATIONS:  MEDICATIONS  (STANDING):  ampicillin/sulbactam  IVPB 3 Gram(s) IV Intermittent every 6 hours  atorvastatin 40 milliGRAM(s) Oral at bedtime  budesonide 160 MICROgram(s)/formoterol 4.5 MICROgram(s) Inhaler 2 Puff(s) Inhalation two times a day  chlorhexidine 4% Liquid 1 Application(s) Topical <User Schedule>  collagenase Ointment 1 Application(s) Topical two times a day  Dakins Solution - 1/2 Strength 1 Application(s) Topical two times a day  dextrose 5%. 1000 milliLiter(s) (50 mL/Hr) IV Continuous <Continuous>  dextrose 50% Injectable 12.5 Gram(s) IV Push once  dextrose 50% Injectable 25 Gram(s) IV Push once  dextrose 50% Injectable 25 Gram(s) IV Push once  furosemide    Tablet 40 milliGRAM(s) Oral daily  heparin   Injectable 5000 Unit(s) SubCutaneous every 8 hours  insulin glargine Injectable (LANTUS) 15 Unit(s) SubCutaneous at bedtime  insulin lispro (ADMELOG) corrective regimen sliding scale   SubCutaneous three times a day before meals  insulin lispro Injectable (ADMELOG) 5 Unit(s) SubCutaneous three times a day before meals  latanoprost 0.005% Ophthalmic Solution 1 Drop(s) Both EYES at bedtime  levothyroxine  Oral Tab/Cap - Peds 125 MICROGram(s) Oral daily  losartan 100 milliGRAM(s) Oral daily  pantoprazole    Tablet 40 milliGRAM(s) Oral before breakfast  sodium chloride 0.9%. 1000 milliLiter(s) (75 mL/Hr) IV Continuous <Continuous>  tamsulosin Oral Tab/Cap - Peds 0.4 milliGRAM(s) Oral at bedtime  zinc sulfate 220 milliGRAM(s) Oral daily    MEDICATIONS  (PRN):  albuterol/ipratropium for Nebulization 3 milliLiter(s) Nebulizer every 6 hours PRN Shortness of Breath and/or Wheezing  dextrose 40% Gel 15 Gram(s) Oral once PRN Blood Glucose LESS THAN 70 milliGRAM(s)/deciliter  glucagon  Injectable 1 milliGRAM(s) IntraMuscular once PRN Glucose LESS THAN 70 milligrams/deciliter  morphine  - Injectable 2 milliGRAM(s) IV Push two times a day PRN wound care  oxycodone    5 mG/acetaminophen 325 mG 1 Tablet(s) Oral every 6 hours PRN Moderate Pain (4 - 6)  polyethylene glycol 3350 17 Gram(s) Oral daily PRN Constipation  senna 2 Tablet(s) Oral daily PRN Constipation      HOME MEDICATIONS:  Home Medications:  atorvastatin 40 mg oral tablet: 1 tab(s) orally once a day (at bedtime) (25 Oct 2020 19:03)  Incruse Ellipta 62.5 mcg/inh inhalation powder: 1 inhaler(s) inhaled once a day (25 Oct 2020 19:03)  Lasix 40 mg oral tablet: 1 tab(s) orally once a day (25 Oct 2020 19:03)  latanoprost 0.005% ophthalmic solution: 1 drop(s) to each affected eye once a day (in the evening) (25 Oct 2020 19:03)  levothyroxine 125 mcg (0.125 mg) oral capsule: 1 cap(s) orally once a day (25 Oct 2020 19:03)  metFORMIN 750 mg oral tablet, extended release: 1 tab(s) orally once a day (2020 20:06)  metoprolol succinate 50 mg oral tablet, extended release: 1 tab(s) orally once a day (21 Dec 2018 21:53)      VITALS:   T(F): 96.9 (10-26 @ 07:45), Max: 98.1 (10-25 @ 12:17)  HR: 62 (10-26 @ 07:45) (60 - 68)  BP: 118/57 (10-26 @ 07:45) (100/50 - 140/65)  BP(mean): --  RR: 18 (10-26 @ 07:45) (18 - 19)  SpO2: 99% (10-26 @ 07:45) (98% - 100%)    I&O's Summary      REVIEW OF SYSTEMS:  CONSTITUTIONAL: No weakness, fevers or chills  EYES: No visual changes  ENT: No vertigo or throat pain   NECK: No pain or stiffness  RESPIRATORY: No cough, wheezing, hemoptysis; Chronic shorness of breath on exertion   CARDIOVASCULAR: No chest pain or palpitations  GASTROINTESTINAL: No abdominal or epigastric pain. No nausea, vomiting, or hematemesis; No diarrhea or constipation. No melena or hematochezia.  GENITOURINARY: No dysuria, frequency or hematuria  NEUROLOGICAL: No numbness or weakness  SKIN: No itching, no rashes  MSK: no    PHYSICAL EXAM:  NEURO: patient is awake , alert and oriented, moves all extremities   GEN: Not in acute distress  NECK: no thyroid enlargement, no JVD  LUNGS: Breath sounds diminished bilaterally, No wheezing, rhonchi, rales  CARDIOVASCULAR: S1/S2 present, RRR , Grade 3/6 systolic murmur heard best or rubs, no carotid bruits,  + PP bilaterally  ABD: Soft, non-tender, non-distended, +BS  EXT: 1-2+ Pitting Edema, LLE Heel ulcer  SKIN: Left heel ulcer noted     LABS:                        13.4   7.05  )-----------( 119      ( 26 Oct 2020 06:12 )             42.3     10    141  |  107  |  48<H>  ----------------------------<  208<H>  4.4   |  24  |  1.3    Ca    8.5      26 Oct 2020 00:26  Phos  3.7     1026  Mg     1.7     10-26    TPro  6.8  /  Alb  4.3  /  TBili  1.4<H>  /  DBili  x   /  AST  26  /  ALT  17  /  AlkPhos  155<H>  10-25    PT/INR - ( 26 Oct 2020 06:12 )   PT: 26.90 sec;   INR: 2.34 ratio    PTT - ( 26 Oct 2020 06:12 )  PTT:42.3 sec    RADIOLOGY:  -CXR:  No radiographic evidence of cardiopulmonary disease. AICD noted    -TTE (2019):  EF 40-45%, severe LA enlargement, Mild AR, Moderate to severe AS with peak gradient 29.98 and area 1.1, Mild MR, Moderate MS, Mild TR    -STRESS TEST ():  EF 30%, small mild intensity stress induced ischemia in the inferior wall with significant motion artifact. Moderate in size and severe in intensity fixed defects involving the apex/anterior and lateral wall defect, fixed defect of inferior wall    -CATHETERIZATION:    EC Lead ECG (10.25.20 @ 13:47)  Ventricular Rate 67 BPM  Atrial Rate 78 BPM  QRS Duration 150 ms  Q-T Interval 452 ms  QTC Calculation(Bazett) 477 ms  R Axis -75 degrees  T Axis 72 degrees    Diagnosis Line Wide QRS rhythm  Right bundle branch block  Left anterior fascicular block  *** Bifascicular block *** HPI:  Patient is a 80 year old male with PMHx significant of Afib on coumadin, HFrEF (s/p AICD placement), CAD s/p CABG, BPH, hypothyroidism, DM, HLD, HTN, COPD, Psoriasis, PVD presenting to the ED for chronic left heel wound. Patient states the wound has been there since  of this year due to diabetes. Patient endorses that he was following a podiatrist for the heel wound and was referred to see Dr. Billy for further wound care management. Patient was seen by Dr. Billy on 10/22/20 in burn clinic and was told to come in for admission for IV antibiotic and debridement of left heel wound. Wcx from clinic 10/22 showed num proteus, num E. Faecalis, Num streptococcus gordonii. Patient reports increased drainage of left heel wound,  foul smell and some tenderness to palpation of wound. Patient denies any fever, chills, paresthesia edema, chest pain, shortness of breath, abdominal pain, nausea, vomiting, or diarrhea  (25 Oct 2020 15:37)    Cardiology consulted for OR clearance. He was seen and examined at bedside. He sess Dr. Laughlin for cardiology and Dr. Gipson for EPS. He reports that he has been feeling well. Last seen by Qian in 2020 prior to the pandemic. He reports that he has no chest pain or palpitations. He endores and intentional 30lb weight loss. States that he ambulates with a cane and reports living alone and still performing his ADLs and IADLs. States he gets short of breath on exertion secondary to his COPD which limits him from walking up a flight of stairs or even one city block without having to stop and catch his breath.       PAST MEDICAL & SURGICAL HISTORY  Psoriasis  Glaucoma  COPD (chronic obstructive pulmonary disease)  Diabetes mellitus  Hypothyroidism  Hypertension  Dyslipidemia  Benign prostate hyperplasia  Atrial fibrillation  Heart failure  chronic systolic heart failure  Coronary artery disease s/p CABG     S/P thoracotomy  pt states he had a VATS w/lung decortication  H/O total knee replacement, bilateral  AICD (automatic cardioverter/defibrillator) present      FAMILY HISTORY:  Family history of coronary artery disease (Father)    SOCIAL HISTORY:  Former Smoker  No Alcohol  No recreational drug use    ALLERGIES:  No Known Allergies      MEDICATIONS:  MEDICATIONS  (STANDING):  ampicillin/sulbactam  IVPB 3 Gram(s) IV Intermittent every 6 hours  atorvastatin 40 milliGRAM(s) Oral at bedtime  budesonide 160 MICROgram(s)/formoterol 4.5 MICROgram(s) Inhaler 2 Puff(s) Inhalation two times a day  chlorhexidine 4% Liquid 1 Application(s) Topical <User Schedule>  collagenase Ointment 1 Application(s) Topical two times a day  Dakins Solution - 1/2 Strength 1 Application(s) Topical two times a day  dextrose 5%. 1000 milliLiter(s) (50 mL/Hr) IV Continuous <Continuous>  dextrose 50% Injectable 12.5 Gram(s) IV Push once  dextrose 50% Injectable 25 Gram(s) IV Push once  dextrose 50% Injectable 25 Gram(s) IV Push once  furosemide    Tablet 40 milliGRAM(s) Oral daily  heparin   Injectable 5000 Unit(s) SubCutaneous every 8 hours  insulin glargine Injectable (LANTUS) 15 Unit(s) SubCutaneous at bedtime  insulin lispro (ADMELOG) corrective regimen sliding scale   SubCutaneous three times a day before meals  insulin lispro Injectable (ADMELOG) 5 Unit(s) SubCutaneous three times a day before meals  latanoprost 0.005% Ophthalmic Solution 1 Drop(s) Both EYES at bedtime  levothyroxine  Oral Tab/Cap - Peds 125 MICROGram(s) Oral daily  losartan 100 milliGRAM(s) Oral daily  pantoprazole    Tablet 40 milliGRAM(s) Oral before breakfast  sodium chloride 0.9%. 1000 milliLiter(s) (75 mL/Hr) IV Continuous <Continuous>  tamsulosin Oral Tab/Cap - Peds 0.4 milliGRAM(s) Oral at bedtime  zinc sulfate 220 milliGRAM(s) Oral daily    MEDICATIONS  (PRN):  albuterol/ipratropium for Nebulization 3 milliLiter(s) Nebulizer every 6 hours PRN Shortness of Breath and/or Wheezing  dextrose 40% Gel 15 Gram(s) Oral once PRN Blood Glucose LESS THAN 70 milliGRAM(s)/deciliter  glucagon  Injectable 1 milliGRAM(s) IntraMuscular once PRN Glucose LESS THAN 70 milligrams/deciliter  morphine  - Injectable 2 milliGRAM(s) IV Push two times a day PRN wound care  oxycodone    5 mG/acetaminophen 325 mG 1 Tablet(s) Oral every 6 hours PRN Moderate Pain (4 - 6)  polyethylene glycol 3350 17 Gram(s) Oral daily PRN Constipation  senna 2 Tablet(s) Oral daily PRN Constipation      HOME MEDICATIONS:  Home Medications:  atorvastatin 40 mg oral tablet: 1 tab(s) orally once a day (at bedtime) (25 Oct 2020 19:03)  Incruse Ellipta 62.5 mcg/inh inhalation powder: 1 inhaler(s) inhaled once a day (25 Oct 2020 19:03)  Lasix 40 mg oral tablet: 1 tab(s) orally once a day (25 Oct 2020 19:03)  latanoprost 0.005% ophthalmic solution: 1 drop(s) to each affected eye once a day (in the evening) (25 Oct 2020 19:03)  levothyroxine 125 mcg (0.125 mg) oral capsule: 1 cap(s) orally once a day (25 Oct 2020 19:03)  metFORMIN 750 mg oral tablet, extended release: 1 tab(s) orally once a day (2020 20:06)  metoprolol succinate 50 mg oral tablet, extended release: 1 tab(s) orally once a day (21 Dec 2018 21:53)      VITALS:   T(F): 96.9 (10-26 @ 07:45), Max: 98.1 (10-25 @ 12:17)  HR: 62 (10-26 @ 07:45) (60 - 68)  BP: 118/57 (10-26 @ 07:45) (100/50 - 140/65)  BP(mean): --  RR: 18 (10-26 @ 07:45) (18 - 19)  SpO2: 99% (10-26 @ 07:45) (98% - 100%)    I&O's Summary      REVIEW OF SYSTEMS:  CONSTITUTIONAL: No weakness, fevers or chills  EYES: No visual changes  ENT: No vertigo or throat pain   NECK: No pain or stiffness  RESPIRATORY: No cough, wheezing, hemoptysis; Chronic shorness of breath on exertion   CARDIOVASCULAR: No chest pain or palpitations  GASTROINTESTINAL: No abdominal or epigastric pain. No nausea, vomiting, or hematemesis; No diarrhea or constipation. No melena or hematochezia.  GENITOURINARY: No dysuria, frequency or hematuria  NEUROLOGICAL: No numbness or weakness  SKIN: No itching, no rashes  MSK: no    PHYSICAL EXAM:  NEURO: patient is awake , alert and oriented, moves all extremities   GEN: Not in acute distress  NECK: no thyroid enlargement, no JVD  LUNGS: Breath sounds diminished bilaterally, No wheezing, rhonchi, rales  CARDIOVASCULAR: S1/S2 present, RRR , Grade 3/6 systolic murmur heard best or rubs, no carotid bruits,  + PP bilaterally  ABD: Soft, non-tender, non-distended, +BS  EXT: 1-2+ Pitting Edema, LLE Heel ulcer  SKIN: Left heel ulcer noted     LABS:                        13.4   7.05  )-----------( 119      ( 26 Oct 2020 06:12 )             42.3     10    141  |  107  |  48<H>  ----------------------------<  208<H>  4.4   |  24  |  1.3    Ca    8.5      26 Oct 2020 00:26  Phos  3.7     10  Mg     1.7     10-26    TPro  6.8  /  Alb  4.3  /  TBili  1.4<H>  /  DBili  x   /  AST  26  /  ALT  17  /  AlkPhos  155<H>  10-25    PT/INR - ( 26 Oct 2020 06:12 )   PT: 26.90 sec;   INR: 2.34 ratio    PTT - ( 26 Oct 2020 06:12 )  PTT:42.3 sec        RADIOLOGY:  -CXR:  No radiographic evidence of cardiopulmonary disease. AICD noted.    -TTE (2019):  EF 40-45%, severe LA enlargement, Mild AR, Moderate to severe AS with peak gradient 29.98 and area 1.1, Mild MR, Moderate MS, Mild TR    -STRESS TEST ():  EF 30%, small mild intensity stress induced ischemia in the inferior wall with significant motion artifact. Moderate in size and severe in intensity fixed defects involving the apex/anterior and lateral wall defect, fixed defect of inferior wall        EC Lead ECG (10.25.20 @ 13:47)  Ventricular Rate 67 BPM  Atrial Rate 78 BPM  QRS Duration 150 ms  Q-T Interval 452 ms  QTC Calculation(Bazett) 477 ms  R Axis -75 degrees  T Axis 72 degrees    Diagnosis Line Wide QRS rhythm  Right bundle branch block  Left anterior fascicular block  *** Bifascicular block ***

## 2020-10-26 NOTE — PHYSICAL THERAPY INITIAL EVALUATION ADULT - GENERAL OBSERVATIONS, REHAB EVAL
Pt encountered lying supine in a stretcher bed in NAD, +IV, +AW dressing Lt foot c/d/i, no c/o pain. Pt is indep in bed mobility and transfer mobility. Pt requires supervision in ambulation 100 ft using RW. Pt lives in an apart with elevator. Stair training declined.

## 2020-10-26 NOTE — CONSULT NOTE ADULT - ATTENDING COMMENTS
Cardiologist:  Dr. Laughlin    SUMMARY:  Preop clearance for left heel wound debridement    * Patient-based characteristics (Functional capacity)  Patient is able to achieve more than 4 MET (walk 4 blocks, climb 2 flights of stairs, etc...)          Y [] / N [X]    High-risk patient features:  - Recent (<30 days) or active MI          Y [] / N [X]  - Unstable or severe angina          Y [] / N [X]  - Decompensated heart failure, or worsening or new-onset heart failure          Y [] / N [X]  - Severe valvular disease          Y [] / N [X]  - Significant arrhythmia (Tachy- or Bradyarrhythmia)          Y [] / N [X]    * Surgery/Procedure-based characteristics (Type of surgery)  - Elevated or Moderate-risk procedure (Inpatient)          Y [X] / N []    * Revised Cardiac Risk Index (RCRI)  1- History of ischemic heart disease          Y [X] / N []  2- History of congestive heart failure          Y [X] / N []  3- History of stroke/TIA          Y [X] / N []  4- History of insulin-dependent diabetes          Y [] / N [X]  5- Chronic kidney disease (Cr >2mg/dL)          Y [] / N [X]  6- Undergoing suprainguinal vascular, intraperitoneal, or intrathoracic surgery          Y [] / N [X]    Class IV risk (Three factors or more) --> >15% risk (30-day risk of death, MI, or cardiac arrest)      * IMPRESSION & RECOMMENDATIONS:  High (>25%)-risk patient for a Moderate-risk surgery/procedure  Bridge with IV Heparin and resume Coumadin when cleared by Dr. Billy / Burn team    No further cardiac work-up is needed at this time.  There are no current cardiac contraindications to prevent from proceeding with the scheduled surgery/procedure.    This consult serves only as a mitch-operative cardiac risk stratification and evaluation to predict 30-days cardiac complications risk and mortality.  The decision to proceed with the surgery/procedure is made by the performing physician and the patient.

## 2020-10-26 NOTE — PROGRESS NOTE ADULT - SUBJECTIVE AND OBJECTIVE BOX
AM Rounds   INTERVAL HISTORY:  no acute events overnight. Plan for OR tomorrow for debridement of left foot.     Vital Signs Last 24 Hrs  T(C): 35.9 (26 Oct 2020 16:36), Max: 36.6 (26 Oct 2020 05:44)  T(F): 96.7 (26 Oct 2020 16:36), Max: 97.8 (26 Oct 2020 05:44)  HR: 64 (26 Oct 2020 16:36) (60 - 64)  BP: 134/64 (26 Oct 2020 16:36) (118/57 - 134/64)  RR: 18 (26 Oct 2020 16:36) (18 - 19)  SpO2: 96% (26 Oct 2020 16:36) (96% - 99%)    I&O's Detail        MEDICATIONS  (STANDING):  ampicillin/sulbactam  IVPB 3 Gram(s) IV Intermittent every 6 hours  atorvastatin 40 milliGRAM(s) Oral at bedtime  budesonide 160 MICROgram(s)/formoterol 4.5 MICROgram(s) Inhaler 2 Puff(s) Inhalation two times a day  chlorhexidine 4% Liquid 1 Application(s) Topical <User Schedule>  collagenase Ointment 1 Application(s) Topical two times a day  Dakins Solution - 1/2 Strength 1 Application(s) Topical two times a day  dextrose 5%. 1000 milliLiter(s) (50 mL/Hr) IV Continuous <Continuous>  dextrose 50% Injectable 12.5 Gram(s) IV Push once  dextrose 50% Injectable 25 Gram(s) IV Push once  dextrose 50% Injectable 25 Gram(s) IV Push once  furosemide    Tablet 40 milliGRAM(s) Oral daily  heparin   Injectable 5000 Unit(s) SubCutaneous every 8 hours  insulin glargine Injectable (LANTUS) 15 Unit(s) SubCutaneous at bedtime  insulin lispro (ADMELOG) corrective regimen sliding scale   SubCutaneous three times a day before meals  insulin lispro Injectable (ADMELOG) 5 Unit(s) SubCutaneous three times a day before meals  latanoprost 0.005% Ophthalmic Solution 1 Drop(s) Both EYES at bedtime  levothyroxine 250 MICROGram(s) Oral daily  losartan 100 milliGRAM(s) Oral daily  pantoprazole    Tablet 40 milliGRAM(s) Oral before breakfast  sodium chloride 0.9%. 1000 milliLiter(s) (75 mL/Hr) IV Continuous <Continuous>  tamsulosin Oral Tab/Cap - Peds 0.4 milliGRAM(s) Oral at bedtime  zinc sulfate 220 milliGRAM(s) Oral daily    MEDICATIONS  (PRN):  albuterol/ipratropium for Nebulization 3 milliLiter(s) Nebulizer every 6 hours PRN Shortness of Breath and/or Wheezing  dextrose 40% Gel 15 Gram(s) Oral once PRN Blood Glucose LESS THAN 70 milliGRAM(s)/deciliter  glucagon  Injectable 1 milliGRAM(s) IntraMuscular once PRN Glucose LESS THAN 70 milligrams/deciliter  morphine  - Injectable 2 milliGRAM(s) IV Push two times a day PRN wound care  oxycodone    5 mG/acetaminophen 325 mG 1 Tablet(s) Oral every 6 hours PRN Moderate Pain (4 - 6)  polyethylene glycol 3350 17 Gram(s) Oral daily PRN Constipation  senna 2 Tablet(s) Oral daily PRN Constipation    Allergies    No Known Allergies    Intolerances        Lab Results:                        13.4   7.05  )-----------( 119      ( 26 Oct 2020 06:12 )             42.3     10-26    141  |  107  |  48<H>  ----------------------------<  208<H>  4.4   |  24  |  1.3    Ca    8.5      26 Oct 2020 00:26  Phos  3.7     10-26  Mg     1.7     10-26    TPro  6.8  /  Alb  4.3  /  TBili  1.4<H>  /  DBili  x   /  AST  26  /  ALT  17  /  AlkPhos  155<H>  10-25    PT/INR - ( 26 Oct 2020 06:12 )   PT: 26.90 sec;   INR: 2.34 ratio         PTT - ( 26 Oct 2020 06:12 )  PTT:42.3 sec    LIVER FUNCTIONS - ( 25 Oct 2020 12:45 )  Alb: 4.3 g/dL / Pro: 6.8 g/dL / ALK PHOS: 155 U/L / ALT: 17 U/L / AST: 26 U/L / GGT: x           CAPILLARY BLOOD GLUCOSE      POCT Blood Glucose.: 115 mg/dL (26 Oct 2020 17:25)  POCT Blood Glucose.: 125 mg/dL (26 Oct 2020 11:48)  POCT Blood Glucose.: 208 mg/dL (25 Oct 2020 21:18)              IMAGING STUDIES:   < from: [facility] Duplex Lower Extrem Arterial, Bilat (10.25.20 @ 21:13) >  Impression:    Occluded left distal peroneal artery with otherwise patent infrainguinal vessels bilaterally.    < end of copied text >    < from: [facility] Duplex Lower Ext Vein Scan, Bilat (10.25.20 @ 21:13) >    Impression:    No evidence of deep venous thrombosis or superficial thrombophlebitis in the bilateral lower extremities.    < end of copied text >    < from: Xray Foot AP + Lateral + Oblique, Left (10.25.20 @ 14:34) >  IMPRESSION:    There is no evidence of fracture or dislocation.     There is soft tissue air inferior to the posterior calcaneus, without obvious periosteal reaction or skin erosions. These findings may be related to osteomyelitis    < end of copied text >      EXAM:  GENERAL: NAD, sitting in wheelchair comfortably.   HEAD:  Atraumatic, Normocephalic  CHEST/LUNG: speaking in full sentences, breathing comfortably in room air, symmetrical chest expansion.   HEART: in no acute cardiopulmonary distress.   PSYCH: AAOx3, cooperative, pleasant   SKIN: LLE: left medial heel full thickness wound ~ 3cm x 3cm pink and moist with areas of yellow necrotic tissue.  mild drainage noted. malodorous noted. no significant erythema or edema appreciated. no active bleeding noted.    Large Dressing change performed. Patient tolerated well.                   AM Rounds   INTERVAL HISTORY:  no acute events overnight. Plan for OR tomorrow for debridement of left foot.     Vital Signs Last 24 Hrs  T(C): 35.9 (26 Oct 2020 16:36), Max: 36.6 (26 Oct 2020 05:44)  T(F): 96.7 (26 Oct 2020 16:36), Max: 97.8 (26 Oct 2020 05:44)  HR: 64 (26 Oct 2020 16:36) (60 - 64)  BP: 134/64 (26 Oct 2020 16:36) (118/57 - 134/64)  RR: 18 (26 Oct 2020 16:36) (18 - 19)  SpO2: 96% (26 Oct 2020 16:36) (96% - 99%)    I&O's Detail        MEDICATIONS  (STANDING):  ampicillin/sulbactam  IVPB 3 Gram(s) IV Intermittent every 6 hours  atorvastatin 40 milliGRAM(s) Oral at bedtime  budesonide 160 MICROgram(s)/formoterol 4.5 MICROgram(s) Inhaler 2 Puff(s) Inhalation two times a day  chlorhexidine 4% Liquid 1 Application(s) Topical <User Schedule>  collagenase Ointment 1 Application(s) Topical two times a day  Dakins Solution - 1/2 Strength 1 Application(s) Topical two times a day  dextrose 5%. 1000 milliLiter(s) (50 mL/Hr) IV Continuous <Continuous>  dextrose 50% Injectable 12.5 Gram(s) IV Push once  dextrose 50% Injectable 25 Gram(s) IV Push once  dextrose 50% Injectable 25 Gram(s) IV Push once  furosemide    Tablet 40 milliGRAM(s) Oral daily  heparin   Injectable 5000 Unit(s) SubCutaneous every 8 hours  insulin glargine Injectable (LANTUS) 15 Unit(s) SubCutaneous at bedtime  insulin lispro (ADMELOG) corrective regimen sliding scale   SubCutaneous three times a day before meals  insulin lispro Injectable (ADMELOG) 5 Unit(s) SubCutaneous three times a day before meals  latanoprost 0.005% Ophthalmic Solution 1 Drop(s) Both EYES at bedtime  levothyroxine 250 MICROGram(s) Oral daily  losartan 100 milliGRAM(s) Oral daily  pantoprazole    Tablet 40 milliGRAM(s) Oral before breakfast  sodium chloride 0.9%. 1000 milliLiter(s) (75 mL/Hr) IV Continuous <Continuous>  tamsulosin Oral Tab/Cap - Peds 0.4 milliGRAM(s) Oral at bedtime  zinc sulfate 220 milliGRAM(s) Oral daily    MEDICATIONS  (PRN):  albuterol/ipratropium for Nebulization 3 milliLiter(s) Nebulizer every 6 hours PRN Shortness of Breath and/or Wheezing  dextrose 40% Gel 15 Gram(s) Oral once PRN Blood Glucose LESS THAN 70 milliGRAM(s)/deciliter  glucagon  Injectable 1 milliGRAM(s) IntraMuscular once PRN Glucose LESS THAN 70 milligrams/deciliter  morphine  - Injectable 2 milliGRAM(s) IV Push two times a day PRN wound care  oxycodone    5 mG/acetaminophen 325 mG 1 Tablet(s) Oral every 6 hours PRN Moderate Pain (4 - 6)  polyethylene glycol 3350 17 Gram(s) Oral daily PRN Constipation  senna 2 Tablet(s) Oral daily PRN Constipation    Allergies    No Known Allergies    Intolerances        Lab Results:                        13.4   7.05  )-----------( 119      ( 26 Oct 2020 06:12 )             42.3     10-26    141  |  107  |  48<H>  ----------------------------<  208<H>  4.4   |  24  |  1.3    Ca    8.5      26 Oct 2020 00:26  Phos  3.7     10-26  Mg     1.7     10-26    TPro  6.8  /  Alb  4.3  /  TBili  1.4<H>  /  DBili  x   /  AST  26  /  ALT  17  /  AlkPhos  155<H>  10-25    PT/INR - ( 26 Oct 2020 06:12 )   PT: 26.90 sec;   INR: 2.34 ratio         PTT - ( 26 Oct 2020 06:12 )  PTT:42.3 sec    LIVER FUNCTIONS - ( 25 Oct 2020 12:45 )  Alb: 4.3 g/dL / Pro: 6.8 g/dL / ALK PHOS: 155 U/L / ALT: 17 U/L / AST: 26 U/L / GGT: x           CAPILLARY BLOOD GLUCOSE      POCT Blood Glucose.: 115 mg/dL (26 Oct 2020 17:25)  POCT Blood Glucose.: 125 mg/dL (26 Oct 2020 11:48)  POCT Blood Glucose.: 208 mg/dL (25 Oct 2020 21:18)              IMAGING STUDIES:   < from: [facility] Duplex Lower Extrem Arterial, Bilat (10.25.20 @ 21:13) >  Impression:    Occluded left distal peroneal artery with otherwise patent infrainguinal vessels bilaterally.    < end of copied text >    < from: [facility] Duplex Lower Ext Vein Scan, Bilat (10.25.20 @ 21:13) >    Impression:    No evidence of deep venous thrombosis or superficial thrombophlebitis in the bilateral lower extremities.    < end of copied text >    < from: Xray Foot AP + Lateral + Oblique, Left (10.25.20 @ 14:34) >  IMPRESSION:    There is no evidence of fracture or dislocation.     There is soft tissue air inferior to the posterior calcaneus, without obvious periosteal reaction or skin erosions. These findings may be related to osteomyelitis    < end of copied text >      EXAM:  GENERAL: NAD, sitting in wheelchair comfortably.   HEAD:  Atraumatic, Normocephalic  CHEST/LUNG: speaking in full sentences, breathing comfortably in room air, symmetrical chest expansion.   HEART: in no acute cardiopulmonary distress.   PSYCH: AAOx3, cooperative, pleasant   SKIN: LLE: left medial heel full thickness wound ~ 3cm x 3cm pink and moist with areas of yellow necrotic tissue.  mild drainage noted. malodor noted. no significant erythema or edema appreciated. no active bleeding noted.    Large Dressing change performed. Patient tolerated well.

## 2020-10-26 NOTE — PROCEDURE NOTE - NSPROCDETAILS_GEN_ALL_CORE
ultrasound assessment/sterile dressing applied/ultrasound guidance/location identified, draped/prepped, sterile technique used

## 2020-10-26 NOTE — PROGRESS NOTE ADULT - ASSESSMENT
80 year old male with PMHx significant for Afib on coumadin, HFrEF (s/p AICD placement), CAD s/p CABG, BPH, hypothyroidism, DM, HLD, HTN, COPD, Psoriasis, PVD presenting to the ED for left heel wound.     #Left heel full thickness wound  - continue LWC: santyl/ Dakins wrap with Kerlix and ACE twice a day  -OR tomorrow for debridement  -NPO after midnight, pre-op labs  -continue IVF, IV abx  -pain management  -PT consult  -Vascular studies- 10/25 venous duplex of BLE- prelim negative for DVT 10/25 arterial duplex of BLE; prelim showed Occluded left distal peroneal artery with otherwise patent infrainguinal vessels bilaterally. f/u final read.  -GI/DVT ppx  -ambulate as tolerated.    #ID  -Wcx from clinic 10/22 showed num proteus, num E. Faecalis, Num streptococcus gordonii.  -continue Unasyn  -x-ray of Left foot showed soft tissue air inferior to the posterior calcaneus, without obvious periosteal reaction or skin erosions. These findings may be related to osteomyelitis.  -ID consult placed, f/u recs    #Cards  -Patient has a hx of Atrial fibrillation, HFrEF, CAD s/p CABG  -per cards, High risk patient for an intermediate risk procedure   -Coumadin held for OR, Bridge with IV Heparin and resume Coumadin when cleared  - continue home dose Lasix 40mg qd, losartan 100mg qd,  Lipitor 40 qhs  - echo pending, f/u results   -DASH/TLC diet    #Resp  -hx of Severe COPD s/p VATS not on home O2  -neb tx PRN  - continue Symbicort inhaler  -encourage incentive spirometer   -pulm consult placed for pulm clearance, f/u recs    #Endo  Patient has a hx of DM2, hypothyroidism    Metformin held, continue levothyroxine   5,5,5,15 plus ISS inpatient   monitor FS, adjust insulin accordingly   -hgA1c 8.6- 10/26  -TSH ordered  carb consistent diet    #hyperkalemia  -K 5.3 on admission  -recived Kayexalate last night and repeat K 4.4  -monitor electrolytes, replete and correct as needed            Plan of care discussed with patient, in agreement with plan. all concerns addressed.      80 year old male with PMHx significant for Afib on coumadin, HFrEF (s/p AICD placement), CAD s/p CABG, BPH, hypothyroidism, DM, HLD, HTN, COPD, Psoriasis, PVD presenting to the ED for left heel wound.     #Left heel full thickness wound  - continue LWC: santyl/ Dakins wrap with Kerlix and ACE twice a day  -OR tomorrow for debridement  -NPO after midnight, pre-op labs  -continue IVF, IV abx  -pain management  -PT consult  -Vascular studies- 10/25 venous duplex of BLE- prelim negative for DVT 10/25 arterial duplex of BLE; prelim showed Occluded left distal peroneal artery with otherwise patent infrainguinal vessels bilaterally. f/u final read.  -GI/DVT ppx  -ambulate as tolerated.    #ID  -Wcx from clinic 10/22 showed num proteus, num E. Faecalis, Num streptococcus gordonii.  -continue Unasyn  -x-ray of Left foot showed soft tissue air inferior to the posterior calcaneus, without obvious periosteal reaction or skin erosions. These findings may be related to osteomyelitis.  -ID consult placed, f/u recs    #Cards  -Patient has a hx of Atrial fibrillation, HFrEF, CAD s/p CABG  -per cards, High risk patient for an intermediate risk procedure   -Coumadin held for OR, Bridge with IV Heparin and resume Coumadin when cleared  - continue home dose Lasix 40mg qd, losartan 100mg qd,  Lipitor 40 qhs  - echo pending, f/u results   -DASH/TLC diet    #Resp  -hx of Severe COPD s/p VATS not on home O2  -neb tx PRN  - continue Symbicort inhaler  -encourage incentive spirometer   -pulm consult placed for pulm clearance, f/u recs    #Endo  Patient has a hx of DM2, hypothyroidism    Metformin held, continue levothyroxine   5,5,5,15 plus ISS inpatient   monitor FS, adjust insulin accordingly   -hgA1c 8.6- 10/26  -TSH ordered  carb consistent diet    #hyperkalemia  -K 5.3 on admission  - received Kayexalate last night and repeat K 4.4  -monitor electrolytes, replete and correct as needed            Plan of care discussed with patient, in agreement with plan. all concerns addressed.      80 year old male with PMHx significant for Afib on coumadin, HFrEF (s/p AICD placement), CAD s/p CABG, BPH, hypothyroidism, DM, HLD, HTN, COPD, Psoriasis, PVD presenting to the ED for left heel wound.     #Left heel full thickness wound  - continue LWC: santyl/ Dakins wrap with Kerlix and ACE twice a day  -OR tomorrow for debridement  -NPO after midnight, pre-op labs  -continue IVF, IV abx  -pain management  -PT consult  -Vascular studies- 10/25 venous duplex of BLE- prelim negative for DVT 10/25 arterial duplex of BLE; prelim showed Occluded left distal peroneal artery with otherwise patent infrainguinal vessels bilaterally. f/u final read.  -GI/DVT ppx  -ambulate as tolerated.    #ID  -Wcx from clinic 10/22 showed num proteus, num E. Faecalis, Num streptococcus gordonii.  -continue Unasyn  -x-ray of Left foot showed soft tissue air inferior to the posterior calcaneus, without obvious periosteal reaction or skin erosions. These findings may be related to osteomyelitis.  -ID consult placed, f/u recs    #Cards  -Patient has a hx of Atrial fibrillation, HFrEF, CAD s/p CABG  -per cards, High risk patient for an intermediate risk procedure   -Coumadin held for OR, Bridge with IV Heparin and resume Coumadin when cleared  -will start heparin dip, f/u repeat INR at 8pm   - continue home dose Lasix 40mg qd, losartan 100mg qd,  Lipitor 40 qhs  - echo pending, f/u results   -DASH/TLC diet    #Resp  -hx of Severe COPD s/p VATS not on home O2  -neb tx PRN  - continue Symbicort inhaler  -encourage incentive spirometer   -pulm consult placed for pulm clearance, f/u recs    #Endo  Patient has a hx of DM2, hypothyroidism    Metformin held, continue levothyroxine   5,5,5,15 plus ISS inpatient   monitor FS, adjust insulin accordingly   -hgA1c 8.6- 10/26  -TSH ordered  carb consistent diet    #hyperkalemia  -K 5.3 on admission  - received Kayexalate last night and repeat K 4.4  -monitor electrolytes, replete and correct as needed            Plan of care discussed with patient, in agreement with plan. all concerns addressed.      80 year old male with PMHx significant for Afib on coumadin, HFrEF (s/p AICD placement), CAD s/p CABG, BPH, hypothyroidism, DM, HLD, HTN, COPD, Psoriasis, PVD presenting to the ED for left heel wound.     #Left heel full thickness wound  - continue LWC: santyl/ Dakins wrap with Kerlix and ACE twice a day  -OR tomorrow for debridement  -NPO after midnight, pre-op labs  -continue IVF, IV abx  -pain management  -PT consult  -Vascular studies- 10/25 venous duplex of BLE- prelim negative for DVT 10/25 arterial duplex of BLE; prelim showed Occluded left distal peroneal artery with otherwise patent infrainguinal vessels bilaterally. f/u final read.  -GI/DVT ppx  -ambulate as tolerated.    #ID  -Wcx from clinic 10/22 showed num proteus, num E. Faecalis, Num streptococcus gordonii.  -continue Unasyn  -x-ray of Left foot showed soft tissue air inferior to the posterior calcaneus, without obvious periosteal reaction or skin erosions. These findings may be related to osteomyelitis.  -ID consult placed, f/u recs    #Cards  -Patient has a hx of Atrial fibrillation, HFrEF, CAD s/p CABG  -per cards, High risk patient for an intermediate risk procedure   -Coumadin held for OR, Bridge with IV Heparin and resume Coumadin when cleared  -will start heparin drip, f/u repeat INR at 8pm   - continue home dose Lasix 40mg qd, losartan 100mg qd,  Lipitor 40 qhs  - echo pending, f/u results   -DASH/TLC diet    #Resp  -hx of Severe COPD s/p VATS not on home O2  -neb tx PRN  - continue Symbicort inhaler  -encourage incentive spirometer   -pulm consult placed for pulm clearance, f/u recs    #Endo  Patient has a hx of DM2, hypothyroidism    Metformin held, continue levothyroxine   5,5,5,15 plus ISS inpatient   monitor FS, adjust insulin accordingly   -hgA1c 8.6- 10/26  -TSH ordered  carb consistent diet    #hyperkalemia  -K 5.3 on admission  - received Kayexalate last night and repeat K 4.4  -monitor electrolytes, replete and correct as needed            Plan of care discussed with patient, in agreement with plan. all concerns addressed.

## 2020-10-26 NOTE — CONSULT NOTE ADULT - ASSESSMENT
Patient is a 80 year old male with PMHx significant of Afib on coumadin, HFrEF (s/p AICD placement), CAD s/p CABG, BPH, hypothyroidism, DM, HLD, HTN, COPD, Psoriasis, PVD presenting to the ED for chronic left heel wound.    #HTN  #HLD  #CAD s/p CABG in 2014 and PCI  #CHFrEF s/p AICD  #A-fib on Coumadin  #Severe COPD s/p VATS not on any O2    - Overall has a poor reserve capacity secondary to COPD  - RCRI 4, ASA III-IV  - Continue on Losartan and Metoprolol  - Continue on Lasix for now; appears to have some fluid overload]  - CHADsVASC: 8; continue on coumadin   - High risk patient for an intermediate risk procedure    Patient is a 80 year old male with PMHx significant of Afib on coumadin, HFrEF (s/p AICD placement), CAD s/p CABG, BPH, hypothyroidism, DM, HLD, HTN, COPD, Psoriasis, PVD presenting to the ED for chronic left heel wound.    #HTN  #HLD  #CAD s/p CABG in 2014 and PCI  #CHFrEF s/p AICD  #A-fib on Coumadin  #Severe COPD s/p VATS not on any O2    - Overall has a poor reserve capacity secondary to COPD  - RCRI 4, ASA III-IV, noted tolerance of <4 METS  - Continue on Losartan and Metoprolol  - Continue on Lasix for now; appears to have some fluid overload]  - CHADsVASC: 8; continue on coumadin   - High risk patient for an intermediate risk procedure    Patient is a 80 year old male with PMHx significant of Afib on coumadin, HFrEF (s/p AICD placement), CAD s/p CABG, BPH, hypothyroidism, DM, HLD, HTN, COPD, Psoriasis, PVD presenting to the ED for chronic left heel wound.    #HTN  #HLD  #CAD s/p CABG in 2014 and PCI  #CHFrEF s/p AICD  #A-fib on Coumadin  #Severe COPD s/p VATS not on any O2    - Overall has a poor reserve capacity secondary to COPD  - RCRI 4, ASA III-IV, noted tolerance of <4 METS  - Continue on Losartan and Metoprolol  - Continue on Lasix for now; appears to have some fluid overload]  - CHADsVASC: 8; continue on coumadin when cleared from surgical standpoint. Will need to be bridged with Heparin  - High risk patient for an intermediate risk procedure    Patient is a 80 year old male with PMHx significant of Afib on coumadin, HFrEF (s/p AICD placement), CAD s/p CABG, BPH, hypothyroidism, DM, HLD, HTN, COPD, Psoriasis, PVD presenting to the ED for chronic left heel wound.    #HTN  #HLD  #CAD s/p CABG in 2014 and PCI  #CHFrEF s/p AICD  #A-fib on Coumadin  #Severe COPD s/p VATS not on any O2    - Overall has a poor reserve capacity secondary to COPD  - RCRI 4, ASA III-IV, noted tolerance of <4 METS  - Continue on Losartan and Metoprolol  - Continue on Lasix for now; appears to have some fluid overload  - CHADsVASC: 8; continue on coumadin when cleared from surgical standpoint. Will need to be bridged with Heparin.  - High risk patient for an intermediate risk procedure

## 2020-10-26 NOTE — PHYSICAL THERAPY INITIAL EVALUATION ADULT - PERTINENT HX OF CURRENT PROBLEM, REHAB EVAL
80 year old male with PMHx significant for Afib on coumadin, HFrEF (s/p AICD placement), CAD s/p CABG, BPH, hypothyroidism, DM, HLD, HTN, COPD, Psoriasis, PVD presenting to the ED for left heel wound.

## 2020-10-27 ENCOUNTER — RESULT REVIEW (OUTPATIENT)
Age: 80
End: 2020-10-27

## 2020-10-27 LAB
BACTERIA SPEC CULT: ABNORMAL
GLUCOSE BLDC GLUCOMTR-MCNC: 140 MG/DL — HIGH (ref 70–99)
GLUCOSE BLDC GLUCOMTR-MCNC: 84 MG/DL — SIGNIFICANT CHANGE UP (ref 70–99)
GLUCOSE BLDC GLUCOMTR-MCNC: 85 MG/DL — SIGNIFICANT CHANGE UP (ref 70–99)

## 2020-10-27 PROCEDURE — 99222 1ST HOSP IP/OBS MODERATE 55: CPT

## 2020-10-27 PROCEDURE — 11043 DBRDMT MUSC&/FSCA 1ST 20/<: CPT

## 2020-10-27 PROCEDURE — 11046 DBRDMT MUSC&/FSCA EA ADDL: CPT

## 2020-10-27 PROCEDURE — 88304 TISSUE EXAM BY PATHOLOGIST: CPT | Mod: 26

## 2020-10-27 RX ORDER — GLUCAGON INJECTION, SOLUTION 0.5 MG/.1ML
1 INJECTION, SOLUTION SUBCUTANEOUS ONCE
Refills: 0 | Status: DISCONTINUED | OUTPATIENT
Start: 2020-10-27 | End: 2020-10-29

## 2020-10-27 RX ORDER — DEXTROSE 50 % IN WATER 50 %
25 SYRINGE (ML) INTRAVENOUS ONCE
Refills: 0 | Status: DISCONTINUED | OUTPATIENT
Start: 2020-10-27 | End: 2020-10-29

## 2020-10-27 RX ORDER — IPRATROPIUM/ALBUTEROL SULFATE 18-103MCG
3 AEROSOL WITH ADAPTER (GRAM) INHALATION EVERY 6 HOURS
Refills: 0 | Status: DISCONTINUED | OUTPATIENT
Start: 2020-10-27 | End: 2020-10-29

## 2020-10-27 RX ORDER — LOSARTAN POTASSIUM 100 MG/1
100 TABLET, FILM COATED ORAL DAILY
Refills: 0 | Status: DISCONTINUED | OUTPATIENT
Start: 2020-10-27 | End: 2020-10-29

## 2020-10-27 RX ORDER — FUROSEMIDE 40 MG
40 TABLET ORAL DAILY
Refills: 0 | Status: DISCONTINUED | OUTPATIENT
Start: 2020-10-27 | End: 2020-10-29

## 2020-10-27 RX ORDER — POLYETHYLENE GLYCOL 3350 17 G/17G
17 POWDER, FOR SOLUTION ORAL DAILY
Refills: 0 | Status: DISCONTINUED | OUTPATIENT
Start: 2020-10-27 | End: 2020-10-29

## 2020-10-27 RX ORDER — LATANOPROST 0.05 MG/ML
1 SOLUTION/ DROPS OPHTHALMIC; TOPICAL AT BEDTIME
Refills: 0 | Status: DISCONTINUED | OUTPATIENT
Start: 2020-10-27 | End: 2020-10-29

## 2020-10-27 RX ORDER — DEXTROSE 50 % IN WATER 50 %
12.5 SYRINGE (ML) INTRAVENOUS ONCE
Refills: 0 | Status: DISCONTINUED | OUTPATIENT
Start: 2020-10-27 | End: 2020-10-29

## 2020-10-27 RX ORDER — COLLAGENASE CLOSTRIDIUM HIST. 250 UNIT/G
1 OINTMENT (GRAM) TOPICAL
Refills: 0 | Status: DISCONTINUED | OUTPATIENT
Start: 2020-10-27 | End: 2020-10-28

## 2020-10-27 RX ORDER — INSULIN LISPRO 100/ML
5 VIAL (ML) SUBCUTANEOUS
Refills: 0 | Status: DISCONTINUED | OUTPATIENT
Start: 2020-10-27 | End: 2020-10-29

## 2020-10-27 RX ORDER — INSULIN GLARGINE 100 [IU]/ML
15 INJECTION, SOLUTION SUBCUTANEOUS AT BEDTIME
Refills: 0 | Status: DISCONTINUED | OUTPATIENT
Start: 2020-10-27 | End: 2020-10-29

## 2020-10-27 RX ORDER — HEPARIN SODIUM 5000 [USP'U]/ML
5000 INJECTION INTRAVENOUS; SUBCUTANEOUS EVERY 8 HOURS
Refills: 0 | Status: DISCONTINUED | OUTPATIENT
Start: 2020-10-27 | End: 2020-10-29

## 2020-10-27 RX ORDER — BUDESONIDE AND FORMOTEROL FUMARATE DIHYDRATE 160; 4.5 UG/1; UG/1
2 AEROSOL RESPIRATORY (INHALATION)
Refills: 0 | Status: DISCONTINUED | OUTPATIENT
Start: 2020-10-27 | End: 2020-10-29

## 2020-10-27 RX ORDER — LEVOTHYROXINE SODIUM 125 MCG
250 TABLET ORAL DAILY
Refills: 0 | Status: DISCONTINUED | OUTPATIENT
Start: 2020-10-27 | End: 2020-10-29

## 2020-10-27 RX ORDER — INSULIN LISPRO 100/ML
VIAL (ML) SUBCUTANEOUS
Refills: 0 | Status: DISCONTINUED | OUTPATIENT
Start: 2020-10-27 | End: 2020-10-29

## 2020-10-27 RX ORDER — OXYCODONE AND ACETAMINOPHEN 5; 325 MG/1; MG/1
1 TABLET ORAL EVERY 6 HOURS
Refills: 0 | Status: DISCONTINUED | OUTPATIENT
Start: 2020-10-27 | End: 2020-10-29

## 2020-10-27 RX ORDER — ONDANSETRON 8 MG/1
4 TABLET, FILM COATED ORAL ONCE
Refills: 0 | Status: DISCONTINUED | OUTPATIENT
Start: 2020-10-27 | End: 2020-10-27

## 2020-10-27 RX ORDER — DEXTROSE 50 % IN WATER 50 %
15 SYRINGE (ML) INTRAVENOUS ONCE
Refills: 0 | Status: DISCONTINUED | OUTPATIENT
Start: 2020-10-27 | End: 2020-10-29

## 2020-10-27 RX ORDER — SENNA PLUS 8.6 MG/1
2 TABLET ORAL DAILY
Refills: 0 | Status: DISCONTINUED | OUTPATIENT
Start: 2020-10-27 | End: 2020-10-29

## 2020-10-27 RX ORDER — SODIUM CHLORIDE 9 MG/ML
1000 INJECTION, SOLUTION INTRAVENOUS
Refills: 0 | Status: DISCONTINUED | OUTPATIENT
Start: 2020-10-27 | End: 2020-10-27

## 2020-10-27 RX ORDER — HYDROMORPHONE HYDROCHLORIDE 2 MG/ML
0.5 INJECTION INTRAMUSCULAR; INTRAVENOUS; SUBCUTANEOUS
Refills: 0 | Status: DISCONTINUED | OUTPATIENT
Start: 2020-10-27 | End: 2020-10-27

## 2020-10-27 RX ORDER — ATORVASTATIN CALCIUM 80 MG/1
40 TABLET, FILM COATED ORAL AT BEDTIME
Refills: 0 | Status: DISCONTINUED | OUTPATIENT
Start: 2020-10-27 | End: 2020-10-29

## 2020-10-27 RX ORDER — CHLORHEXIDINE GLUCONATE 213 G/1000ML
1 SOLUTION TOPICAL
Refills: 0 | Status: DISCONTINUED | OUTPATIENT
Start: 2020-10-27 | End: 2020-10-29

## 2020-10-27 RX ORDER — MORPHINE SULFATE 50 MG/1
2 CAPSULE, EXTENDED RELEASE ORAL
Refills: 0 | Status: DISCONTINUED | OUTPATIENT
Start: 2020-10-27 | End: 2020-10-29

## 2020-10-27 RX ORDER — PANTOPRAZOLE SODIUM 20 MG/1
40 TABLET, DELAYED RELEASE ORAL
Refills: 0 | Status: DISCONTINUED | OUTPATIENT
Start: 2020-10-27 | End: 2020-10-29

## 2020-10-27 RX ORDER — ZINC SULFATE TAB 220 MG (50 MG ZINC EQUIVALENT) 220 (50 ZN) MG
220 TAB ORAL DAILY
Refills: 0 | Status: DISCONTINUED | OUTPATIENT
Start: 2020-10-27 | End: 2020-10-29

## 2020-10-27 RX ORDER — TAMSULOSIN HYDROCHLORIDE 0.4 MG/1
0.4 CAPSULE ORAL AT BEDTIME
Refills: 0 | Status: DISCONTINUED | OUTPATIENT
Start: 2020-10-27 | End: 2020-10-29

## 2020-10-27 RX ORDER — SODIUM CHLORIDE 9 MG/ML
1000 INJECTION, SOLUTION INTRAVENOUS
Refills: 0 | Status: DISCONTINUED | OUTPATIENT
Start: 2020-10-27 | End: 2020-10-29

## 2020-10-27 RX ORDER — AMPICILLIN SODIUM AND SULBACTAM SODIUM 250; 125 MG/ML; MG/ML
3 INJECTION, POWDER, FOR SUSPENSION INTRAMUSCULAR; INTRAVENOUS EVERY 6 HOURS
Refills: 0 | Status: DISCONTINUED | OUTPATIENT
Start: 2020-10-27 | End: 2020-10-29

## 2020-10-27 RX ADMIN — BUDESONIDE AND FORMOTEROL FUMARATE DIHYDRATE 2 PUFF(S): 160; 4.5 AEROSOL RESPIRATORY (INHALATION) at 21:17

## 2020-10-27 RX ADMIN — HEPARIN SODIUM 5000 UNIT(S): 5000 INJECTION INTRAVENOUS; SUBCUTANEOUS at 05:04

## 2020-10-27 RX ADMIN — PANTOPRAZOLE SODIUM 40 MILLIGRAM(S): 20 TABLET, DELAYED RELEASE ORAL at 05:04

## 2020-10-27 RX ADMIN — HEPARIN SODIUM 5000 UNIT(S): 5000 INJECTION INTRAVENOUS; SUBCUTANEOUS at 21:13

## 2020-10-27 RX ADMIN — BUDESONIDE AND FORMOTEROL FUMARATE DIHYDRATE 2 PUFF(S): 160; 4.5 AEROSOL RESPIRATORY (INHALATION) at 12:00

## 2020-10-27 RX ADMIN — TAMSULOSIN HYDROCHLORIDE 0.4 MILLIGRAM(S): 0.4 CAPSULE ORAL at 21:14

## 2020-10-27 RX ADMIN — AMPICILLIN SODIUM AND SULBACTAM SODIUM 200 GRAM(S): 250; 125 INJECTION, POWDER, FOR SUSPENSION INTRAMUSCULAR; INTRAVENOUS at 12:01

## 2020-10-27 RX ADMIN — LOSARTAN POTASSIUM 100 MILLIGRAM(S): 100 TABLET, FILM COATED ORAL at 05:03

## 2020-10-27 RX ADMIN — Medication 40 MILLIGRAM(S): at 05:03

## 2020-10-27 RX ADMIN — Medication 250 MICROGRAM(S): at 05:03

## 2020-10-27 RX ADMIN — ATORVASTATIN CALCIUM 40 MILLIGRAM(S): 80 TABLET, FILM COATED ORAL at 21:14

## 2020-10-27 RX ADMIN — ZINC SULFATE TAB 220 MG (50 MG ZINC EQUIVALENT) 220 MILLIGRAM(S): 220 (50 ZN) TAB at 13:03

## 2020-10-27 RX ADMIN — AMPICILLIN SODIUM AND SULBACTAM SODIUM 200 GRAM(S): 250; 125 INJECTION, POWDER, FOR SUSPENSION INTRAMUSCULAR; INTRAVENOUS at 23:09

## 2020-10-27 RX ADMIN — Medication 1 APPLICATION(S): at 05:04

## 2020-10-27 RX ADMIN — AMPICILLIN SODIUM AND SULBACTAM SODIUM 200 GRAM(S): 250; 125 INJECTION, POWDER, FOR SUSPENSION INTRAMUSCULAR; INTRAVENOUS at 05:03

## 2020-10-27 RX ADMIN — HEPARIN SODIUM 5000 UNIT(S): 5000 INJECTION INTRAVENOUS; SUBCUTANEOUS at 14:35

## 2020-10-27 RX ADMIN — INSULIN GLARGINE 15 UNIT(S): 100 INJECTION, SOLUTION SUBCUTANEOUS at 21:14

## 2020-10-27 RX ADMIN — LATANOPROST 1 DROP(S): 0.05 SOLUTION/ DROPS OPHTHALMIC; TOPICAL at 21:15

## 2020-10-27 RX ADMIN — AMPICILLIN SODIUM AND SULBACTAM SODIUM 200 GRAM(S): 250; 125 INJECTION, POWDER, FOR SUSPENSION INTRAMUSCULAR; INTRAVENOUS at 18:50

## 2020-10-27 NOTE — CONSULT NOTE ADULT - ASSESSMENT
80 year old male with PMHx significant of Afib on coumadin, HFrEF (s/p AICD placement), CAD s/p CABG, BPH, hypothyroidism, DM, HLD, HTN, COPD, Psoriasis, PVD presenting to the ED for chronic left heel wound.     Vascular surgery evaluated for non healing ulcer and tibial occlusion  no need for any intervention on this admission  Follow up as outpt 2 weeks    [facility] [id number] 80 year old male with PMHx significant of Afib on coumadin, HFrEF (s/p AICD placement), CAD s/p CABG, BPH, hypothyroidism, DM, HLD, HTN, COPD, Psoriasis, PVD presenting to the ED for chronic left heel wound.     Vascular surgery evaluated for non healing ulcer and tibial occlusion  Plan for LLE angiogram Friday  needs pre-op labs NPO post MN for Friday    [facility] [id number]

## 2020-10-27 NOTE — CHART NOTE - NSCHARTNOTEFT_GEN_A_CORE
PACU ANESTHESIA ADMISSION NOTE      Procedure: Incision and debridement, foot        Post op diagnosis:  Wound, left foot.        ____  Intubated  TV:______       Rate: ______      FiO2: ______    __x__  Patent Airway    __x__  Full return of protective reflexes    __x__  Full recovery from anesthesia / back to baseline     Vitals:   T: 98.6F          R:    16              BP:   139/77               Sat:    98               P: 72      Mental Status:  __x__ Awake   ___x__ Alert   _____ Drowsy   _____ Sedated    Nausea/Vomiting:  _x___ NO  ______Yes,   See Post - Op Orders          Pain Scale (0-10):  __0/10___    Treatment: ____ None    __x__ See Post - Op/PCA Orders    Post - Operative Fluids:   ____ Oral   __x__ See Post - Op Orders    Plan: Discharge:   ____Home       __x___Floor     _____Critical Care    _____  Other:_________________    Comments: pt tolerated procedure well, no anesthesia related complications. Care of pt endorsed to PACU, report given to PACU RN.

## 2020-10-27 NOTE — CONSULT NOTE ADULT - SUBJECTIVE AND OBJECTIVE BOX
VASCULAR SURGERY CONSULT NOTE      HPI:  Patient is a 80 year old male with PMHx significant of Afib on coumadin, HFrEF (s/p AICD placement), CAD s/p CABG, BPH, hypothyroidism, DM, HLD, HTN, COPD, Psoriasis, PVD presenting to the ED for chronic left heel wound. Patient states the wound has been there since June of this year due to diabetes. Patient endorses that he was following a podiatrist for the heel wound and was referred to see Dr. Billy for further wound care management. Patient was seen by Dr. Billy on 10/22/20 in burn clinic and was told to come in for admission for IV antibiotic and debridement of left heel wound. Wcx from clinic 10/22 showed num proteus, num E. Faecalis, Num streptococcus gordonii. Patient reports increased drainage of left heel wound,  foul smell and some tenderness to palpation of wound. Patient denies any fever, chills, paresthesia edema, chest pain, shortness of breath, abdominal pain, nausea, vomiting, or diarrhea  (25 Oct 2020 15:37)        PAST MEDICAL & SURGICAL HISTORY:  Psoriasis    Glaucoma    COPD (chronic obstructive pulmonary disease)    Diabetes mellitus    Hypothyroidism    Hypertension    Dyslipidemia    Benign prostate hyperplasia    Atrial fibrillation    Heart failure  chronic systolic heart failure    Coronary artery disease  s/p CABG 2014    S/P thoracotomy  pt states he had a VATS w/lung decortication    H/O total knee replacement, bilateral    AICD (automatic cardioverter/defibrillator) present    S/P CABG (coronary artery bypass graft)      No Known Allergies    Home Medications:  atorvastatin 40 mg oral tablet: 1 tab(s) orally once a day (at bedtime) (26 Oct 2020 12:03)  Incruse Ellipta 62.5 mcg/inh inhalation powder: 1 inhaler(s) inhaled once a day (26 Oct 2020 12:03)  insulin glargine (concentrated) 300 units/mL subcutaneous solution: 20 unit(s) subcutaneous once a day (26 Oct 2020 12:03)  Lasix 40 mg oral tablet: 1 tab(s) orally once a day (26 Oct 2020 12:03)  latanoprost 0.005% ophthalmic solution: 1 drop(s) to each affected eye once a day (in the evening) (26 Oct 2020 12:03)  levothyroxine: 250 microgram(s) orally once a day (26 Oct 2020 12:03)  metFORMIN 750 mg oral tablet, extended release: 1 tab(s) orally 2 times a day (26 Oct 2020 12:03)  metoprolol succinate 50 mg oral tablet, extended release: 1 tab(s) orally once a day (26 Oct 2020 12:03)  Trelegy Ellipta inhalation powder: 1 puff(s) inhaled once a day (26 Oct 2020 12:03)  warfarin 2.5 mg oral tablet: 1 tab(s) orally 2 times a day (26 Oct 2020 12:03)    No permtinent family history of PVD    REVIEW OF SYSTEMS:  GENERAL:                                         negative  SKIN:                                                see HPI  OPTHALMOLOGIC:                          negative  ENMT:                                               negative  RESPIRATORY AND THORAX:        negative  CARDIOVASCULAR:                  see HPI  GASTROINTESTINAL:                       negative  NEPHROLOGY:                                  negative  MUSCULOSKELETAL:                       negative  NEUROLOGIC:                                   negative  PSYCHIATRIC:                                    negative  HEMATOLOGY/LYMPHATICS:         negative  ENDOCRINE:                                     see HPI  ALLERGIC/IMMUNOLOGIC:            negative    12 point ROS otherwise normal except as stated in HPI    PHYSICAL EXAM  Vital Signs Last 24 Hrs  T(C): 35.7 (27 Oct 2020 05:00), Max: 36.2 (26 Oct 2020 17:44)  T(F): 96.3 (27 Oct 2020 05:00), Max: 97.1 (26 Oct 2020 17:44)  HR: 60 (27 Oct 2020 05:00) (60 - 69)  BP: 116/63 (27 Oct 2020 05:00) (116/63 - 138/67)  BP(mean): --  RR: 18 (27 Oct 2020 05:00) (18 - 19)  SpO2: 96% (26 Oct 2020 16:36) (96% - 96%)    Appearance: Normal	  HEENT:   Normal oral mucosa, PERRL, EOMI	  Neck: Supple, - JVD;    Cardiovascular: Normal S1 S2, No JVD, No murmurs,   Respiratory: Lungs clear to auscultation, No Rales, Rhonchi, Wheezing	  Gastrointestinal:  Soft, Non-tender, positive BS	  Skin: No rashes, No ecchymoses, No cyanosis  Extremities: Normal range of motion, No clubbing, cyanosis or edema  left foot wound  Neurologic: Non-focal  Psychiatry: A & O x 3, Mood & affect appropriate      PULSES:  Femoral:  Popliteal:  Dorsal Pedal: difficult assessment on the left, + doppler signal  Posterior Tibial: dopplerable left  Capillary:    MEDICATIONS:   MEDICATIONS  (STANDING):  ampicillin/sulbactam  IVPB 3 Gram(s) IV Intermittent every 6 hours  atorvastatin 40 milliGRAM(s) Oral at bedtime  budesonide 160 MICROgram(s)/formoterol 4.5 MICROgram(s) Inhaler 2 Puff(s) Inhalation two times a day  chlorhexidine 4% Liquid 1 Application(s) Topical <User Schedule>  collagenase Ointment 1 Application(s) Topical two times a day  Dakins Solution - 1/2 Strength 1 Application(s) Topical two times a day  dextrose 5%. 1000 milliLiter(s) (50 mL/Hr) IV Continuous <Continuous>  dextrose 50% Injectable 12.5 Gram(s) IV Push once  dextrose 50% Injectable 25 Gram(s) IV Push once  dextrose 50% Injectable 25 Gram(s) IV Push once  furosemide    Tablet 40 milliGRAM(s) Oral daily  heparin   Injectable 5000 Unit(s) SubCutaneous every 8 hours  insulin glargine Injectable (LANTUS) 15 Unit(s) SubCutaneous at bedtime  insulin lispro (ADMELOG) corrective regimen sliding scale   SubCutaneous three times a day before meals  insulin lispro Injectable (ADMELOG) 5 Unit(s) SubCutaneous three times a day before meals  latanoprost 0.005% Ophthalmic Solution 1 Drop(s) Both EYES at bedtime  levothyroxine 250 MICROGram(s) Oral daily  losartan 100 milliGRAM(s) Oral daily  pantoprazole    Tablet 40 milliGRAM(s) Oral before breakfast  tamsulosin Oral Tab/Cap - Peds 0.4 milliGRAM(s) Oral at bedtime  zinc sulfate 220 milliGRAM(s) Oral daily    MEDICATIONS  (PRN):  albuterol/ipratropium for Nebulization 3 milliLiter(s) Nebulizer every 6 hours PRN Shortness of Breath and/or Wheezing  dextrose 40% Gel 15 Gram(s) Oral once PRN Blood Glucose LESS THAN 70 milliGRAM(s)/deciliter  glucagon  Injectable 1 milliGRAM(s) IntraMuscular once PRN Glucose LESS THAN 70 milligrams/deciliter  morphine  - Injectable 2 milliGRAM(s) IV Push two times a day PRN wound care  oxycodone    5 mG/acetaminophen 325 mG 1 Tablet(s) Oral every 6 hours PRN Moderate Pain (4 - 6)  polyethylene glycol 3350 17 Gram(s) Oral daily PRN Constipation  senna 2 Tablet(s) Oral daily PRN Constipation      LAB/STUDIES:                        13.4   7.62  )-----------( 122      ( 26 Oct 2020 17:40 )             41.8     10-26    142  |  103  |  41<H>  ----------------------------<  122<H>  4.2   |  27  |  1.2    Ca    9.1      26 Oct 2020 17:40  Phos  3.3     10-26  Mg     1.6     10-26      PT/INR - ( 26 Oct 2020 21:00 )   PT: 25.10 sec;   INR: 2.18 ratio         PTT - ( 26 Oct 2020 21:00 )  PTT:36.2 sec        Culture - Blood (collected 25 Oct 2020 12:45)  Source: .Blood Blood  Preliminary Report (26 Oct 2020 20:01):    No growth to date.    Culture - Blood (collected 25 Oct 2020 12:45)  Source: .Blood Blood  Preliminary Report (26 Oct 2020 20:01):    No growth to date.        IMAGING:  < from: [facility] Duplex Lower Ext Vein Scan, Bilat (10.25.20 @ 21:13) >  No evidence ofdeep venous thrombosis or superficial thrombophlebitis in the bilateral lower extremities.    < end of copied text >  < from: [facility] Duplex Lower Extrem Arterial, Bilat (10.25.20 @ 21:13) >  Occluded left distal peroneal artery with otherwise patent infrainguinal vessels bilaterally.    < end of copied text >

## 2020-10-27 NOTE — CONSULT NOTE ADULT - ASSESSMENT
80 year old male with PMHx significant of Afib on coumadin, HFrEF (s/p AICD placement), CAD s/p CABG, BPH, hypothyroidism, DM, HLD, HTN, COPD, Psoriasis, PVD presenting to the ED for chronic left heel wound. Patient states the wound has been there since June of this year due to diabetes. Patient endorses that he was following a podiatrist for the heel wound and was referred to see Dr. Billy for further wound care management. Patient was seen by Dr. Billy on 10/22/20 in burn clinic and was told to come in for admission for IV antibiotic and debridement of left heel wound. Wcx from clinic 10/22 showed num proteus, num E. Faecalis, Num streptococcus gordonii.     IMPRESSION;  L heel with abscess/cellulitis   R/o underlying OM    RECOMMENDATIONS;  Unasyn 3 gm iv q6h  Deep tissue cultures during OR debridement  Consider CT or MRI of heel

## 2020-10-27 NOTE — CONSULT NOTE ADULT - SUBJECTIVE AND OBJECTIVE BOX
LAMYRNA  80y, Male  Allergy: No Known Allergies      All historical available data reviewed.    HPI:  Patient is a 80 year old male with PMHx significant of Afib on coumadin, HFrEF (s/p AICD placement), CAD s/p CABG, BPH, hypothyroidism, DM, HLD, HTN, COPD, Psoriasis, PVD presenting to the ED for chronic left heel wound. Patient states the wound has been there since June of this year due to diabetes. Patient endorses that he was following a podiatrist for the heel wound and was referred to see Dr. Billy for further wound care management. Patient was seen by Dr. Billy on 10/22/20 in burn clinic and was told to come in for admission for IV antibiotic and debridement of left heel wound. Wcx from clinic 10/22 showed num proteus, num E. Faecalis, Num streptococcus gordonii. Patient reports increased drainage of left heel wound,  foul smell and some tenderness to palpation of wound. Patient denies any fever, chills, paresthesia edema, chest pain, shortness of breath, abdominal pain, nausea, vomiting, or diarrhea  (25 Oct 2020 15:37)  ID called for wound    FAMILY HISTORY:  Family history of coronary artery disease (Father)      PAST MEDICAL & SURGICAL HISTORY:  Psoriasis    Glaucoma    COPD (chronic obstructive pulmonary disease)    Diabetes mellitus    Hypothyroidism    Hypertension    Dyslipidemia    Benign prostate hyperplasia    Atrial fibrillation    Heart failure  chronic systolic heart failure    Coronary artery disease  s/p CABG 2014    S/P thoracotomy  pt states he had a VATS w/lung decortication    H/O total knee replacement, bilateral    AICD (automatic cardioverter/defibrillator) present    S/P CABG (coronary artery bypass graft)          VITALS:  T(F): 96.6, Max: 97.1 (10-26-20 @ 17:44)  HR: 68  BP: 127/77  RR: 18Vital Signs Last 24 Hrs  T(C): 35.9 (27 Oct 2020 13:40), Max: 36.2 (26 Oct 2020 17:44)  T(F): 96.6 (27 Oct 2020 13:40), Max: 97.1 (26 Oct 2020 17:44)  HR: 68 (27 Oct 2020 13:40) (60 - 69)  BP: 127/77 (27 Oct 2020 13:40) (116/63 - 138/67)  BP(mean): --  RR: 18 (27 Oct 2020 13:40) (18 - 19)  SpO2: 96% (26 Oct 2020 16:36) (96% - 96%)    TESTS & MEASUREMENTS:                        13.4   7.62  )-----------( 122      ( 26 Oct 2020 17:40 )             41.8     10-26    142  |  103  |  41<H>  ----------------------------<  122<H>  4.2   |  27  |  1.2    Ca    9.1      26 Oct 2020 17:40  Phos  3.3     10-26  Mg     1.6     10-26          Culture - Blood (collected 10-25-20 @ 12:45)  Source: .Blood Blood  Preliminary Report (10-26-20 @ 20:01):    No growth to date.    Culture - Blood (collected 10-25-20 @ 12:45)  Source: .Blood Blood  Preliminary Report (10-26-20 @ 20:01):    No growth to date.            RADIOLOGY & ADDITIONAL TESTS:  Personal review of radiological diagnostics performed  Echo and EKG results noted when applicable.     MEDICATIONS:  albuterol/ipratropium for Nebulization 3 milliLiter(s) Nebulizer every 6 hours PRN  ampicillin/sulbactam  IVPB 3 Gram(s) IV Intermittent every 6 hours  atorvastatin 40 milliGRAM(s) Oral at bedtime  budesonide 160 MICROgram(s)/formoterol 4.5 MICROgram(s) Inhaler 2 Puff(s) Inhalation two times a day  chlorhexidine 4% Liquid 1 Application(s) Topical <User Schedule>  collagenase Ointment 1 Application(s) Topical two times a day  Dakins Solution - 1/2 Strength 1 Application(s) Topical two times a day  dextrose 40% Gel 15 Gram(s) Oral once PRN  dextrose 5%. 1000 milliLiter(s) IV Continuous <Continuous>  dextrose 50% Injectable 12.5 Gram(s) IV Push once  dextrose 50% Injectable 25 Gram(s) IV Push once  dextrose 50% Injectable 25 Gram(s) IV Push once  furosemide    Tablet 40 milliGRAM(s) Oral daily  glucagon  Injectable 1 milliGRAM(s) IntraMuscular once PRN  heparin   Injectable 5000 Unit(s) SubCutaneous every 8 hours  insulin glargine Injectable (LANTUS) 15 Unit(s) SubCutaneous at bedtime  insulin lispro (ADMELOG) corrective regimen sliding scale   SubCutaneous three times a day before meals  insulin lispro Injectable (ADMELOG) 5 Unit(s) SubCutaneous three times a day before meals  latanoprost 0.005% Ophthalmic Solution 1 Drop(s) Both EYES at bedtime  levothyroxine 250 MICROGram(s) Oral daily  losartan 100 milliGRAM(s) Oral daily  morphine  - Injectable 2 milliGRAM(s) IV Push two times a day PRN  oxycodone    5 mG/acetaminophen 325 mG 1 Tablet(s) Oral every 6 hours PRN  pantoprazole    Tablet 40 milliGRAM(s) Oral before breakfast  polyethylene glycol 3350 17 Gram(s) Oral daily PRN  senna 2 Tablet(s) Oral daily PRN  tamsulosin Oral Tab/Cap - Peds 0.4 milliGRAM(s) Oral at bedtime  zinc sulfate 220 milliGRAM(s) Oral daily      ANTIBIOTICS:  ampicillin/sulbactam  IVPB 3 Gram(s) IV Intermittent every 6 hours

## 2020-10-28 LAB
ANION GAP SERPL CALC-SCNC: 13 MMOL/L — SIGNIFICANT CHANGE UP (ref 7–14)
ANION GAP SERPL CALC-SCNC: 13 MMOL/L — SIGNIFICANT CHANGE UP (ref 7–14)
APTT BLD: 42.2 SEC — HIGH (ref 27–39.2)
APTT BLD: 42.9 SEC — HIGH (ref 27–39.2)
BASOPHILS # BLD AUTO: 0.03 K/UL — SIGNIFICANT CHANGE UP (ref 0–0.2)
BASOPHILS NFR BLD AUTO: 0.4 % — SIGNIFICANT CHANGE UP (ref 0–1)
BLD GP AB SCN SERPL QL: SIGNIFICANT CHANGE UP
BUN SERPL-MCNC: 32 MG/DL — HIGH (ref 10–20)
BUN SERPL-MCNC: 33 MG/DL — HIGH (ref 10–20)
CALCIUM SERPL-MCNC: 8.8 MG/DL — SIGNIFICANT CHANGE UP (ref 8.5–10.1)
CALCIUM SERPL-MCNC: 9 MG/DL — SIGNIFICANT CHANGE UP (ref 8.5–10.1)
CHLORIDE SERPL-SCNC: 104 MMOL/L — SIGNIFICANT CHANGE UP (ref 98–110)
CHLORIDE SERPL-SCNC: 106 MMOL/L — SIGNIFICANT CHANGE UP (ref 98–110)
CO2 SERPL-SCNC: 23 MMOL/L — SIGNIFICANT CHANGE UP (ref 17–32)
CO2 SERPL-SCNC: 24 MMOL/L — SIGNIFICANT CHANGE UP (ref 17–32)
CREAT SERPL-MCNC: 1.2 MG/DL — SIGNIFICANT CHANGE UP (ref 0.7–1.5)
CREAT SERPL-MCNC: 1.2 MG/DL — SIGNIFICANT CHANGE UP (ref 0.7–1.5)
EOSINOPHIL # BLD AUTO: 0.09 K/UL — SIGNIFICANT CHANGE UP (ref 0–0.7)
EOSINOPHIL NFR BLD AUTO: 1.3 % — SIGNIFICANT CHANGE UP (ref 0–8)
GLUCOSE BLDC GLUCOMTR-MCNC: 107 MG/DL — HIGH (ref 70–99)
GLUCOSE BLDC GLUCOMTR-MCNC: 108 MG/DL — HIGH (ref 70–99)
GLUCOSE BLDC GLUCOMTR-MCNC: 144 MG/DL — HIGH (ref 70–99)
GLUCOSE BLDC GLUCOMTR-MCNC: 196 MG/DL — HIGH (ref 70–99)
GLUCOSE SERPL-MCNC: 115 MG/DL — HIGH (ref 70–99)
GLUCOSE SERPL-MCNC: 147 MG/DL — HIGH (ref 70–99)
HCT VFR BLD CALC: 38.4 % — LOW (ref 42–52)
HCT VFR BLD CALC: 41.3 % — LOW (ref 42–52)
HGB BLD-MCNC: 12 G/DL — LOW (ref 14–18)
HGB BLD-MCNC: 13.3 G/DL — LOW (ref 14–18)
IMM GRANULOCYTES NFR BLD AUTO: 0.3 % — SIGNIFICANT CHANGE UP (ref 0.1–0.3)
INR BLD: 2.08 RATIO — HIGH (ref 0.65–1.3)
INR BLD: 2.18 RATIO — HIGH (ref 0.65–1.3)
LYMPHOCYTES # BLD AUTO: 1.19 K/UL — LOW (ref 1.2–3.4)
LYMPHOCYTES # BLD AUTO: 17.5 % — LOW (ref 20.5–51.1)
MAGNESIUM SERPL-MCNC: 1.4 MG/DL — LOW (ref 1.8–2.4)
MAGNESIUM SERPL-MCNC: 1.8 MG/DL — SIGNIFICANT CHANGE UP (ref 1.8–2.4)
MCHC RBC-ENTMCNC: 30.1 PG — SIGNIFICANT CHANGE UP (ref 27–31)
MCHC RBC-ENTMCNC: 30.4 PG — SIGNIFICANT CHANGE UP (ref 27–31)
MCHC RBC-ENTMCNC: 31.3 G/DL — LOW (ref 32–37)
MCHC RBC-ENTMCNC: 32.2 G/DL — SIGNIFICANT CHANGE UP (ref 32–37)
MCV RBC AUTO: 94.5 FL — HIGH (ref 80–94)
MCV RBC AUTO: 96.2 FL — HIGH (ref 80–94)
MONOCYTES # BLD AUTO: 1.16 K/UL — HIGH (ref 0.1–0.6)
MONOCYTES NFR BLD AUTO: 17.1 % — HIGH (ref 1.7–9.3)
NEUTROPHILS # BLD AUTO: 4.31 K/UL — SIGNIFICANT CHANGE UP (ref 1.4–6.5)
NEUTROPHILS NFR BLD AUTO: 63.4 % — SIGNIFICANT CHANGE UP (ref 42.2–75.2)
NRBC # BLD: 0 /100 WBCS — SIGNIFICANT CHANGE UP (ref 0–0)
NRBC # BLD: 0 /100 WBCS — SIGNIFICANT CHANGE UP (ref 0–0)
PHOSPHATE SERPL-MCNC: 3 MG/DL — SIGNIFICANT CHANGE UP (ref 2.1–4.9)
PHOSPHATE SERPL-MCNC: 3.1 MG/DL — SIGNIFICANT CHANGE UP (ref 2.1–4.9)
PLATELET # BLD AUTO: 115 K/UL — LOW (ref 130–400)
PLATELET # BLD AUTO: 125 K/UL — LOW (ref 130–400)
POTASSIUM SERPL-MCNC: 3.9 MMOL/L — SIGNIFICANT CHANGE UP (ref 3.5–5)
POTASSIUM SERPL-MCNC: 4.2 MMOL/L — SIGNIFICANT CHANGE UP (ref 3.5–5)
POTASSIUM SERPL-SCNC: 3.9 MMOL/L — SIGNIFICANT CHANGE UP (ref 3.5–5)
POTASSIUM SERPL-SCNC: 4.2 MMOL/L — SIGNIFICANT CHANGE UP (ref 3.5–5)
PROTHROM AB SERPL-ACNC: 23.9 SEC — HIGH (ref 9.95–12.87)
PROTHROM AB SERPL-ACNC: 25.1 SEC — HIGH (ref 9.95–12.87)
RBC # BLD: 3.99 M/UL — LOW (ref 4.7–6.1)
RBC # BLD: 4.37 M/UL — LOW (ref 4.7–6.1)
RBC # FLD: 15.9 % — HIGH (ref 11.5–14.5)
RBC # FLD: 16 % — HIGH (ref 11.5–14.5)
SODIUM SERPL-SCNC: 141 MMOL/L — SIGNIFICANT CHANGE UP (ref 135–146)
SODIUM SERPL-SCNC: 142 MMOL/L — SIGNIFICANT CHANGE UP (ref 135–146)
WBC # BLD: 6.8 K/UL — SIGNIFICANT CHANGE UP (ref 4.8–10.8)
WBC # BLD: 7.94 K/UL — SIGNIFICANT CHANGE UP (ref 4.8–10.8)
WBC # FLD AUTO: 6.8 K/UL — SIGNIFICANT CHANGE UP (ref 4.8–10.8)
WBC # FLD AUTO: 7.94 K/UL — SIGNIFICANT CHANGE UP (ref 4.8–10.8)

## 2020-10-28 PROCEDURE — 73701 CT LOWER EXTREMITY W/DYE: CPT | Mod: 26,LT

## 2020-10-28 PROCEDURE — 99231 SBSQ HOSP IP/OBS SF/LOW 25: CPT

## 2020-10-28 PROCEDURE — 93306 TTE W/DOPPLER COMPLETE: CPT | Mod: 26

## 2020-10-28 RX ORDER — WARFARIN SODIUM 2.5 MG/1
5 TABLET ORAL ONCE
Refills: 0 | Status: COMPLETED | OUTPATIENT
Start: 2020-10-28 | End: 2020-10-28

## 2020-10-28 RX ORDER — MAGNESIUM SULFATE 500 MG/ML
2 VIAL (ML) INJECTION
Refills: 0 | Status: COMPLETED | OUTPATIENT
Start: 2020-10-28 | End: 2020-10-28

## 2020-10-28 RX ORDER — SODIUM CHLORIDE 9 MG/ML
1000 INJECTION, SOLUTION INTRAVENOUS
Refills: 0 | Status: DISCONTINUED | OUTPATIENT
Start: 2020-10-28 | End: 2020-11-02

## 2020-10-28 RX ORDER — WARFARIN SODIUM 2.5 MG/1
5 TABLET ORAL ONCE
Refills: 0 | Status: DISCONTINUED | OUTPATIENT
Start: 2020-10-28 | End: 2020-10-28

## 2020-10-28 RX ORDER — COLLAGENASE CLOSTRIDIUM HIST. 250 UNIT/G
1 OINTMENT (GRAM) TOPICAL
Refills: 0 | Status: DISCONTINUED | OUTPATIENT
Start: 2020-10-28 | End: 2020-10-29

## 2020-10-28 RX ORDER — WARFARIN SODIUM 2.5 MG/1
5 TABLET ORAL AT BEDTIME
Refills: 0 | Status: DISCONTINUED | OUTPATIENT
Start: 2020-10-28 | End: 2020-10-28

## 2020-10-28 RX ORDER — INSULIN GLARGINE 100 [IU]/ML
7 INJECTION, SOLUTION SUBCUTANEOUS ONCE
Refills: 0 | Status: COMPLETED | OUTPATIENT
Start: 2020-10-28 | End: 2020-10-28

## 2020-10-28 RX ORDER — WARFARIN SODIUM 2.5 MG/1
2.5 TABLET ORAL
Refills: 0 | Status: DISCONTINUED | OUTPATIENT
Start: 2020-10-28 | End: 2020-10-28

## 2020-10-28 RX ADMIN — Medication 1 APPLICATION(S): at 22:16

## 2020-10-28 RX ADMIN — BUDESONIDE AND FORMOTEROL FUMARATE DIHYDRATE 2 PUFF(S): 160; 4.5 AEROSOL RESPIRATORY (INHALATION) at 22:21

## 2020-10-28 RX ADMIN — WARFARIN SODIUM 5 MILLIGRAM(S): 2.5 TABLET ORAL at 01:52

## 2020-10-28 RX ADMIN — Medication 5 UNIT(S): at 11:33

## 2020-10-28 RX ADMIN — HEPARIN SODIUM 5000 UNIT(S): 5000 INJECTION INTRAVENOUS; SUBCUTANEOUS at 22:16

## 2020-10-28 RX ADMIN — Medication 50 GRAM(S): at 02:53

## 2020-10-28 RX ADMIN — LATANOPROST 1 DROP(S): 0.05 SOLUTION/ DROPS OPHTHALMIC; TOPICAL at 22:21

## 2020-10-28 RX ADMIN — AMPICILLIN SODIUM AND SULBACTAM SODIUM 200 GRAM(S): 250; 125 INJECTION, POWDER, FOR SUSPENSION INTRAMUSCULAR; INTRAVENOUS at 11:33

## 2020-10-28 RX ADMIN — ATORVASTATIN CALCIUM 40 MILLIGRAM(S): 80 TABLET, FILM COATED ORAL at 22:15

## 2020-10-28 RX ADMIN — TAMSULOSIN HYDROCHLORIDE 0.4 MILLIGRAM(S): 0.4 CAPSULE ORAL at 22:15

## 2020-10-28 RX ADMIN — INSULIN GLARGINE 7 UNIT(S): 100 INJECTION, SOLUTION SUBCUTANEOUS at 22:15

## 2020-10-28 RX ADMIN — Medication 50 GRAM(S): at 01:52

## 2020-10-28 RX ADMIN — CHLORHEXIDINE GLUCONATE 1 APPLICATION(S): 213 SOLUTION TOPICAL at 05:03

## 2020-10-28 RX ADMIN — Medication 1 APPLICATION(S): at 05:02

## 2020-10-28 RX ADMIN — Medication 1: at 07:31

## 2020-10-28 RX ADMIN — Medication 5 UNIT(S): at 07:30

## 2020-10-28 RX ADMIN — PANTOPRAZOLE SODIUM 40 MILLIGRAM(S): 20 TABLET, DELAYED RELEASE ORAL at 06:02

## 2020-10-28 RX ADMIN — HEPARIN SODIUM 5000 UNIT(S): 5000 INJECTION INTRAVENOUS; SUBCUTANEOUS at 13:49

## 2020-10-28 RX ADMIN — AMPICILLIN SODIUM AND SULBACTAM SODIUM 200 GRAM(S): 250; 125 INJECTION, POWDER, FOR SUSPENSION INTRAMUSCULAR; INTRAVENOUS at 23:34

## 2020-10-28 RX ADMIN — Medication 40 MILLIGRAM(S): at 05:03

## 2020-10-28 RX ADMIN — AMPICILLIN SODIUM AND SULBACTAM SODIUM 200 GRAM(S): 250; 125 INJECTION, POWDER, FOR SUSPENSION INTRAMUSCULAR; INTRAVENOUS at 05:01

## 2020-10-28 RX ADMIN — ZINC SULFATE TAB 220 MG (50 MG ZINC EQUIVALENT) 220 MILLIGRAM(S): 220 (50 ZN) TAB at 11:34

## 2020-10-28 RX ADMIN — HEPARIN SODIUM 5000 UNIT(S): 5000 INJECTION INTRAVENOUS; SUBCUTANEOUS at 05:02

## 2020-10-28 RX ADMIN — MORPHINE SULFATE 2 MILLIGRAM(S): 50 CAPSULE, EXTENDED RELEASE ORAL at 09:03

## 2020-10-28 RX ADMIN — LOSARTAN POTASSIUM 100 MILLIGRAM(S): 100 TABLET, FILM COATED ORAL at 05:02

## 2020-10-28 RX ADMIN — BUDESONIDE AND FORMOTEROL FUMARATE DIHYDRATE 2 PUFF(S): 160; 4.5 AEROSOL RESPIRATORY (INHALATION) at 07:30

## 2020-10-28 RX ADMIN — MORPHINE SULFATE 2 MILLIGRAM(S): 50 CAPSULE, EXTENDED RELEASE ORAL at 23:00

## 2020-10-28 RX ADMIN — Medication 250 MICROGRAM(S): at 05:03

## 2020-10-28 RX ADMIN — AMPICILLIN SODIUM AND SULBACTAM SODIUM 200 GRAM(S): 250; 125 INJECTION, POWDER, FOR SUSPENSION INTRAMUSCULAR; INTRAVENOUS at 18:48

## 2020-10-28 RX ADMIN — MORPHINE SULFATE 2 MILLIGRAM(S): 50 CAPSULE, EXTENDED RELEASE ORAL at 22:19

## 2020-10-28 NOTE — CHART NOTE - NSCHARTNOTEFT_GEN_A_CORE
Surgeon: SHILO  Procedure: LLE ANGIOGRAM     Vital Signs Last 24 Hrs  T(C): 36.6 (28 Oct 2020 12:13), Max: 37.1 (28 Oct 2020 05:14)  T(F): 97.8 (28 Oct 2020 12:13), Max: 98.7 (28 Oct 2020 05:14)  HR: 63 (28 Oct 2020 12:13) (59 - 82)  BP: 106/59 (28 Oct 2020 12:13) (98/46 - 166/74)  RR: 18 (28 Oct 2020 12:13) (17 - 18)  SpO2: 96% (27 Oct 2020 21:24) (96% - 99%)                        13.3   6.80  )-----------( 125      ( 28 Oct 2020 00:40 )             41.3     10-28    141  |  104  |  33<H>  ----------------------------<  147<H>  3.9   |  24  |  1.2    Ca    9.0      28 Oct 2020 00:40  Phos  3.1     10-28  Mg     1.4     10-28      PT/INR - ( 28 Oct 2020 00:40 )   PT: 23.90 sec;   INR: 2.08 ratio         PTT - ( 28 Oct 2020 00:40 )  PTT:42.2 sec  Daily Height in cm: 180.34 (27 Oct 2020 16:25)    Daily Weight in k (28 Oct 2020 05:14)    EKG:  CXR:   Type and Screen:     A/P: 80y Male     - OR 10/28/2020 for LLE ANGIOGRAM with Dr. LAO  - NPO past midnight, except medications  - IVF while NPO  - Consent signed and in chart

## 2020-10-28 NOTE — PROGRESS NOTE ADULT - ASSESSMENT
80 year old male with PMHx significant for Afib on coumadin, HFrEF (s/p AICD placement), CAD s/p CABG, BPH, hypothyroidism, DM, HLD, HTN, COPD, Psoriasis, PVD presenting to the ED for left heel wound, s/p debridement of Left heel wound on 10/27/20.       1. Left heel full thickness wound  - continue LWC: santyl/ Dakins wrap with Kerlix and ACE twice a day  -continue IVF, IV abx  - pain management  - PT consult  - ambulate as tolerated.    2. Cards: hx of Atrial fib-on AC, CAD s/p CABG, HFrEF,   - per cards, High risk patient for an intermediate risk procedure   - Coumadin restarted post-op, monitor INR daily  - continue home dose Lasix 40mg qd, Losartan 100mg qd,  Lipitor 40 qhs  - Echo pending, f/u results   - DASH/TLC diet    3. Vascular:  -Vascular studies- 10/25 venous duplex of BLE- prelim negative for DVT 10/25 arterial duplex of BLE; prelim showed Occluded left distal peroneal artery with otherwise patent infrainguinal vessels bilaterally.  - possible angio with vascular on Friday 10/30    4. Resp  -hx of Severe COPD s/p VATS not on home O2  -neb tx PRN  - continue Symbicort inhaler  -encourage incentive spirometer     5. Endo: hx of DM2 (hgA1c 8.6- 10/26),  hypothyroidism   -  Metformin held, 5,5,5,15 plus ISS inpatient   - monitor FS, adjust insulin accordingly   - continue levothyroxine   - carb consistent diet    6. ID  -Wcx from clinic 10/22 showed num proteus, num E. Faecalis, Num streptococcus gordonii.  -continue Unasyn  -x-ray of Left foot showed soft tissue air inferior to the posterior calcaneus, without obvious periosteal reaction or skin erosions. These findings may be related to osteomyelitis.  -cont IV abx as per ID    DVT ppx - pt on Coumadin  PPI daily for GI ppx  PT c/s     80 year old male with PMHx significant for Afib on coumadin, HFrEF (s/p AICD placement), CAD s/p CABG, BPH, hypothyroidism, DM, HLD, HTN, COPD, Psoriasis, PVD presenting to the ED for left heel wound, s/p debridement of Left heel wound on 10/27/20.       1. Left heel full thickness wound - s/p debridement on 10/27  - continue LWC: santyl/ Dakins wrap with Kerlix and ACE twice a day  -continue IVF, IV abx  - pain management  - PT consult  - ambulate as tolerated.    2. Cards: hx of Atrial fib-on AC, CAD s/p CABG, HFrEF,   - per cards, High risk patient for an intermediate risk procedure   - Coumadin held for angio on Friday 10/30, start Heparin drip if INR<2    - continue home dose Lasix 40mg qd, Losartan 100mg qd,  Lipitor 40 qhs  - Echo pending, f/u results   - DASH/TLC diet    3. Vascular:  -Vascular studies- 10/25 venous duplex of BLE- prelim negative for DVT 10/25 arterial duplex of BLE; prelim showed Occluded left distal peroneal artery with otherwise patent infrainguinal vessels bilaterally.  - possible angio with vascular on Friday 10/30  - keep NPO Th night, Coumadin held, start Heparin drip if INR<2    4. Resp:  -hx of Severe COPD s/p VATS not on home O2  -neb tx PRN  - continue Symbicort inhaler  -encourage incentive spirometer     5. Endo: hx of DM2 (hgA1c 8.6- 10/26),  hypothyroidism   -  Metformin held, 5,5,5,15 plus ISS inpatient   - monitor FS, adjust insulin accordingly   - continue home dose levothyroxine   - carb consistent diet    6. ID  -Wcx from clinic 10/22 showed num proteus, num E. Faecalis, Num streptococcus gordonii.  -x-ray of Left foot showed soft tissue air inferior to the posterior calcaneus, without obvious periosteal reaction or skin erosions. These findings may be related to osteomyelitis.  - R/o underlying OM -> f/u CT LLE (ordered)  -cont IV abx as per ID    DVT ppx - pt on AC  PPI daily for GI ppx  PT c/s

## 2020-10-28 NOTE — PROGRESS NOTE ADULT - SUBJECTIVE AND OBJECTIVE BOX
LAMYRNA  80y, Male    All available historical data reviewed    OVERNIGHT EVENTS:  Incision and debridement, foot 27-Oct-2020 17:48:44 excxisional debridement left medial heel/ foot wound    ROS:  General: Denies rigors, nightsweats  HEENT: Denies headache, rhinorrhea, sore throat, eye pain  CV: Denies CP, palpitations  PULM: Denies wheezing, hemoptysis  GI: Denies hematemesis, hematochezia, melena  : Denies discharge, hematuria  MSK: Denies arthralgias, myalgias  SKIN: Denies rash, lesions  NEURO: Denies paresthesias, weakness  PSYCH: Denies depression, anxiety    VITALS:  T(F): 97.8, Max: 98.7 (10-28-20 @ 05:14)  HR: 63  BP: 106/59  RR: 18Vital Signs Last 24 Hrs  T(C): 36.6 (28 Oct 2020 12:13), Max: 37.1 (28 Oct 2020 05:14)  T(F): 97.8 (28 Oct 2020 12:13), Max: 98.7 (28 Oct 2020 05:14)  HR: 63 (28 Oct 2020 12:13) (59 - 82)  BP: 106/59 (28 Oct 2020 12:13) (98/46 - 166/74)  BP(mean): --  RR: 18 (28 Oct 2020 12:13) (17 - 18)  SpO2: 96% (27 Oct 2020 21:24) (96% - 99%)    TESTS & MEASUREMENTS:                        13.3   6.80  )-----------( 125      ( 28 Oct 2020 00:40 )             41.3     10-28    141  |  104  |  33<H>  ----------------------------<  147<H>  3.9   |  24  |  1.2    Ca    9.0      28 Oct 2020 00:40  Phos  3.1     10-28  Mg     1.4     10-28          Culture - Blood (collected 10-25-20 @ 12:45)  Source: .Blood Blood  Preliminary Report (10-26-20 @ 20:01):    No growth to date.    Culture - Blood (collected 10-25-20 @ 12:45)  Source: .Blood Blood  Preliminary Report (10-26-20 @ 20:01):    No growth to date.            RADIOLOGY & ADDITIONAL TESTS:  Personal review of radiological diagnostics performed  Echo and EKG results noted when applicable.     MEDICATIONS:  albuterol/ipratropium for Nebulization 3 milliLiter(s) Nebulizer every 6 hours  ampicillin/sulbactam  IVPB 3 Gram(s) IV Intermittent every 6 hours  atorvastatin 40 milliGRAM(s) Oral at bedtime  budesonide 160 MICROgram(s)/formoterol 4.5 MICROgram(s) Inhaler 2 Puff(s) Inhalation two times a day  chlorhexidine 4% Liquid 1 Application(s) Topical <User Schedule>  collagenase Ointment 1 Application(s) Topical two times a day  dextrose 40% Gel 15 Gram(s) Oral once PRN  dextrose 5%. 1000 milliLiter(s) IV Continuous <Continuous>  dextrose 50% Injectable 12.5 Gram(s) IV Push once  dextrose 50% Injectable 25 Gram(s) IV Push once  dextrose 50% Injectable 25 Gram(s) IV Push once  furosemide    Tablet 40 milliGRAM(s) Oral daily  glucagon  Injectable 1 milliGRAM(s) IntraMuscular once PRN  heparin   Injectable 5000 Unit(s) SubCutaneous every 8 hours  insulin glargine Injectable (LANTUS) 15 Unit(s) SubCutaneous at bedtime  insulin lispro (ADMELOG) corrective regimen sliding scale   SubCutaneous three times a day before meals  insulin lispro Injectable (ADMELOG) 5 Unit(s) SubCutaneous three times a day before meals  latanoprost 0.005% Ophthalmic Solution 1 Drop(s) Both EYES at bedtime  levothyroxine 250 MICROGram(s) Oral daily  losartan 100 milliGRAM(s) Oral daily  morphine  - Injectable 2 milliGRAM(s) IV Push two times a day PRN  oxycodone    5 mG/acetaminophen 325 mG 1 Tablet(s) Oral every 6 hours PRN  pantoprazole    Tablet 40 milliGRAM(s) Oral before breakfast  polyethylene glycol 3350 17 Gram(s) Oral daily PRN  senna 2 Tablet(s) Oral daily PRN  tamsulosin Oral Tab/Cap - Peds 0.4 milliGRAM(s) Oral at bedtime  zinc sulfate 220 milliGRAM(s) Oral daily      ANTIBIOTICS:  ampicillin/sulbactam  IVPB 3 Gram(s) IV Intermittent every 6 hours

## 2020-10-28 NOTE — PROGRESS NOTE ADULT - ASSESSMENT
80 year old male with PMHx significant of Afib on coumadin, HFrEF (s/p AICD placement), CAD s/p CABG, BPH, hypothyroidism, DM, HLD, HTN, COPD, Psoriasis, PVD presenting to the ED for chronic left heel wound. Patient states the wound has been there since June of this year due to diabetes. Patient endorses that he was following a podiatrist for the heel wound and was referred to see Dr. Billy for further wound care management. Patient was seen by Dr. Billy on 10/22/20 in burn clinic and was told to come in for admission for IV antibiotic and debridement of left heel wound. Wcx from clinic 10/22 showed num proteus, num E. Faecalis, Num streptococcus gordonii.     IMPRESSION;  L heel with abscess/cellulitis   S/p debridement  R/o underlying OM  BCx [id number]    RECOMMENDATIONS;  Unasyn 3 gm iv q6h  Consider CT  of heel. If suggestive of OM then po augmentin 875 mg q12h for 4 weeks.  If NG then 2 weeks of po ABx  recall prn please

## 2020-10-28 NOTE — CONSULT NOTE ADULT - SUBJECTIVE AND OBJECTIVE BOX
T H I S    I S    A N    I N C O M P L E T E     N O T MYRNA HILLS  MRN-[id number]    HISTORY OF PRESENT ILLNESS:  HPI:  Patient is a 80 year old male with PMHx significant of Afib on coumadin, HFrEF (s/p AICD placement), CAD s/p CABG, BPH, hypothyroidism, DM, HLD, HTN, COPD, Psoriasis, PVD presenting to the ED for chronic left heel wound. Patient states the wound has been there since June of this year due to diabetes. Patient endorses that he was following a podiatrist for the heel wound and was referred to see Dr. Billy for further wound care management. Patient was seen by Dr. Billy on 10/22/20 in burn clinic and was told to come in for admission for IV antibiotic and debridement of left heel wound. Wcx from clinic 10/22 showed num proteus, num E. Faecalis, Num streptococcus gordonii. Patient reports increased drainage of left heel wound,  foul smell and some tenderness to palpation of wound. Patient denies any fever, chills, paresthesia edema, chest pain, shortness of breath, abdominal pain, nausea, vomiting, or diarrhea  (25 Oct 2020 15:37)      PMH/PSH:  PAST MEDICAL & SURGICAL HISTORY:  Psoriasis    Glaucoma    COPD (chronic obstructive pulmonary disease)    Diabetes mellitus    Hypothyroidism    Hypertension    Dyslipidemia    Benign prostate hyperplasia    Atrial fibrillation    Heart failure  chronic systolic heart failure    Coronary artery disease  s/p CABG 2014    S/P thoracotomy  pt states he had a VATS w/lung decortication    H/O total knee replacement, bilateral    AICD (automatic cardioverter/defibrillator) present    S/P CABG (coronary artery bypass graft)      ALLERGIES:  Allergies    No Known Allergies    Intolerances      SOCIAL HABITS:    FAMILY HISTORY:   FAMILY HISTORY:  Family history of coronary artery disease (Father)        REVIEW OF SYSTEM:  Elements of review of systems are negative or non-applicable except as noted above in HPI section.       HOME MEDICATIONS:  atorvastatin 40 mg oral tablet  Incruse Ellipta 62.5 mcg/inh inhalation powder  insulin glargine (concentrated) 300 units/mL subcutaneous solution  Lasix 40 mg oral tablet  latanoprost 0.005% ophthalmic solution  levothyroxine  metFORMIN 750 mg oral tablet, extended release  metoprolol succinate 50 mg oral tablet, extended release  Trelegy Ellipta inhalation powder  warfarin 5 mg oral tablet    MEDICATIONS:  MEDICATIONS  (STANDING):  albuterol/ipratropium for Nebulization 3 milliLiter(s) Nebulizer every 6 hours  ampicillin/sulbactam  IVPB 3 Gram(s) IV Intermittent every 6 hours  atorvastatin 40 milliGRAM(s) Oral at bedtime  budesonide 160 MICROgram(s)/formoterol 4.5 MICROgram(s) Inhaler 2 Puff(s) Inhalation two times a day  chlorhexidine 4% Liquid 1 Application(s) Topical <User Schedule>  collagenase Ointment 1 Application(s) Topical two times a day  dextrose 5%. 1000 milliLiter(s) (50 mL/Hr) IV Continuous <Continuous>  dextrose 50% Injectable 12.5 Gram(s) IV Push once  dextrose 50% Injectable 25 Gram(s) IV Push once  dextrose 50% Injectable 25 Gram(s) IV Push once  furosemide    Tablet 40 milliGRAM(s) Oral daily  heparin   Injectable 5000 Unit(s) SubCutaneous every 8 hours  insulin glargine Injectable (LANTUS) 15 Unit(s) SubCutaneous at bedtime  insulin lispro (ADMELOG) corrective regimen sliding scale   SubCutaneous three times a day before meals  insulin lispro Injectable (ADMELOG) 5 Unit(s) SubCutaneous three times a day before meals  latanoprost 0.005% Ophthalmic Solution 1 Drop(s) Both EYES at bedtime  levothyroxine 250 MICROGram(s) Oral daily  losartan 100 milliGRAM(s) Oral daily  pantoprazole    Tablet 40 milliGRAM(s) Oral before breakfast  tamsulosin Oral Tab/Cap - Peds 0.4 milliGRAM(s) Oral at bedtime  zinc sulfate 220 milliGRAM(s) Oral daily    MEDICATIONS  (PRN):  dextrose 40% Gel 15 Gram(s) Oral once PRN Blood Glucose LESS THAN 70 milliGRAM(s)/deciliter  glucagon  Injectable 1 milliGRAM(s) IntraMuscular once PRN Glucose LESS THAN 70 milligrams/deciliter  morphine  - Injectable 2 milliGRAM(s) IV Push two times a day PRN wound care  oxycodone    5 mG/acetaminophen 325 mG 1 Tablet(s) Oral every 6 hours PRN Moderate Pain (4 - 6)  polyethylene glycol 3350 17 Gram(s) Oral daily PRN Constipation  senna 2 Tablet(s) Oral daily PRN Constipation        VITALS:   Vital Signs Last 24 Hrs  T(C): 35.9 (28 Oct 2020 17:00), Max: 37.1 (28 Oct 2020 05:14)  T(F): 96.7 (28 Oct 2020 17:00), Max: 98.7 (28 Oct 2020 05:14)  HR: 66 (28 Oct 2020 17:00) (59 - 80)  BP: 137/68 (28 Oct 2020 17:00) (98/46 - 139/77)  BP(mean): --  RR: 18 (28 Oct 2020 17:00) (18 - 18)  SpO2: 96% (27 Oct 2020 21:24) (96% - 99%)        PHYSICAL EXAM:    GENERAL: NAD  HEAD:  Atraumatic, Normocephalic  NECK: Supple, No JVD  CHEST/LUNG: Clear to auscultation bilaterally;   HEART: Regular rate and rhythm; No murmurs  ABDOMEN: Soft, Nontender, Nondistended  EXTREMITIES:  Good peripheral Pulses, No clubbing, cyanosis, or edema      LABS:                        13.3   6.80  )-----------( 125      ( 28 Oct 2020 00:40 )             41.3     10-28    141  |  104  |  33<H>  ----------------------------<  147<H>  3.9   |  24  |  1.2    Ca    9.0      28 Oct 2020 00:40  Phos  3.1     10-28  Mg     1.4     10-28            PT/INR - ( 28 Oct 2020 00:40 )   PT: 23.90 sec;   INR: 2.08 ratio         PTT - ( 28 Oct 2020 00:40 )  PTT:42.2 sec  COVID-19 PCR: NotDetec (10-25-20 @ 15:08)      Culture - Blood (collected 10-25-20 @ 12:45)  Source: .Blood Blood  Preliminary Report (10-26-20 @ 20:01):    No growth to date.    Culture - Blood (collected 10-25-20 @ 12:45)  Source: .Blood Blood  Preliminary Report (10-26-20 @ 20:01):    No growth to date.            ABG & VENT SETTINGS (when applicable)        DIAGNOSTIC STUDIES:         MYRNA TOVAR  MRN-[id number]    HISTORY OF PRESENT ILLNESS:  HPI:  Patient is a 80 year old male with PMHx significant of Afib on coumadin, HFrEF (s/p AICD placement), CAD s/p CABG, BPH, hypothyroidism, DM, HLD, HTN, COPD, Psoriasis, PVD presenting to the ED for chronic left heel wound. Patient states the wound has been there since June of this year due to diabetes. Patient endorses that he was following a podiatrist for the heel wound and was referred to see Dr. Billy for further wound care management. Patient was seen by Dr. Billy on 10/22/20 in burn clinic and was told to come in for admission for IV antibiotic and debridement of left heel wound. Wcx from clinic 10/22 showed num proteus, num E. Faecalis, Num streptococcus gordonii. Patient reports increased drainage of left heel wound,  foul smell and some tenderness to palpation of wound. Patient denies any fever, chills, paresthesia edema, chest pain, shortness of breath, abdominal pain, nausea, vomiting, or diarrhea  (25 Oct 2020 15:37)      PMH/PSH:  PAST MEDICAL & SURGICAL HISTORY:  Psoriasis    Glaucoma    COPD (chronic obstructive pulmonary disease)    Diabetes mellitus    Hypothyroidism    Hypertension    Dyslipidemia    Benign prostate hyperplasia    Atrial fibrillation    Heart failure  chronic systolic heart failure    Coronary artery disease  s/p CABG 2014    S/P thoracotomy  pt states he had a VATS w/lung decortication    H/O total knee replacement, bilateral    AICD (automatic cardioverter/defibrillator) present    S/P CABG (coronary artery bypass graft)      ALLERGIES:  Allergies    No Known Allergies    Intolerances      SOCIAL HABITS:  ex smoker    FAMILY HISTORY:   FAMILY HISTORY:  Family history of coronary artery disease (Father)        REVIEW OF SYSTEM:  Elements of review of systems are negative or non-applicable except as noted above in HPI section.       HOME MEDICATIONS:  atorvastatin 40 mg oral tablet  Incruse Ellipta 62.5 mcg/inh inhalation powder  insulin glargine (concentrated) 300 units/mL subcutaneous solution  Lasix 40 mg oral tablet  latanoprost 0.005% ophthalmic solution  levothyroxine  metFORMIN 750 mg oral tablet, extended release  metoprolol succinate 50 mg oral tablet, extended release  Trelegy Ellipta inhalation powder  warfarin 5 mg oral tablet    MEDICATIONS:  MEDICATIONS  (STANDING):  albuterol/ipratropium for Nebulization 3 milliLiter(s) Nebulizer every 6 hours  ampicillin/sulbactam  IVPB 3 Gram(s) IV Intermittent every 6 hours  atorvastatin 40 milliGRAM(s) Oral at bedtime  budesonide 160 MICROgram(s)/formoterol 4.5 MICROgram(s) Inhaler 2 Puff(s) Inhalation two times a day  chlorhexidine 4% Liquid 1 Application(s) Topical <User Schedule>  collagenase Ointment 1 Application(s) Topical two times a day  dextrose 5%. 1000 milliLiter(s) (50 mL/Hr) IV Continuous <Continuous>  dextrose 50% Injectable 12.5 Gram(s) IV Push once  dextrose 50% Injectable 25 Gram(s) IV Push once  dextrose 50% Injectable 25 Gram(s) IV Push once  furosemide    Tablet 40 milliGRAM(s) Oral daily  heparin   Injectable 5000 Unit(s) SubCutaneous every 8 hours  insulin glargine Injectable (LANTUS) 15 Unit(s) SubCutaneous at bedtime  insulin lispro (ADMELOG) corrective regimen sliding scale   SubCutaneous three times a day before meals  insulin lispro Injectable (ADMELOG) 5 Unit(s) SubCutaneous three times a day before meals  latanoprost 0.005% Ophthalmic Solution 1 Drop(s) Both EYES at bedtime  levothyroxine 250 MICROGram(s) Oral daily  losartan 100 milliGRAM(s) Oral daily  pantoprazole    Tablet 40 milliGRAM(s) Oral before breakfast  tamsulosin Oral Tab/Cap - Peds 0.4 milliGRAM(s) Oral at bedtime  zinc sulfate 220 milliGRAM(s) Oral daily    MEDICATIONS  (PRN):  dextrose 40% Gel 15 Gram(s) Oral once PRN Blood Glucose LESS THAN 70 milliGRAM(s)/deciliter  glucagon  Injectable 1 milliGRAM(s) IntraMuscular once PRN Glucose LESS THAN 70 milligrams/deciliter  morphine  - Injectable 2 milliGRAM(s) IV Push two times a day PRN wound care  oxycodone    5 mG/acetaminophen 325 mG 1 Tablet(s) Oral every 6 hours PRN Moderate Pain (4 - 6)  polyethylene glycol 3350 17 Gram(s) Oral daily PRN Constipation  senna 2 Tablet(s) Oral daily PRN Constipation        VITALS:   Vital Signs Last 24 Hrs  T(C): 35.9 (28 Oct 2020 17:00), Max: 37.1 (28 Oct 2020 05:14)  T(F): 96.7 (28 Oct 2020 17:00), Max: 98.7 (28 Oct 2020 05:14)  HR: 66 (28 Oct 2020 17:00) (59 - 80)  BP: 137/68 (28 Oct 2020 17:00) (98/46 - 139/77)  BP(mean): --  RR: 18 (28 Oct 2020 17:00) (18 - 18)  SpO2: 96% (27 Oct 2020 21:24) (96% - 99%)        PHYSICAL EXAM:    GENERAL: NAD  HEAD:  Atraumatic, Normocephalic  NECK: Supple, No JVD  CHEST/LUNG: Clear to auscultation bilaterally;   HEART: Regular rate and rhythm; No murmurs  ABDOMEN: Soft, Nontender, Nondistended  EXTREMITIES:  Good peripheral Pulses, No clubbing, cyanosis, or edema      LABS:                        13.3   6.80  )-----------( 125      ( 28 Oct 2020 00:40 )             41.3     10-28    141  |  104  |  33<H>  ----------------------------<  147<H>  3.9   |  24  |  1.2    Ca    9.0      28 Oct 2020 00:40  Phos  3.1     10-28  Mg     1.4     10-28            PT/INR - ( 28 Oct 2020 00:40 )   PT: 23.90 sec;   INR: 2.08 ratio         PTT - ( 28 Oct 2020 00:40 )  PTT:42.2 sec  COVID-19 PCR: NotDetec (10-25-20 @ 15:08)      Culture - Blood (collected 10-25-20 @ 12:45)  Source: .Blood Blood  Preliminary Report (10-26-20 @ 20:01):    No growth to date.    Culture - Blood (collected 10-25-20 @ 12:45)  Source: .Blood Blood  Preliminary Report (10-26-20 @ 20:01):    No growth to date.            ABG & VENT SETTINGS (when applicable)        DIAGNOSTIC STUDIES:

## 2020-10-28 NOTE — CONSULT NOTE ADULT - ASSESSMENT
80 year old male with COPD Afib on coumadin, HFrEF (s/p AICD placement), CAD s/p CABG and PVD admitted with Chronic left heel wound/ ulcer now   s/p debridementnow awaiting angioplasty    monitor 02 sat  cont symbicort, add spiriva while hospitalized (in lieu of trelegy)  albuterol/atrovent nebs prn  pt stable from the pulmonary standpoint to undergo planned vascular intervention  discussed with vascular  oob-chair daily  outpt follow up  dvt/gi px

## 2020-10-28 NOTE — PROGRESS NOTE ADULT - SUBJECTIVE AND OBJECTIVE BOX
Patient is a 80y old  Male who presents with a chief complaint of chronic left heel wound (27 Oct 2020 16:22)      INTERVAL HPI/OVERNIGHT EVENTS:  no events overnight, afebrile    T(C): 36.2 (28 Oct 2020 08:39), Max: 37.1 (28 Oct 2020 05:14)  T(F): 97.1 (28 Oct 2020 08:39), Max: 98.7 (28 Oct 2020 05:14)  HR: 60 (28 Oct 2020 08:39) (59 - 82)  BP: 98/46 (28 Oct 2020 08:39) (98/46 - 166/74)  RR: 18 (28 Oct 2020 08:39) (17 - 18)  SpO2: 96% (27 Oct 2020 21:24) (96% - 99%)    I&O's Summary    27 Oct 2020 07:01  -  28 Oct 2020 07:00  --------------------------------------------------------  IN: 200 mL / OUT: 1450 mL / NET: -1250 mL    Allergies  No Known Allergies      Lab Results:                        13.3   6.80  )-----------( 125      ( 28 Oct 2020 00:40 )             41.3     10-28    141  |  104  |  33<H>  ----------------------------<  147<H>  3.9   |  24  |  1.2    Ca    9.0      28 Oct 2020 00:40  Phos  3.1     10-28  Mg     1.4     10-28      PT/INR - ( 28 Oct 2020 00:40 )   PT: 23.90 sec;   INR: 2.08 ratio    PTT - ( 28 Oct 2020 00:40 )  PTT:42.2 sec    CAPILLARY BLOOD GLUCOSE  POCT Blood Glucose.: 196 mg/dL (28 Oct 2020 07:15)  POCT Blood Glucose.: 140 mg/dL (27 Oct 2020 21:05)  POCT Blood Glucose.: 84 mg/dL (27 Oct 2020 11:54)    MICROBIOLOGY:  10-25 @ 12:45  Culture-urine --  Culture results   No growth to date.  method type --  Organism --  Organism Identification --  Specimen source .Blood Blood     10-25 @ 12:45  Culture blood --  Culture results   No growth to date.  Gram stain --  Gram stain blood --  Method type --  Organism --  Organism identification --  Specimen source .Blood Blood      MEDICATIONS  (STANDING):  albuterol/ipratropium for Nebulization 3 milliLiter(s) Nebulizer every 6 hours  ampicillin/sulbactam  IVPB 3 Gram(s) IV Intermittent every 6 hours  atorvastatin 40 milliGRAM(s) Oral at bedtime  budesonide 160 MICROgram(s)/formoterol 4.5 MICROgram(s) Inhaler 2 Puff(s) Inhalation two times a day  chlorhexidine 4% Liquid 1 Application(s) Topical <User Schedule>  collagenase Ointment 1 Application(s) Topical two times a day  dextrose 5%. 1000 milliLiter(s) (50 mL/Hr) IV Continuous <Continuous>  dextrose 50% Injectable 12.5 Gram(s) IV Push once  dextrose 50% Injectable 25 Gram(s) IV Push once  dextrose 50% Injectable 25 Gram(s) IV Push once  furosemide    Tablet 40 milliGRAM(s) Oral daily  heparin   Injectable 5000 Unit(s) SubCutaneous every 8 hours  insulin glargine Injectable (LANTUS) 15 Unit(s) SubCutaneous at bedtime  insulin lispro (ADMELOG) corrective regimen sliding scale   SubCutaneous three times a day before meals  insulin lispro Injectable (ADMELOG) 5 Unit(s) SubCutaneous three times a day before meals  latanoprost 0.005% Ophthalmic Solution 1 Drop(s) Both EYES at bedtime  levothyroxine 250 MICROGram(s) Oral daily  losartan 100 milliGRAM(s) Oral daily  pantoprazole    Tablet 40 milliGRAM(s) Oral before breakfast  tamsulosin Oral Tab/Cap - Peds 0.4 milliGRAM(s) Oral at bedtime  warfarin 5 milliGRAM(s) Oral once  zinc sulfate 220 milliGRAM(s) Oral daily    MEDICATIONS  (PRN):  dextrose 40% Gel 15 Gram(s) Oral once PRN Blood Glucose LESS THAN 70 milliGRAM(s)/deciliter  glucagon  Injectable 1 milliGRAM(s) IntraMuscular once PRN Glucose LESS THAN 70 milligrams/deciliter  morphine  - Injectable 2 milliGRAM(s) IV Push two times a day PRN wound care  oxycodone    5 mG/acetaminophen 325 mG 1 Tablet(s) Oral every 6 hours PRN Moderate Pain (4 - 6)  polyethylene glycol 3350 17 Gram(s) Oral daily PRN Constipation  senna 2 Tablet(s) Oral daily PRN Constipation        PHYSICAL EXAM:  GENERAL: NAD, sitting in wheelchair comfortably.   HEAD:  Atraumatic, Normocephalic  CHEST/LUNG: speaking in full sentences, breathing comfortably in room air, symmetrical chest expansion.   HEART: in no acute cardiopulmonary distress.   PSYCH: AAOx3, cooperative, pleasant   SKIN: LLE: left medial heel full thickness wound ~ 3cm x 3cm pink and moist with areas of yellow necrotic tissue.  mild drainage noted. malodor noted. no significant erythema or edema appreciated. no active bleeding noted.    Large Dressing change performed. Patient tolerated well.   Patient is a 80y old  Male who presents with a chief complaint of chronic left heel wound (27 Oct 2020 16:22)  s/p debridement of LLE 10/27/2020.       INTERVAL HPI/OVERNIGHT EVENTS:  no events overnight, afebrile    T(C): 36.2 (28 Oct 2020 08:39), Max: 37.1 (28 Oct 2020 05:14)  T(F): 97.1 (28 Oct 2020 08:39), Max: 98.7 (28 Oct 2020 05:14)  HR: 60 (28 Oct 2020 08:39) (59 - 82)  BP: 98/46 (28 Oct 2020 08:39) (98/46 - 166/74)  RR: 18 (28 Oct 2020 08:39) (17 - 18)  SpO2: 96% (27 Oct 2020 21:24) (96% - 99%)    I&O's Summary    27 Oct 2020 07:01  -  28 Oct 2020 07:00  --------------------------------------------------------  IN: 200 mL / OUT: 1450 mL / NET: -1250 mL    Allergies  No Known Allergies      Lab Results:                        13.3   6.80  )-----------( 125      ( 28 Oct 2020 00:40 )             41.3     10-28    141  |  104  |  33<H>  ----------------------------<  147<H>  3.9   |  24  |  1.2    Ca    9.0      28 Oct 2020 00:40  Phos  3.1     10-28  Mg     1.4     10-28      PT/INR - ( 28 Oct 2020 00:40 )   PT: 23.90 sec;   INR: 2.08 ratio    PTT - ( 28 Oct 2020 00:40 )  PTT:42.2 sec    CAPILLARY BLOOD GLUCOSE  POCT Blood Glucose.: 196 mg/dL (28 Oct 2020 07:15)  POCT Blood Glucose.: 140 mg/dL (27 Oct 2020 21:05)  POCT Blood Glucose.: 84 mg/dL (27 Oct 2020 11:54)    MICROBIOLOGY:  10-25 @ 12:45  Culture-urine --  Culture results   No growth to date.  method type --  Organism --  Organism Identification --  Specimen source .Blood Blood     10-25 @ 12:45  Culture blood --  Culture results   No growth to date.  Gram stain --  Gram stain blood --  Method type --  Organism --  Organism identification --  Specimen source .Blood Blood      MEDICATIONS  (STANDING):  albuterol/ipratropium for Nebulization 3 milliLiter(s) Nebulizer every 6 hours  ampicillin/sulbactam  IVPB 3 Gram(s) IV Intermittent every 6 hours  atorvastatin 40 milliGRAM(s) Oral at bedtime  budesonide 160 MICROgram(s)/formoterol 4.5 MICROgram(s) Inhaler 2 Puff(s) Inhalation two times a day  chlorhexidine 4% Liquid 1 Application(s) Topical <User Schedule>  collagenase Ointment 1 Application(s) Topical two times a day  dextrose 5%. 1000 milliLiter(s) (50 mL/Hr) IV Continuous <Continuous>  dextrose 50% Injectable 12.5 Gram(s) IV Push once  dextrose 50% Injectable 25 Gram(s) IV Push once  dextrose 50% Injectable 25 Gram(s) IV Push once  furosemide    Tablet 40 milliGRAM(s) Oral daily  heparin   Injectable 5000 Unit(s) SubCutaneous every 8 hours  insulin glargine Injectable (LANTUS) 15 Unit(s) SubCutaneous at bedtime  insulin lispro (ADMELOG) corrective regimen sliding scale   SubCutaneous three times a day before meals  insulin lispro Injectable (ADMELOG) 5 Unit(s) SubCutaneous three times a day before meals  latanoprost 0.005% Ophthalmic Solution 1 Drop(s) Both EYES at bedtime  levothyroxine 250 MICROGram(s) Oral daily  losartan 100 milliGRAM(s) Oral daily  pantoprazole    Tablet 40 milliGRAM(s) Oral before breakfast  tamsulosin Oral Tab/Cap - Peds 0.4 milliGRAM(s) Oral at bedtime  warfarin 5 milliGRAM(s) Oral once  zinc sulfate 220 milliGRAM(s) Oral daily    MEDICATIONS  (PRN):  dextrose 40% Gel 15 Gram(s) Oral once PRN Blood Glucose LESS THAN 70 milliGRAM(s)/deciliter  glucagon  Injectable 1 milliGRAM(s) IntraMuscular once PRN Glucose LESS THAN 70 milligrams/deciliter  morphine  - Injectable 2 milliGRAM(s) IV Push two times a day PRN wound care  oxycodone    5 mG/acetaminophen 325 mG 1 Tablet(s) Oral every 6 hours PRN Moderate Pain (4 - 6)  polyethylene glycol 3350 17 Gram(s) Oral daily PRN Constipation  senna 2 Tablet(s) Oral daily PRN Constipation        PHYSICAL EXAM:  GENERAL: NAD, sitting in wheelchair comfortably.   HEAD:  Atraumatic, Normocephalic  CHEST/LUNG: speaking in full sentences, breathing comfortably in room air, symmetrical chest expansion.   HEART: in no acute cardiopulmonary distress.   PSYCH: AAOx3, cooperative, pleasant   SKIN: LLE: left medial heel full thickness wound ~ 3cm x 3cm pink and moist.  mild drainage noted. no significant erythema or edema appreciated. no active bleeding noted.    Large Dressing change performed. Patient tolerated well.

## 2020-10-29 ENCOUNTER — APPOINTMENT (OUTPATIENT)
Dept: CARDIOLOGY | Facility: CLINIC | Age: 80
End: 2020-10-29

## 2020-10-29 ENCOUNTER — TRANSCRIPTION ENCOUNTER (OUTPATIENT)
Age: 80
End: 2020-10-29

## 2020-10-29 DIAGNOSIS — S91.309A UNSPECIFIED OPEN WOUND, UNSPECIFIED FOOT, INITIAL ENCOUNTER: ICD-10-CM

## 2020-10-29 LAB
GLUCOSE BLDC GLUCOMTR-MCNC: 114 MG/DL — HIGH (ref 70–99)
GLUCOSE BLDC GLUCOMTR-MCNC: 169 MG/DL — HIGH (ref 70–99)
GLUCOSE BLDC GLUCOMTR-MCNC: 74 MG/DL — SIGNIFICANT CHANGE UP (ref 70–99)
GLUCOSE BLDC GLUCOMTR-MCNC: 90 MG/DL — SIGNIFICANT CHANGE UP (ref 70–99)
GLUCOSE BLDC GLUCOMTR-MCNC: 95 MG/DL — SIGNIFICANT CHANGE UP (ref 70–99)
SURGICAL PATHOLOGY STUDY: SIGNIFICANT CHANGE UP
TSH SERPL-MCNC: 0.08 UIU/ML — LOW (ref 0.27–4.2)

## 2020-10-29 PROCEDURE — 75710 ARTERY X-RAYS ARM/LEG: CPT | Mod: 26,59

## 2020-10-29 PROCEDURE — 75625 CONTRAST EXAM ABDOMINL AORTA: CPT | Mod: 26

## 2020-10-29 PROCEDURE — 36247 INS CATH ABD/L-EXT ART 3RD: CPT | Mod: 59

## 2020-10-29 PROCEDURE — 36248 INS CATH ABD/L-EXT ART ADDL: CPT

## 2020-10-29 PROCEDURE — 76937 US GUIDE VASCULAR ACCESS: CPT | Mod: 26

## 2020-10-29 PROCEDURE — 37229: CPT | Mod: RT

## 2020-10-29 RX ORDER — SODIUM CHLORIDE 9 MG/ML
1000 INJECTION, SOLUTION INTRAVENOUS
Refills: 0 | Status: DISCONTINUED | OUTPATIENT
Start: 2020-10-29 | End: 2020-11-02

## 2020-10-29 RX ORDER — TAMSULOSIN HYDROCHLORIDE 0.4 MG/1
0.4 CAPSULE ORAL AT BEDTIME
Refills: 0 | Status: DISCONTINUED | OUTPATIENT
Start: 2020-10-29 | End: 2020-11-02

## 2020-10-29 RX ORDER — ONDANSETRON 8 MG/1
4 TABLET, FILM COATED ORAL ONCE
Refills: 0 | Status: DISCONTINUED | OUTPATIENT
Start: 2020-10-29 | End: 2020-10-29

## 2020-10-29 RX ORDER — INSULIN LISPRO 100/ML
VIAL (ML) SUBCUTANEOUS
Refills: 0 | Status: DISCONTINUED | OUTPATIENT
Start: 2020-10-29 | End: 2020-11-02

## 2020-10-29 RX ORDER — DEXTROSE 50 % IN WATER 50 %
15 SYRINGE (ML) INTRAVENOUS ONCE
Refills: 0 | Status: DISCONTINUED | OUTPATIENT
Start: 2020-10-29 | End: 2020-11-02

## 2020-10-29 RX ORDER — BUDESONIDE AND FORMOTEROL FUMARATE DIHYDRATE 160; 4.5 UG/1; UG/1
2 AEROSOL RESPIRATORY (INHALATION)
Refills: 0 | Status: DISCONTINUED | OUTPATIENT
Start: 2020-10-29 | End: 2020-11-02

## 2020-10-29 RX ORDER — SODIUM CHLORIDE 9 MG/ML
1000 INJECTION, SOLUTION INTRAVENOUS
Refills: 0 | Status: DISCONTINUED | OUTPATIENT
Start: 2020-10-29 | End: 2020-10-29

## 2020-10-29 RX ORDER — DEXTROSE 50 % IN WATER 50 %
25 SYRINGE (ML) INTRAVENOUS ONCE
Refills: 0 | Status: DISCONTINUED | OUTPATIENT
Start: 2020-10-29 | End: 2020-11-02

## 2020-10-29 RX ORDER — LATANOPROST 0.05 MG/ML
1 SOLUTION/ DROPS OPHTHALMIC; TOPICAL AT BEDTIME
Refills: 0 | Status: DISCONTINUED | OUTPATIENT
Start: 2020-10-29 | End: 2020-11-02

## 2020-10-29 RX ORDER — AMPICILLIN SODIUM AND SULBACTAM SODIUM 250; 125 MG/ML; MG/ML
3 INJECTION, POWDER, FOR SUSPENSION INTRAMUSCULAR; INTRAVENOUS EVERY 6 HOURS
Refills: 0 | Status: DISCONTINUED | OUTPATIENT
Start: 2020-10-29 | End: 2020-11-02

## 2020-10-29 RX ORDER — WARFARIN SODIUM 2.5 MG/1
5 TABLET ORAL AT BEDTIME
Refills: 0 | Status: COMPLETED | OUTPATIENT
Start: 2020-10-29 | End: 2020-10-29

## 2020-10-29 RX ORDER — LEVOTHYROXINE SODIUM 125 MCG
250 TABLET ORAL DAILY
Refills: 0 | Status: DISCONTINUED | OUTPATIENT
Start: 2020-10-29 | End: 2020-11-02

## 2020-10-29 RX ORDER — ATORVASTATIN CALCIUM 80 MG/1
40 TABLET, FILM COATED ORAL AT BEDTIME
Refills: 0 | Status: DISCONTINUED | OUTPATIENT
Start: 2020-10-29 | End: 2020-11-02

## 2020-10-29 RX ORDER — INSULIN GLARGINE 100 [IU]/ML
15 INJECTION, SOLUTION SUBCUTANEOUS AT BEDTIME
Refills: 0 | Status: DISCONTINUED | OUTPATIENT
Start: 2020-10-29 | End: 2020-11-02

## 2020-10-29 RX ORDER — DEXTROSE 50 % IN WATER 50 %
12.5 SYRINGE (ML) INTRAVENOUS ONCE
Refills: 0 | Status: DISCONTINUED | OUTPATIENT
Start: 2020-10-29 | End: 2020-11-02

## 2020-10-29 RX ORDER — SENNA PLUS 8.6 MG/1
2 TABLET ORAL DAILY
Refills: 0 | Status: DISCONTINUED | OUTPATIENT
Start: 2020-10-29 | End: 2020-11-02

## 2020-10-29 RX ORDER — HEPARIN SODIUM 5000 [USP'U]/ML
5000 INJECTION INTRAVENOUS; SUBCUTANEOUS EVERY 8 HOURS
Refills: 0 | Status: DISCONTINUED | OUTPATIENT
Start: 2020-10-29 | End: 2020-10-29

## 2020-10-29 RX ORDER — ZINC SULFATE TAB 220 MG (50 MG ZINC EQUIVALENT) 220 (50 ZN) MG
220 TAB ORAL DAILY
Refills: 0 | Status: DISCONTINUED | OUTPATIENT
Start: 2020-10-29 | End: 2020-11-02

## 2020-10-29 RX ORDER — COLLAGENASE CLOSTRIDIUM HIST. 250 UNIT/G
1 OINTMENT (GRAM) TOPICAL
Refills: 0 | Status: DISCONTINUED | OUTPATIENT
Start: 2020-10-29 | End: 2020-10-30

## 2020-10-29 RX ORDER — PANTOPRAZOLE SODIUM 20 MG/1
40 TABLET, DELAYED RELEASE ORAL
Refills: 0 | Status: DISCONTINUED | OUTPATIENT
Start: 2020-10-29 | End: 2020-11-02

## 2020-10-29 RX ORDER — POLYETHYLENE GLYCOL 3350 17 G/17G
17 POWDER, FOR SOLUTION ORAL DAILY
Refills: 0 | Status: DISCONTINUED | OUTPATIENT
Start: 2020-10-29 | End: 2020-11-02

## 2020-10-29 RX ORDER — LOSARTAN POTASSIUM 100 MG/1
100 TABLET, FILM COATED ORAL DAILY
Refills: 0 | Status: DISCONTINUED | OUTPATIENT
Start: 2020-10-29 | End: 2020-11-02

## 2020-10-29 RX ORDER — MEPERIDINE HYDROCHLORIDE 50 MG/ML
12.5 INJECTION INTRAMUSCULAR; INTRAVENOUS; SUBCUTANEOUS
Refills: 0 | Status: DISCONTINUED | OUTPATIENT
Start: 2020-10-29 | End: 2020-10-29

## 2020-10-29 RX ORDER — CHLORHEXIDINE GLUCONATE 213 G/1000ML
1 SOLUTION TOPICAL
Refills: 0 | Status: DISCONTINUED | OUTPATIENT
Start: 2020-10-29 | End: 2020-11-02

## 2020-10-29 RX ORDER — HYDROMORPHONE HYDROCHLORIDE 2 MG/ML
1 INJECTION INTRAMUSCULAR; INTRAVENOUS; SUBCUTANEOUS
Refills: 0 | Status: DISCONTINUED | OUTPATIENT
Start: 2020-10-29 | End: 2020-10-29

## 2020-10-29 RX ORDER — INSULIN LISPRO 100/ML
5 VIAL (ML) SUBCUTANEOUS
Refills: 0 | Status: DISCONTINUED | OUTPATIENT
Start: 2020-10-29 | End: 2020-11-02

## 2020-10-29 RX ORDER — MORPHINE SULFATE 50 MG/1
2 CAPSULE, EXTENDED RELEASE ORAL
Refills: 0 | Status: DISCONTINUED | OUTPATIENT
Start: 2020-10-29 | End: 2020-10-30

## 2020-10-29 RX ORDER — HYDROMORPHONE HYDROCHLORIDE 2 MG/ML
0.5 INJECTION INTRAMUSCULAR; INTRAVENOUS; SUBCUTANEOUS
Refills: 0 | Status: DISCONTINUED | OUTPATIENT
Start: 2020-10-29 | End: 2020-10-29

## 2020-10-29 RX ORDER — OXYCODONE AND ACETAMINOPHEN 5; 325 MG/1; MG/1
1 TABLET ORAL EVERY 6 HOURS
Refills: 0 | Status: DISCONTINUED | OUTPATIENT
Start: 2020-10-29 | End: 2020-11-02

## 2020-10-29 RX ADMIN — CHLORHEXIDINE GLUCONATE 1 APPLICATION(S): 213 SOLUTION TOPICAL at 05:34

## 2020-10-29 RX ADMIN — TAMSULOSIN HYDROCHLORIDE 0.4 MILLIGRAM(S): 0.4 CAPSULE ORAL at 21:22

## 2020-10-29 RX ADMIN — Medication 250 MICROGRAM(S): at 05:34

## 2020-10-29 RX ADMIN — Medication 40 MILLIGRAM(S): at 05:34

## 2020-10-29 RX ADMIN — Medication 1 APPLICATION(S): at 21:22

## 2020-10-29 RX ADMIN — AMPICILLIN SODIUM AND SULBACTAM SODIUM 200 GRAM(S): 250; 125 INJECTION, POWDER, FOR SUSPENSION INTRAMUSCULAR; INTRAVENOUS at 05:33

## 2020-10-29 RX ADMIN — AMPICILLIN SODIUM AND SULBACTAM SODIUM 200 GRAM(S): 250; 125 INJECTION, POWDER, FOR SUSPENSION INTRAMUSCULAR; INTRAVENOUS at 18:30

## 2020-10-29 RX ADMIN — AMPICILLIN SODIUM AND SULBACTAM SODIUM 200 GRAM(S): 250; 125 INJECTION, POWDER, FOR SUSPENSION INTRAMUSCULAR; INTRAVENOUS at 23:01

## 2020-10-29 RX ADMIN — Medication 1 APPLICATION(S): at 09:51

## 2020-10-29 RX ADMIN — ATORVASTATIN CALCIUM 40 MILLIGRAM(S): 80 TABLET, FILM COATED ORAL at 21:22

## 2020-10-29 RX ADMIN — HEPARIN SODIUM 5000 UNIT(S): 5000 INJECTION INTRAVENOUS; SUBCUTANEOUS at 05:34

## 2020-10-29 RX ADMIN — SODIUM CHLORIDE 75 MILLILITER(S): 9 INJECTION, SOLUTION INTRAVENOUS at 00:05

## 2020-10-29 RX ADMIN — ZINC SULFATE TAB 220 MG (50 MG ZINC EQUIVALENT) 220 MILLIGRAM(S): 220 (50 ZN) TAB at 11:35

## 2020-10-29 RX ADMIN — LATANOPROST 1 DROP(S): 0.05 SOLUTION/ DROPS OPHTHALMIC; TOPICAL at 21:23

## 2020-10-29 RX ADMIN — PANTOPRAZOLE SODIUM 40 MILLIGRAM(S): 20 TABLET, DELAYED RELEASE ORAL at 06:25

## 2020-10-29 RX ADMIN — LOSARTAN POTASSIUM 100 MILLIGRAM(S): 100 TABLET, FILM COATED ORAL at 05:34

## 2020-10-29 RX ADMIN — INSULIN GLARGINE 15 UNIT(S): 100 INJECTION, SOLUTION SUBCUTANEOUS at 22:39

## 2020-10-29 RX ADMIN — AMPICILLIN SODIUM AND SULBACTAM SODIUM 200 GRAM(S): 250; 125 INJECTION, POWDER, FOR SUSPENSION INTRAMUSCULAR; INTRAVENOUS at 11:34

## 2020-10-29 RX ADMIN — BUDESONIDE AND FORMOTEROL FUMARATE DIHYDRATE 2 PUFF(S): 160; 4.5 AEROSOL RESPIRATORY (INHALATION) at 21:23

## 2020-10-29 RX ADMIN — WARFARIN SODIUM 5 MILLIGRAM(S): 2.5 TABLET ORAL at 22:34

## 2020-10-29 NOTE — BRIEF OPERATIVE NOTE - NSICDXBRIEFPROCEDURE_GEN_ALL_CORE_FT
PROCEDURES:  Angiogram, femoral vessel 29-Oct-2020 18:10:18  Marc Corea  
PROCEDURES:  Incision and debridement, foot 27-Oct-2020 17:48:44 excxisional debridement left medial heel/ foot wound Paul Billy

## 2020-10-29 NOTE — BRIEF OPERATIVE NOTE - NSICDXBRIEFPOSTOP_GEN_ALL_CORE_FT
POST-OP DIAGNOSIS:  Wound 27-Oct-2020 17:50:28  Paul Billy  
POST-OP DIAGNOSIS:  Wound 27-Oct-2020 17:50:28  Paul Billy

## 2020-10-29 NOTE — BRIEF OPERATIVE NOTE - OPERATION/FINDINGS
AT- severe 20 cm stenosis   PT occluded  Peroneal distal occlusion  Procedure - AT CSI athererectomy, ultrascore angioplasty
necrotic tissue

## 2020-10-29 NOTE — BRIEF OPERATIVE NOTE - NSICDXBRIEFPREOP_GEN_ALL_CORE_FT
PRE-OP DIAGNOSIS:  Wound 27-Oct-2020 17:50:08  Paul Billy  
PRE-OP DIAGNOSIS:  Wound 27-Oct-2020 17:50:08  Paul Billy

## 2020-10-29 NOTE — DISCHARGE NOTE NURSING/CASE MANAGEMENT/SOCIAL WORK - PATIENT PORTAL LINK FT
You can access the FollowMyHealth Patient Portal offered by [facility] by registering at the following website: [email or web address]. By joining [facility]’s FollowMyHealth portal, you will also be able to view your health information using other applications (apps) compatible with our system.

## 2020-10-30 ENCOUNTER — APPOINTMENT (OUTPATIENT)
Dept: CARDIOLOGY | Facility: CLINIC | Age: 80
End: 2020-10-30

## 2020-10-30 ENCOUNTER — TRANSCRIPTION ENCOUNTER (OUTPATIENT)
Age: 80
End: 2020-10-30

## 2020-10-30 LAB
-  AMIKACIN: SIGNIFICANT CHANGE UP
-  AMOXICILLIN/CLAVULANIC ACID: SIGNIFICANT CHANGE UP
-  AMPICILLIN/SULBACTAM: SIGNIFICANT CHANGE UP
-  AMPICILLIN: SIGNIFICANT CHANGE UP
-  AMPICILLIN: SIGNIFICANT CHANGE UP
-  AZTREONAM: SIGNIFICANT CHANGE UP
-  CEFAZOLIN: SIGNIFICANT CHANGE UP
-  CEFEPIME: SIGNIFICANT CHANGE UP
-  CEFOXITIN: SIGNIFICANT CHANGE UP
-  CEFTRIAXONE: SIGNIFICANT CHANGE UP
-  CIPROFLOXACIN: SIGNIFICANT CHANGE UP
-  ERTAPENEM: SIGNIFICANT CHANGE UP
-  GENTAMICIN: SIGNIFICANT CHANGE UP
-  LEVOFLOXACIN: SIGNIFICANT CHANGE UP
-  MEROPENEM: SIGNIFICANT CHANGE UP
-  PIPERACILLIN/TAZOBACTAM: SIGNIFICANT CHANGE UP
-  TETRACYCLINE: SIGNIFICANT CHANGE UP
-  TOBRAMYCIN: SIGNIFICANT CHANGE UP
-  TRIMETHOPRIM/SULFAMETHOXAZOLE: SIGNIFICANT CHANGE UP
-  VANCOMYCIN: SIGNIFICANT CHANGE UP
ANION GAP SERPL CALC-SCNC: 10 MMOL/L — SIGNIFICANT CHANGE UP (ref 7–14)
ANION GAP SERPL CALC-SCNC: 16 MMOL/L — HIGH (ref 7–14)
BUN SERPL-MCNC: 30 MG/DL — HIGH (ref 10–20)
BUN SERPL-MCNC: 36 MG/DL — HIGH (ref 10–20)
CALCIUM SERPL-MCNC: 8.7 MG/DL — SIGNIFICANT CHANGE UP (ref 8.5–10.1)
CALCIUM SERPL-MCNC: 9.2 MG/DL — SIGNIFICANT CHANGE UP (ref 8.5–10.1)
CHLORIDE SERPL-SCNC: 101 MMOL/L — SIGNIFICANT CHANGE UP (ref 98–110)
CHLORIDE SERPL-SCNC: 102 MMOL/L — SIGNIFICANT CHANGE UP (ref 98–110)
CO2 SERPL-SCNC: 20 MMOL/L — SIGNIFICANT CHANGE UP (ref 17–32)
CO2 SERPL-SCNC: 28 MMOL/L — SIGNIFICANT CHANGE UP (ref 17–32)
CREAT SERPL-MCNC: 1.1 MG/DL — SIGNIFICANT CHANGE UP (ref 0.7–1.5)
CREAT SERPL-MCNC: 1.4 MG/DL — SIGNIFICANT CHANGE UP (ref 0.7–1.5)
CULTURE RESULTS: SIGNIFICANT CHANGE UP
CULTURE RESULTS: SIGNIFICANT CHANGE UP
GLUCOSE BLDC GLUCOMTR-MCNC: 103 MG/DL — HIGH (ref 70–99)
GLUCOSE BLDC GLUCOMTR-MCNC: 105 MG/DL — HIGH (ref 70–99)
GLUCOSE BLDC GLUCOMTR-MCNC: 144 MG/DL — HIGH (ref 70–99)
GLUCOSE BLDC GLUCOMTR-MCNC: 83 MG/DL — SIGNIFICANT CHANGE UP (ref 70–99)
GLUCOSE SERPL-MCNC: 121 MG/DL — HIGH (ref 70–99)
GLUCOSE SERPL-MCNC: 129 MG/DL — HIGH (ref 70–99)
HCT VFR BLD CALC: 38.6 % — LOW (ref 42–52)
HCT VFR BLD CALC: 40 % — LOW (ref 42–52)
HGB BLD-MCNC: 12.3 G/DL — LOW (ref 14–18)
HGB BLD-MCNC: 13 G/DL — LOW (ref 14–18)
INR BLD: 1.78 RATIO — HIGH (ref 0.65–1.3)
MAGNESIUM SERPL-MCNC: 1.6 MG/DL — LOW (ref 1.8–2.4)
MAGNESIUM SERPL-MCNC: 1.8 MG/DL — SIGNIFICANT CHANGE UP (ref 1.8–2.4)
MCHC RBC-ENTMCNC: 30.7 PG — SIGNIFICANT CHANGE UP (ref 27–31)
MCHC RBC-ENTMCNC: 31.6 PG — HIGH (ref 27–31)
MCHC RBC-ENTMCNC: 31.9 G/DL — LOW (ref 32–37)
MCHC RBC-ENTMCNC: 32.5 G/DL — SIGNIFICANT CHANGE UP (ref 32–37)
MCV RBC AUTO: 96.3 FL — HIGH (ref 80–94)
MCV RBC AUTO: 97.3 FL — HIGH (ref 80–94)
METHOD TYPE: SIGNIFICANT CHANGE UP
METHOD TYPE: SIGNIFICANT CHANGE UP
NRBC # BLD: 0 /100 WBCS — SIGNIFICANT CHANGE UP (ref 0–0)
NRBC # BLD: 0 /100 WBCS — SIGNIFICANT CHANGE UP (ref 0–0)
PHOSPHATE SERPL-MCNC: 3.6 MG/DL — SIGNIFICANT CHANGE UP (ref 2.1–4.9)
PHOSPHATE SERPL-MCNC: 3.9 MG/DL — SIGNIFICANT CHANGE UP (ref 2.1–4.9)
PLATELET # BLD AUTO: 101 K/UL — LOW (ref 130–400)
PLATELET # BLD AUTO: 101 K/UL — LOW (ref 130–400)
POTASSIUM SERPL-MCNC: 3.9 MMOL/L — SIGNIFICANT CHANGE UP (ref 3.5–5)
POTASSIUM SERPL-MCNC: 4.8 MMOL/L — SIGNIFICANT CHANGE UP (ref 3.5–5)
POTASSIUM SERPL-SCNC: 3.9 MMOL/L — SIGNIFICANT CHANGE UP (ref 3.5–5)
POTASSIUM SERPL-SCNC: 4.8 MMOL/L — SIGNIFICANT CHANGE UP (ref 3.5–5)
PROTHROM AB SERPL-ACNC: 20.5 SEC — HIGH (ref 9.95–12.87)
RBC # BLD: 4.01 M/UL — LOW (ref 4.7–6.1)
RBC # BLD: 4.11 M/UL — LOW (ref 4.7–6.1)
RBC # FLD: 16 % — HIGH (ref 11.5–14.5)
RBC # FLD: 16 % — HIGH (ref 11.5–14.5)
SODIUM SERPL-SCNC: 137 MMOL/L — SIGNIFICANT CHANGE UP (ref 135–146)
SODIUM SERPL-SCNC: 140 MMOL/L — SIGNIFICANT CHANGE UP (ref 135–146)
SPECIMEN SOURCE: SIGNIFICANT CHANGE UP
SPECIMEN SOURCE: SIGNIFICANT CHANGE UP
T3 SERPL-MCNC: 83 NG/DL — SIGNIFICANT CHANGE UP (ref 80–200)
T4 AB SER-ACNC: 9 UG/DL — SIGNIFICANT CHANGE UP (ref 4.6–12)
WBC # BLD: 6.56 K/UL — SIGNIFICANT CHANGE UP (ref 4.8–10.8)
WBC # BLD: 6.76 K/UL — SIGNIFICANT CHANGE UP (ref 4.8–10.8)
WBC # FLD AUTO: 6.56 K/UL — SIGNIFICANT CHANGE UP (ref 4.8–10.8)
WBC # FLD AUTO: 6.76 K/UL — SIGNIFICANT CHANGE UP (ref 4.8–10.8)

## 2020-10-30 PROCEDURE — 99231 SBSQ HOSP IP/OBS SF/LOW 25: CPT

## 2020-10-30 RX ORDER — WARFARIN SODIUM 2.5 MG/1
5 TABLET ORAL ONCE
Refills: 0 | Status: COMPLETED | OUTPATIENT
Start: 2020-10-30 | End: 2020-10-30

## 2020-10-30 RX ORDER — MAGNESIUM SULFATE 500 MG/ML
2 VIAL (ML) INJECTION ONCE
Refills: 0 | Status: COMPLETED | OUTPATIENT
Start: 2020-10-30 | End: 2020-10-30

## 2020-10-30 RX ADMIN — LOSARTAN POTASSIUM 100 MILLIGRAM(S): 100 TABLET, FILM COATED ORAL at 05:12

## 2020-10-30 RX ADMIN — BUDESONIDE AND FORMOTEROL FUMARATE DIHYDRATE 2 PUFF(S): 160; 4.5 AEROSOL RESPIRATORY (INHALATION) at 07:43

## 2020-10-30 RX ADMIN — Medication 5 UNIT(S): at 07:43

## 2020-10-30 RX ADMIN — Medication 5 UNIT(S): at 17:08

## 2020-10-30 RX ADMIN — PANTOPRAZOLE SODIUM 40 MILLIGRAM(S): 20 TABLET, DELAYED RELEASE ORAL at 05:12

## 2020-10-30 RX ADMIN — BUDESONIDE AND FORMOTEROL FUMARATE DIHYDRATE 2 PUFF(S): 160; 4.5 AEROSOL RESPIRATORY (INHALATION) at 22:09

## 2020-10-30 RX ADMIN — Medication 50 GRAM(S): at 10:15

## 2020-10-30 RX ADMIN — LATANOPROST 1 DROP(S): 0.05 SOLUTION/ DROPS OPHTHALMIC; TOPICAL at 22:10

## 2020-10-30 RX ADMIN — CHLORHEXIDINE GLUCONATE 1 APPLICATION(S): 213 SOLUTION TOPICAL at 10:15

## 2020-10-30 RX ADMIN — ATORVASTATIN CALCIUM 40 MILLIGRAM(S): 80 TABLET, FILM COATED ORAL at 22:11

## 2020-10-30 RX ADMIN — OXYCODONE AND ACETAMINOPHEN 1 TABLET(S): 5; 325 TABLET ORAL at 12:09

## 2020-10-30 RX ADMIN — WARFARIN SODIUM 5 MILLIGRAM(S): 2.5 TABLET ORAL at 22:10

## 2020-10-30 RX ADMIN — INSULIN GLARGINE 15 UNIT(S): 100 INJECTION, SOLUTION SUBCUTANEOUS at 22:11

## 2020-10-30 RX ADMIN — Medication 250 MICROGRAM(S): at 05:12

## 2020-10-30 RX ADMIN — TAMSULOSIN HYDROCHLORIDE 0.4 MILLIGRAM(S): 0.4 CAPSULE ORAL at 22:11

## 2020-10-30 RX ADMIN — Medication 5 UNIT(S): at 11:09

## 2020-10-30 RX ADMIN — AMPICILLIN SODIUM AND SULBACTAM SODIUM 200 GRAM(S): 250; 125 INJECTION, POWDER, FOR SUSPENSION INTRAMUSCULAR; INTRAVENOUS at 11:11

## 2020-10-30 RX ADMIN — AMPICILLIN SODIUM AND SULBACTAM SODIUM 200 GRAM(S): 250; 125 INJECTION, POWDER, FOR SUSPENSION INTRAMUSCULAR; INTRAVENOUS at 17:11

## 2020-10-30 RX ADMIN — AMPICILLIN SODIUM AND SULBACTAM SODIUM 200 GRAM(S): 250; 125 INJECTION, POWDER, FOR SUSPENSION INTRAMUSCULAR; INTRAVENOUS at 05:12

## 2020-10-30 RX ADMIN — ZINC SULFATE TAB 220 MG (50 MG ZINC EQUIVALENT) 220 MILLIGRAM(S): 220 (50 ZN) TAB at 11:10

## 2020-10-30 NOTE — PROGRESS NOTE ADULT - ASSESSMENT
80 year old male with PMHx significant for Afib on coumadin, HFrEF (s/p AICD placement), CAD s/p CABG, BPH, hypothyroidism, DM, HLD, HTN, COPD, Psoriasis, PVD presenting to the ED for left heel wound, s/p debridement of Left heel wound on 10/27/20.       1. Left heel full thickness wound - s/p debridement on 10/27  - placed NPWT VAC on 10/30, patient to continue VAC therapy outpatient with VNS and follow up  -continue IVF, IV abx  - pain management  - PT consult  - ambulate as tolerated  -Discharge planning, pending Home VAC and VNS set up    2. Cards: hx of Atrial fib-on AC, CAD s/p CABG, HFrEF,   - per cards, High risk patient for an intermediate risk procedure   - Coumadin held for angio on Thurs 10/29, restart coumadin today  - continue home dose Lasix 40mg qd, Losartan 100mg qd,  Lipitor 40 qhs  - Echo done, EF ~50-55%  - DASH/TLC diet    3. Vascular:  -Vascular studies- 10/25 venous duplex of BLE- prelim negative for DVT 10/25 arterial duplex of BLE; prelim showed Occluded left distal peroneal artery with otherwise patent infrainguinal vessels bilaterally.  - had angio on Thursday 10/29 with Dr. Corea findings as follows: AT- severe 20 cm stenosis, PT occluded, Peroneal distal occlusion, Procedure - AT I atherectomy ultrascore angioplasty  - spoke with vasc this AM, okay to restart coumadin, monitor INR    4. Resp:  -hx of Severe COPD s/p VATS not on home O2  -neb tx PRN  - continue Symbicort inhaler  -encourage incentive spirometer     5. Endo: hx of DM2 (hgA1c 8.6- 10/26),  hypothyroidism   -  Metformin held, 5,5,5,15 plus ISS inpatient   - monitor FS, adjust insulin accordingly   - continue home dose levothyroxine   - carb consistent diet    6. ID  -Wcx from clinic 10/22 showed num proteus, num E. Faecalis, Num streptococcus gordonii.  -x-ray of Left foot showed soft tissue air inferior to the posterior calcaneus, without obvious periosteal reaction or skin erosions. These findings may be related to osteomyelitis.   -CT 10/28- no evidence of OM, per ID d/c on Augmentin for 2 weeks  -cont IV abx as per ID    DVT ppx - pt on AC  PPI daily for GI ppx  PT c/s    Care Discussed with Consultants/Other Providers [ x] YES  [ ] NO

## 2020-10-30 NOTE — PROGRESS NOTE ADULT - SUBJECTIVE AND OBJECTIVE BOX
LAMYRNA  80y, Male  Allergy: No Known Allergies      LAST 24-Hr EVENTS:  breathing is better than baseline  VITALS:  T(F): 97 (10-30-20 @ 13:14), Max: 97.8 (10-30-20 @ 05:00)  HR: 62 (10-30-20 @ 13:14)  BP: 100/56 (10-30-20 @ 13:14) (100/56 - 158/85)  RR: 18 (10-30-20 @ 13:14)  SpO2: 97% (10-29-20 @ 20:00)room air    PHYSICAL EXAM:    GENERAL: NAD, well-developed  HEAD:  Atraumatic, Normocephalic  NECK: Supple, No JVD  CHEST/LUNG: Clear to auscultation bilaterally; No wheeze  HEART: Regular rate and rhythm; No murmurs, rubs, or gallops  ABDOMEN: Soft, Nontender, Nondistended; Bowel sounds present  EXTREMITIES:  decreased Peripheral Pulses, No clubbing, cyanosis, or edema, wounds per surgery        TESTS & MEASUREMENTS:    IN: 800 mL / OUT: 700 mL / NET: 100 mL    IN: 0 mL / OUT: 2000 mL / NET: -2000 mL                            13.0   6.56  )-----------( 101      ( 30 Oct 2020 06:12 )             40.0     PT/INR - ( 28 Oct 2020 21:18 )   PT: 25.10 sec;   INR: 2.18 ratio         PTT - ( 28 Oct 2020 21:18 )  PTT:42.9 sec  10-30    140  |  102  |  30<H>  ----------------------------<  121<H>  3.9   |  28  |  1.1    Ca    9.2      30 Oct 2020 06:12  Phos  3.6     10-30  Mg     1.6     10-30              Culture - Other (collected 10-27-20 @ 17:05)  Source: .Other None  Preliminary Report (10-30-20 @ 14:36):    Numerous Serratia marcescens    Moderate Enterococcus faecalis  Organism: Serratia marcescens  Enterococcus faecalis (10-30-20 @ 14:35)  Organism: Enterococcus faecalis (10-30-20 @ 14:35)      -  Ampicillin: S <=2 Predicts results to ampicillin/sulbactam, amoxacillin-clavulanate and  piperacillin-tazobactam.      -  Tetra/Doxy: R >8      -  Vancomycin: S 2      Method Type: ROMERO  Organism: Serratia marcescens (10-30-20 @ 08:53)      -  Amikacin: S <=16      -  Amoxicillin/Clavulanic Acid: R >16/8      -  Ampicillin: R >16 These ampicillin results predict results for amoxicillin      -  Ampicillin/Sulbactam: R 16/8 Enterobacter, Citrobacter, and Serratia may develop resistance during prolonged therapy (3-4 days)      -  Aztreonam: I 16      -  Cefazolin: R >16 Enterobacter, Citrobacter, and Serratia may develop resistance during prolonged therapy (3-4 days)      -  Cefepime: S <=2      -  Cefoxitin: R <=8      -  Ceftriaxone: S <=1 Enterobacter, Citrobacter, and Serratia may develop resistance during prolonged therapy      -  Ciprofloxacin: S <=0.25      -  Ertapenem: S <=0.5      -  Gentamicin: S <=2      -  Levofloxacin: S <=0.5      -  Meropenem: S <=1      -  Piperacillin/Tazobactam: I 32      -  Tobramycin: S 4      -  Trimethoprim/Sulfamethoxazole: S <=0.5/9.5      Method Type: ROMERO    Culture - Surgical Swab (collected 10-27-20 @ 17:05)  Source: .Surgical Swab None  Preliminary Report (10-30-20 @ 14:04):    Culture yields >4 types of aerobic and/or anaerobic bacteria    Call client services within 7 days if further workup is clinically    indicated. Culture includes    Moderate Pseudomonas aeruginosa    Culture - Blood (collected 10-25-20 @ 12:45)  Source: .Blood Blood  Preliminary Report (10-26-20 @ 20:01):    No growth to date.    Culture - Blood (collected 10-25-20 @ 12:45)  Source: .Blood Blood  Preliminary Report (10-26-20 @ 20:01):    No growth to date.          ABG & VENT SETTINGS: (when applicable)      n/a  RADIOLOGY & ADDITIONAL TESTS:    MEDICATIONS:  MEDICATIONS  (STANDING):  ampicillin/sulbactam  IVPB 3 Gram(s) IV Intermittent every 6 hours  atorvastatin 40 milliGRAM(s) Oral at bedtime  budesonide 160 MICROgram(s)/formoterol 4.5 MICROgram(s) Inhaler 2 Puff(s) Inhalation two times a day  chlorhexidine 4% Liquid 1 Application(s) Topical <User Schedule>  dextrose 5%. 1000 milliLiter(s) (50 mL/Hr) IV Continuous <Continuous>  dextrose 50% Injectable 12.5 Gram(s) IV Push once  dextrose 50% Injectable 25 Gram(s) IV Push once  insulin glargine Injectable (LANTUS) 15 Unit(s) SubCutaneous at bedtime  insulin lispro (ADMELOG) corrective regimen sliding scale   SubCutaneous three times a day before meals  insulin lispro Injectable (ADMELOG) 5 Unit(s) SubCutaneous three times a day before meals  lactated ringers. 1000 milliLiter(s) (75 mL/Hr) IV Continuous <Continuous>  latanoprost 0.005% Ophthalmic Solution 1 Drop(s) Both EYES at bedtime  levothyroxine 250 MICROGram(s) Oral daily  losartan 100 milliGRAM(s) Oral daily  pantoprazole    Tablet 40 milliGRAM(s) Oral before breakfast  tamsulosin Oral Tab/Cap - Peds 0.4 milliGRAM(s) Oral at bedtime  warfarin 5 milliGRAM(s) Oral once  zinc sulfate 220 milliGRAM(s) Oral daily    MEDICATIONS  (PRN):  dextrose 40% Gel 15 Gram(s) Oral once PRN Blood Glucose LESS THAN 70 milliGRAM(s)/deciliter  oxycodone    5 mG/acetaminophen 325 mG 1 Tablet(s) Oral every 6 hours PRN Moderate Pain (4 - 6)  polyethylene glycol 3350 17 Gram(s) Oral daily PRN Constipation  senna 2 Tablet(s) Oral daily PRN Constipation

## 2020-10-30 NOTE — DISCHARGE NOTE PROVIDER - NSDCFUADDAPPT_GEN_ALL_CORE_FT
Please call [phone] to make a follow up appointment within 1 week with Dr. Billy or Dr. Gomes. Clinic is located at [address] [place] [address] on Tuesdays (2-4pm) or Thursdays (9am-1pm).     Call vascular services to make an appointment with Dr. Corea's office in 2 weeks Please call [phone] to make a follow up appointment within 1 week with Dr. Billy or Dr. Gomes. Clinic is located at [address] [place] [address] on Tuesdays (2-4pm) or Thursdays (9am-1pm).     Call vascular surgery office [phone] to make an appointment with Dr. Corea's office in 2 weeks    Call Dr. Maher to follow up within 2 weeks of discharge [phone]

## 2020-10-30 NOTE — DISCHARGE NOTE PROVIDER - NSDCCPTREATMENT_GEN_ALL_CORE_FT
PRINCIPAL PROCEDURE  Procedure: Incision and debridement, foot  Findings and Treatment: excxisional debridement left medial heel/ foot wound      SECONDARY PROCEDURE  Procedure: Angiogram, femoral vessel  Findings and Treatment:

## 2020-10-30 NOTE — DISCHARGE NOTE PROVIDER - PROVIDER TOKENS
PROVIDER:[TOKEN:[[id number]]],PROVIDER:[TOKEN:[[id number]]],PROVIDER:[TOKEN:[[id number]]] PROVIDER:[TOKEN:[[id number]]],PROVIDER:[TOKEN:[[id number]]],PROVIDER:[TOKEN:[[id number]]],PROVIDER:[TOKEN:[[id number]]]

## 2020-10-30 NOTE — DISCHARGE NOTE PROVIDER - CARE PROVIDER_API CALL
Paul Billy  PLASTIC SURGERY  [address] [place] [address]  Phone: [phone]  Fax: [phone]  Follow Up Time:     Angel Gomes  PLASTIC SURGERY  [address] [facility] [address]  [place] [address]  Phone: [phone]  Fax: [phone]  Follow Up Time:     Marc Corea  SURGERY  [address] [facility] [address]  [place] [address]  Phone: [phone]  Fax: [phone]  Follow Up Time:    Paul Billy  PLASTIC SURGERY  [address] [place] [address]  Phone: [phone]  Fax: [phone]  Follow Up Time:     Angel Gomes  PLASTIC SURGERY  [address] [facility] [address]  [place] [address]  Phone: [phone]  Fax: [phone]  Follow Up Time:     Marc Corea  SURGERY  [address] [facility] [address]  [place] [address]  Phone: [phone]  Fax: [phone]  Follow Up Time:     Susan Maher  INTERNAL MEDICINE  [address] [place] [address]  Phone: [phone]  Fax: [phone]  Follow Up Time:

## 2020-10-30 NOTE — PROGRESS NOTE ADULT - ASSESSMENT
copd  s/p angioplasty left lower extremity and wound debridement        continue symbicort  add spiriva  albuterol prn  antibiotics  anticoagulation  incentive spirometry  pulmonary status stable at this time

## 2020-10-30 NOTE — ANESTHESIA FOLLOW-UP NOTE - NSEVALATION_GEN_ALL_CORE
All questions were answered/No apparent complications or complaints regarding anesthesia care at this time
No apparent complications or complaints regarding anesthesia care at this time/All questions were answered

## 2020-10-30 NOTE — PROGRESS NOTE ADULT - SUBJECTIVE AND OBJECTIVE BOX
Patient is a 80y old  Male who presents with a chief complaint of chronic left heel wound (30 Oct 2020 09:11)      INTERVAL HPI/OVERNIGHT EVENTS: no overnight events    Vital Signs Last 24 Hrs  T(C): 36.1 (30 Oct 2020 07:48), Max: 36.7 (29 Oct 2020 14:26)  T(F): 96.9 (30 Oct 2020 07:48), Max: 98 (29 Oct 2020 14:26)  HR: 66 (30 Oct 2020 07:48) (60 - 72)  BP: 144/65 (30 Oct 2020 07:48) (103/71 - 158/85)  RR: 18 (30 Oct 2020 07:48) (14 - 18)  SpO2: 97% (29 Oct 2020 20:00) (97% - 99%)    LABS:                        13.0   6.56  )-----------( 101      ( 30 Oct 2020 06:12 )             40.0     10-30    140  |  102  |  30<H>  ----------------------------<  121<H>  3.9   |  28  |  1.1    Ca    9.2      30 Oct 2020 06:12  Phos  3.6     10-30  Mg     1.6     10-30      PT/INR - ( 28 Oct 2020 21:18 )   PT: 25.10 sec;   INR: 2.18 ratio         PTT - ( 28 Oct 2020 21:18 )  PTT:42.9 sec    CAPILLARY BLOOD GLUCOSE  POCT Blood Glucose.: 103 mg/dL (30 Oct 2020 10:26)  POCT Blood Glucose.: 105 mg/dL (30 Oct 2020 07:12)  POCT Blood Glucose.: 169 mg/dL (29 Oct 2020 22:37)  POCT Blood Glucose.: 74 mg/dL (29 Oct 2020 20:34)      RADIOLOGY & ADDITIONAL TESTS:    Consultant(s) Notes Reviewed:  [x ] YES  [ ] NO    MEDICATIONS  (STANDING):  ampicillin/sulbactam  IVPB 3 Gram(s) IV Intermittent every 6 hours  atorvastatin 40 milliGRAM(s) Oral at bedtime  budesonide 160 MICROgram(s)/formoterol 4.5 MICROgram(s) Inhaler 2 Puff(s) Inhalation two times a day  chlorhexidine 4% Liquid 1 Application(s) Topical <User Schedule>  dextrose 5%. 1000 milliLiter(s) (50 mL/Hr) IV Continuous <Continuous>  dextrose 50% Injectable 12.5 Gram(s) IV Push once  dextrose 50% Injectable 25 Gram(s) IV Push once  insulin glargine Injectable (LANTUS) 15 Unit(s) SubCutaneous at bedtime  insulin lispro (ADMELOG) corrective regimen sliding scale   SubCutaneous three times a day before meals  insulin lispro Injectable (ADMELOG) 5 Unit(s) SubCutaneous three times a day before meals  lactated ringers. 1000 milliLiter(s) (75 mL/Hr) IV Continuous <Continuous>  latanoprost 0.005% Ophthalmic Solution 1 Drop(s) Both EYES at bedtime  levothyroxine 250 MICROGram(s) Oral daily  losartan 100 milliGRAM(s) Oral daily  pantoprazole    Tablet 40 milliGRAM(s) Oral before breakfast  tamsulosin Oral Tab/Cap - Peds 0.4 milliGRAM(s) Oral at bedtime  zinc sulfate 220 milliGRAM(s) Oral daily    MEDICATIONS  (PRN):  dextrose 40% Gel 15 Gram(s) Oral once PRN Blood Glucose LESS THAN 70 milliGRAM(s)/deciliter  oxycodone    5 mG/acetaminophen 325 mG 1 Tablet(s) Oral every 6 hours PRN Moderate Pain (4 - 6)  polyethylene glycol 3350 17 Gram(s) Oral daily PRN Constipation  senna 2 Tablet(s) Oral daily PRN Constipation      PHYSICAL EXAM:  GENERAL: NAD, sitting in bed comfortable  HEAD:  Atraumatic, Normocephalic  CHEST/LUNG: speaking in full sentences, breathing comfortably in room air, symmetrical chest expansion.   HEART: in no acute cardiopulmonary distress.   PSYCH: AAOx3, cooperative, pleasant   SKIN: LLE: left medial heel full thickness wound ~ 3cm x 3cm pink and moist.  mild serosanguineous drainage noted on old dressing. no significant erythema or edema appreciated. no active bleeding noted.    +NPWT VAC applied by BURN Service

## 2020-10-30 NOTE — DISCHARGE NOTE PROVIDER - NSDCFUADDINST_GEN_ALL_CORE_FT
Monitor for signs of infection, including increasing redness, swelling, pus drainage, or fever. Please call the Burn Clinic for guidance or go to the ER if you seek immediate evaluation.

## 2020-10-30 NOTE — PROGRESS NOTE ADULT - SUBJECTIVE AND OBJECTIVE BOX
VASCULAR SURGERY PROGRESS NOTE      Procedure: s/p left lower extremity angiogram, AT plasty and atherectomy    Events of past 24 hours: none          ROS otherwise negative except per subjective and HPI      PAST MEDICAL & SURGICAL HISTORY:  Psoriasis    Glaucoma    COPD (chronic obstructive pulmonary disease)    Diabetes mellitus    Hypothyroidism    Hypertension    Dyslipidemia    Benign prostate hyperplasia    Atrial fibrillation    Heart failure  chronic systolic heart failure    Coronary artery disease  s/p CABG 2014    S/P thoracotomy  pt states he had a VATS w/lung decortication    H/O total knee replacement, bilateral    AICD (automatic cardioverter/defibrillator) present    S/P CABG (coronary artery bypass graft)        Vital Signs Last 24 Hrs  T(C): 36.1 (30 Oct 2020 07:48), Max: 36.7 (29 Oct 2020 14:26)  T(F): 96.9 (30 Oct 2020 07:48), Max: 98 (29 Oct 2020 14:26)  HR: 66 (30 Oct 2020 07:48) (60 - 72)  BP: 144/65 (30 Oct 2020 07:48) (103/71 - 158/85)  BP(mean): --  RR: 18 (30 Oct 2020 07:48) (14 - 18)  SpO2: 97% (29 Oct 2020 20:00) (97% - 99%)    Pain (0-10):            Pain Control Adequate: [] YES [] N    Diet:    I&O's Detail    29 Oct 2020 07:01  -  30 Oct 2020 07:00  --------------------------------------------------------  IN:  Total IN: 0 mL    OUT:    Voided (mL): 2000 mL  Total OUT: 2000 mL    Total NET: -2000 mL          Bowel Movement: : [] YES [] NO  Flatus: : [] YES [] NO    PHYSICAL EXAM    Appearance: Normal	  HEENT:   Normal oral mucosa, PERRL, EOMI	  Neck: Supple, - JVD  Cardiovascular: Normal S1 S2, No JVD, No murmurs,   Respiratory: Lungs clear to auscultation/No Rales, Rhonchi, Wheezing	  Gastrointestinal:  Soft, Non-tender, + BS	  Skin: No rashes, No ecchymoses, No cyanosis  Extremities: Normal range of motion, No clubbing, cyanosis or edema  Right groin: no hematoma/swelling  Neurologic: Non-focal  Psychiatry: A & O x 3, Mood & affect appropriate    PULSES:  Femoral:  Popliteal:  Dorsal Pedal: left dopplerable  Posterior Tibial: left dopplerable  Capillary:      MEDICATIONS:   MEDICATIONS  (STANDING):  ampicillin/sulbactam  IVPB 3 Gram(s) IV Intermittent every 6 hours  atorvastatin 40 milliGRAM(s) Oral at bedtime  budesonide 160 MICROgram(s)/formoterol 4.5 MICROgram(s) Inhaler 2 Puff(s) Inhalation two times a day  chlorhexidine 4% Liquid 1 Application(s) Topical <User Schedule>  dextrose 5%. 1000 milliLiter(s) (50 mL/Hr) IV Continuous <Continuous>  dextrose 50% Injectable 12.5 Gram(s) IV Push once  dextrose 50% Injectable 25 Gram(s) IV Push once  insulin glargine Injectable (LANTUS) 15 Unit(s) SubCutaneous at bedtime  insulin lispro (ADMELOG) corrective regimen sliding scale   SubCutaneous three times a day before meals  insulin lispro Injectable (ADMELOG) 5 Unit(s) SubCutaneous three times a day before meals  lactated ringers. 1000 milliLiter(s) (75 mL/Hr) IV Continuous <Continuous>  latanoprost 0.005% Ophthalmic Solution 1 Drop(s) Both EYES at bedtime  levothyroxine 250 MICROGram(s) Oral daily  losartan 100 milliGRAM(s) Oral daily  pantoprazole    Tablet 40 milliGRAM(s) Oral before breakfast  tamsulosin Oral Tab/Cap - Peds 0.4 milliGRAM(s) Oral at bedtime  warfarin 5 milliGRAM(s) Oral once  zinc sulfate 220 milliGRAM(s) Oral daily    MEDICATIONS  (PRN):  dextrose 40% Gel 15 Gram(s) Oral once PRN Blood Glucose LESS THAN 70 milliGRAM(s)/deciliter  oxycodone    5 mG/acetaminophen 325 mG 1 Tablet(s) Oral every 6 hours PRN Moderate Pain (4 - 6)  polyethylene glycol 3350 17 Gram(s) Oral daily PRN Constipation  senna 2 Tablet(s) Oral daily PRN Constipation        LAB/STUDIES:                        13.0   6.56  )-----------( 101      ( 30 Oct 2020 06:12 )             40.0     10-30    140  |  102  |  30<H>  ----------------------------<  121<H>  3.9   |  28  |  1.1    Ca    9.2      30 Oct 2020 06:12  Phos  3.6     10-30  Mg     1.6     10-30      PT/INR - ( 28 Oct 2020 21:18 )   PT: 25.10 sec;   INR: 2.18 ratio         PTT - ( 28 Oct 2020 21:18 )  PTT:42.9 sec                    Culture - Other (collected 27 Oct 2020 17:05)  Source: .Other None  Preliminary Report (30 Oct 2020 08:53):    Numerous Serratia marcescens    Moderate Enterococcus faecalis Susceptibility to follow.  Organism: Serratia marcescens (30 Oct 2020 08:53)  Organism: Serratia marcescens (30 Oct 2020 08:53)    Culture - Surgical Swab (collected 27 Oct 2020 17:05)  Source: .Surgical Swab None  Preliminary Report (29 Oct 2020 12:26):    Culture yields >4 types of aerobic and/or anaerobic bacteria    Call client services within 7 days if further workup is clinically    indicated.

## 2020-10-30 NOTE — DISCHARGE NOTE PROVIDER - NSFOLLOWUPCLINICS_GEN_ALL_ED_FT
[facility]  Burn  [address] [facility], [address]  [place] [address]  Phone: [phone]  Fax:   Follow Up Time:      [facility]  Burn  [address] [facility], [address]  [place] [address]  Phone: [phone]  Fax:     [facility]  Coumadin  [address] [place] [address]  Phone: [phone]  Fax:   Follow Up Time:

## 2020-10-30 NOTE — DISCHARGE NOTE PROVIDER - CARE PROVIDERS DIRECT ADDRESSES
[email or web address]

## 2020-10-30 NOTE — PROGRESS NOTE ADULT - ASSESSMENT
s/p left AT atherectomy    restart Coumadin  ambulate  dressing out tomorrow  follow up with Dr. Corea in 2 weeks

## 2020-10-30 NOTE — DISCHARGE NOTE PROVIDER - NSDCMRMEDTOKEN_GEN_ALL_CORE_FT
atorvastatin 40 mg oral tablet: 1 tab(s) orally once a day (at bedtime)  Flomax 0.4 mg oral capsule: 1 cap(s) orally once a day (at bedtime)  Incruse Ellipta 62.5 mcg/inh inhalation powder: 1 inhaler(s) inhaled once a day  insulin glargine (concentrated) 300 units/mL subcutaneous solution: 20 unit(s) subcutaneous once a day  Lasix 40 mg oral tablet: 1 tab(s) orally once a day  latanoprost 0.005% ophthalmic solution: 1 drop(s) to each affected eye once a day (in the evening)  levothyroxine: 250 microgram(s) orally once a day  losartan 100 mg oral tablet: 1 tab(s) orally once a day   metFORMIN 750 mg oral tablet, extended release: 1 tab(s) orally 2 times a day  metoprolol succinate 50 mg oral tablet, extended release: 1 tab(s) orally once a day  Trelegy Ellipta inhalation powder: 1 puff(s) inhaled once a day  warfarin 5 mg oral tablet: 1 tab(s) orally once a day (at bedtime)   atorvastatin 40 mg oral tablet: 1 tab(s) orally once a day (at bedtime)  Augmentin 875 mg-125 mg oral tablet: 1 tab(s) orally every 12 hours   Flomax 0.4 mg oral capsule: 1 cap(s) orally once a day (at bedtime)  Incruse Ellipta 62.5 mcg/inh inhalation powder: 1 inhaler(s) inhaled once a day  Lasix 40 mg oral tablet: 1 tab(s) orally once a day  latanoprost 0.005% ophthalmic solution: 1 drop(s) to each affected eye once a day (in the evening)  levothyroxine: 250 microgram(s) orally once a day  losartan 100 mg oral tablet: 1 tab(s) orally once a day   metFORMIN 750 mg oral tablet, extended release: 1 tab(s) orally 2 times a day  metoprolol succinate 50 mg oral tablet, extended release: 1 tab(s) orally once a day  Trelegy Ellipta inhalation powder: 1 puff(s) inhaled once a day  warfarin 5 mg oral tablet: 1 tab(s) orally once a day (at bedtime)

## 2020-10-30 NOTE — DISCHARGE NOTE PROVIDER - NSDCCPCAREPLAN_GEN_ALL_CORE_FT
PRINCIPAL DISCHARGE DIAGNOSIS  Diagnosis: Diabetic ulcer of left heel  Assessment and Plan of Treatment: -s/p surgical debridement on 10/27.  -recieved IV antibiotics, pain management, and local wound care  -VAC therapy initiated on 10/30.   -Patient to continue home VAC therapy with outpatient follow up  -VAC changes 3 times per week with VNS  -Wash wounds with soap and water prior to new dressings  -follow up with BURN clinic 1 week from discharge  -Finish oral Augmentin course as prescribed  -Monitor for signs of infection, including increased redness, swelling, bleeding, pus drainage.   -if infection suspected seek medical advice immediately      SECONDARY DISCHARGE DIAGNOSES  Diagnosis: PAD (peripheral artery disease)  Assessment and Plan of Treatment: -s/p LLE angiogram with arterectomy with Vascular  -follow up with vascular surgery outpatient in 2 weeks    Diagnosis: Type 2 diabetes mellitus  Assessment and Plan of Treatment: -Restart home medications  -follow up with primary provider outpatient    Diagnosis: Atrial fibrillation  Assessment and Plan of Treatment: -Continue home medications.  -Monitor INR outpatient.  -Follow up with primary provider     PRINCIPAL DISCHARGE DIAGNOSIS  Diagnosis: Diabetic ulcer of left heel  Assessment and Plan of Treatment: -s/p surgical debridement on 10/27.  -recieved IV antibiotics, pain management, and local wound care  -VAC therapy initiated on 10/30.   -Patient to continue home VAC therapy with outpatient follow up  -VAC changes 3 times per week with VNS  -Wash wounds with soap and water prior to new dressings  -follow up with BURN clinic 1 week from discharge  -Finish oral Augmentin course as prescribed  -Monitor for signs of infection, including increased redness, swelling, bleeding, pus drainage.   -if infection suspected seek medical advice immediately      SECONDARY DISCHARGE DIAGNOSES  Diagnosis: Hypothyroid  Assessment and Plan of Treatment: Your TSH level was 0.08, please follow up with your endocrinologist or primary care physician, for repeat levels.    Diagnosis: Type 2 diabetes mellitus  Assessment and Plan of Treatment: -Restart home medications  -follow up with primary provider outpatient    Diagnosis: PAD (peripheral artery disease)  Assessment and Plan of Treatment: -s/p LLE angiogram with arterectomy with Vascular  -follow up with vascular surgery outpatient in 2 weeks    Diagnosis: Atrial fibrillation  Assessment and Plan of Treatment: -Continue your coumadin as prescribed.   -Monitor INR outpatient.  -Follow up with primary provider and your coumadin clinic.

## 2020-10-31 LAB
-  AMIKACIN: SIGNIFICANT CHANGE UP
-  AZTREONAM: SIGNIFICANT CHANGE UP
-  CEFEPIME: SIGNIFICANT CHANGE UP
-  CEFTAZIDIME: SIGNIFICANT CHANGE UP
-  CIPROFLOXACIN: SIGNIFICANT CHANGE UP
-  GENTAMICIN: SIGNIFICANT CHANGE UP
-  IMIPENEM: SIGNIFICANT CHANGE UP
-  LEVOFLOXACIN: SIGNIFICANT CHANGE UP
-  MEROPENEM: SIGNIFICANT CHANGE UP
-  PIPERACILLIN/TAZOBACTAM: SIGNIFICANT CHANGE UP
-  TOBRAMYCIN: SIGNIFICANT CHANGE UP
ANION GAP SERPL CALC-SCNC: 10 MMOL/L — SIGNIFICANT CHANGE UP (ref 7–14)
APTT BLD: 35.2 SEC — SIGNIFICANT CHANGE UP (ref 27–39.2)
BASOPHILS # BLD AUTO: 0.02 K/UL — SIGNIFICANT CHANGE UP (ref 0–0.2)
BASOPHILS NFR BLD AUTO: 0.2 % — SIGNIFICANT CHANGE UP (ref 0–1)
BUN SERPL-MCNC: 40 MG/DL — HIGH (ref 10–20)
CALCIUM SERPL-MCNC: 9.1 MG/DL — SIGNIFICANT CHANGE UP (ref 8.5–10.1)
CHLORIDE SERPL-SCNC: 101 MMOL/L — SIGNIFICANT CHANGE UP (ref 98–110)
CO2 SERPL-SCNC: 25 MMOL/L — SIGNIFICANT CHANGE UP (ref 17–32)
CREAT SERPL-MCNC: 1.5 MG/DL — SIGNIFICANT CHANGE UP (ref 0.7–1.5)
CULTURE RESULTS: SIGNIFICANT CHANGE UP
CULTURE RESULTS: SIGNIFICANT CHANGE UP
EOSINOPHIL # BLD AUTO: 0.09 K/UL — SIGNIFICANT CHANGE UP (ref 0–0.7)
EOSINOPHIL NFR BLD AUTO: 1.1 % — SIGNIFICANT CHANGE UP (ref 0–8)
GLUCOSE BLDC GLUCOMTR-MCNC: 121 MG/DL — HIGH (ref 70–99)
GLUCOSE BLDC GLUCOMTR-MCNC: 157 MG/DL — HIGH (ref 70–99)
GLUCOSE BLDC GLUCOMTR-MCNC: 169 MG/DL — HIGH (ref 70–99)
GLUCOSE BLDC GLUCOMTR-MCNC: 94 MG/DL — SIGNIFICANT CHANGE UP (ref 70–99)
GLUCOSE SERPL-MCNC: 113 MG/DL — HIGH (ref 70–99)
HCT VFR BLD CALC: 39 % — LOW (ref 42–52)
HGB BLD-MCNC: 12.6 G/DL — LOW (ref 14–18)
IMM GRANULOCYTES NFR BLD AUTO: 0.2 % — SIGNIFICANT CHANGE UP (ref 0.1–0.3)
INR BLD: 1.9 RATIO — HIGH (ref 0.65–1.3)
LYMPHOCYTES # BLD AUTO: 1.1 K/UL — LOW (ref 1.2–3.4)
LYMPHOCYTES # BLD AUTO: 13.4 % — LOW (ref 20.5–51.1)
MAGNESIUM SERPL-MCNC: 1.8 MG/DL — SIGNIFICANT CHANGE UP (ref 1.8–2.4)
MCHC RBC-ENTMCNC: 30.5 PG — SIGNIFICANT CHANGE UP (ref 27–31)
MCHC RBC-ENTMCNC: 32.3 G/DL — SIGNIFICANT CHANGE UP (ref 32–37)
MCV RBC AUTO: 94.4 FL — HIGH (ref 80–94)
METHOD TYPE: SIGNIFICANT CHANGE UP
MONOCYTES # BLD AUTO: 1.27 K/UL — HIGH (ref 0.1–0.6)
MONOCYTES NFR BLD AUTO: 15.4 % — HIGH (ref 1.7–9.3)
NEUTROPHILS # BLD AUTO: 5.73 K/UL — SIGNIFICANT CHANGE UP (ref 1.4–6.5)
NEUTROPHILS NFR BLD AUTO: 69.7 % — SIGNIFICANT CHANGE UP (ref 42.2–75.2)
NRBC # BLD: 0 /100 WBCS — SIGNIFICANT CHANGE UP (ref 0–0)
ORGANISM # SPEC MICROSCOPIC CNT: SIGNIFICANT CHANGE UP
PHOSPHATE SERPL-MCNC: 3.5 MG/DL — SIGNIFICANT CHANGE UP (ref 2.1–4.9)
PLATELET # BLD AUTO: 115 K/UL — LOW (ref 130–400)
POTASSIUM SERPL-MCNC: 4 MMOL/L — SIGNIFICANT CHANGE UP (ref 3.5–5)
POTASSIUM SERPL-SCNC: 4 MMOL/L — SIGNIFICANT CHANGE UP (ref 3.5–5)
PROTHROM AB SERPL-ACNC: 21.8 SEC — HIGH (ref 9.95–12.87)
RBC # BLD: 4.13 M/UL — LOW (ref 4.7–6.1)
RBC # FLD: 16.1 % — HIGH (ref 11.5–14.5)
SODIUM SERPL-SCNC: 136 MMOL/L — SIGNIFICANT CHANGE UP (ref 135–146)
SPECIMEN SOURCE: SIGNIFICANT CHANGE UP
SPECIMEN SOURCE: SIGNIFICANT CHANGE UP
WBC # BLD: 8.23 K/UL — SIGNIFICANT CHANGE UP (ref 4.8–10.8)
WBC # FLD AUTO: 8.23 K/UL — SIGNIFICANT CHANGE UP (ref 4.8–10.8)

## 2020-10-31 PROCEDURE — 99231 SBSQ HOSP IP/OBS SF/LOW 25: CPT

## 2020-10-31 RX ORDER — WARFARIN SODIUM 2.5 MG/1
5 TABLET ORAL AT BEDTIME
Refills: 0 | Status: DISCONTINUED | OUTPATIENT
Start: 2020-10-31 | End: 2020-10-31

## 2020-10-31 RX ORDER — WARFARIN SODIUM 2.5 MG/1
5 TABLET ORAL AT BEDTIME
Refills: 0 | Status: COMPLETED | OUTPATIENT
Start: 2020-10-31 | End: 2020-10-31

## 2020-10-31 RX ADMIN — BUDESONIDE AND FORMOTEROL FUMARATE DIHYDRATE 2 PUFF(S): 160; 4.5 AEROSOL RESPIRATORY (INHALATION) at 21:37

## 2020-10-31 RX ADMIN — LATANOPROST 1 DROP(S): 0.05 SOLUTION/ DROPS OPHTHALMIC; TOPICAL at 21:38

## 2020-10-31 RX ADMIN — ZINC SULFATE TAB 220 MG (50 MG ZINC EQUIVALENT) 220 MILLIGRAM(S): 220 (50 ZN) TAB at 12:16

## 2020-10-31 RX ADMIN — WARFARIN SODIUM 5 MILLIGRAM(S): 2.5 TABLET ORAL at 22:33

## 2020-10-31 RX ADMIN — AMPICILLIN SODIUM AND SULBACTAM SODIUM 200 GRAM(S): 250; 125 INJECTION, POWDER, FOR SUSPENSION INTRAMUSCULAR; INTRAVENOUS at 23:10

## 2020-10-31 RX ADMIN — Medication 250 MICROGRAM(S): at 05:18

## 2020-10-31 RX ADMIN — AMPICILLIN SODIUM AND SULBACTAM SODIUM 200 GRAM(S): 250; 125 INJECTION, POWDER, FOR SUSPENSION INTRAMUSCULAR; INTRAVENOUS at 18:35

## 2020-10-31 RX ADMIN — POLYETHYLENE GLYCOL 3350 17 GRAM(S): 17 POWDER, FOR SOLUTION ORAL at 05:18

## 2020-10-31 RX ADMIN — INSULIN GLARGINE 15 UNIT(S): 100 INJECTION, SOLUTION SUBCUTANEOUS at 21:39

## 2020-10-31 RX ADMIN — LOSARTAN POTASSIUM 100 MILLIGRAM(S): 100 TABLET, FILM COATED ORAL at 05:18

## 2020-10-31 RX ADMIN — AMPICILLIN SODIUM AND SULBACTAM SODIUM 200 GRAM(S): 250; 125 INJECTION, POWDER, FOR SUSPENSION INTRAMUSCULAR; INTRAVENOUS at 12:21

## 2020-10-31 RX ADMIN — Medication 5 UNIT(S): at 08:20

## 2020-10-31 RX ADMIN — AMPICILLIN SODIUM AND SULBACTAM SODIUM 200 GRAM(S): 250; 125 INJECTION, POWDER, FOR SUSPENSION INTRAMUSCULAR; INTRAVENOUS at 00:13

## 2020-10-31 RX ADMIN — Medication 1: at 12:15

## 2020-10-31 RX ADMIN — TAMSULOSIN HYDROCHLORIDE 0.4 MILLIGRAM(S): 0.4 CAPSULE ORAL at 21:38

## 2020-10-31 RX ADMIN — BUDESONIDE AND FORMOTEROL FUMARATE DIHYDRATE 2 PUFF(S): 160; 4.5 AEROSOL RESPIRATORY (INHALATION) at 08:22

## 2020-10-31 RX ADMIN — Medication 5 UNIT(S): at 12:15

## 2020-10-31 RX ADMIN — AMPICILLIN SODIUM AND SULBACTAM SODIUM 200 GRAM(S): 250; 125 INJECTION, POWDER, FOR SUSPENSION INTRAMUSCULAR; INTRAVENOUS at 05:17

## 2020-10-31 RX ADMIN — PANTOPRAZOLE SODIUM 40 MILLIGRAM(S): 20 TABLET, DELAYED RELEASE ORAL at 05:19

## 2020-10-31 RX ADMIN — ATORVASTATIN CALCIUM 40 MILLIGRAM(S): 80 TABLET, FILM COATED ORAL at 21:38

## 2020-10-31 RX ADMIN — Medication 10 MILLIGRAM(S): at 12:18

## 2020-10-31 NOTE — PROGRESS NOTE ADULT - ASSESSMENT
80 year old male with PMHx significant for Afib on coumadin, HFrEF (s/p AICD placement), CAD s/p CABG, BPH, hypothyroidism, DM, HLD, HTN, COPD, Psoriasis, PVD presenting to the ED for left heel wound, s/p debridement of Left heel wound on 10/27/20.       1. Left heel full thickness wound - s/p debridement on 10/27  - placed NPWT VAC on 10/30, patient to continue VAC therapy outpatient with VNS and follow up  -continue IVF, IV abx  - pain management  - PT consult  - ambulate as tolerated  -Discharge planning, pending Home VAC delivery today     2. Cards: hx of Atrial fib-on AC, CAD s/p CABG, HFrEF,   - per cards, High risk patient for an intermediate risk procedure   - Coumadin held for angio on Thurs 10/29, restarted coumadin 10/30, INR stable  - continue home dose Lasix 40mg qd, Losartan 100mg qd,  Lipitor 40 qhs  - Echo done, EF ~50-55%  - DASH/TLC diet    3. Vascular:  -Vascular studies- 10/25 venous duplex of BLE- prelim negative for DVT 10/25 arterial duplex of BLE; prelim showed Occluded left distal peroneal artery with otherwise patent infrainguinal vessels bilaterally.  - had angio on Thursday 10/29 with Dr. Corea findings as follows: AT- severe 20 cm stenosis, PT occluded, Peroneal distal occlusion, Procedure - AT I atherectomy ultrascore angioplasty  - [facility] okay to restart coumadin, monitor INR, pt to follow up outpatient with Dr. Corea in 2 weeks    4. Resp:  -hx of Severe COPD s/p VATS not on home O2  -neb tx PRN  - continue Symbicort inhaler  -encourage incentive spirometer     5. Endo: hx of DM2 (hgA1c 8.6- 10/26),  hypothyroidism   -  Metformin held, 5,5,5,15 plus ISS inpatient   - monitor FS, adjust insulin accordingly   - continue home dose levothyroxine   - carb consistent diet    6. ID  -Wcx from clinic 10/22 showed num proteus, num E. Faecalis, Num streptococcus gordonii.  -x-ray of Left foot showed soft tissue air inferior to the posterior calcaneus, without obvious periosteal reaction or skin erosions. These findings may be related to osteomyelitis.   -CT 10/28- no evidence of OM, per ID d/c on Augmentin for 2 weeks  -cont IV abx as per ID    DVT ppx - pt on AC  PPI daily for GI ppx  PT c/s    Care Discussed with Consultants/Other Providers [ x] YES  [ ] NO 80 year old male with PMHx significant for Afib on coumadin, HFrEF (s/p AICD placement), CAD s/p CABG, BPH, hypothyroidism, DM, HLD, HTN, COPD, Psoriasis, PVD presenting to the ED for left heel wound, s/p debridement of Left heel wound on 10/27/20.       1. Left heel full thickness wound - s/p debridement on 10/27  - placed NPWT VAC on 10/30, patient to continue VAC therapy outpatient with VNS and follow up  -continue IVF, IV abx  - pain management  - PT consult  - ambulate as tolerated  -Discharge planning, pending Home VAC delivery today     2. Cards: hx of Atrial fib-on AC, CAD s/p CABG, HFrEF,   - continue home dose Lasix 40mg qd, Losartan 100mg qd,  Lipitor 40 qhs      3. Vascular:  - spoke with vasc this AM, okay to restart coumadin, monitor INR, pt to follow up outpatient with Dr. Corea in 2 weeks    4. Resp:  - continue Symbicort inhaler  -encourage incentive spirometer     5. Endo: hx of DM2 (hgA1c 8.6- 10/26),  hypothyroidism   - monitor FS, adjust insulin accordingly   - continue home dose levothyroxine   - carb consistent diet    6. ID  -Wcx from clinic 10/22 showed num proteus, num E. Faecalis, Num streptococcus gordonii.  -x-ray of Left foot showed soft tissue air inferior to the posterior calcaneus, without obvious periosteal reaction or skin erosions. These findings may be related to osteomyelitis.   -CT 10/28- no evidence of OM, per ID d/c on Augmentin for 2 weeks  -cont IV abx as per ID    DVT ppx - pt on AC  PPI daily for GI ppx  PT c/s    Care Discussed with Consultants/Other Providers [ x] YES  [ ] NO

## 2020-10-31 NOTE — PROGRESS NOTE ADULT - SUBJECTIVE AND OBJECTIVE BOX
Patient is a 80y old  Male who presents with a chief complaint of chronic left heel wound (30 Oct 2020 09:11)      INTERVAL HPI/OVERNIGHT EVENTS:   no overnight events, pt complaining of constipation    Vital Signs Last 24 Hrs  T(C): 36.1 (30 Oct 2020 07:48), Max: 36.7 (29 Oct 2020 14:26)  T(F): 96.9 (30 Oct 2020 07:48), Max: 98 (29 Oct 2020 14:26)  HR: 66 (30 Oct 2020 07:48) (60 - 72)  BP: 144/65 (30 Oct 2020 07:48) (103/71 - 158/85)  RR: 18 (30 Oct 2020 07:48) (14 - 18)  SpO2: 97% (29 Oct 2020 20:00) (97% - 99%)    LABS:                        12.3   6.76  )-----------( 101      ( 30 Oct 2020 20:21 )             38.6              10-30    137  |  101  |  36<H>  ----------------------------<  129<H>  4.8   |  20  |  1.4    Ca    8.7      30 Oct 2020 20:21  Phos  3.9     10-30  Mg     1.8     10-30      PT/INR - ( 30 Oct 2020 18:17 )   PT: 20.50 sec;   INR: 1.78 ratio        CAPILLARY BLOOD GLUCOSE      POCT Blood Glucose.: 157 mg/dL (31 Oct 2020 08:34)  POCT Blood Glucose.: 144 mg/dL (30 Oct 2020 21:00)  POCT Blood Glucose.: 83 mg/dL (30 Oct 2020 17:06)  POCT Blood Glucose.: 103 mg/dL (30 Oct 2020 10:26)    Consultant(s) Notes Reviewed:  [x ] YES  [ ] NO    MEDICATIONS  (STANDING):  ampicillin/sulbactam  IVPB 3 Gram(s) IV Intermittent every 6 hours  atorvastatin 40 milliGRAM(s) Oral at bedtime  budesonide 160 MICROgram(s)/formoterol 4.5 MICROgram(s) Inhaler 2 Puff(s) Inhalation two times a day  chlorhexidine 4% Liquid 1 Application(s) Topical <User Schedule>  dextrose 5%. 1000 milliLiter(s) (50 mL/Hr) IV Continuous <Continuous>  dextrose 50% Injectable 12.5 Gram(s) IV Push once  dextrose 50% Injectable 25 Gram(s) IV Push once  insulin glargine Injectable (LANTUS) 15 Unit(s) SubCutaneous at bedtime  insulin lispro (ADMELOG) corrective regimen sliding scale   SubCutaneous three times a day before meals  insulin lispro Injectable (ADMELOG) 5 Unit(s) SubCutaneous three times a day before meals  lactated ringers. 1000 milliLiter(s) (75 mL/Hr) IV Continuous <Continuous>  latanoprost 0.005% Ophthalmic Solution 1 Drop(s) Both EYES at bedtime  PT/INR - ( 30 Oct 2020 18:17 )   PT: 20.50 sec;   INR: 1.78 ratio         levothyroxine 250 MICROGram(s) Oral daily  losartan 100 milliGRAM(s) Oral daily  pantoprazole    Tablet 40 milliGRAM(s) Oral before breakfast  tamsulosin Oral Tab/Cap - Peds 0.4 milliGRAM(s) Oral at bedtime  zinc sulfate 220 milliGRAM(s) Oral daily    MEDICATIONS  (PRN):  dextrose 40% Gel 15 Gram(s) Oral once PRN Blood Glucose LESS THAN 70 milliGRAM(s)/deciliter  oxycodone    5 mG/acetaminophen 325 mG 1 Tablet(s) Oral every 6 hours PRN Moderate Pain (4 - 6)  polyethylene glycol 3350 17 Gram(s) Oral daily PRN Constipation  senna 2 Tablet(s) Oral daily PRN Constipation      PHYSICAL EXAM:  GENERAL: NAD, sitting in bed comfortable  CHEST/LUNG: speaking in full sentences, breathing comfortably in room air, symmetrical chest expansion.   HEART: in no acute cardiopulmonary distress.   SKIN: LLE:  wound vac in place, working  Vascular dressing in place s/p angio on 10/29   Patient is a 80y old  Male who presents with a chief complaint of chronic left heel wound (30 Oct 2020 09:11)      INTERVAL HPI/OVERNIGHT EVENTS:   no overnight events, pt complaining of constipation    Vital Signs Last 24 Hrs  T(C): 36.1 (30 Oct 2020 07:48), Max: 36.7 (29 Oct 2020 14:26)  T(F): 96.9 (30 Oct 2020 07:48), Max: 98 (29 Oct 2020 14:26)  HR: 66 (30 Oct 2020 07:48) (60 - 72)  BP: 144/65 (30 Oct 2020 07:48) (103/71 - 158/85)  RR: 18 (30 Oct 2020 07:48) (14 - 18)  SpO2: 97% (29 Oct 2020 20:00) (97% - 99%)    LABS:                        12.3   6.76  )-----------( 101      ( 30 Oct 2020 20:21 )             38.6              10-30    137  |  101  |  36<H>  ----------------------------<  129<H>  4.8   |  20  |  1.4    Ca    8.7      30 Oct 2020 20:21  Phos  3.9     10-30  Mg     1.8     10-30      PT/INR - ( 30 Oct 2020 18:17 )   PT: 20.50 sec;   INR: 1.78 ratio        CAPILLARY BLOOD GLUCOSE      POCT Blood Glucose.: 157 mg/dL (31 Oct 2020 08:34)  POCT Blood Glucose.: 144 mg/dL (30 Oct 2020 21:00)  POCT Blood Glucose.: 83 mg/dL (30 Oct 2020 17:06)  POCT Blood Glucose.: 103 mg/dL (30 Oct 2020 10:26)    Consultant(s) Notes Reviewed:  [x ] YES  [ ] NO    MEDICATIONS  (STANDING):  ampicillin/sulbactam  IVPB 3 Gram(s) IV Intermittent every 6 hours  atorvastatin 40 milliGRAM(s) Oral at bedtime  budesonide 160 MICROgram(s)/formoterol 4.5 MICROgram(s) Inhaler 2 Puff(s) Inhalation two times a day  chlorhexidine 4% Liquid 1 Application(s) Topical <User Schedule>  dextrose 5%. 1000 milliLiter(s) (50 mL/Hr) IV Continuous <Continuous>  dextrose 50% Injectable 12.5 Gram(s) IV Push once  dextrose 50% Injectable 25 Gram(s) IV Push once  insulin glargine Injectable (LANTUS) 15 Unit(s) SubCutaneous at bedtime  insulin lispro (ADMELOG) corrective regimen sliding scale   SubCutaneous three times a day before meals  insulin lispro Injectable (ADMELOG) 5 Unit(s) SubCutaneous three times a day before meals  lactated ringers. 1000 milliLiter(s) (75 mL/Hr) IV Continuous <Continuous>  latanoprost 0.005% Ophthalmic Solution 1 Drop(s) Both EYES at bedtime  PT/INR - ( 30 Oct 2020 18:17 )   PT: 20.50 sec;   INR: 1.78 ratio         levothyroxine 250 MICROGram(s) Oral daily  losartan 100 milliGRAM(s) Oral daily  pantoprazole    Tablet 40 milliGRAM(s) Oral before breakfast  tamsulosin Oral Tab/Cap - Peds 0.4 milliGRAM(s) Oral at bedtime  zinc sulfate 220 milliGRAM(s) Oral daily    MEDICATIONS  (PRN):  dextrose 40% Gel 15 Gram(s) Oral once PRN Blood Glucose LESS THAN 70 milliGRAM(s)/deciliter  oxycodone    5 mG/acetaminophen 325 mG 1 Tablet(s) Oral every 6 hours PRN Moderate Pain (4 - 6)  polyethylene glycol 3350 17 Gram(s) Oral daily PRN Constipation  senna 2 Tablet(s) Oral daily PRN Constipation      PHYSICAL EXAM:  GENERAL: NAD, sitting in bed comfortable  CHEST/LUNG: speaking in full sentences, breathing comfortably in room air, symmetrical chest expansion.   HEART: in no acute cardiopulmonary distress.   SKIN: LLE:  wound vac in place

## 2020-11-01 LAB
ANION GAP SERPL CALC-SCNC: 9 MMOL/L — SIGNIFICANT CHANGE UP (ref 7–14)
APTT BLD: 35.1 SEC — SIGNIFICANT CHANGE UP (ref 27–39.2)
BASOPHILS # BLD AUTO: 0.02 K/UL — SIGNIFICANT CHANGE UP (ref 0–0.2)
BASOPHILS NFR BLD AUTO: 0.3 % — SIGNIFICANT CHANGE UP (ref 0–1)
BUN SERPL-MCNC: 37 MG/DL — HIGH (ref 10–20)
CALCIUM SERPL-MCNC: 9.2 MG/DL — SIGNIFICANT CHANGE UP (ref 8.5–10.1)
CHLORIDE SERPL-SCNC: 105 MMOL/L — SIGNIFICANT CHANGE UP (ref 98–110)
CO2 SERPL-SCNC: 25 MMOL/L — SIGNIFICANT CHANGE UP (ref 17–32)
CREAT SERPL-MCNC: 1.4 MG/DL — SIGNIFICANT CHANGE UP (ref 0.7–1.5)
EOSINOPHIL # BLD AUTO: 0.08 K/UL — SIGNIFICANT CHANGE UP (ref 0–0.7)
EOSINOPHIL NFR BLD AUTO: 1.1 % — SIGNIFICANT CHANGE UP (ref 0–8)
GLUCOSE BLDC GLUCOMTR-MCNC: 110 MG/DL — HIGH (ref 70–99)
GLUCOSE BLDC GLUCOMTR-MCNC: 124 MG/DL — HIGH (ref 70–99)
GLUCOSE BLDC GLUCOMTR-MCNC: 168 MG/DL — HIGH (ref 70–99)
GLUCOSE BLDC GLUCOMTR-MCNC: 99 MG/DL — SIGNIFICANT CHANGE UP (ref 70–99)
GLUCOSE SERPL-MCNC: 148 MG/DL — HIGH (ref 70–99)
HCT VFR BLD CALC: 35.9 % — LOW (ref 42–52)
HGB BLD-MCNC: 11.4 G/DL — LOW (ref 14–18)
IMM GRANULOCYTES NFR BLD AUTO: 0.3 % — SIGNIFICANT CHANGE UP (ref 0.1–0.3)
INR BLD: 2.07 RATIO — HIGH (ref 0.65–1.3)
LYMPHOCYTES # BLD AUTO: 0.87 K/UL — LOW (ref 1.2–3.4)
LYMPHOCYTES # BLD AUTO: 12 % — LOW (ref 20.5–51.1)
MAGNESIUM SERPL-MCNC: 1.8 MG/DL — SIGNIFICANT CHANGE UP (ref 1.8–2.4)
MCHC RBC-ENTMCNC: 30.6 PG — SIGNIFICANT CHANGE UP (ref 27–31)
MCHC RBC-ENTMCNC: 31.8 G/DL — LOW (ref 32–37)
MCV RBC AUTO: 96.2 FL — HIGH (ref 80–94)
MONOCYTES # BLD AUTO: 1.13 K/UL — HIGH (ref 0.1–0.6)
MONOCYTES NFR BLD AUTO: 15.6 % — HIGH (ref 1.7–9.3)
NEUTROPHILS # BLD AUTO: 5.12 K/UL — SIGNIFICANT CHANGE UP (ref 1.4–6.5)
NEUTROPHILS NFR BLD AUTO: 70.7 % — SIGNIFICANT CHANGE UP (ref 42.2–75.2)
NRBC # BLD: 0 /100 WBCS — SIGNIFICANT CHANGE UP (ref 0–0)
PHOSPHATE SERPL-MCNC: 3.1 MG/DL — SIGNIFICANT CHANGE UP (ref 2.1–4.9)
PLATELET # BLD AUTO: 110 K/UL — LOW (ref 130–400)
POTASSIUM SERPL-MCNC: 4.3 MMOL/L — SIGNIFICANT CHANGE UP (ref 3.5–5)
POTASSIUM SERPL-SCNC: 4.3 MMOL/L — SIGNIFICANT CHANGE UP (ref 3.5–5)
PROTHROM AB SERPL-ACNC: 23.8 SEC — HIGH (ref 9.95–12.87)
RBC # BLD: 3.73 M/UL — LOW (ref 4.7–6.1)
RBC # FLD: 16.3 % — HIGH (ref 11.5–14.5)
SODIUM SERPL-SCNC: 139 MMOL/L — SIGNIFICANT CHANGE UP (ref 135–146)
WBC # BLD: 7.24 K/UL — SIGNIFICANT CHANGE UP (ref 4.8–10.8)
WBC # FLD AUTO: 7.24 K/UL — SIGNIFICANT CHANGE UP (ref 4.8–10.8)

## 2020-11-01 PROCEDURE — 99231 SBSQ HOSP IP/OBS SF/LOW 25: CPT

## 2020-11-01 RX ORDER — WARFARIN SODIUM 2.5 MG/1
5 TABLET ORAL AT BEDTIME
Refills: 0 | Status: COMPLETED | OUTPATIENT
Start: 2020-11-01 | End: 2020-11-01

## 2020-11-01 RX ORDER — WARFARIN SODIUM 2.5 MG/1
5 TABLET ORAL AT BEDTIME
Refills: 0 | Status: DISCONTINUED | OUTPATIENT
Start: 2020-11-01 | End: 2020-11-01

## 2020-11-01 RX ADMIN — CHLORHEXIDINE GLUCONATE 1 APPLICATION(S): 213 SOLUTION TOPICAL at 05:36

## 2020-11-01 RX ADMIN — LATANOPROST 1 DROP(S): 0.05 SOLUTION/ DROPS OPHTHALMIC; TOPICAL at 22:13

## 2020-11-01 RX ADMIN — BUDESONIDE AND FORMOTEROL FUMARATE DIHYDRATE 2 PUFF(S): 160; 4.5 AEROSOL RESPIRATORY (INHALATION) at 22:11

## 2020-11-01 RX ADMIN — Medication 5 UNIT(S): at 11:33

## 2020-11-01 RX ADMIN — Medication 5 UNIT(S): at 08:23

## 2020-11-01 RX ADMIN — BUDESONIDE AND FORMOTEROL FUMARATE DIHYDRATE 2 PUFF(S): 160; 4.5 AEROSOL RESPIRATORY (INHALATION) at 08:24

## 2020-11-01 RX ADMIN — Medication 5 UNIT(S): at 17:58

## 2020-11-01 RX ADMIN — AMPICILLIN SODIUM AND SULBACTAM SODIUM 200 GRAM(S): 250; 125 INJECTION, POWDER, FOR SUSPENSION INTRAMUSCULAR; INTRAVENOUS at 23:12

## 2020-11-01 RX ADMIN — Medication 1: at 11:33

## 2020-11-01 RX ADMIN — Medication 250 MICROGRAM(S): at 05:37

## 2020-11-01 RX ADMIN — PANTOPRAZOLE SODIUM 40 MILLIGRAM(S): 20 TABLET, DELAYED RELEASE ORAL at 08:23

## 2020-11-01 RX ADMIN — AMPICILLIN SODIUM AND SULBACTAM SODIUM 200 GRAM(S): 250; 125 INJECTION, POWDER, FOR SUSPENSION INTRAMUSCULAR; INTRAVENOUS at 11:33

## 2020-11-01 RX ADMIN — TAMSULOSIN HYDROCHLORIDE 0.4 MILLIGRAM(S): 0.4 CAPSULE ORAL at 22:12

## 2020-11-01 RX ADMIN — ATORVASTATIN CALCIUM 40 MILLIGRAM(S): 80 TABLET, FILM COATED ORAL at 22:12

## 2020-11-01 RX ADMIN — INSULIN GLARGINE 15 UNIT(S): 100 INJECTION, SOLUTION SUBCUTANEOUS at 22:30

## 2020-11-01 RX ADMIN — LOSARTAN POTASSIUM 100 MILLIGRAM(S): 100 TABLET, FILM COATED ORAL at 05:36

## 2020-11-01 RX ADMIN — ZINC SULFATE TAB 220 MG (50 MG ZINC EQUIVALENT) 220 MILLIGRAM(S): 220 (50 ZN) TAB at 11:34

## 2020-11-01 RX ADMIN — AMPICILLIN SODIUM AND SULBACTAM SODIUM 200 GRAM(S): 250; 125 INJECTION, POWDER, FOR SUSPENSION INTRAMUSCULAR; INTRAVENOUS at 18:02

## 2020-11-01 RX ADMIN — WARFARIN SODIUM 5 MILLIGRAM(S): 2.5 TABLET ORAL at 22:13

## 2020-11-01 RX ADMIN — AMPICILLIN SODIUM AND SULBACTAM SODIUM 200 GRAM(S): 250; 125 INJECTION, POWDER, FOR SUSPENSION INTRAMUSCULAR; INTRAVENOUS at 05:36

## 2020-11-01 NOTE — PROGRESS NOTE ADULT - ASSESSMENT
80 year old male with PMHx significant for Afib on coumadin, HFrEF (s/p AICD placement), CAD s/p CABG, BPH, hypothyroidism, DM, HLD, HTN, COPD, Psoriasis, PVD presenting to the ED for left heel wound, s/p debridement of Left heel wound on 10/27/20.       1. Left heel full thickness wound - s/p debridement on 10/27  - placed NPWT VAC on 10/30, patient to continue VAC therapy outpatient with VNS and follow up  -continue IVF, IV abx  - pain management  - PT consult  - ambulate as tolerated  -Discharge planning, pending Home VAC delivery today     2. Cards: hx of Atrial fib-on AC, CAD s/p CABG, HFrEF,   - continue home dose Lasix 40mg qd, Losartan 100mg qd,  Lipitor 40 qhs      3. Vascular:  - spoke with vasc this AM, okay to restart coumadin, monitor INR, pt to follow up outpatient with Dr. Corea in 2 weeks    4. Resp:  - continue Symbicort inhaler  -encourage incentive spirometer     5. Endo: hx of DM2 (hgA1c 8.6- 10/26),  hypothyroidism   - monitor FS, adjust insulin accordingly   - continue home dose levothyroxine   - carb consistent diet    6. ID  -Wcx from clinic 10/22 showed num proteus, num E. Faecalis, Num streptococcus gordonii.  -x-ray of Left foot showed soft tissue air inferior to the posterior calcaneus, without obvious periosteal reaction or skin erosions. These findings may be related to osteomyelitis.   -CT 10/28- no evidence of OM, per ID d/c on Augmentin for 2 weeks  -cont IV abx as per ID    DVT ppx - pt on AC  PPI daily for GI ppx  PT c/s

## 2020-11-01 NOTE — PROGRESS NOTE ADULT - SUBJECTIVE AND OBJECTIVE BOX
Patient is a 80y old  Male who presents with a chief complaint of chronic left heel wound (30 Oct 2020 09:11)      INTERVAL HPI/OVERNIGHT EVENTS:   no overnight events    Vital Signs Last 24 Hrs  T(C): 35.8 (01 Nov 2020 06:35), Max: 36.2 (31 Oct 2020 17:24)  T(F): 96.5 (01 Nov 2020 06:35), Max: 97.2 (31 Oct 2020 17:24)  HR: 68 (01 Nov 2020 05:10) (60 - 77)  BP: 116/72 (01 Nov 2020 05:10) (103/51 - 138/65)  BP(mean): --  RR: 18 (01 Nov 2020 05:10) (18 - 18)  SpO2: 97% (31 Oct 2020 21:02) (97% - 98%)    LABS:                        12.6   8.23  )-----------( 115      ( 31 Oct 2020 17:13 )             39.0     31 Oct 2020 17:13    136    |  101    |  40     ----------------------------<  113    4.0     |  25     |  1.5      Ca    9.1        31 Oct 2020 17:13  Phos  3.5       31 Oct 2020 17:13  Mg     1.8       31 Oct 2020 17:13      PT/INR - ( 31 Oct 2020 17:13 )   PT: 21.80 sec;   INR: 1.90 ratio         PTT - ( 31 Oct 2020 17:13 )  PTT:35.2 sec        CAPILLARY BLOOD GLUCOSE      POCT Blood Glucose.: 168 mg/dL (01 Nov 2020 11:29)  POCT Blood Glucose.: 99 mg/dL (01 Nov 2020 07:14)  POCT Blood Glucose.: 121 mg/dL (31 Oct 2020 20:56)  POCT Blood Glucose.: 94 mg/dL (31 Oct 2020 16:35)      Consultant(s) Notes Reviewed:  [x ] YES  [ ] NO    MEDICATIONS  (STANDING):  ampicillin/sulbactam  IVPB 3 Gram(s) IV Intermittent every 6 hours  atorvastatin 40 milliGRAM(s) Oral at bedtime  budesonide 160 MICROgram(s)/formoterol 4.5 MICROgram(s) Inhaler 2 Puff(s) Inhalation two times a day  chlorhexidine 4% Liquid 1 Application(s) Topical <User Schedule>  dextrose 5%. 1000 milliLiter(s) (50 mL/Hr) IV Continuous <Continuous>  dextrose 50% Injectable 12.5 Gram(s) IV Push once  dextrose 50% Injectable 25 Gram(s) IV Push once  insulin glargine Injectable (LANTUS) 15 Unit(s) SubCutaneous at bedtime  insulin lispro (ADMELOG) corrective regimen sliding scale   SubCutaneous three times a day before meals  insulin lispro Injectable (ADMELOG) 5 Unit(s) SubCutaneous three times a day before meals  lactated ringers. 1000 milliLiter(s) (75 mL/Hr) IV Continuous <Continuous>  latanoprost 0.005% Ophthalmic Solution 1 Drop(s) Both EYES at bedtime  PT/INR - ( 30 Oct 2020 18:17 )   PT: 20.50 sec;   INR: 1.78 ratio         levothyroxine 250 MICROGram(s) Oral daily  losartan 100 milliGRAM(s) Oral daily  pantoprazole    Tablet 40 milliGRAM(s) Oral before breakfast  tamsulosin Oral Tab/Cap - Peds 0.4 milliGRAM(s) Oral at bedtime  zinc sulfate 220 milliGRAM(s) Oral daily    MEDICATIONS  (PRN):  dextrose 40% Gel 15 Gram(s) Oral once PRN Blood Glucose LESS THAN 70 milliGRAM(s)/deciliter  oxycodone    5 mG/acetaminophen 325 mG 1 Tablet(s) Oral every 6 hours PRN Moderate Pain (4 - 6)  polyethylene glycol 3350 17 Gram(s) Oral daily PRN Constipation  senna 2 Tablet(s) Oral daily PRN Constipation      PHYSICAL EXAM:  GENERAL: NAD, sitting in bed comfortable  CHEST/LUNG: speaking in full sentences, breathing comfortably in room air, symmetrical chest expansion.   HEART: in no acute cardiopulmonary distress.   SKIN: LLE:  wound vac in place

## 2020-11-02 VITALS — WEIGHT: 249.78 LBS

## 2020-11-02 LAB
GLUCOSE BLDC GLUCOMTR-MCNC: 112 MG/DL — HIGH (ref 70–99)
GLUCOSE BLDC GLUCOMTR-MCNC: 153 MG/DL — HIGH (ref 70–99)

## 2020-11-02 PROCEDURE — 99231 SBSQ HOSP IP/OBS SF/LOW 25: CPT

## 2020-11-02 RX ORDER — INSULIN GLARGINE 100 [IU]/ML
20 INJECTION, SOLUTION SUBCUTANEOUS
Qty: 0 | Refills: 0 | DISCHARGE

## 2020-11-02 RX ADMIN — ZINC SULFATE TAB 220 MG (50 MG ZINC EQUIVALENT) 220 MILLIGRAM(S): 220 (50 ZN) TAB at 11:27

## 2020-11-02 RX ADMIN — LOSARTAN POTASSIUM 100 MILLIGRAM(S): 100 TABLET, FILM COATED ORAL at 05:50

## 2020-11-02 RX ADMIN — BUDESONIDE AND FORMOTEROL FUMARATE DIHYDRATE 2 PUFF(S): 160; 4.5 AEROSOL RESPIRATORY (INHALATION) at 07:49

## 2020-11-02 RX ADMIN — Medication 5 UNIT(S): at 07:50

## 2020-11-02 RX ADMIN — AMPICILLIN SODIUM AND SULBACTAM SODIUM 200 GRAM(S): 250; 125 INJECTION, POWDER, FOR SUSPENSION INTRAMUSCULAR; INTRAVENOUS at 05:51

## 2020-11-02 RX ADMIN — PANTOPRAZOLE SODIUM 40 MILLIGRAM(S): 20 TABLET, DELAYED RELEASE ORAL at 07:50

## 2020-11-02 RX ADMIN — Medication 250 MICROGRAM(S): at 05:50

## 2020-11-02 RX ADMIN — CHLORHEXIDINE GLUCONATE 1 APPLICATION(S): 213 SOLUTION TOPICAL at 05:50

## 2020-11-02 RX ADMIN — Medication 1: at 11:26

## 2020-11-02 RX ADMIN — Medication 5 UNIT(S): at 11:26

## 2020-11-02 RX ADMIN — AMPICILLIN SODIUM AND SULBACTAM SODIUM 200 GRAM(S): 250; 125 INJECTION, POWDER, FOR SUSPENSION INTRAMUSCULAR; INTRAVENOUS at 11:28

## 2020-11-02 NOTE — DIETITIAN INITIAL EVALUATION ADULT. - PHYSCIAL ASSESSMENT
obese AAOX4; No edema noted per Rn flowsheets; GI: No S/S of discomfort per pt, LBM 11/2; Skin: L DFU per Rn flowsheets; No NFPF

## 2020-11-02 NOTE — CHART NOTE - NSCHARTNOTEFT_GEN_A_CORE
Correct VAC machine and supplies were delivered. VAC successfully placed without issue. Patient stable and ready for discharge.

## 2020-11-02 NOTE — DIETITIAN INITIAL EVALUATION ADULT. - ORAL INTAKE PTA/DIET HISTORY
Pt uninterested in Nut assessment - asked Rd to leave at 1st attempt. Refused at first during 2nd attempt - but then decided to answer questions. NKFA. Takes zinc and vit D at home. No cul/rel food preferences. No personal food prefs mentioned. Claims to follow a diabetic/heart-healthy diet @ home. UBW: 104.5-109kg - vs. wt this admit 113.3kg; % wt change: 3.9% - no complaints of wt loss in past.

## 2020-11-02 NOTE — PROGRESS NOTE ADULT - ATTENDING COMMENTS
continuing care discharge plan for 24-48 hrs and f/u outpt discussed with pt. Concerns addressed
large dressing changed--> left foot wound with viable tissue--> local wound care
Continuing wound care, planned discharge today and out pt f/u next week -discussed. Concerns addressed.

## 2020-11-02 NOTE — DIETITIAN INITIAL EVALUATION ADULT. - PERTINENT LABORATORY DATA
(11/1) H/H: 11.4/35.9, BUN: 37, Gluc: 148, eGFR: 47; HgbA1c: (10/26) 8.6  POCT Blood Glucose.: 153 mg/dL (02 Nov 2020 11:14)  POCT Blood Glucose.: 112 mg/dL (02 Nov 2020 07:23)  POCT Blood Glucose.: 124 mg/dL (01 Nov 2020 21:31)  POCT Blood Glucose.: 110 mg/dL (01 Nov 2020 16:18)

## 2020-11-02 NOTE — PROGRESS NOTE ADULT - SUBJECTIVE AND OBJECTIVE BOX
AM Rounds    Discharge planning back home with VAC once correct supplies delivered.       No acute events overnight. Over weekend wrong supplies delivered for home VAC. Case management is aware and will follow up today.     Vital Signs Last 24 Hrs  T(C): 36.5 (02 Nov 2020 05:27), Max: 36.5 (02 Nov 2020 05:27)  T(F): 97.7 (02 Nov 2020 05:27), Max: 97.7 (02 Nov 2020 05:27)  HR: 69 (02 Nov 2020 05:27) (69 - 70)  BP: 144/79 (02 Nov 2020 05:27) (144/79 - 152/70)  RR: 18 (02 Nov 2020 05:27) (18 - 18)  SpO2: 98% (01 Nov 2020 19:35) (98% - 98%)    MEDICATIONS  (STANDING):  ampicillin/sulbactam  IVPB 3 Gram(s) IV Intermittent every 6 hours  atorvastatin 40 milliGRAM(s) Oral at bedtime  budesonide 160 MICROgram(s)/formoterol 4.5 MICROgram(s) Inhaler 2 Puff(s) Inhalation two times a day  chlorhexidine 4% Liquid 1 Application(s) Topical <User Schedule>  dextrose 5%. 1000 milliLiter(s) (50 mL/Hr) IV Continuous <Continuous>  dextrose 50% Injectable 12.5 Gram(s) IV Push once  dextrose 50% Injectable 25 Gram(s) IV Push once  insulin glargine Injectable (LANTUS) 15 Unit(s) SubCutaneous at bedtime  insulin lispro (ADMELOG) corrective regimen sliding scale   SubCutaneous three times a day before meals  insulin lispro Injectable (ADMELOG) 5 Unit(s) SubCutaneous three times a day before meals  lactated ringers. 1000 milliLiter(s) (75 mL/Hr) IV Continuous <Continuous>  latanoprost 0.005% Ophthalmic Solution 1 Drop(s) Both EYES at bedtime  levothyroxine 250 MICROGram(s) Oral daily  losartan 100 milliGRAM(s) Oral daily  pantoprazole    Tablet 40 milliGRAM(s) Oral before breakfast  tamsulosin Oral Tab/Cap - Peds 0.4 milliGRAM(s) Oral at bedtime  zinc sulfate 220 milliGRAM(s) Oral daily    MEDICATIONS  (PRN):  dextrose 40% Gel 15 Gram(s) Oral once PRN Blood Glucose LESS THAN 70 milliGRAM(s)/deciliter  oxycodone    5 mG/acetaminophen 325 mG 1 Tablet(s) Oral every 6 hours PRN Moderate Pain (4 - 6)  polyethylene glycol 3350 17 Gram(s) Oral daily PRN Constipation  senna 2 Tablet(s) Oral daily PRN Constipation    Allergies    No Known Allergies          Lab Results:                        11.4   7.24  )-----------( 110      ( 01 Nov 2020 16:00 )             35.9     11-01    139  |  105  |  37<H>  ----------------------------<  148<H>  4.3   |  25  |  1.4    Ca    9.2      01 Nov 2020 16:00  Phos  3.1     11-01  Mg     1.8     11-01      PT/INR - ( 01 Nov 2020 16:00 )   PT: 23.80 sec;   INR: 2.07 ratio         PTT - ( 01 Nov 2020 16:00 )  PTT:35.1 sec      CAPILLARY BLOOD GLUCOSE      POCT Blood Glucose.: 112 mg/dL (02 Nov 2020 07:23)  POCT Blood Glucose.: 124 mg/dL (01 Nov 2020 21:31)  POCT Blood Glucose.: 110 mg/dL (01 Nov 2020 16:18)  POCT Blood Glucose.: 168 mg/dL (01 Nov 2020 11:29)          PHYSICAL EXAM:  GENERAL: NAD, sitting in bed comfortable  CHEST/LUNG: speaking in full sentences, breathing comfortably in room air, symmetrical chest expansion.   HEART: in no acute cardiopulmonary distress.   SKIN: LLE:  wound vac in place                     AM Rounds    Discharge planning for home with VAC once correct supplies delivered.       No acute events overnight. Over weekend wrong supplies delivered for home VAC. Case management is aware and will follow up today.     Vital Signs Last 24 Hrs  T(C): 36.5 (02 Nov 2020 05:27), Max: 36.5 (02 Nov 2020 05:27)  T(F): 97.7 (02 Nov 2020 05:27), Max: 97.7 (02 Nov 2020 05:27)  HR: 69 (02 Nov 2020 05:27) (69 - 70)  BP: 144/79 (02 Nov 2020 05:27) (144/79 - 152/70)  RR: 18 (02 Nov 2020 05:27) (18 - 18)  SpO2: 98% (01 Nov 2020 19:35) (98% - 98%)    MEDICATIONS  (STANDING):  ampicillin/sulbactam  IVPB 3 Gram(s) IV Intermittent every 6 hours  atorvastatin 40 milliGRAM(s) Oral at bedtime  budesonide 160 MICROgram(s)/formoterol 4.5 MICROgram(s) Inhaler 2 Puff(s) Inhalation two times a day  chlorhexidine 4% Liquid 1 Application(s) Topical <User Schedule>  dextrose 5%. 1000 milliLiter(s) (50 mL/Hr) IV Continuous <Continuous>  dextrose 50% Injectable 12.5 Gram(s) IV Push once  dextrose 50% Injectable 25 Gram(s) IV Push once  insulin glargine Injectable (LANTUS) 15 Unit(s) SubCutaneous at bedtime  insulin lispro (ADMELOG) corrective regimen sliding scale   SubCutaneous three times a day before meals  insulin lispro Injectable (ADMELOG) 5 Unit(s) SubCutaneous three times a day before meals  lactated ringers. 1000 milliLiter(s) (75 mL/Hr) IV Continuous <Continuous>  latanoprost 0.005% Ophthalmic Solution 1 Drop(s) Both EYES at bedtime  levothyroxine 250 MICROGram(s) Oral daily  losartan 100 milliGRAM(s) Oral daily  pantoprazole    Tablet 40 milliGRAM(s) Oral before breakfast  tamsulosin Oral Tab/Cap - Peds 0.4 milliGRAM(s) Oral at bedtime  zinc sulfate 220 milliGRAM(s) Oral daily    MEDICATIONS  (PRN):  dextrose 40% Gel 15 Gram(s) Oral once PRN Blood Glucose LESS THAN 70 milliGRAM(s)/deciliter  oxycodone    5 mG/acetaminophen 325 mG 1 Tablet(s) Oral every 6 hours PRN Moderate Pain (4 - 6)  polyethylene glycol 3350 17 Gram(s) Oral daily PRN Constipation  senna 2 Tablet(s) Oral daily PRN Constipation    Allergies    No Known Allergies          Lab Results:                        11.4   7.24  )-----------( 110      ( 01 Nov 2020 16:00 )             35.9     11-01    139  |  105  |  37<H>  ----------------------------<  148<H>  4.3   |  25  |  1.4    Ca    9.2      01 Nov 2020 16:00  Phos  3.1     11-01  Mg     1.8     11-01      PT/INR - ( 01 Nov 2020 16:00 )   PT: 23.80 sec;   INR: 2.07 ratio         PTT - ( 01 Nov 2020 16:00 )  PTT:35.1 sec      CAPILLARY BLOOD GLUCOSE      POCT Blood Glucose.: 112 mg/dL (02 Nov 2020 07:23)  POCT Blood Glucose.: 124 mg/dL (01 Nov 2020 21:31)  POCT Blood Glucose.: 110 mg/dL (01 Nov 2020 16:18)  POCT Blood Glucose.: 168 mg/dL (01 Nov 2020 11:29)          PHYSICAL EXAM:  GENERAL: NAD, sitting in bed comfortable  CHEST/LUNG: speaking in full sentences, breathing comfortably in room air, symmetrical chest expansion.   HEART: in no acute cardiopulmonary distress.   SKIN: LLE: left medial heel full thickness wound ~ 3cm x 3cm pink and moist with remaining areas of devitalized tisses.  mild serosanguineous drainage noted. no significant erythema or edema appreciated. no active bleeding noted.    +NPWT VAC reapplied. Patient tolerated well.                      AM Rounds    Discharge planning for home with VAC once correct supplies delivered.       No acute events overnight. Over weekend wrong supplies delivered for home VAC. Case management is aware and will follow up today.     Vital Signs Last 24 Hrs  T(C): 36.5 (02 Nov 2020 05:27), Max: 36.5 (02 Nov 2020 05:27)  T(F): 97.7 (02 Nov 2020 05:27), Max: 97.7 (02 Nov 2020 05:27)  HR: 69 (02 Nov 2020 05:27) (69 - 70)  BP: 144/79 (02 Nov 2020 05:27) (144/79 - 152/70)  RR: 18 (02 Nov 2020 05:27) (18 - 18)  SpO2: 98% (01 Nov 2020 19:35) (98% - 98%)    MEDICATIONS  (STANDING):  ampicillin/sulbactam  IVPB 3 Gram(s) IV Intermittent every 6 hours  atorvastatin 40 milliGRAM(s) Oral at bedtime  budesonide 160 MICROgram(s)/formoterol 4.5 MICROgram(s) Inhaler 2 Puff(s) Inhalation two times a day  chlorhexidine 4% Liquid 1 Application(s) Topical <User Schedule>  dextrose 5%. 1000 milliLiter(s) (50 mL/Hr) IV Continuous <Continuous>  dextrose 50% Injectable 12.5 Gram(s) IV Push once  dextrose 50% Injectable 25 Gram(s) IV Push once  insulin glargine Injectable (LANTUS) 15 Unit(s) SubCutaneous at bedtime  insulin lispro (ADMELOG) corrective regimen sliding scale   SubCutaneous three times a day before meals  insulin lispro Injectable (ADMELOG) 5 Unit(s) SubCutaneous three times a day before meals  lactated ringers. 1000 milliLiter(s) (75 mL/Hr) IV Continuous <Continuous>  latanoprost 0.005% Ophthalmic Solution 1 Drop(s) Both EYES at bedtime  levothyroxine 250 MICROGram(s) Oral daily  losartan 100 milliGRAM(s) Oral daily  pantoprazole    Tablet 40 milliGRAM(s) Oral before breakfast  tamsulosin Oral Tab/Cap - Peds 0.4 milliGRAM(s) Oral at bedtime  zinc sulfate 220 milliGRAM(s) Oral daily    MEDICATIONS  (PRN):  dextrose 40% Gel 15 Gram(s) Oral once PRN Blood Glucose LESS THAN 70 milliGRAM(s)/deciliter  oxycodone    5 mG/acetaminophen 325 mG 1 Tablet(s) Oral every 6 hours PRN Moderate Pain (4 - 6)  polyethylene glycol 3350 17 Gram(s) Oral daily PRN Constipation  senna 2 Tablet(s) Oral daily PRN Constipation    Allergies    No Known Allergies          Lab Results:                        11.4   7.24  )-----------( 110      ( 01 Nov 2020 16:00 )             35.9     11-01    139  |  105  |  37<H>  ----------------------------<  148<H>  4.3   |  25  |  1.4    Ca    9.2      01 Nov 2020 16:00  Phos  3.1     11-01  Mg     1.8     11-01      PT/INR - ( 01 Nov 2020 16:00 )   PT: 23.80 sec;   INR: 2.07 ratio         PTT - ( 01 Nov 2020 16:00 )  PTT:35.1 sec      CAPILLARY BLOOD GLUCOSE      POCT Blood Glucose.: 112 mg/dL (02 Nov 2020 07:23)  POCT Blood Glucose.: 124 mg/dL (01 Nov 2020 21:31)  POCT Blood Glucose.: 110 mg/dL (01 Nov 2020 16:18)  POCT Blood Glucose.: 168 mg/dL (01 Nov 2020 11:29)          PHYSICAL EXAM:  GENERAL: NAD, sitting in bed comfortable  CHEST/LUNG: speaking in full sentences, breathing comfortably in room air, symmetrical chest expansion.   HEART: in no acute cardiopulmonary distress.   SKIN: LLE: left medial heel full thickness wound ~ 3cm x 3cm pink and moist with remaining areas of devitalized tissues  mild serosanguineous drainage noted. no significant erythema or edema appreciated. no active bleeding noted.    +NPWT VAC reapplied. Patient tolerated well.                      AM Rounds    Discharge planning for home with VAC once correct supplies delivered.       No acute events overnight. Over weekend wrong supplies delivered for home VAC. Case management is aware and will follow up today.     Vital Signs Last 24 Hrs  T(C): 36.5 (02 Nov 2020 05:27), Max: 36.5 (02 Nov 2020 05:27)  T(F): 97.7 (02 Nov 2020 05:27), Max: 97.7 (02 Nov 2020 05:27)  HR: 69 (02 Nov 2020 05:27) (69 - 70)  BP: 144/79 (02 Nov 2020 05:27) (144/79 - 152/70)  RR: 18 (02 Nov 2020 05:27) (18 - 18)  SpO2: 98% (01 Nov 2020 19:35) (98% - 98%)    MEDICATIONS  (STANDING):  ampicillin/sulbactam  IVPB 3 Gram(s) IV Intermittent every 6 hours  atorvastatin 40 milliGRAM(s) Oral at bedtime  budesonide 160 MICROgram(s)/formoterol 4.5 MICROgram(s) Inhaler 2 Puff(s) Inhalation two times a day  chlorhexidine 4% Liquid 1 Application(s) Topical <User Schedule>  dextrose 5%. 1000 milliLiter(s) (50 mL/Hr) IV Continuous <Continuous>  dextrose 50% Injectable 12.5 Gram(s) IV Push once  dextrose 50% Injectable 25 Gram(s) IV Push once  insulin glargine Injectable (LANTUS) 15 Unit(s) SubCutaneous at bedtime  insulin lispro (ADMELOG) corrective regimen sliding scale   SubCutaneous three times a day before meals  insulin lispro Injectable (ADMELOG) 5 Unit(s) SubCutaneous three times a day before meals  lactated ringers. 1000 milliLiter(s) (75 mL/Hr) IV Continuous <Continuous>  latanoprost 0.005% Ophthalmic Solution 1 Drop(s) Both EYES at bedtime  levothyroxine 250 MICROGram(s) Oral daily  losartan 100 milliGRAM(s) Oral daily  pantoprazole    Tablet 40 milliGRAM(s) Oral before breakfast  tamsulosin Oral Tab/Cap - Peds 0.4 milliGRAM(s) Oral at bedtime  zinc sulfate 220 milliGRAM(s) Oral daily    MEDICATIONS  (PRN):  dextrose 40% Gel 15 Gram(s) Oral once PRN Blood Glucose LESS THAN 70 milliGRAM(s)/deciliter  oxycodone    5 mG/acetaminophen 325 mG 1 Tablet(s) Oral every 6 hours PRN Moderate Pain (4 - 6)  polyethylene glycol 3350 17 Gram(s) Oral daily PRN Constipation  senna 2 Tablet(s) Oral daily PRN Constipation    Allergies    No Known Allergies          Lab Results:                        11.4   7.24  )-----------( 110      ( 01 Nov 2020 16:00 )             35.9     11-01    139  |  105  |  37<H>  ----------------------------<  148<H>  4.3   |  25  |  1.4    Ca    9.2      01 Nov 2020 16:00  Phos  3.1     11-01  Mg     1.8     11-01      PT/INR - ( 01 Nov 2020 16:00 )   PT: 23.80 sec;   INR: 2.07 ratio         PTT - ( 01 Nov 2020 16:00 )  PTT:35.1 sec      CAPILLARY BLOOD GLUCOSE      POCT Blood Glucose.: 112 mg/dL (02 Nov 2020 07:23)  POCT Blood Glucose.: 124 mg/dL (01 Nov 2020 21:31)  POCT Blood Glucose.: 110 mg/dL (01 Nov 2020 16:18)  POCT Blood Glucose.: 168 mg/dL (01 Nov 2020 11:29)          PHYSICAL EXAM:  GENERAL: NAD, sitting in bed comfortable  CHEST/LUNG: speaking in full sentences, breathing comfortably in room air, symmetrical chest expansion.   HEART: in no acute cardiopulmonary distress.   SKIN: LLE: left medial heel full thickness wound ~ 3cm x 3cm pink and moist  yellow fibrinous tissue at base    mild serosanguineous drainage noted. no significant erythema or edema appreciated. no active bleeding noted.    +NPWT VAC reapplied. Patient tolerated well.         wound irrigated and mechanically debrided

## 2020-11-02 NOTE — DIETITIAN INITIAL EVALUATION ADULT. - NSPROEDAREADYLEARN_GEN_A_NUR
Pt refused coumadin education at this time - says he visits the coumadin clinic and they educate him; Refused further DM/heart-healthy education - says he's knowledgeable./lack of motivation

## 2020-11-02 NOTE — PROGRESS NOTE ADULT - SUBJECTIVE AND OBJECTIVE BOX
MYRNA TOVAR  80y, Male  Allergy: No Known Allergies      LAST 24-Hr EVENTS:  doing well  wound vac in place    VITALS:  T(F): 96.6 (11-02-20 @ 13:31), Max: 97.7 (11-02-20 @ 05:27)  HR: 80 (11-02-20 @ 13:31)  BP: 138/65 (11-02-20 @ 13:31) (138/65 - 152/70)  RR: 20 (11-02-20 @ 13:31)  SpO2: 98% (11-02-20 @ 10:19)    PHYSICAL EXAM:    GENERAL: NAD  NECK: Supple, No JVD  CHEST/LUNG: CTA b/l  HEART: Regular rate and rhythm  ABDOMEN: Soft, Nontender, Nondistended  EXTREMITIES:  No clubbing, rle wound vac        TESTS & MEASUREMENTS:    IN: 530 mL / OUT: 1150 mL / NET: -620 mL    IN: 890 mL / OUT: 1330 mL / NET: -440 mL    IN: 620 mL / OUT: 701 mL / NET: -81 mL                            11.4   7.24  )-----------( 110      ( 01 Nov 2020 16:00 )             35.9     PT/INR - ( 01 Nov 2020 16:00 )   PT: 23.80 sec;   INR: 2.07 ratio         PTT - ( 01 Nov 2020 16:00 )  PTT:35.1 sec  11-01    139  |  105  |  37<H>  ----------------------------<  148<H>  4.3   |  25  |  1.4    Ca    9.2      01 Nov 2020 16:00  Phos  3.1     11-01  Mg     1.8     11-01                    Culture - Other (collected 10-27-20 @ 17:05)  Source: .Other None  Final Report (10-31-20 @ 12:49):    Culture yields growth of greater than 3 colony types of    bacteria,  which may indicate contamination and normal nelsy    Call client services within 7 days if further workup is clinically    indicated. including    Serratia marcescens    Enterococcus faecalis    Alpha hemolytic strep  Organism: Serratia marcescens  Enterococcus faecalis (10-31-20 @ 12:49)  Organism: Enterococcus faecalis (10-31-20 @ 12:49)      -  Ampicillin: S <=2 Predicts results to ampicillin/sulbactam, amoxacillin-clavulanate and  piperacillin-tazobactam.      -  Tetra/Doxy: R >8      -  Vancomycin: S 2      Method Type: ROMERO  Organism: Serratia marcescens (10-31-20 @ 12:49)      -  Amikacin: S <=16      -  Amoxicillin/Clavulanic Acid: R >16/8      -  Ampicillin: R >16 These ampicillin results predict results for amoxicillin      -  Ampicillin/Sulbactam: R 16/8 Enterobacter, Citrobacter, and Serratia may develop resistance during prolonged therapy (3-4 days)      -  Aztreonam: I 16      -  Cefazolin: R >16 Enterobacter, Citrobacter, and Serratia may develop resistance during prolonged therapy (3-4 days)      -  Cefepime: S <=2      -  Cefoxitin: R <=8      -  Ceftriaxone: S <=1 Enterobacter, Citrobacter, and Serratia may develop resistance during prolonged therapy      -  Ciprofloxacin: S <=0.25      -  Ertapenem: S <=0.5      -  Gentamicin: S <=2      -  Levofloxacin: S <=0.5      -  Meropenem: S <=1      -  Piperacillin/Tazobactam: I 32      -  Tobramycin: S 4      -  Trimethoprim/Sulfamethoxazole: S <=0.5/9.5      Method Type: ROMERO    Culture - Surgical Swab (collected 10-27-20 @ 17:05)  Source: .Surgical Swab None  Final Report (10-31-20 @ 10:42):    Culture yields >4 types of aerobic and/or anaerobic bacteria    Call client services within 7 days if further workup is clinically    indicated. Culture includes    Moderate Pseudomonas aeruginosa  Organism: Pseudomonas aeruginosa (10-31-20 @ 10:42)  Organism: Pseudomonas aeruginosa (10-31-20 @ 10:42)      -  Amikacin: S <=16      -  Aztreonam: S <=4      -  Cefepime: S <=2      -  Ceftazidime: S 4      -  Ciprofloxacin: S <=0.25      -  Gentamicin: S 4      -  Imipenem: S 2      -  Levofloxacin: S <=0.5      -  Meropenem: S <=1      -  Piperacillin/Tazobactam: S <=8      -  Tobramycin: S <=2      Method Type: ROMERO          MEDICATIONS:  MEDICATIONS  (STANDING):  ampicillin/sulbactam  IVPB 3 Gram(s) IV Intermittent every 6 hours  atorvastatin 40 milliGRAM(s) Oral at bedtime  budesonide 160 MICROgram(s)/formoterol 4.5 MICROgram(s) Inhaler 2 Puff(s) Inhalation two times a day  chlorhexidine 4% Liquid 1 Application(s) Topical <User Schedule>  dextrose 5%. 1000 milliLiter(s) (50 mL/Hr) IV Continuous <Continuous>  dextrose 50% Injectable 12.5 Gram(s) IV Push once  dextrose 50% Injectable 25 Gram(s) IV Push once  insulin glargine Injectable (LANTUS) 15 Unit(s) SubCutaneous at bedtime  insulin lispro (ADMELOG) corrective regimen sliding scale   SubCutaneous three times a day before meals  insulin lispro Injectable (ADMELOG) 5 Unit(s) SubCutaneous three times a day before meals  lactated ringers. 1000 milliLiter(s) (75 mL/Hr) IV Continuous <Continuous>  latanoprost 0.005% Ophthalmic Solution 1 Drop(s) Both EYES at bedtime  levothyroxine 250 MICROGram(s) Oral daily  losartan 100 milliGRAM(s) Oral daily  pantoprazole    Tablet 40 milliGRAM(s) Oral before breakfast  tamsulosin Oral Tab/Cap - Peds 0.4 milliGRAM(s) Oral at bedtime  zinc sulfate 220 milliGRAM(s) Oral daily    MEDICATIONS  (PRN):  dextrose 40% Gel 15 Gram(s) Oral once PRN Blood Glucose LESS THAN 70 milliGRAM(s)/deciliter  oxycodone    5 mG/acetaminophen 325 mG 1 Tablet(s) Oral every 6 hours PRN Moderate Pain (4 - 6)  polyethylene glycol 3350 17 Gram(s) Oral daily PRN Constipation  senna 2 Tablet(s) Oral daily PRN Constipation

## 2020-11-02 NOTE — PROGRESS NOTE ADULT - ASSESSMENT
80 year old male with PMHx significant for Afib on coumadin, HFrEF (s/p AICD placement), CAD s/p CABG, BPH, hypothyroidism, DM, HLD, HTN, COPD, Psoriasis, PVD presenting to the ED for left heel wound, s/p debridement of Left heel wound on 10/27/20. S/p AT [facility] atherectomy ultrascore angioplasty with Dr. Corea (Vascular surgery).     1. Left heel full thickness wound - s/p debridement on 10/27  - placed NPWT VAC on 10/30, patient to continue VAC therapy outpatient with VNS and follow up  -continue IV abx  - pain management  - ambulate as tolerated  -Discharge planning, pending Home VAC supplies delivery     2. Cards: hx of Atrial fib-on AC, CAD s/p CABG, HFrEF,   - Coumadin 5mg at bedtime - f/u INR  - continue home dose Lasix 40mg qd, Losartan 100mg qd,  Lipitor 40 qhs      3. Vascular:  -  S/p AT [facility] atherectomy ultrascore angioplasty with Dr. Corea (Vascular surgery).   - okay to restart coumadin, monitor INR, pt to follow up outpatient with Dr. Corea in 2 weeks    4. Resp:  - continue Symbicort inhaler  -encourage incentive spirometer     5. Endo: hx of DM2 (hgA1c 8.6- 10/26),  hypothyroidism   - monitor FS, adjust insulin accordingly   - continue home dose levothyroxine   - carb consistent diet    6. ID  -Wcx from clinic 10/22 showed num proteus, num E. Faecalis, Num streptococcus gordonii.  -x-ray of Left foot showed soft tissue air inferior to the posterior calcaneus, without obvious periosteal reaction or skin erosions. These findings may be related to osteomyelitis.   -CT 10/28- no evidence of OM, per ID d/c on Augmentin for 2 weeks  -cont IV abx as per ID    DVT ppx - pt on AC  PPI daily for GI ppx  PT c/s    Plan discussed with patient. Concerns addressed. 80 year old male with PMHx significant for Afib on coumadin, HFrEF (s/p AICD placement), CAD s/p CABG, BPH, hypothyroidism, DM, HLD, HTN, COPD, Psoriasis, PVD presenting to the ED for left heel wound, s/p debridement of Left heel wound on 10/27/20. S/p AT [facility] atherectomy ultrascore angioplasty with Dr. Corea (Vascular surgery).     1. Left heel full thickness wound - s/p debridement on 10/27  - placed NPWT VAC on 11/2, next change Wed 11/4. patient to continue VAC therapy outpatient with VNS and follow up  -continue IV abx  - pain management  - ambulate as tolerated  -Discharge planning, pending Home VAC supplies delivery     2. Cards: hx of Atrial fib-on AC, CAD s/p CABG, HFrEF,   - Coumadin 5mg at bedtime - f/u INR at 4pm  - continue home dose Lasix 40mg qd, Losartan 100mg qd,  Lipitor 40 qhs      3. Vascular:  -  S/p AT [facility] atherectomy ultrascore angioplasty with Dr. Corea (Vascular surgery).   - okay to restart coumadin, monitor INR, pt to follow up outpatient with Dr. Corea in 2 weeks after discharge    4. Resp:  - continue Symbicort inhaler  -encourage incentive spirometer     5. Endo: hx of DM2 (hgA1c 8.6- 10/26),  hypothyroidism   - monitor FS, adjust insulin accordingly   - continue home dose levothyroxine   - carb consistent diet    6. ID  -Wcx from clinic 10/22 showed num proteus, num E. Faecalis, Num streptococcus gordonii.  -x-ray of Left foot showed soft tissue air inferior to the posterior calcaneus, without obvious periosteal reaction or skin erosions. These findings may be related to osteomyelitis.   -CT 10/28- no evidence of OM, per ID d/c on Augmentin for 2 weeks  -cont IV abx as per ID    DVT ppx - pt on AC  PPI daily for GI ppx  PT c/s    Plan discussed with patient. Concerns addressed. 80 year old male with PMHx significant for Afib on coumadin, HFrEF (s/p AICD placement), CAD s/p CABG, BPH, hypothyroidism, DM, HLD, HTN, COPD, Psoriasis, PVD presenting to the ED for left heel wound, s/p debridement of Left heel wound on 10/27/20. S/p [facility] atherectomy ultrascore angioplasty with Dr. Corea (Vascular surgery) on 10/29.     1. Left heel full thickness wound - s/p debridement on 10/27  - placed NPWT VAC on 11/2, next change Wed 11/4. patient to continue VAC therapy outpatient with [facility] and follow up  -continue IV abx  - pain management  - ambulate as tolerated  -Discharge planning, pending Home VAC supplies delivery     2. Cards: hx of Atrial fib-on AC, CAD s/p CABG, HFrEF,   - Coumadin 5mg at bedtime - f/u INR at 4pm  - continue home dose Lasix 40mg qd, Losartan 100mg qd,  Lipitor 40 qhs      3. Vascular:  -  S/p AT [facility] atherectomy ultrascore angioplasty with Dr. Corea (Vascular surgery).   - okay to restart coumadin, monitor INR, pt to follow up outpatient with Dr. Corea in 2 weeks after discharge    4. Resp:  - continue Symbicort inhaler  -encourage incentive spirometer     5. Endo: hx of DM2 (hgA1c 8.6- 10/26),  hypothyroidism   - monitor FS, adjust insulin accordingly   - continue home dose levothyroxine   - carb consistent diet    6. ID  -Wcx from clinic 10/22 showed num proteus, num E. Faecalis, Num streptococcus gordonii.  -x-ray of Left foot showed soft tissue air inferior to the posterior calcaneus, without obvious periosteal reaction or skin erosions. These findings may be related to osteomyelitis.   -CT 10/28- no evidence of OM, per ID d/c on Augmentin for 2 weeks  -cont IV abx as per ID    DVT ppx - pt on AC  PPI daily for GI ppx  PT c/s    D/C planning  Plan of care discussed with patient. in agreement. Concerns addressed.

## 2020-11-02 NOTE — PROGRESS NOTE ADULT - ASSESSMENT
80 year old male with COPD Afib on coumadin, HFrEF (s/p AICD placement), CAD s/p CABG and PVD admitted with Chronic left heel wound/ ulcer now   s/p debridementnow and angioplasty      d/c current inhalers  resume trelegy on discharge  albuterol/atrovent nebs prn  dvt/gi px    outpt follow up 1-2 weeks

## 2020-11-02 NOTE — DIETITIAN INITIAL EVALUATION ADULT. - OTHER INFO
pertinent medical information: p/w chronic left heel wound - s/p debridement on 10/27. ID -Wcx from clinic 10/22 showed num proteus, num E. Faecalis, Num streptococcus gordoniil per progress note 11/2; DC home today with HCS once delivered per care coordination note 11/2. H/o Afib on coumadin, HFrEF (s/p AICD placement), CAD s/p CABG, DM, HLD, HTN, COPD noted.     pertinent subjective information: Reported PO/appetite >75%. No chewing/swallowing difficulty reported.

## 2020-11-02 NOTE — PROGRESS NOTE ADULT - REASON FOR ADMISSION
chronic left heel wound

## 2020-11-02 NOTE — DIETITIAN INITIAL EVALUATION ADULT. - PERTINENT MEDS FT
abx, dextrose 5%, lactated ringers, dextrose 50% injectable, insulin glargine injectable, insulin lispro, lipitor, budesonide, synthroid, losartan, percocet, protonix, miralax, senna, zinc sulfate

## 2020-11-02 NOTE — DIETITIAN INITIAL EVALUATION ADULT. - OTHER CALCULATIONS
IBW: 78kg; 1950-2340kcal (25-30kcal - Obese BMI considered); Protein: 78-94g/kg (1-1.2g/kg  - same reason as before; fluid: 1mL per kcal or LIP

## 2020-11-10 ENCOUNTER — APPOINTMENT (OUTPATIENT)
Dept: BURN CARE | Facility: CLINIC | Age: 80
End: 2020-11-10
Payer: MEDICARE

## 2020-11-10 ENCOUNTER — OUTPATIENT (OUTPATIENT)
Dept: OUTPATIENT SERVICES | Facility: HOSPITAL | Age: 80
LOS: 1 days | Discharge: HOME | End: 2020-11-10

## 2020-11-10 DIAGNOSIS — Z95.810 PRESENCE OF AUTOMATIC (IMPLANTABLE) CARDIAC DEFIBRILLATOR: Chronic | ICD-10-CM
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
## 2020-11-10 DIAGNOSIS — Z96.653 PRESENCE OF ARTIFICIAL KNEE JOINT, BILATERAL: Chronic | ICD-10-CM
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
## 2020-11-10 DIAGNOSIS — Z95.1 PRESENCE OF AORTOCORONARY BYPASS GRAFT: Chronic | ICD-10-CM

## 2020-11-10 DIAGNOSIS — S91.302A UNSPECIFIED OPEN WOUND, LEFT FOOT, INITIAL ENCOUNTER: ICD-10-CM

## 2020-11-10 DIAGNOSIS — Z98.890 OTHER SPECIFIED POSTPROCEDURAL STATES: Chronic | ICD-10-CM
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
## 2020-11-10 PROCEDURE — 16025 DRESS/DEBRID P-THICK BURN M: CPT

## 2020-11-10 PROCEDURE — 99213 OFFICE O/P EST LOW 20 MIN: CPT | Mod: 25

## 2020-11-12 LAB — BACTERIA SPEC CULT: ABNORMAL

## 2020-11-14 DIAGNOSIS — Z95.810 PRESENCE OF AUTOMATIC (IMPLANTABLE) CARDIAC DEFIBRILLATOR: ICD-10-CM

## 2020-11-14 DIAGNOSIS — Z87.891 PERSONAL HISTORY OF NICOTINE DEPENDENCE: ICD-10-CM

## 2020-11-14 DIAGNOSIS — L40.9 PSORIASIS, UNSPECIFIED: ICD-10-CM

## 2020-11-14 DIAGNOSIS — L03.115 CELLULITIS OF RIGHT LOWER LIMB: ICD-10-CM

## 2020-11-14 DIAGNOSIS — L97.428 NON-PRESSURE CHRONIC ULCER OF LEFT HEEL AND MIDFOOT WITH OTHER SPECIFIED SEVERITY: ICD-10-CM

## 2020-11-14 DIAGNOSIS — I48.91 UNSPECIFIED ATRIAL FIBRILLATION: ICD-10-CM

## 2020-11-14 DIAGNOSIS — Z79.01 LONG TERM (CURRENT) USE OF ANTICOAGULANTS: ICD-10-CM

## 2020-11-14 DIAGNOSIS — Z79.84 LONG TERM (CURRENT) USE OF ORAL HYPOGLYCEMIC DRUGS: ICD-10-CM

## 2020-11-14 DIAGNOSIS — Z95.5 PRESENCE OF CORONARY ANGIOPLASTY IMPLANT AND GRAFT: ICD-10-CM

## 2020-11-14 DIAGNOSIS — I50.22 CHRONIC SYSTOLIC (CONGESTIVE) HEART FAILURE: ICD-10-CM

## 2020-11-14 DIAGNOSIS — E03.9 HYPOTHYROIDISM, UNSPECIFIED: ICD-10-CM

## 2020-11-14 DIAGNOSIS — N40.0 BENIGN PROSTATIC HYPERPLASIA WITHOUT LOWER URINARY TRACT SYMPTOMS: ICD-10-CM

## 2020-11-14 DIAGNOSIS — E11.621 TYPE 2 DIABETES MELLITUS WITH FOOT ULCER: ICD-10-CM

## 2020-11-14 DIAGNOSIS — Z96.653 PRESENCE OF ARTIFICIAL KNEE JOINT, BILATERAL: ICD-10-CM

## 2020-11-14 DIAGNOSIS — E11.52 TYPE 2 DIABETES MELLITUS WITH DIABETIC PERIPHERAL ANGIOPATHY WITH GANGRENE: ICD-10-CM

## 2020-11-14 DIAGNOSIS — L03.116 CELLULITIS OF LEFT LOWER LIMB: ICD-10-CM

## 2020-11-14 DIAGNOSIS — J44.9 CHRONIC OBSTRUCTIVE PULMONARY DISEASE, UNSPECIFIED: ICD-10-CM

## 2020-11-14 DIAGNOSIS — H40.9 UNSPECIFIED GLAUCOMA: ICD-10-CM

## 2020-11-14 DIAGNOSIS — E78.5 HYPERLIPIDEMIA, UNSPECIFIED: ICD-10-CM

## 2020-11-14 DIAGNOSIS — I77.1 STRICTURE OF ARTERY: ICD-10-CM

## 2020-11-14 DIAGNOSIS — Z95.1 PRESENCE OF AORTOCORONARY BYPASS GRAFT: ICD-10-CM

## 2020-11-14 DIAGNOSIS — E87.5 HYPERKALEMIA: ICD-10-CM

## 2020-11-14 DIAGNOSIS — I11.0 HYPERTENSIVE HEART DISEASE WITH HEART FAILURE: ICD-10-CM

## 2020-11-19 RX ORDER — CIPROFLOXACIN LACTATE 400MG/40ML
1 VIAL (ML) INTRAVENOUS
Qty: 20 | Refills: 0
Start: 2020-11-19 | End: 2020-11-28

## 2020-11-24 ENCOUNTER — APPOINTMENT (OUTPATIENT)
Dept: MEDICATION MANAGEMENT | Facility: CLINIC | Age: 80
End: 2020-11-24

## 2020-11-25 DIAGNOSIS — I70.245 ATHEROSCLEROSIS OF NATIVE ARTERIES OF LEFT LEG WITH ULCERATION OF OTHER PART OF FOOT: ICD-10-CM

## 2020-12-01 ENCOUNTER — APPOINTMENT (OUTPATIENT)
Dept: BURN CARE | Facility: CLINIC | Age: 80
End: 2020-12-01
Payer: MEDICARE

## 2020-12-01 ENCOUNTER — OUTPATIENT (OUTPATIENT)
Dept: OUTPATIENT SERVICES | Facility: HOSPITAL | Age: 80
LOS: 1 days | Discharge: HOME | End: 2020-12-01

## 2020-12-01 DIAGNOSIS — Z95.810 PRESENCE OF AUTOMATIC (IMPLANTABLE) CARDIAC DEFIBRILLATOR: Chronic | ICD-10-CM

## 2020-12-01 DIAGNOSIS — Z98.890 OTHER SPECIFIED POSTPROCEDURAL STATES: Chronic | ICD-10-CM

## 2020-12-01 DIAGNOSIS — Z96.653 PRESENCE OF ARTIFICIAL KNEE JOINT, BILATERAL: Chronic | ICD-10-CM

## 2020-12-01 DIAGNOSIS — Z95.1 PRESENCE OF AORTOCORONARY BYPASS GRAFT: Chronic | ICD-10-CM

## 2020-12-01 PROCEDURE — 97597 DBRDMT OPN WND 1ST 20 CM/<: CPT

## 2020-12-01 PROCEDURE — 99213 OFFICE O/P EST LOW 20 MIN: CPT | Mod: 25

## 2020-12-09 ENCOUNTER — APPOINTMENT (OUTPATIENT)
Dept: VASCULAR SURGERY | Facility: CLINIC | Age: 80
End: 2020-12-09
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
## 2020-12-09 DIAGNOSIS — S91.302A UNSPECIFIED OPEN WOUND, LEFT FOOT, INITIAL ENCOUNTER: ICD-10-CM
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
## 2020-12-10 ENCOUNTER — OUTPATIENT (OUTPATIENT)
Dept: OUTPATIENT SERVICES | Facility: HOSPITAL | Age: 80
LOS: 1 days | Discharge: HOME | End: 2020-12-10

## 2020-12-10 ENCOUNTER — LABORATORY RESULT (OUTPATIENT)
Age: 80
End: 2020-12-10

## 2020-12-10 ENCOUNTER — APPOINTMENT (OUTPATIENT)
Dept: MEDICATION MANAGEMENT | Facility: CLINIC | Age: 80
End: 2020-12-10

## 2020-12-10 DIAGNOSIS — Z96.653 PRESENCE OF ARTIFICIAL KNEE JOINT, BILATERAL: Chronic | ICD-10-CM

## 2020-12-10 DIAGNOSIS — Z95.1 PRESENCE OF AORTOCORONARY BYPASS GRAFT: Chronic | ICD-10-CM

## 2020-12-10 DIAGNOSIS — Z95.810 PRESENCE OF AUTOMATIC (IMPLANTABLE) CARDIAC DEFIBRILLATOR: Chronic | ICD-10-CM

## 2020-12-10 DIAGNOSIS — I48.91 UNSPECIFIED ATRIAL FIBRILLATION: ICD-10-CM

## 2020-12-10 DIAGNOSIS — Z98.890 OTHER SPECIFIED POSTPROCEDURAL STATES: Chronic | ICD-10-CM

## 2020-12-10 DIAGNOSIS — Z79.01 LONG TERM (CURRENT) USE OF ANTICOAGULANTS: ICD-10-CM

## 2020-12-15 ENCOUNTER — APPOINTMENT (OUTPATIENT)
Dept: BURN CARE | Facility: CLINIC | Age: 80
End: 2020-12-15

## 2020-12-17 ENCOUNTER — APPOINTMENT (OUTPATIENT)
Dept: MEDICATION MANAGEMENT | Facility: CLINIC | Age: 80
End: 2020-12-17

## 2020-12-17 ENCOUNTER — LABORATORY RESULT (OUTPATIENT)
Age: 80
End: 2020-12-17

## 2020-12-17 ENCOUNTER — OUTPATIENT (OUTPATIENT)
Dept: OUTPATIENT SERVICES | Facility: HOSPITAL | Age: 80
LOS: 1 days | Discharge: HOME | End: 2020-12-17

## 2020-12-17 DIAGNOSIS — Z79.01 LONG TERM (CURRENT) USE OF ANTICOAGULANTS: ICD-10-CM

## 2020-12-17 DIAGNOSIS — Z96.653 PRESENCE OF ARTIFICIAL KNEE JOINT, BILATERAL: Chronic | ICD-10-CM

## 2020-12-17 DIAGNOSIS — Z98.890 OTHER SPECIFIED POSTPROCEDURAL STATES: Chronic | ICD-10-CM

## 2020-12-17 DIAGNOSIS — Z95.1 PRESENCE OF AORTOCORONARY BYPASS GRAFT: Chronic | ICD-10-CM

## 2020-12-17 DIAGNOSIS — I48.91 UNSPECIFIED ATRIAL FIBRILLATION: ICD-10-CM

## 2020-12-17 DIAGNOSIS — Z95.810 PRESENCE OF AUTOMATIC (IMPLANTABLE) CARDIAC DEFIBRILLATOR: Chronic | ICD-10-CM

## 2020-12-22 ENCOUNTER — APPOINTMENT (OUTPATIENT)
Dept: BURN CARE | Facility: CLINIC | Age: 80
End: 2020-12-22
Payer: MEDICARE

## 2020-12-22 ENCOUNTER — OUTPATIENT (OUTPATIENT)
Dept: OUTPATIENT SERVICES | Facility: HOSPITAL | Age: 80
LOS: 1 days | Discharge: HOME | End: 2020-12-22
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
## 2020-12-22 DIAGNOSIS — Z98.890 OTHER SPECIFIED POSTPROCEDURAL STATES: Chronic | ICD-10-CM

## 2020-12-22 DIAGNOSIS — R21 RASH AND OTHER NONSPECIFIC SKIN ERUPTION: ICD-10-CM
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
## 2020-12-22 DIAGNOSIS — Z95.810 PRESENCE OF AUTOMATIC (IMPLANTABLE) CARDIAC DEFIBRILLATOR: Chronic | ICD-10-CM

## 2020-12-22 DIAGNOSIS — Z96.653 PRESENCE OF ARTIFICIAL KNEE JOINT, BILATERAL: Chronic | ICD-10-CM
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
## 2020-12-22 DIAGNOSIS — Z95.1 PRESENCE OF AORTOCORONARY BYPASS GRAFT: Chronic | ICD-10-CM

## 2020-12-22 DIAGNOSIS — S91.302A UNSPECIFIED OPEN WOUND, LEFT FOOT, INITIAL ENCOUNTER: ICD-10-CM

## 2020-12-22 PROCEDURE — 99213 OFFICE O/P EST LOW 20 MIN: CPT | Mod: 25

## 2020-12-22 PROCEDURE — 97597 DBRDMT OPN WND 1ST 20 CM/<: CPT

## 2020-12-24 ENCOUNTER — LABORATORY RESULT (OUTPATIENT)
Age: 80
End: 2020-12-24

## 2020-12-24 ENCOUNTER — APPOINTMENT (OUTPATIENT)
Dept: MEDICATION MANAGEMENT | Facility: CLINIC | Age: 80
End: 2020-12-24

## 2020-12-24 ENCOUNTER — OUTPATIENT (OUTPATIENT)
Dept: OUTPATIENT SERVICES | Facility: HOSPITAL | Age: 80
LOS: 1 days | Discharge: HOME | End: 2020-12-24

## 2020-12-24 DIAGNOSIS — Z95.810 PRESENCE OF AUTOMATIC (IMPLANTABLE) CARDIAC DEFIBRILLATOR: Chronic | ICD-10-CM

## 2020-12-24 DIAGNOSIS — Z96.653 PRESENCE OF ARTIFICIAL KNEE JOINT, BILATERAL: Chronic | ICD-10-CM

## 2020-12-24 DIAGNOSIS — Z95.1 PRESENCE OF AORTOCORONARY BYPASS GRAFT: Chronic | ICD-10-CM

## 2020-12-24 DIAGNOSIS — Z79.01 LONG TERM (CURRENT) USE OF ANTICOAGULANTS: ICD-10-CM

## 2020-12-24 DIAGNOSIS — Z98.890 OTHER SPECIFIED POSTPROCEDURAL STATES: Chronic | ICD-10-CM

## 2020-12-24 DIAGNOSIS — I48.91 UNSPECIFIED ATRIAL FIBRILLATION: ICD-10-CM

## 2020-12-26 LAB — BACTERIA SPEC CULT: ABNORMAL

## 2020-12-31 ENCOUNTER — LABORATORY RESULT (OUTPATIENT)
Age: 80
End: 2020-12-31

## 2020-12-31 ENCOUNTER — OUTPATIENT (OUTPATIENT)
Dept: OUTPATIENT SERVICES | Facility: HOSPITAL | Age: 80
LOS: 1 days | Discharge: HOME | End: 2020-12-31

## 2020-12-31 ENCOUNTER — APPOINTMENT (OUTPATIENT)
Dept: MEDICATION MANAGEMENT | Facility: CLINIC | Age: 80
End: 2020-12-31

## 2020-12-31 DIAGNOSIS — Z95.1 PRESENCE OF AORTOCORONARY BYPASS GRAFT: Chronic | ICD-10-CM

## 2020-12-31 DIAGNOSIS — Z95.810 PRESENCE OF AUTOMATIC (IMPLANTABLE) CARDIAC DEFIBRILLATOR: Chronic | ICD-10-CM

## 2020-12-31 DIAGNOSIS — Z98.890 OTHER SPECIFIED POSTPROCEDURAL STATES: Chronic | ICD-10-CM

## 2020-12-31 DIAGNOSIS — I48.91 UNSPECIFIED ATRIAL FIBRILLATION: ICD-10-CM

## 2020-12-31 DIAGNOSIS — Z79.01 LONG TERM (CURRENT) USE OF ANTICOAGULANTS: ICD-10-CM

## 2020-12-31 DIAGNOSIS — Z96.653 PRESENCE OF ARTIFICIAL KNEE JOINT, BILATERAL: Chronic | ICD-10-CM

## 2021-01-01 ENCOUNTER — APPOINTMENT (OUTPATIENT)
Dept: CARDIOLOGY | Facility: CLINIC | Age: 81
End: 2021-01-01
Payer: MEDICARE

## 2021-01-01 ENCOUNTER — OUTPATIENT (OUTPATIENT)
Dept: OUTPATIENT SERVICES | Facility: HOSPITAL | Age: 81
LOS: 1 days | Discharge: HOME | End: 2021-01-01

## 2021-01-01 ENCOUNTER — APPOINTMENT (OUTPATIENT)
Dept: CARDIOLOGY | Facility: CLINIC | Age: 81
End: 2021-01-01

## 2021-01-01 ENCOUNTER — APPOINTMENT (OUTPATIENT)
Dept: VASCULAR SURGERY | Facility: CLINIC | Age: 81
End: 2021-01-01

## 2021-01-01 ENCOUNTER — APPOINTMENT (OUTPATIENT)
Dept: ENDOCRINOLOGY | Facility: CLINIC | Age: 81
End: 2021-01-01

## 2021-01-01 ENCOUNTER — NON-APPOINTMENT (OUTPATIENT)
Age: 81
End: 2021-01-01

## 2021-01-01 ENCOUNTER — APPOINTMENT (OUTPATIENT)
Dept: BURN CARE | Facility: CLINIC | Age: 81
End: 2021-01-01
Payer: MEDICARE

## 2021-01-01 VITALS — HEART RATE: 72 BPM | DIASTOLIC BLOOD PRESSURE: 70 MMHG | SYSTOLIC BLOOD PRESSURE: 110 MMHG | RESPIRATION RATE: 18 BRPM

## 2021-01-01 VITALS — HEIGHT: 71 IN | WEIGHT: 196 LBS | BODY MASS INDEX: 27.44 KG/M2

## 2021-01-01 DIAGNOSIS — Z95.1 PRESENCE OF AORTOCORONARY BYPASS GRAFT: ICD-10-CM

## 2021-01-01 DIAGNOSIS — Z98.890 OTHER SPECIFIED POSTPROCEDURAL STATES: Chronic | ICD-10-CM

## 2021-01-01 DIAGNOSIS — Z95.1 PRESENCE OF AORTOCORONARY BYPASS GRAFT: Chronic | ICD-10-CM

## 2021-01-01 DIAGNOSIS — Z96.653 PRESENCE OF ARTIFICIAL KNEE JOINT, BILATERAL: Chronic | ICD-10-CM

## 2021-01-01 DIAGNOSIS — Z79.01 LONG TERM (CURRENT) USE OF ANTICOAGULANTS: ICD-10-CM

## 2021-01-01 DIAGNOSIS — Z95.810 PRESENCE OF AUTOMATIC (IMPLANTABLE) CARDIAC DEFIBRILLATOR: Chronic | ICD-10-CM

## 2021-01-01 PROCEDURE — 99214 OFFICE O/P EST MOD 30 MIN: CPT

## 2021-01-01 PROCEDURE — 93296 REM INTERROG EVL PM/IDS: CPT

## 2021-01-01 PROCEDURE — 99213 OFFICE O/P EST LOW 20 MIN: CPT

## 2021-01-01 PROCEDURE — 93295 DEV INTERROG REMOTE 1/2/MLT: CPT

## 2021-01-01 PROCEDURE — 93000 ELECTROCARDIOGRAM COMPLETE: CPT

## 2021-01-01 PROCEDURE — 93306 TTE W/DOPPLER COMPLETE: CPT

## 2021-01-01 RX ORDER — BLOOD SUGAR DIAGNOSTIC
STRIP MISCELLANEOUS
Qty: 300 | Refills: 2 | Status: ACTIVE | COMMUNITY
Start: 2021-07-22 | End: 1900-01-01

## 2021-01-04 NOTE — ED PROVIDER NOTE - DATE/TIME 1
Noemy Pierre from [address] [place] [address] called asking if she could get an order to continue pts home care.  Noemy Pierre had called last week also regarding the same thing    Thank you
Yes she can have an order for home health care
21-Dec-2018 20:50

## 2021-01-07 ENCOUNTER — APPOINTMENT (OUTPATIENT)
Dept: MEDICATION MANAGEMENT | Facility: CLINIC | Age: 81
End: 2021-01-07

## 2021-01-07 ENCOUNTER — LABORATORY RESULT (OUTPATIENT)
Age: 81
End: 2021-01-07

## 2021-01-07 ENCOUNTER — OUTPATIENT (OUTPATIENT)
Dept: OUTPATIENT SERVICES | Facility: HOSPITAL | Age: 81
LOS: 1 days | Discharge: HOME | End: 2021-01-07

## 2021-01-07 ENCOUNTER — RX RENEWAL (OUTPATIENT)
Age: 81
End: 2021-01-07

## 2021-01-07 DIAGNOSIS — Z95.810 PRESENCE OF AUTOMATIC (IMPLANTABLE) CARDIAC DEFIBRILLATOR: Chronic | ICD-10-CM

## 2021-01-07 DIAGNOSIS — Z98.890 OTHER SPECIFIED POSTPROCEDURAL STATES: Chronic | ICD-10-CM

## 2021-01-07 DIAGNOSIS — Z96.653 PRESENCE OF ARTIFICIAL KNEE JOINT, BILATERAL: Chronic | ICD-10-CM

## 2021-01-07 DIAGNOSIS — Z95.1 PRESENCE OF AORTOCORONARY BYPASS GRAFT: Chronic | ICD-10-CM

## 2021-01-08 DIAGNOSIS — Z79.01 LONG TERM (CURRENT) USE OF ANTICOAGULANTS: ICD-10-CM

## 2021-01-08 DIAGNOSIS — I48.91 UNSPECIFIED ATRIAL FIBRILLATION: ICD-10-CM

## 2021-01-12 ENCOUNTER — APPOINTMENT (OUTPATIENT)
Dept: BURN CARE | Facility: CLINIC | Age: 81
End: 2021-01-12
Payer: MEDICARE

## 2021-01-12 ENCOUNTER — OUTPATIENT (OUTPATIENT)
Dept: OUTPATIENT SERVICES | Facility: HOSPITAL | Age: 81
LOS: 1 days | Discharge: HOME | End: 2021-01-12

## 2021-01-12 DIAGNOSIS — Z95.810 PRESENCE OF AUTOMATIC (IMPLANTABLE) CARDIAC DEFIBRILLATOR: Chronic | ICD-10-CM

## 2021-01-12 DIAGNOSIS — Z95.1 PRESENCE OF AORTOCORONARY BYPASS GRAFT: Chronic | ICD-10-CM

## 2021-01-12 DIAGNOSIS — Z96.653 PRESENCE OF ARTIFICIAL KNEE JOINT, BILATERAL: Chronic | ICD-10-CM

## 2021-01-12 DIAGNOSIS — Z98.890 OTHER SPECIFIED POSTPROCEDURAL STATES: Chronic | ICD-10-CM

## 2021-01-12 PROCEDURE — 97597 DBRDMT OPN WND 1ST 20 CM/<: CPT

## 2021-01-12 PROCEDURE — 99213 OFFICE O/P EST LOW 20 MIN: CPT | Mod: 25

## 2021-01-15 ENCOUNTER — OUTPATIENT (OUTPATIENT)
Dept: OUTPATIENT SERVICES | Facility: HOSPITAL | Age: 81
LOS: 1 days | Discharge: HOME | End: 2021-01-15

## 2021-01-15 ENCOUNTER — APPOINTMENT (OUTPATIENT)
Dept: MEDICATION MANAGEMENT | Facility: CLINIC | Age: 81
End: 2021-01-15

## 2021-01-15 DIAGNOSIS — Z98.890 OTHER SPECIFIED POSTPROCEDURAL STATES: Chronic | ICD-10-CM

## 2021-01-15 DIAGNOSIS — Z95.810 PRESENCE OF AUTOMATIC (IMPLANTABLE) CARDIAC DEFIBRILLATOR: Chronic | ICD-10-CM

## 2021-01-15 DIAGNOSIS — Z96.653 PRESENCE OF ARTIFICIAL KNEE JOINT, BILATERAL: Chronic | ICD-10-CM

## 2021-01-15 DIAGNOSIS — I48.91 UNSPECIFIED ATRIAL FIBRILLATION: ICD-10-CM

## 2021-01-15 DIAGNOSIS — Z95.1 PRESENCE OF AORTOCORONARY BYPASS GRAFT: Chronic | ICD-10-CM

## 2021-01-15 DIAGNOSIS — Z79.01 LONG TERM (CURRENT) USE OF ANTICOAGULANTS: ICD-10-CM

## 2021-01-15 LAB
INR PPP: 1.7 RATIO
POCT-PROTHROMBIN TIME: 20.6 SECS
QUALITY CONTROL: YES
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
## 2021-01-20 DIAGNOSIS — S91.302A UNSPECIFIED OPEN WOUND, LEFT FOOT, INITIAL ENCOUNTER: ICD-10-CM
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
## 2021-01-22 ENCOUNTER — APPOINTMENT (OUTPATIENT)
Dept: CARDIOLOGY | Facility: CLINIC | Age: 81
End: 2021-01-22
Payer: MEDICARE

## 2021-01-22 PROCEDURE — 93296 REM INTERROG EVL PM/IDS: CPT

## 2021-01-22 PROCEDURE — 93295 DEV INTERROG REMOTE 1/2/MLT: CPT

## 2021-01-28 ENCOUNTER — OUTPATIENT (OUTPATIENT)
Dept: OUTPATIENT SERVICES | Facility: HOSPITAL | Age: 81
LOS: 1 days | Discharge: HOME | End: 2021-01-28

## 2021-01-28 ENCOUNTER — APPOINTMENT (OUTPATIENT)
Dept: MEDICATION MANAGEMENT | Facility: CLINIC | Age: 81
End: 2021-01-28

## 2021-01-28 DIAGNOSIS — Z79.01 LONG TERM (CURRENT) USE OF ANTICOAGULANTS: ICD-10-CM

## 2021-01-28 DIAGNOSIS — Z96.653 PRESENCE OF ARTIFICIAL KNEE JOINT, BILATERAL: Chronic | ICD-10-CM

## 2021-01-28 DIAGNOSIS — I48.91 UNSPECIFIED ATRIAL FIBRILLATION: ICD-10-CM

## 2021-01-28 DIAGNOSIS — Z95.1 PRESENCE OF AORTOCORONARY BYPASS GRAFT: Chronic | ICD-10-CM

## 2021-01-28 DIAGNOSIS — Z98.890 OTHER SPECIFIED POSTPROCEDURAL STATES: Chronic | ICD-10-CM

## 2021-01-28 DIAGNOSIS — Z95.810 PRESENCE OF AUTOMATIC (IMPLANTABLE) CARDIAC DEFIBRILLATOR: Chronic | ICD-10-CM

## 2021-01-28 LAB — INR PPP: 2.1 RATIO

## 2021-02-09 ENCOUNTER — OUTPATIENT (OUTPATIENT)
Dept: OUTPATIENT SERVICES | Facility: HOSPITAL | Age: 81
LOS: 1 days | Discharge: HOME | End: 2021-02-09

## 2021-02-09 ENCOUNTER — APPOINTMENT (OUTPATIENT)
Dept: BURN CARE | Facility: CLINIC | Age: 81
End: 2021-02-09
Payer: MEDICARE

## 2021-02-09 DIAGNOSIS — Z95.810 PRESENCE OF AUTOMATIC (IMPLANTABLE) CARDIAC DEFIBRILLATOR: Chronic | ICD-10-CM

## 2021-02-09 DIAGNOSIS — Z96.653 PRESENCE OF ARTIFICIAL KNEE JOINT, BILATERAL: Chronic | ICD-10-CM

## 2021-02-09 DIAGNOSIS — Z95.1 PRESENCE OF AORTOCORONARY BYPASS GRAFT: Chronic | ICD-10-CM

## 2021-02-09 DIAGNOSIS — Z98.890 OTHER SPECIFIED POSTPROCEDURAL STATES: Chronic | ICD-10-CM

## 2021-02-09 PROCEDURE — 97597 DBRDMT OPN WND 1ST 20 CM/<: CPT

## 2021-02-09 PROCEDURE — 99213 OFFICE O/P EST LOW 20 MIN: CPT | Mod: 25

## 2021-02-17 DIAGNOSIS — S91.302A UNSPECIFIED OPEN WOUND, LEFT FOOT, INITIAL ENCOUNTER: ICD-10-CM
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
## 2021-03-03 ENCOUNTER — APPOINTMENT (OUTPATIENT)
Dept: MEDICATION MANAGEMENT | Facility: CLINIC | Age: 81
End: 2021-03-03

## 2021-03-03 ENCOUNTER — OUTPATIENT (OUTPATIENT)
Dept: OUTPATIENT SERVICES | Facility: HOSPITAL | Age: 81
LOS: 1 days | Discharge: HOME | End: 2021-03-03

## 2021-03-03 DIAGNOSIS — Z98.890 OTHER SPECIFIED POSTPROCEDURAL STATES: Chronic | ICD-10-CM

## 2021-03-03 DIAGNOSIS — Z79.01 LONG TERM (CURRENT) USE OF ANTICOAGULANTS: ICD-10-CM

## 2021-03-03 DIAGNOSIS — I48.91 UNSPECIFIED ATRIAL FIBRILLATION: ICD-10-CM

## 2021-03-03 DIAGNOSIS — Z95.1 PRESENCE OF AORTOCORONARY BYPASS GRAFT: Chronic | ICD-10-CM

## 2021-03-03 DIAGNOSIS — Z96.653 PRESENCE OF ARTIFICIAL KNEE JOINT, BILATERAL: Chronic | ICD-10-CM

## 2021-03-03 DIAGNOSIS — Z95.810 PRESENCE OF AUTOMATIC (IMPLANTABLE) CARDIAC DEFIBRILLATOR: Chronic | ICD-10-CM

## 2021-03-03 LAB
INR PPP: 2 RATIO
POCT-PROTHROMBIN TIME: 24.1 SECS
QUALITY CONTROL: YES

## 2021-03-09 ENCOUNTER — OUTPATIENT (OUTPATIENT)
Dept: OUTPATIENT SERVICES | Facility: HOSPITAL | Age: 81
LOS: 1 days | Discharge: HOME | End: 2021-03-09

## 2021-03-09 ENCOUNTER — APPOINTMENT (OUTPATIENT)
Dept: BURN CARE | Facility: CLINIC | Age: 81
End: 2021-03-09
Payer: MEDICARE

## 2021-03-09 DIAGNOSIS — Z95.810 PRESENCE OF AUTOMATIC (IMPLANTABLE) CARDIAC DEFIBRILLATOR: Chronic | ICD-10-CM

## 2021-03-09 DIAGNOSIS — Z95.1 PRESENCE OF AORTOCORONARY BYPASS GRAFT: Chronic | ICD-10-CM

## 2021-03-09 DIAGNOSIS — Z96.653 PRESENCE OF ARTIFICIAL KNEE JOINT, BILATERAL: Chronic | ICD-10-CM

## 2021-03-09 DIAGNOSIS — Z98.890 OTHER SPECIFIED POSTPROCEDURAL STATES: Chronic | ICD-10-CM

## 2021-03-09 PROCEDURE — 97597 DBRDMT OPN WND 1ST 20 CM/<: CPT

## 2021-03-09 PROCEDURE — 99213 OFFICE O/P EST LOW 20 MIN: CPT | Mod: 25
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
## 2021-03-18 DIAGNOSIS — S91.302A UNSPECIFIED OPEN WOUND, LEFT FOOT, INITIAL ENCOUNTER: ICD-10-CM
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
## 2021-04-06 ENCOUNTER — OUTPATIENT (OUTPATIENT)
Dept: OUTPATIENT SERVICES | Facility: HOSPITAL | Age: 81
LOS: 1 days | Discharge: HOME | End: 2021-04-06

## 2021-04-06 ENCOUNTER — APPOINTMENT (OUTPATIENT)
Dept: BURN CARE | Facility: CLINIC | Age: 81
End: 2021-04-06
Payer: MEDICARE

## 2021-04-06 ENCOUNTER — APPOINTMENT (OUTPATIENT)
Dept: MEDICATION MANAGEMENT | Facility: CLINIC | Age: 81
End: 2021-04-06

## 2021-04-06 VITALS — RESPIRATION RATE: 16 BRPM

## 2021-04-06 DIAGNOSIS — Z95.810 PRESENCE OF AUTOMATIC (IMPLANTABLE) CARDIAC DEFIBRILLATOR: Chronic | ICD-10-CM

## 2021-04-06 DIAGNOSIS — Z79.01 LONG TERM (CURRENT) USE OF ANTICOAGULANTS: ICD-10-CM

## 2021-04-06 DIAGNOSIS — Z98.890 OTHER SPECIFIED POSTPROCEDURAL STATES: Chronic | ICD-10-CM

## 2021-04-06 DIAGNOSIS — Z95.1 PRESENCE OF AORTOCORONARY BYPASS GRAFT: Chronic | ICD-10-CM

## 2021-04-06 DIAGNOSIS — Z96.653 PRESENCE OF ARTIFICIAL KNEE JOINT, BILATERAL: Chronic | ICD-10-CM

## 2021-04-06 DIAGNOSIS — I48.91 UNSPECIFIED ATRIAL FIBRILLATION: ICD-10-CM

## 2021-04-06 LAB
INR PPP: 2.1 RATIO
POCT-PROTHROMBIN TIME: 24.7 SECS
QUALITY CONTROL: YES

## 2021-04-06 PROCEDURE — 99213 OFFICE O/P EST LOW 20 MIN: CPT
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
## 2021-04-20 DIAGNOSIS — S91.302A UNSPECIFIED OPEN WOUND, LEFT FOOT, INITIAL ENCOUNTER: ICD-10-CM
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
## 2021-04-23 ENCOUNTER — NON-APPOINTMENT (OUTPATIENT)
Age: 81
End: 2021-04-23

## 2021-04-23 ENCOUNTER — APPOINTMENT (OUTPATIENT)
Dept: CARDIOLOGY | Facility: CLINIC | Age: 81
End: 2021-04-23
Payer: MEDICARE

## 2021-04-23 PROCEDURE — 93295 DEV INTERROG REMOTE 1/2/MLT: CPT

## 2021-04-23 PROCEDURE — 93296 REM INTERROG EVL PM/IDS: CPT

## 2021-05-11 ENCOUNTER — NON-APPOINTMENT (OUTPATIENT)
Age: 81
End: 2021-05-11

## 2021-05-11 ENCOUNTER — APPOINTMENT (OUTPATIENT)
Dept: CARDIOLOGY | Facility: CLINIC | Age: 81
End: 2021-05-11
Payer: MEDICARE

## 2021-05-11 ENCOUNTER — RESULT CHARGE (OUTPATIENT)
Age: 81
End: 2021-05-11

## 2021-05-11 ENCOUNTER — APPOINTMENT (OUTPATIENT)
Dept: MEDICATION MANAGEMENT | Facility: CLINIC | Age: 81
End: 2021-05-11

## 2021-05-11 VITALS — SYSTOLIC BLOOD PRESSURE: 120 MMHG | HEART RATE: 68 BPM | DIASTOLIC BLOOD PRESSURE: 80 MMHG | RESPIRATION RATE: 18 BRPM

## 2021-05-11 VITALS — HEIGHT: 71 IN | WEIGHT: 212 LBS | BODY MASS INDEX: 29.68 KG/M2 | TEMPERATURE: 96.4 F

## 2021-05-11 PROCEDURE — 99214 OFFICE O/P EST MOD 30 MIN: CPT

## 2021-05-11 PROCEDURE — 99072 ADDL SUPL MATRL&STAF TM PHE: CPT

## 2021-05-11 PROCEDURE — 93000 ELECTROCARDIOGRAM COMPLETE: CPT

## 2021-05-11 NOTE — PHYSICAL EXAM
[General Appearance - Well Developed] : well developed [Normal Appearance] : normal appearance [Well Groomed] : well groomed [General Appearance - Well Nourished] : well nourished [No Deformities] : no deformities [General Appearance - In No Acute Distress] : no acute distress [Normal Conjunctiva] : the conjunctiva exhibited no abnormalities [Eyelids - No Xanthelasma] : the eyelids demonstrated no xanthelasmas [Normal Oral Mucosa] : normal oral mucosa [No Oral Pallor] : no oral pallor [No Oral Cyanosis] : no oral cyanosis [Normal Jugular Venous A Waves Present] : normal jugular venous A waves present [Normal Jugular Venous V Waves Present] : normal jugular venous V waves present [No Jugular Venous Peterson A Waves] : no jugular venous peterson A waves [Heart Sounds] : normal S1 and S2 [Murmurs] : no murmurs present [Irregularly Irregular] : the rhythm was irregularly irregular [Systolic grade ___/6] : A grade [unfilled]/6 systolic murmur was heard. [Respiration, Rhythm And Depth] : normal respiratory rhythm and effort [Exaggerated Use Of Accessory Muscles For Inspiration] : no accessory muscle use [Auscultation Breath Sounds / Voice Sounds] : lungs were clear to auscultation bilaterally [Bowel Sounds] : normal bowel sounds [Abdomen Soft] : soft [Abdomen Tenderness] : non-tender [Abdomen Mass (___ Cm)] : no abdominal mass palpated [Abnormal Walk] : normal gait [Nail Clubbing] : no clubbing of the fingernails [Cyanosis, Localized] : no localized cyanosis [Petechial Hemorrhages (___cm)] : no petechial hemorrhages [Skin Color & Pigmentation] : normal skin color and pigmentation [] : no rash [No Venous Stasis] : no venous stasis [Skin Lesions] : no skin lesions [No Skin Ulcers] : no skin ulcer [No Xanthoma] : no  xanthoma was observed [Oriented To Time, Place, And Person] : oriented to person, place, and time

## 2021-05-12 ENCOUNTER — APPOINTMENT (OUTPATIENT)
Dept: CARDIOLOGY | Facility: CLINIC | Age: 81
End: 2021-05-12
Payer: MEDICARE

## 2021-05-12 VITALS
SYSTOLIC BLOOD PRESSURE: 132 MMHG | HEART RATE: 62 BPM | BODY MASS INDEX: 29.4 KG/M2 | DIASTOLIC BLOOD PRESSURE: 70 MMHG | WEIGHT: 210 LBS | OXYGEN SATURATION: 97 % | HEIGHT: 71 IN | TEMPERATURE: 96.3 F

## 2021-05-12 DIAGNOSIS — Z00.00 ENCOUNTER FOR GENERAL ADULT MEDICAL EXAMINATION W/OUT ABNORMAL FINDINGS: ICD-10-CM

## 2021-05-12 LAB
ALBUMIN SERPL ELPH-MCNC: 4.4 G/DL
ALP BLD-CCNC: 138 U/L
ALT SERPL-CCNC: 22 U/L
ANION GAP SERPL CALC-SCNC: 11 MMOL/L
ANION GAP SERPL CALC-SCNC: 15 MMOL/L
AST SERPL-CCNC: 27 U/L
BILIRUB SERPL-MCNC: 1.7 MG/DL
BUN SERPL-MCNC: 62 MG/DL
BUN SERPL-MCNC: 62 MG/DL
CALCIUM SERPL-MCNC: 9.7 MG/DL
CALCIUM SERPL-MCNC: 9.8 MG/DL
CHLORIDE SERPL-SCNC: 102 MMOL/L
CHLORIDE SERPL-SCNC: 103 MMOL/L
CHOLEST SERPL-MCNC: 148 MG/DL
CO2 SERPL-SCNC: 20 MMOL/L
CO2 SERPL-SCNC: 25 MMOL/L
CREAT SERPL-MCNC: 1.4 MG/DL
CREAT SERPL-MCNC: 1.4 MG/DL
ESTIMATED AVERAGE GLUCOSE: 186 MG/DL
GLUCOSE SERPL-MCNC: 142 MG/DL
GLUCOSE SERPL-MCNC: 152 MG/DL
HBA1C MFR BLD HPLC: 8.1 %
HDLC SERPL-MCNC: 31 MG/DL
LDLC SERPL CALC-MCNC: 104 MG/DL
NONHDLC SERPL-MCNC: 117 MG/DL
POTASSIUM SERPL-SCNC: 5.9 MMOL/L
POTASSIUM SERPL-SCNC: 6 MMOL/L
PROT SERPL-MCNC: 7.4 G/DL
SODIUM SERPL-SCNC: 138 MMOL/L
SODIUM SERPL-SCNC: 138 MMOL/L
TRIGL SERPL-MCNC: 86 MG/DL

## 2021-05-12 PROCEDURE — 93284 PRGRMG EVAL IMPLANTABLE DFB: CPT

## 2021-05-12 PROCEDURE — 93000 ELECTROCARDIOGRAM COMPLETE: CPT | Mod: 59

## 2021-05-12 PROCEDURE — 99214 OFFICE O/P EST MOD 30 MIN: CPT

## 2021-05-12 PROCEDURE — 93290 INTERROG DEV EVAL ICPMS IP: CPT | Mod: 26

## 2021-05-12 RX ORDER — LOSARTAN POTASSIUM 100 MG/1
100 TABLET, FILM COATED ORAL DAILY
Qty: 90 | Refills: 3 | Status: DISCONTINUED | COMMUNITY
Start: 2019-08-01 | End: 2021-05-12

## 2021-05-12 RX ORDER — METFORMIN ER 750 MG 750 MG/1
750 TABLET ORAL
Qty: 90 | Refills: 3 | Status: DISCONTINUED | COMMUNITY
Start: 2019-08-20 | End: 2021-05-12

## 2021-05-17 ENCOUNTER — NON-APPOINTMENT (OUTPATIENT)
Age: 81
End: 2021-05-17

## 2021-05-17 DIAGNOSIS — E87.5 HYPERKALEMIA: ICD-10-CM

## 2021-05-17 LAB
ANION GAP SERPL CALC-SCNC: 13 MMOL/L
BUN SERPL-MCNC: 47 MG/DL
CALCIUM SERPL-MCNC: 9.5 MG/DL
CHLORIDE SERPL-SCNC: 102 MMOL/L
CO2 SERPL-SCNC: 26 MMOL/L
CREAT SERPL-MCNC: 1.2 MG/DL
GLUCOSE SERPL-MCNC: 184 MG/DL
POTASSIUM SERPL-SCNC: 4.7 MMOL/L
SODIUM SERPL-SCNC: 141 MMOL/L

## 2021-05-30 NOTE — PHYSICAL EXAM
[Heart Sounds] : normal S1 and S2 [Murmurs] : no murmurs present [Irregularly Irregular] : the rhythm was irregularly irregular [Respiration, Rhythm And Depth] : normal respiratory rhythm and effort [Exaggerated Use Of Accessory Muscles For Inspiration] : no accessory muscle use [Auscultation Breath Sounds / Voice Sounds] : lungs were clear to auscultation bilaterally [Clean] : clean [Dry] : dry [Well-Healed] : well-healed [Abdomen Soft] : soft [Abdomen Tenderness] : non-tender [Abdomen Mass (___ Cm)] : no abdominal mass palpated [Nail Clubbing] : no clubbing of the fingernails [Cyanosis, Localized] : no localized cyanosis [Petechial Hemorrhages (___cm)] : no petechial hemorrhages [] : no ischemic changes

## 2021-05-30 NOTE — HISTORY OF PRESENT ILLNESS
[de-identified] : I had a pleasure of seeing Mr. TOVAR for initial consultation for device care. He was previously being followed by Dr. Gipson- last seen ~ 2 years ago. \par \par Mr. TOVAR is a 81 year-year old male with history of CAD/CABG, DM, HTN, DL, Ischemic Cardiomyopathy, CHB s/p CRT-D (MDT, Dep, 17) with RV lead dislodgement-reposition, T wave oversensing, persistent atrial fibrillation is here for routine f-up.\par \par Seen by Dr. Laughlin recently- noted to have high K yesterday- meds adjusted.\par \par Denies chest pain, shortness of breath, palpitation, dizziness or LOC except noted above.\par \par EKG: AF w/BiVP@ 62/min, ? + PVC\par TTE (10/20): EF 50-55%, severe LAE, mod TR\par Cardio: Dr. Laughlin

## 2021-05-30 NOTE — ASSESSMENT
[FreeTextEntry1] : ## persistent atrial fibrillation\par ## Ischemic Cardiomyopathy\par ## CHB s/p CRT-D\par \par - ICD interrogation shows normally functioning CRT-D. Battery life 4.5 years. Optivol below threshold. No new events. Total  80%. VSR pace 19.3% -like AF/RVR episodes (?CHB) vs PVC/NSVT episodes\par - EF has recovered as per last echo\par - Relatively asymptomatic.\par - In this scenario, we will continue current care. if worsening LVEF, we will consider aggressive approach to RVR with AVN blocker agents vs Amiodarone\par - Continue with GDMT. Losartan on hold due to hyperkalemia.\par - Repeat labs\par - Remote Monitoring\par - Return in 1 year

## 2021-05-30 NOTE — PROCEDURE
[Complete Heart Block] : complete heart block [See Scanned Paceart Report] : See scanned paceart report [See Device Printout] : See device printout [CRT-D] : Cardiac resynchronization therapy defibrillator [VVIR] : VVIR [Voltage: ___ volts] : Voltage was [unfilled] volts [Charge Time: ___ sec] : charge time was [unfilled] seconds [Longevity: ___ months] : The estimated remaining battery life is [unfilled] months [Threshold Testing Performed] : Threshold testing was performed [Sensing Amplitude ___mv] : sensing amplitude was [unfilled] mv [Lead Imp:  ___ohms] : lead impedance was [unfilled] ohms [___V @] : [unfilled] V [___ ms] : [unfilled] ms [Programmed for Longevity] : output reprogrammed for improved battery longevity [Sense ___ %] : Sense [unfilled]% [Pace ___ %] : Pace [unfilled]% [de-identified] : Medtronic [de-identified] : [employer]ira MRI CTRD [de-identified] : [id number] [de-identified] : 7/17/17 [de-identified] : 60 [de-identified] : Total  80.6%, effective CRT 80.3%\par 2 NSVT episodes, 13 beats each, 1 second long, most recent 5/2/21\par PVC counter has reduced since last interrogation\par Optivol high 11/5/20-11/29/21

## 2021-05-30 NOTE — PROCEDURE
[Complete Heart Block] : complete heart block [See Scanned Paceart Report] : See scanned paceart report [See Device Printout] : See device printout [CRT-D] : Cardiac resynchronization therapy defibrillator [VVIR] : VVIR [Voltage: ___ volts] : Voltage was [unfilled] volts [Charge Time: ___ sec] : charge time was [unfilled] seconds [Longevity: ___ months] : The estimated remaining battery life is [unfilled] months [Threshold Testing Performed] : Threshold testing was performed [Sensing Amplitude ___mv] : sensing amplitude was [unfilled] mv [Lead Imp:  ___ohms] : lead impedance was [unfilled] ohms [___V @] : [unfilled] V [___ ms] : [unfilled] ms [Programmed for Longevity] : output reprogrammed for improved battery longevity [Sense ___ %] : Sense [unfilled]% [Pace ___ %] : Pace [unfilled]% [de-identified] : Medtronic [de-identified] : Zahira MRI CTRD [de-identified] : [id number] [de-identified] : 7/17/17 [de-identified] : 60 [de-identified] : Total  80.6%, effective CRT 80.3%\par 2 NSVT episodes, 13 beats each, 1 second long, most recent 5/2/21\par PVC counter has reduced since last interrogation\par Optivol high 11/5/20-11/29/21

## 2021-05-30 NOTE — HISTORY OF PRESENT ILLNESS
[de-identified] : I had a pleasure of seeing Mr. TOVAR for initial consultation for device care. He was previously being followed by Dr. Gipson- last seen ~ 2 years ago. \par \par Mr. TOVAR is a 81 year-year old male with history of CAD/CABG, DM, HTN, DL, Ischemic Cardiomyopathy, CHB s/p CRT-D (MDT, Dep, 17) with RV lead dislodgement-reposition, T wave oversensing, persistent atrial fibrillation is here for routine f-up.\par \par Seen by Dr. Laughlin recently- noted to have high K yesterday- meds adjusted.\par \par Denies chest pain, shortness of breath, palpitation, dizziness or LOC except noted above.\par \par EKG: AF w/BiVP@ 62/min, ? + PVC\par TTE (10/20): EF 50-55%, severe LAE, mod TR\par Cardio: Dr. Laughlin

## 2021-06-04 ENCOUNTER — APPOINTMENT (OUTPATIENT)
Dept: CARDIOLOGY | Facility: CLINIC | Age: 81
End: 2021-06-04
Payer: MEDICARE

## 2021-06-04 VITALS — RESPIRATION RATE: 18 BRPM | HEART RATE: 64 BPM | SYSTOLIC BLOOD PRESSURE: 118 MMHG | DIASTOLIC BLOOD PRESSURE: 80 MMHG

## 2021-06-04 VITALS — WEIGHT: 206 LBS | HEIGHT: 71 IN | BODY MASS INDEX: 28.84 KG/M2

## 2021-06-04 DIAGNOSIS — Z95.0 PRESENCE OF CARDIAC PACEMAKER: ICD-10-CM

## 2021-06-04 DIAGNOSIS — E06.3 OTHER SPECIFIED HYPOTHYROIDISM: ICD-10-CM

## 2021-06-04 DIAGNOSIS — I44.2 ATRIOVENTRICULAR BLOCK, COMPLETE: ICD-10-CM

## 2021-06-04 DIAGNOSIS — E03.8 OTHER SPECIFIED HYPOTHYROIDISM: ICD-10-CM

## 2021-06-04 PROCEDURE — 93000 ELECTROCARDIOGRAM COMPLETE: CPT

## 2021-06-04 PROCEDURE — 99213 OFFICE O/P EST LOW 20 MIN: CPT

## 2021-06-04 NOTE — PHYSICAL EXAM
[General Appearance - Well Developed] : well developed [Normal Appearance] : normal appearance [Well Groomed] : well groomed [General Appearance - Well Nourished] : well nourished [No Deformities] : no deformities [General Appearance - In No Acute Distress] : no acute distress [Normal Conjunctiva] : the conjunctiva exhibited no abnormalities [Eyelids - No Xanthelasma] : the eyelids demonstrated no xanthelasmas [Normal Oral Mucosa] : normal oral mucosa [No Oral Pallor] : no oral pallor [No Oral Cyanosis] : no oral cyanosis [Normal Jugular Venous A Waves Present] : normal jugular venous A waves present [Normal Jugular Venous V Waves Present] : normal jugular venous V waves present [No Jugular Venous Peterson A Waves] : no jugular venous peterson A waves [Heart Sounds] : normal S1 and S2 [Murmurs] : no murmurs present [Irregularly Irregular] : the rhythm was irregularly irregular [Systolic grade ___/6] : A grade [unfilled]/6 systolic murmur was heard. [Respiration, Rhythm And Depth] : normal respiratory rhythm and effort [Exaggerated Use Of Accessory Muscles For Inspiration] : no accessory muscle use [Auscultation Breath Sounds / Voice Sounds] : lungs were clear to auscultation bilaterally [Bowel Sounds] : normal bowel sounds [Abdomen Soft] : soft [Abdomen Tenderness] : non-tender [Abdomen Mass (___ Cm)] : no abdominal mass palpated [Abnormal Walk] : normal gait [Nail Clubbing] : no clubbing of the fingernails [Cyanosis, Localized] : no localized cyanosis [Petechial Hemorrhages (___cm)] : no petechial hemorrhages [Skin Color & Pigmentation] : normal skin color and pigmentation [] : no rash [Skin Lesions] : no skin lesions [No Venous Stasis] : no venous stasis [No Skin Ulcers] : no skin ulcer [No Xanthoma] : no  xanthoma was observed [Oriented To Time, Place, And Person] : oriented to person, place, and time

## 2021-06-28 ENCOUNTER — RX RENEWAL (OUTPATIENT)
Age: 81
End: 2021-06-28

## 2021-07-22 RX ORDER — LANCETS
EACH MISCELLANEOUS
Qty: 300 | Refills: 0 | Status: ACTIVE | COMMUNITY
Start: 2021-07-22 | End: 1900-01-01

## 2021-07-22 RX ORDER — BLOOD-GLUCOSE METER
W/DEVICE EACH MISCELLANEOUS
Qty: 1 | Refills: 0 | Status: ACTIVE | COMMUNITY
Start: 2021-07-22 | End: 1900-01-01

## 2021-07-23 ENCOUNTER — APPOINTMENT (OUTPATIENT)
Dept: CARDIOLOGY | Facility: CLINIC | Age: 81
End: 2021-07-23
Payer: MEDICARE

## 2021-07-23 ENCOUNTER — NON-APPOINTMENT (OUTPATIENT)
Age: 81
End: 2021-07-23

## 2021-07-23 PROCEDURE — 93296 REM INTERROG EVL PM/IDS: CPT

## 2021-07-23 PROCEDURE — 93295 DEV INTERROG REMOTE 1/2/MLT: CPT

## 2021-10-15 PROBLEM — Z79.01 LONG TERM (CURRENT) USE OF ANTICOAGULANTS: Status: ACTIVE | Noted: 2020-01-14

## 2022-01-01 ENCOUNTER — APPOINTMENT (OUTPATIENT)
Dept: SURGERY | Facility: CLINIC | Age: 82
End: 2022-01-01

## 2022-01-01 ENCOUNTER — NON-APPOINTMENT (OUTPATIENT)
Age: 82
End: 2022-01-01

## 2022-01-01 ENCOUNTER — APPOINTMENT (OUTPATIENT)
Dept: CARDIOLOGY | Facility: CLINIC | Age: 82
End: 2022-01-01

## 2022-01-01 ENCOUNTER — APPOINTMENT (OUTPATIENT)
Dept: CARDIOLOGY | Facility: CLINIC | Age: 82
End: 2022-01-01
Payer: MEDICARE

## 2022-01-01 ENCOUNTER — TRANSCRIPTION ENCOUNTER (OUTPATIENT)
Age: 82
End: 2022-01-01

## 2022-01-01 ENCOUNTER — APPOINTMENT (OUTPATIENT)
Dept: UROLOGY | Facility: CLINIC | Age: 82
End: 2022-01-01

## 2022-01-01 ENCOUNTER — APPOINTMENT (OUTPATIENT)
Dept: OTOLARYNGOLOGY | Facility: CLINIC | Age: 82
End: 2022-01-01
Payer: MEDICARE

## 2022-01-01 ENCOUNTER — OUTPATIENT (OUTPATIENT)
Dept: OUTPATIENT SERVICES | Facility: HOSPITAL | Age: 82
LOS: 1 days | Discharge: HOME | End: 2022-01-01
Payer: MEDICARE

## 2022-01-01 ENCOUNTER — INPATIENT (INPATIENT)
Facility: HOSPITAL | Age: 82
LOS: 8 days | Discharge: NURSING, REHAB OR LONG-TERM CARE | End: 2022-04-02
Attending: HOSPITALIST | Admitting: HOSPITALIST
Payer: MEDICARE

## 2022-01-01 ENCOUNTER — APPOINTMENT (OUTPATIENT)
Dept: BURN CARE | Facility: CLINIC | Age: 82
End: 2022-01-01

## 2022-01-01 ENCOUNTER — APPOINTMENT (OUTPATIENT)
Dept: VASCULAR SURGERY | Facility: CLINIC | Age: 82
End: 2022-01-01
Payer: MEDICARE

## 2022-01-01 ENCOUNTER — APPOINTMENT (OUTPATIENT)
Dept: OTOLARYNGOLOGY | Facility: CLINIC | Age: 82
End: 2022-01-01

## 2022-01-01 ENCOUNTER — EMERGENCY (EMERGENCY)
Facility: HOSPITAL | Age: 82
LOS: 0 days | Discharge: HOME | End: 2022-02-22
Attending: STUDENT IN AN ORGANIZED HEALTH CARE EDUCATION/TRAINING PROGRAM | Admitting: STUDENT IN AN ORGANIZED HEALTH CARE EDUCATION/TRAINING PROGRAM
Payer: MEDICARE

## 2022-01-01 ENCOUNTER — EMERGENCY (EMERGENCY)
Facility: HOSPITAL | Age: 82
LOS: 0 days | Discharge: HOME | End: 2022-02-17
Attending: EMERGENCY MEDICINE | Admitting: EMERGENCY MEDICINE
Payer: MEDICARE

## 2022-01-01 ENCOUNTER — OUTPATIENT (OUTPATIENT)
Dept: OUTPATIENT SERVICES | Facility: HOSPITAL | Age: 82
LOS: 1 days | Discharge: HOME | End: 2022-01-01

## 2022-01-01 VITALS
SYSTOLIC BLOOD PRESSURE: 120 MMHG | HEART RATE: 64 BPM | DIASTOLIC BLOOD PRESSURE: 76 MMHG | OXYGEN SATURATION: 99 % | TEMPERATURE: 98 F | RESPIRATION RATE: 18 BRPM

## 2022-01-01 VITALS
TEMPERATURE: 97 F | WEIGHT: 205.03 LBS | DIASTOLIC BLOOD PRESSURE: 60 MMHG | SYSTOLIC BLOOD PRESSURE: 128 MMHG | HEIGHT: 71 IN | RESPIRATION RATE: 18 BRPM | HEART RATE: 64 BPM | OXYGEN SATURATION: 99 %

## 2022-01-01 VITALS — HEART RATE: 60 BPM | DIASTOLIC BLOOD PRESSURE: 70 MMHG | RESPIRATION RATE: 18 BRPM | SYSTOLIC BLOOD PRESSURE: 106 MMHG

## 2022-01-01 VITALS — WEIGHT: 230 LBS | BODY MASS INDEX: 32.2 KG/M2 | HEIGHT: 71 IN

## 2022-01-01 VITALS
HEIGHT: 71 IN | WEIGHT: 240.08 LBS | HEART RATE: 77 BPM | TEMPERATURE: 98 F | SYSTOLIC BLOOD PRESSURE: 137 MMHG | OXYGEN SATURATION: 99 % | RESPIRATION RATE: 18 BRPM | DIASTOLIC BLOOD PRESSURE: 85 MMHG

## 2022-01-01 VITALS
HEIGHT: 71 IN | DIASTOLIC BLOOD PRESSURE: 75 MMHG | SYSTOLIC BLOOD PRESSURE: 131 MMHG | OXYGEN SATURATION: 97 % | HEART RATE: 76 BPM | TEMPERATURE: 99 F | RESPIRATION RATE: 20 BRPM

## 2022-01-01 VITALS
RESPIRATION RATE: 18 BRPM | HEART RATE: 76 BPM | DIASTOLIC BLOOD PRESSURE: 72 MMHG | OXYGEN SATURATION: 98 % | SYSTOLIC BLOOD PRESSURE: 132 MMHG | TEMPERATURE: 97 F

## 2022-01-01 VITALS
OXYGEN SATURATION: 98 % | DIASTOLIC BLOOD PRESSURE: 89 MMHG | HEART RATE: 96 BPM | SYSTOLIC BLOOD PRESSURE: 142 MMHG | TEMPERATURE: 98 F | RESPIRATION RATE: 18 BRPM

## 2022-01-01 VITALS — HEIGHT: 71 IN | WEIGHT: 216 LBS | BODY MASS INDEX: 30.24 KG/M2

## 2022-01-01 VITALS
WEIGHT: 206 LBS | OXYGEN SATURATION: 98 % | HEIGHT: 71 IN | DIASTOLIC BLOOD PRESSURE: 72 MMHG | HEART RATE: 61 BPM | BODY MASS INDEX: 28.84 KG/M2 | TEMPERATURE: 96.5 F | SYSTOLIC BLOOD PRESSURE: 106 MMHG

## 2022-01-01 VITALS — HEART RATE: 80 BPM | RESPIRATION RATE: 18 BRPM | DIASTOLIC BLOOD PRESSURE: 70 MMHG | SYSTOLIC BLOOD PRESSURE: 110 MMHG

## 2022-01-01 VITALS
DIASTOLIC BLOOD PRESSURE: 52 MMHG | SYSTOLIC BLOOD PRESSURE: 97 MMHG | TEMPERATURE: 97 F | HEART RATE: 60 BPM | RESPIRATION RATE: 18 BRPM

## 2022-01-01 VITALS
DIASTOLIC BLOOD PRESSURE: 70 MMHG | HEIGHT: 71 IN | TEMPERATURE: 97.3 F | BODY MASS INDEX: 28.84 KG/M2 | WEIGHT: 206 LBS | HEART RATE: 61 BPM | SYSTOLIC BLOOD PRESSURE: 118 MMHG

## 2022-01-01 VITALS — WEIGHT: 206 LBS | BODY MASS INDEX: 28.84 KG/M2 | HEIGHT: 71 IN

## 2022-01-01 DIAGNOSIS — I48.91 UNSPECIFIED ATRIAL FIBRILLATION: ICD-10-CM

## 2022-01-01 DIAGNOSIS — I11.0 HYPERTENSIVE HEART DISEASE WITH HEART FAILURE: ICD-10-CM

## 2022-01-01 DIAGNOSIS — R10.30 LOWER ABDOMINAL PAIN, UNSPECIFIED: ICD-10-CM

## 2022-01-01 DIAGNOSIS — I25.10 ATHEROSCLEROTIC HEART DISEASE OF NATIVE CORONARY ARTERY W/OUT ANGINA PECTORIS: ICD-10-CM

## 2022-01-01 DIAGNOSIS — R04.0 EPISTAXIS: ICD-10-CM

## 2022-01-01 DIAGNOSIS — I42.9 CARDIOMYOPATHY, UNSPECIFIED: ICD-10-CM

## 2022-01-01 DIAGNOSIS — Z95.810 PRESENCE OF AUTOMATIC (IMPLANTABLE) CARDIAC DEFIBRILLATOR: ICD-10-CM

## 2022-01-01 DIAGNOSIS — R30.0 DYSURIA: ICD-10-CM

## 2022-01-01 DIAGNOSIS — E03.9 HYPOTHYROIDISM, UNSPECIFIED: ICD-10-CM

## 2022-01-01 DIAGNOSIS — I47.2 VENTRICULAR TACHYCARDIA: ICD-10-CM

## 2022-01-01 DIAGNOSIS — Z95.810 PRESENCE OF AUTOMATIC (IMPLANTABLE) CARDIAC DEFIBRILLATOR: Chronic | ICD-10-CM

## 2022-01-01 DIAGNOSIS — I25.10 ATHEROSCLEROTIC HEART DISEASE OF NATIVE CORONARY ARTERY WITHOUT ANGINA PECTORIS: ICD-10-CM

## 2022-01-01 DIAGNOSIS — Z79.84 LONG TERM (CURRENT) USE OF ORAL HYPOGLYCEMIC DRUGS: ICD-10-CM

## 2022-01-01 DIAGNOSIS — I70.213 ATHEROSCLEROSIS OF NATIVE ARTERIES OF EXTREMITIES WITH INTERMITTENT CLAUDICATION, BILATERAL LEGS: ICD-10-CM

## 2022-01-01 DIAGNOSIS — Z02.9 ENCOUNTER FOR ADMINISTRATIVE EXAMINATIONS, UNSPECIFIED: ICD-10-CM

## 2022-01-01 DIAGNOSIS — I50.20 UNSPECIFIED SYSTOLIC (CONGESTIVE) HEART FAILURE: ICD-10-CM

## 2022-01-01 DIAGNOSIS — Z01.818 ENCOUNTER FOR OTHER PREPROCEDURAL EXAMINATION: ICD-10-CM

## 2022-01-01 DIAGNOSIS — Z96.653 PRESENCE OF ARTIFICIAL KNEE JOINT, BILATERAL: Chronic | ICD-10-CM

## 2022-01-01 DIAGNOSIS — Z95.1 PRESENCE OF AORTOCORONARY BYPASS GRAFT: Chronic | ICD-10-CM

## 2022-01-01 DIAGNOSIS — N40.0 BENIGN PROSTATIC HYPERPLASIA WITHOUT LOWER URINARY TRACT SYMPTOMS: ICD-10-CM

## 2022-01-01 DIAGNOSIS — I50.22 CHRONIC SYSTOLIC (CONGESTIVE) HEART FAILURE: ICD-10-CM

## 2022-01-01 DIAGNOSIS — Z98.890 OTHER SPECIFIED POSTPROCEDURAL STATES: Chronic | ICD-10-CM

## 2022-01-01 DIAGNOSIS — I10 ESSENTIAL (PRIMARY) HYPERTENSION: ICD-10-CM

## 2022-01-01 DIAGNOSIS — S70.01XA CONTUSION OF RIGHT HIP, INITIAL ENCOUNTER: ICD-10-CM

## 2022-01-01 DIAGNOSIS — R77.8 OTHER SPECIFIED ABNORMALITIES OF PLASMA PROTEINS: ICD-10-CM

## 2022-01-01 DIAGNOSIS — Z96.653 PRESENCE OF ARTIFICIAL KNEE JOINT, BILATERAL: ICD-10-CM
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
## 2022-01-01 DIAGNOSIS — I21.4 NON-ST ELEVATION (NSTEMI) MYOCARDIAL INFARCTION: ICD-10-CM

## 2022-01-01 DIAGNOSIS — E78.5 HYPERLIPIDEMIA, UNSPECIFIED: ICD-10-CM

## 2022-01-01 DIAGNOSIS — Z95.1 PRESENCE OF AORTOCORONARY BYPASS GRAFT: ICD-10-CM

## 2022-01-01 DIAGNOSIS — L89.159 PRESSURE ULCER OF SACRAL REGION, UNSPECIFIED STAGE: ICD-10-CM

## 2022-01-01 DIAGNOSIS — J44.9 CHRONIC OBSTRUCTIVE PULMONARY DISEASE, UNSPECIFIED: ICD-10-CM

## 2022-01-01 DIAGNOSIS — I48.0 PAROXYSMAL ATRIAL FIBRILLATION: ICD-10-CM

## 2022-01-01 DIAGNOSIS — Z79.01 LONG TERM (CURRENT) USE OF ANTICOAGULANTS: ICD-10-CM

## 2022-01-01 DIAGNOSIS — H90.3 SENSORINEURAL HEARING LOSS, BILATERAL: ICD-10-CM

## 2022-01-01 DIAGNOSIS — H42 GLAUCOMA IN DISEASES CLASSIFIED ELSEWHERE: ICD-10-CM

## 2022-01-01 DIAGNOSIS — R10.9 UNSPECIFIED ABDOMINAL PAIN: ICD-10-CM
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
## 2022-01-01 DIAGNOSIS — D68.32 HEMORRHAGIC DISORDER DUE TO EXTRINSIC CIRCULATING ANTICOAGULANTS: ICD-10-CM

## 2022-01-01 DIAGNOSIS — R33.9 RETENTION OF URINE, UNSPECIFIED: ICD-10-CM

## 2022-01-01 DIAGNOSIS — E11.39 TYPE 2 DIABETES MELLITUS WITH OTHER DIABETIC OPHTHALMIC COMPLICATION: ICD-10-CM

## 2022-01-01 DIAGNOSIS — E83.42 HYPOMAGNESEMIA: ICD-10-CM

## 2022-01-01 DIAGNOSIS — H90.5 UNSPECIFIED SENSORINEURAL HEARING LOSS: ICD-10-CM

## 2022-01-01 DIAGNOSIS — E11.51 TYPE 2 DIABETES MELLITUS WITH DIABETIC PERIPHERAL ANGIOPATHY WITHOUT GANGRENE: ICD-10-CM

## 2022-01-01 DIAGNOSIS — D64.9 ANEMIA, UNSPECIFIED: ICD-10-CM

## 2022-01-01 DIAGNOSIS — T79.6XXA TRAUMATIC ISCHEMIA OF MUSCLE, INITIAL ENCOUNTER: ICD-10-CM

## 2022-01-01 DIAGNOSIS — E11.622 TYPE 2 DIABETES MELLITUS WITH OTHER SKIN ULCER: ICD-10-CM

## 2022-01-01 DIAGNOSIS — M85.80 OTHER SPECIFIED DISORDERS OF BONE DENSITY AND STRUCTURE, UNSPECIFIED SITE: ICD-10-CM

## 2022-01-01 DIAGNOSIS — Z86.73 PERSONAL HISTORY OF TRANSIENT ISCHEMIC ATTACK (TIA), AND CEREBRAL INFARCTION WITHOUT RESIDUAL DEFICITS: ICD-10-CM

## 2022-01-01 DIAGNOSIS — H61.23 IMPACTED CERUMEN, BILATERAL: ICD-10-CM

## 2022-01-01 DIAGNOSIS — E11.9 TYPE 2 DIABETES MELLITUS WITHOUT COMPLICATIONS: ICD-10-CM

## 2022-01-01 DIAGNOSIS — I48.19 OTHER PERSISTENT ATRIAL FIBRILLATION: ICD-10-CM

## 2022-01-01 DIAGNOSIS — I34.0 NONRHEUMATIC MITRAL (VALVE) INSUFFICIENCY: ICD-10-CM

## 2022-01-01 DIAGNOSIS — E11.9 TYPE 2 DIABETES MELLITUS W/OUT COMPLICATIONS: ICD-10-CM

## 2022-01-01 DIAGNOSIS — E78.00 PURE HYPERCHOLESTEROLEMIA, UNSPECIFIED: ICD-10-CM

## 2022-01-01 DIAGNOSIS — Z87.891 PERSONAL HISTORY OF NICOTINE DEPENDENCE: ICD-10-CM

## 2022-01-01 DIAGNOSIS — Z91.14 PATIENT'S OTHER NONCOMPLIANCE WITH MEDICATION REGIMEN: ICD-10-CM

## 2022-01-01 DIAGNOSIS — K59.00 CONSTIPATION, UNSPECIFIED: ICD-10-CM

## 2022-01-01 DIAGNOSIS — Z46.6 ENCOUNTER FOR FITTING AND ADJUSTMENT OF URINARY DEVICE: ICD-10-CM

## 2022-01-01 DIAGNOSIS — I35.0 NONRHEUMATIC AORTIC (VALVE) STENOSIS: ICD-10-CM

## 2022-01-01 LAB
-  AMIKACIN: SIGNIFICANT CHANGE UP
-  AMOXICILLIN/CLAVULANIC ACID: SIGNIFICANT CHANGE UP
-  AMOXICILLIN/CLAVULANIC ACID: SIGNIFICANT CHANGE UP
-  AMPICILLIN/SULBACTAM: SIGNIFICANT CHANGE UP
-  AMPICILLIN: SIGNIFICANT CHANGE UP
-  AMPICILLIN: SIGNIFICANT CHANGE UP
-  AZTREONAM: SIGNIFICANT CHANGE UP
-  AZTREONAM: SIGNIFICANT CHANGE UP
-  CEFAZOLIN: SIGNIFICANT CHANGE UP
-  CEFAZOLIN: SIGNIFICANT CHANGE UP
-  CEFEPIME: SIGNIFICANT CHANGE UP
-  CEFOXITIN: SIGNIFICANT CHANGE UP
-  CEFOXITIN: SIGNIFICANT CHANGE UP
-  CEFTAZIDIME: SIGNIFICANT CHANGE UP
-  CEFTRIAXONE: SIGNIFICANT CHANGE UP
-  CIPROFLOXACIN: SIGNIFICANT CHANGE UP
-  ERTAPENEM: SIGNIFICANT CHANGE UP
-  ERTAPENEM: SIGNIFICANT CHANGE UP
-  GENTAMICIN: SIGNIFICANT CHANGE UP
-  IMIPENEM: SIGNIFICANT CHANGE UP
-  LEVOFLOXACIN: SIGNIFICANT CHANGE UP
-  MEROPENEM: SIGNIFICANT CHANGE UP
-  NITROFURANTOIN: SIGNIFICANT CHANGE UP
-  PIPERACILLIN/TAZOBACTAM: SIGNIFICANT CHANGE UP
-  TIGECYCLINE: SIGNIFICANT CHANGE UP
-  TIGECYCLINE: SIGNIFICANT CHANGE UP
-  TOBRAMYCIN: SIGNIFICANT CHANGE UP
-  TRIMETHOPRIM/SULFAMETHOXAZOLE: SIGNIFICANT CHANGE UP
A1C WITH ESTIMATED AVERAGE GLUCOSE RESULT: 6.1 % — HIGH (ref 4–5.6)
ALBUMIN SERPL ELPH-MCNC: 3.4 G/DL — LOW (ref 3.5–5.2)
ALBUMIN SERPL ELPH-MCNC: 3.6 G/DL — SIGNIFICANT CHANGE UP (ref 3.5–5.2)
ALBUMIN SERPL ELPH-MCNC: 3.7 G/DL — SIGNIFICANT CHANGE UP (ref 3.5–5.2)
ALBUMIN SERPL ELPH-MCNC: 3.8 G/DL — SIGNIFICANT CHANGE UP (ref 3.5–5.2)
ALBUMIN SERPL ELPH-MCNC: 4 G/DL — SIGNIFICANT CHANGE UP (ref 3.5–5.2)
ALBUMIN SERPL ELPH-MCNC: 4.3 G/DL — SIGNIFICANT CHANGE UP (ref 3.5–5.2)
ALBUMIN SERPL ELPH-MCNC: 4.5 G/DL — SIGNIFICANT CHANGE UP (ref 3.5–5.2)
ALP SERPL-CCNC: 142 U/L — HIGH (ref 30–115)
ALP SERPL-CCNC: 82 U/L — SIGNIFICANT CHANGE UP (ref 30–115)
ALP SERPL-CCNC: 87 U/L — SIGNIFICANT CHANGE UP (ref 30–115)
ALP SERPL-CCNC: 88 U/L — SIGNIFICANT CHANGE UP (ref 30–115)
ALP SERPL-CCNC: 89 U/L — SIGNIFICANT CHANGE UP (ref 30–115)
ALP SERPL-CCNC: 91 U/L — SIGNIFICANT CHANGE UP (ref 30–115)
ALP SERPL-CCNC: 92 U/L — SIGNIFICANT CHANGE UP (ref 30–115)
ALP SERPL-CCNC: 92 U/L — SIGNIFICANT CHANGE UP (ref 30–115)
ALP SERPL-CCNC: 93 U/L — SIGNIFICANT CHANGE UP (ref 30–115)
ALP SERPL-CCNC: 94 U/L — SIGNIFICANT CHANGE UP (ref 30–115)
ALP SERPL-CCNC: 95 U/L — SIGNIFICANT CHANGE UP (ref 30–115)
ALT FLD-CCNC: 10 U/L — SIGNIFICANT CHANGE UP (ref 0–41)
ALT FLD-CCNC: 11 U/L — SIGNIFICANT CHANGE UP (ref 0–41)
ALT FLD-CCNC: 14 U/L — SIGNIFICANT CHANGE UP (ref 0–41)
ALT FLD-CCNC: 15 U/L — SIGNIFICANT CHANGE UP (ref 0–41)
ALT FLD-CCNC: 16 U/L — SIGNIFICANT CHANGE UP (ref 0–41)
ALT FLD-CCNC: 17 U/L — SIGNIFICANT CHANGE UP (ref 0–41)
ALT FLD-CCNC: 18 U/L — SIGNIFICANT CHANGE UP (ref 0–41)
ALT FLD-CCNC: 19 U/L — SIGNIFICANT CHANGE UP (ref 0–41)
ALT FLD-CCNC: 19 U/L — SIGNIFICANT CHANGE UP (ref 0–41)
ALT FLD-CCNC: 24 U/L — SIGNIFICANT CHANGE UP (ref 0–41)
ALT FLD-CCNC: 7 U/L — SIGNIFICANT CHANGE UP (ref 0–41)
ANION GAP SERPL CALC-SCNC: 10 MMOL/L — SIGNIFICANT CHANGE UP (ref 7–14)
ANION GAP SERPL CALC-SCNC: 12 MMOL/L — SIGNIFICANT CHANGE UP (ref 7–14)
ANION GAP SERPL CALC-SCNC: 13 MMOL/L — SIGNIFICANT CHANGE UP (ref 7–14)
ANION GAP SERPL CALC-SCNC: 13 MMOL/L — SIGNIFICANT CHANGE UP (ref 7–14)
ANION GAP SERPL CALC-SCNC: 14 MMOL/L — SIGNIFICANT CHANGE UP (ref 7–14)
ANION GAP SERPL CALC-SCNC: 17 MMOL/L — HIGH (ref 7–14)
ANION GAP SERPL CALC-SCNC: 9 MMOL/L — SIGNIFICANT CHANGE UP (ref 7–14)
APPEARANCE UR: CLEAR — SIGNIFICANT CHANGE UP
APPEARANCE UR: CLEAR — SIGNIFICANT CHANGE UP
APTT BLD: 38.1 SEC — SIGNIFICANT CHANGE UP (ref 27–39.2)
APTT BLD: 42.1 SEC — HIGH (ref 27–39.2)
APTT BLD: 73.9 SEC — CRITICAL HIGH (ref 27–39.2)
AST SERPL-CCNC: 23 U/L — SIGNIFICANT CHANGE UP (ref 0–41)
AST SERPL-CCNC: 28 U/L — SIGNIFICANT CHANGE UP (ref 0–41)
AST SERPL-CCNC: 32 U/L — SIGNIFICANT CHANGE UP (ref 0–41)
AST SERPL-CCNC: 41 U/L — SIGNIFICANT CHANGE UP (ref 0–41)
AST SERPL-CCNC: 46 U/L — HIGH (ref 0–41)
AST SERPL-CCNC: 49 U/L — HIGH (ref 0–41)
AST SERPL-CCNC: 54 U/L — HIGH (ref 0–41)
AST SERPL-CCNC: 60 U/L — HIGH (ref 0–41)
AST SERPL-CCNC: 61 U/L — HIGH (ref 0–41)
AST SERPL-CCNC: 64 U/L — HIGH (ref 0–41)
AST SERPL-CCNC: 76 U/L — HIGH (ref 0–41)
BACTERIA # UR AUTO: NEGATIVE — SIGNIFICANT CHANGE UP
BASOPHILS # BLD AUTO: 0.02 K/UL — SIGNIFICANT CHANGE UP (ref 0–0.2)
BASOPHILS # BLD AUTO: 0.03 K/UL — SIGNIFICANT CHANGE UP (ref 0–0.2)
BASOPHILS NFR BLD AUTO: 0.2 % — SIGNIFICANT CHANGE UP (ref 0–1)
BASOPHILS NFR BLD AUTO: 0.3 % — SIGNIFICANT CHANGE UP (ref 0–1)
BASOPHILS NFR BLD AUTO: 0.4 % — SIGNIFICANT CHANGE UP (ref 0–1)
BASOPHILS NFR BLD AUTO: 0.5 % — SIGNIFICANT CHANGE UP (ref 0–1)
BASOPHILS NFR BLD AUTO: 0.5 % — SIGNIFICANT CHANGE UP (ref 0–1)
BILIRUB SERPL-MCNC: 1.2 MG/DL — SIGNIFICANT CHANGE UP (ref 0.2–1.2)
BILIRUB SERPL-MCNC: 1.3 MG/DL — HIGH (ref 0.2–1.2)
BILIRUB SERPL-MCNC: 1.4 MG/DL — HIGH (ref 0.2–1.2)
BILIRUB SERPL-MCNC: 1.4 MG/DL — HIGH (ref 0.2–1.2)
BILIRUB SERPL-MCNC: 1.5 MG/DL — HIGH (ref 0.2–1.2)
BILIRUB SERPL-MCNC: 1.6 MG/DL — HIGH (ref 0.2–1.2)
BILIRUB SERPL-MCNC: 1.6 MG/DL — HIGH (ref 0.2–1.2)
BILIRUB SERPL-MCNC: 1.7 MG/DL — HIGH (ref 0.2–1.2)
BILIRUB SERPL-MCNC: 1.8 MG/DL — HIGH (ref 0.2–1.2)
BILIRUB UR-MCNC: NEGATIVE — SIGNIFICANT CHANGE UP
BILIRUB UR-MCNC: NEGATIVE — SIGNIFICANT CHANGE UP
BLD GP AB SCN SERPL QL: SIGNIFICANT CHANGE UP
BUN SERPL-MCNC: 15 MG/DL — SIGNIFICANT CHANGE UP (ref 10–20)
BUN SERPL-MCNC: 16 MG/DL — SIGNIFICANT CHANGE UP (ref 10–20)
BUN SERPL-MCNC: 17 MG/DL — SIGNIFICANT CHANGE UP (ref 10–20)
BUN SERPL-MCNC: 17 MG/DL — SIGNIFICANT CHANGE UP (ref 10–20)
BUN SERPL-MCNC: 18 MG/DL — SIGNIFICANT CHANGE UP (ref 10–20)
BUN SERPL-MCNC: 19 MG/DL — SIGNIFICANT CHANGE UP (ref 10–20)
BUN SERPL-MCNC: 19 MG/DL — SIGNIFICANT CHANGE UP (ref 10–20)
BUN SERPL-MCNC: 20 MG/DL — SIGNIFICANT CHANGE UP (ref 10–20)
BUN SERPL-MCNC: 21 MG/DL — HIGH (ref 10–20)
BUN SERPL-MCNC: 22 MG/DL — HIGH (ref 10–20)
BUN SERPL-MCNC: 25 MG/DL — HIGH (ref 10–20)
BUN SERPL-MCNC: 28 MG/DL — HIGH (ref 10–20)
CALCIUM SERPL-MCNC: 8.9 MG/DL — SIGNIFICANT CHANGE UP (ref 8.5–10.1)
CALCIUM SERPL-MCNC: 9 MG/DL — SIGNIFICANT CHANGE UP (ref 8.5–10.1)
CALCIUM SERPL-MCNC: 9.1 MG/DL — SIGNIFICANT CHANGE UP (ref 8.5–10.1)
CALCIUM SERPL-MCNC: 9.1 MG/DL — SIGNIFICANT CHANGE UP (ref 8.5–10.1)
CALCIUM SERPL-MCNC: 9.2 MG/DL — SIGNIFICANT CHANGE UP (ref 8.5–10.1)
CALCIUM SERPL-MCNC: 9.3 MG/DL — SIGNIFICANT CHANGE UP (ref 8.5–10.1)
CALCIUM SERPL-MCNC: 9.4 MG/DL — SIGNIFICANT CHANGE UP (ref 8.5–10.1)
CALCIUM SERPL-MCNC: 9.4 MG/DL — SIGNIFICANT CHANGE UP (ref 8.5–10.1)
CALCIUM SERPL-MCNC: 9.5 MG/DL — SIGNIFICANT CHANGE UP (ref 8.5–10.1)
CALCIUM SERPL-MCNC: 9.7 MG/DL — SIGNIFICANT CHANGE UP (ref 8.5–10.1)
CHLORIDE SERPL-SCNC: 100 MMOL/L — SIGNIFICANT CHANGE UP (ref 98–110)
CHLORIDE SERPL-SCNC: 101 MMOL/L — SIGNIFICANT CHANGE UP (ref 98–110)
CHLORIDE SERPL-SCNC: 102 MMOL/L — SIGNIFICANT CHANGE UP (ref 98–110)
CHLORIDE SERPL-SCNC: 103 MMOL/L — SIGNIFICANT CHANGE UP (ref 98–110)
CHLORIDE SERPL-SCNC: 103 MMOL/L — SIGNIFICANT CHANGE UP (ref 98–110)
CHLORIDE SERPL-SCNC: 104 MMOL/L — SIGNIFICANT CHANGE UP (ref 98–110)
CHLORIDE SERPL-SCNC: 104 MMOL/L — SIGNIFICANT CHANGE UP (ref 98–110)
CHLORIDE SERPL-SCNC: 105 MMOL/L — SIGNIFICANT CHANGE UP (ref 98–110)
CHOLEST SERPL-MCNC: 273 MG/DL — HIGH
CK MB CFR SERPL CALC: 28.3 NG/ML — HIGH (ref 0.6–6.3)
CK MB CFR SERPL CALC: 29.4 NG/ML — HIGH (ref 0.6–6.3)
CK SERPL-CCNC: 1169 U/L — HIGH (ref 0–225)
CK SERPL-CCNC: 1266 U/L — HIGH (ref 0–225)
CK SERPL-CCNC: 1291 U/L — HIGH (ref 0–225)
CK SERPL-CCNC: 1308 U/L — HIGH (ref 0–225)
CK SERPL-CCNC: 1371 U/L — HIGH (ref 0–225)
CK SERPL-CCNC: 1683 U/L — HIGH (ref 0–225)
CK SERPL-CCNC: 857 U/L — HIGH (ref 0–225)
CO2 SERPL-SCNC: 20 MMOL/L — SIGNIFICANT CHANGE UP (ref 17–32)
CO2 SERPL-SCNC: 20 MMOL/L — SIGNIFICANT CHANGE UP (ref 17–32)
CO2 SERPL-SCNC: 21 MMOL/L — SIGNIFICANT CHANGE UP (ref 17–32)
CO2 SERPL-SCNC: 22 MMOL/L — SIGNIFICANT CHANGE UP (ref 17–32)
CO2 SERPL-SCNC: 22 MMOL/L — SIGNIFICANT CHANGE UP (ref 17–32)
CO2 SERPL-SCNC: 23 MMOL/L — SIGNIFICANT CHANGE UP (ref 17–32)
CO2 SERPL-SCNC: 24 MMOL/L — SIGNIFICANT CHANGE UP (ref 17–32)
CO2 SERPL-SCNC: 25 MMOL/L — SIGNIFICANT CHANGE UP (ref 17–32)
COLOR SPEC: SIGNIFICANT CHANGE UP
COLOR SPEC: YELLOW — SIGNIFICANT CHANGE UP
CREAT SERPL-MCNC: 1.1 MG/DL — SIGNIFICANT CHANGE UP (ref 0.7–1.5)
CREAT SERPL-MCNC: 1.2 MG/DL — SIGNIFICANT CHANGE UP (ref 0.7–1.5)
CREAT SERPL-MCNC: 1.3 MG/DL — SIGNIFICANT CHANGE UP (ref 0.7–1.5)
CREAT SERPL-MCNC: 1.3 MG/DL — SIGNIFICANT CHANGE UP (ref 0.7–1.5)
CULTURE RESULTS: SIGNIFICANT CHANGE UP
CULTURE RESULTS: SIGNIFICANT CHANGE UP
DIFF PNL FLD: NEGATIVE — SIGNIFICANT CHANGE UP
DIFF PNL FLD: NEGATIVE — SIGNIFICANT CHANGE UP
EGFR: 55 ML/MIN/1.73M2 — LOW
EGFR: 60 ML/MIN/1.73M2 — SIGNIFICANT CHANGE UP
EGFR: 67 ML/MIN/1.73M2 — SIGNIFICANT CHANGE UP
EOSINOPHIL # BLD AUTO: 0.04 K/UL — SIGNIFICANT CHANGE UP (ref 0–0.7)
EOSINOPHIL # BLD AUTO: 0.04 K/UL — SIGNIFICANT CHANGE UP (ref 0–0.7)
EOSINOPHIL # BLD AUTO: 0.06 K/UL — SIGNIFICANT CHANGE UP (ref 0–0.7)
EOSINOPHIL # BLD AUTO: 0.06 K/UL — SIGNIFICANT CHANGE UP (ref 0–0.7)
EOSINOPHIL # BLD AUTO: 0.11 K/UL — SIGNIFICANT CHANGE UP (ref 0–0.7)
EOSINOPHIL # BLD AUTO: 0.12 K/UL — SIGNIFICANT CHANGE UP (ref 0–0.7)
EOSINOPHIL # BLD AUTO: 0.12 K/UL — SIGNIFICANT CHANGE UP (ref 0–0.7)
EOSINOPHIL # BLD AUTO: 0.13 K/UL — SIGNIFICANT CHANGE UP (ref 0–0.7)
EOSINOPHIL # BLD AUTO: 0.16 K/UL — SIGNIFICANT CHANGE UP (ref 0–0.7)
EOSINOPHIL # BLD AUTO: 0.17 K/UL — SIGNIFICANT CHANGE UP (ref 0–0.7)
EOSINOPHIL # BLD AUTO: 0.17 K/UL — SIGNIFICANT CHANGE UP (ref 0–0.7)
EOSINOPHIL # BLD AUTO: 0.18 K/UL — SIGNIFICANT CHANGE UP (ref 0–0.7)
EOSINOPHIL NFR BLD AUTO: 0.4 % — SIGNIFICANT CHANGE UP (ref 0–8)
EOSINOPHIL NFR BLD AUTO: 0.4 % — SIGNIFICANT CHANGE UP (ref 0–8)
EOSINOPHIL NFR BLD AUTO: 0.7 % — SIGNIFICANT CHANGE UP (ref 0–8)
EOSINOPHIL NFR BLD AUTO: 0.7 % — SIGNIFICANT CHANGE UP (ref 0–8)
EOSINOPHIL NFR BLD AUTO: 1.8 % — SIGNIFICANT CHANGE UP (ref 0–8)
EOSINOPHIL NFR BLD AUTO: 1.9 % — SIGNIFICANT CHANGE UP (ref 0–8)
EOSINOPHIL NFR BLD AUTO: 1.9 % — SIGNIFICANT CHANGE UP (ref 0–8)
EOSINOPHIL NFR BLD AUTO: 2.1 % — SIGNIFICANT CHANGE UP (ref 0–8)
EOSINOPHIL NFR BLD AUTO: 2.1 % — SIGNIFICANT CHANGE UP (ref 0–8)
EOSINOPHIL NFR BLD AUTO: 2.5 % — SIGNIFICANT CHANGE UP (ref 0–8)
EOSINOPHIL NFR BLD AUTO: 2.6 % — SIGNIFICANT CHANGE UP (ref 0–8)
EOSINOPHIL NFR BLD AUTO: 3 % — SIGNIFICANT CHANGE UP (ref 0–8)
EPI CELLS # UR: 1 /HPF — SIGNIFICANT CHANGE UP (ref 0–5)
ESTIMATED AVERAGE GLUCOSE: 128 MG/DL — HIGH (ref 68–114)
ETHANOL SERPL-MCNC: <10 MG/DL — SIGNIFICANT CHANGE UP
FOLATE SERPL-MCNC: 8.8 NG/ML — SIGNIFICANT CHANGE UP
GLUCOSE BLDC GLUCOMTR-MCNC: 105 MG/DL — HIGH (ref 70–99)
GLUCOSE BLDC GLUCOMTR-MCNC: 105 MG/DL — HIGH (ref 70–99)
GLUCOSE BLDC GLUCOMTR-MCNC: 108 MG/DL — HIGH (ref 70–99)
GLUCOSE BLDC GLUCOMTR-MCNC: 114 MG/DL — HIGH (ref 70–99)
GLUCOSE BLDC GLUCOMTR-MCNC: 114 MG/DL — HIGH (ref 70–99)
GLUCOSE BLDC GLUCOMTR-MCNC: 118 MG/DL — HIGH (ref 70–99)
GLUCOSE BLDC GLUCOMTR-MCNC: 120 MG/DL — HIGH (ref 70–99)
GLUCOSE BLDC GLUCOMTR-MCNC: 123 MG/DL — HIGH (ref 70–99)
GLUCOSE BLDC GLUCOMTR-MCNC: 125 MG/DL — HIGH (ref 70–99)
GLUCOSE BLDC GLUCOMTR-MCNC: 126 MG/DL — HIGH (ref 70–99)
GLUCOSE BLDC GLUCOMTR-MCNC: 128 MG/DL — HIGH (ref 70–99)
GLUCOSE BLDC GLUCOMTR-MCNC: 132 MG/DL — HIGH (ref 70–99)
GLUCOSE BLDC GLUCOMTR-MCNC: 132 MG/DL — HIGH (ref 70–99)
GLUCOSE BLDC GLUCOMTR-MCNC: 133 MG/DL — HIGH (ref 70–99)
GLUCOSE BLDC GLUCOMTR-MCNC: 135 MG/DL — HIGH (ref 70–99)
GLUCOSE BLDC GLUCOMTR-MCNC: 135 MG/DL — HIGH (ref 70–99)
GLUCOSE BLDC GLUCOMTR-MCNC: 149 MG/DL — HIGH (ref 70–99)
GLUCOSE BLDC GLUCOMTR-MCNC: 175 MG/DL — HIGH (ref 70–99)
GLUCOSE BLDC GLUCOMTR-MCNC: 200 MG/DL — HIGH (ref 70–99)
GLUCOSE BLDC GLUCOMTR-MCNC: 224 MG/DL — HIGH (ref 70–99)
GLUCOSE BLDC GLUCOMTR-MCNC: 77 MG/DL — SIGNIFICANT CHANGE UP (ref 70–99)
GLUCOSE BLDC GLUCOMTR-MCNC: 90 MG/DL — SIGNIFICANT CHANGE UP (ref 70–99)
GLUCOSE BLDC GLUCOMTR-MCNC: 91 MG/DL — SIGNIFICANT CHANGE UP (ref 70–99)
GLUCOSE BLDC GLUCOMTR-MCNC: 93 MG/DL — SIGNIFICANT CHANGE UP (ref 70–99)
GLUCOSE SERPL-MCNC: 103 MG/DL — HIGH (ref 70–99)
GLUCOSE SERPL-MCNC: 105 MG/DL — HIGH (ref 70–99)
GLUCOSE SERPL-MCNC: 112 MG/DL — HIGH (ref 70–99)
GLUCOSE SERPL-MCNC: 117 MG/DL — HIGH (ref 70–99)
GLUCOSE SERPL-MCNC: 118 MG/DL — HIGH (ref 70–99)
GLUCOSE SERPL-MCNC: 132 MG/DL — HIGH (ref 70–99)
GLUCOSE SERPL-MCNC: 67 MG/DL — LOW (ref 70–99)
GLUCOSE SERPL-MCNC: 72 MG/DL — SIGNIFICANT CHANGE UP (ref 70–99)
GLUCOSE SERPL-MCNC: 73 MG/DL — SIGNIFICANT CHANGE UP (ref 70–99)
GLUCOSE SERPL-MCNC: 80 MG/DL — SIGNIFICANT CHANGE UP (ref 70–99)
GLUCOSE SERPL-MCNC: 82 MG/DL — SIGNIFICANT CHANGE UP (ref 70–99)
GLUCOSE SERPL-MCNC: 88 MG/DL — SIGNIFICANT CHANGE UP (ref 70–99)
GLUCOSE SERPL-MCNC: 91 MG/DL — SIGNIFICANT CHANGE UP (ref 70–99)
GLUCOSE SERPL-MCNC: 94 MG/DL — SIGNIFICANT CHANGE UP (ref 70–99)
GLUCOSE UR QL: NEGATIVE — SIGNIFICANT CHANGE UP
GLUCOSE UR QL: NEGATIVE — SIGNIFICANT CHANGE UP
HCT VFR BLD CALC: 27.6 % — LOW (ref 42–52)
HCT VFR BLD CALC: 30.7 % — LOW (ref 42–52)
HCT VFR BLD CALC: 31.3 % — LOW (ref 42–52)
HCT VFR BLD CALC: 31.9 % — LOW (ref 42–52)
HCT VFR BLD CALC: 33.8 % — LOW (ref 42–52)
HCT VFR BLD CALC: 33.8 % — LOW (ref 42–52)
HCT VFR BLD CALC: 33.9 % — LOW (ref 42–52)
HCT VFR BLD CALC: 34 % — LOW (ref 42–52)
HCT VFR BLD CALC: 34.5 % — LOW (ref 42–52)
HCT VFR BLD CALC: 34.6 % — LOW (ref 42–52)
HCT VFR BLD CALC: 35.9 % — LOW (ref 42–52)
HCT VFR BLD CALC: 37 % — LOW (ref 42–52)
HCT VFR BLD CALC: 44.6 % — SIGNIFICANT CHANGE UP (ref 42–52)
HDLC SERPL-MCNC: 49 MG/DL — SIGNIFICANT CHANGE UP
HGB BLD-MCNC: 10.1 G/DL — LOW (ref 14–18)
HGB BLD-MCNC: 10.5 G/DL — LOW (ref 14–18)
HGB BLD-MCNC: 10.5 G/DL — LOW (ref 14–18)
HGB BLD-MCNC: 10.6 G/DL — LOW (ref 14–18)
HGB BLD-MCNC: 11.2 G/DL — LOW (ref 14–18)
HGB BLD-MCNC: 11.4 G/DL — LOW (ref 14–18)
HGB BLD-MCNC: 11.7 G/DL — LOW (ref 14–18)
HGB BLD-MCNC: 12 G/DL — LOW (ref 14–18)
HGB BLD-MCNC: 12.4 G/DL — LOW (ref 14–18)
HGB BLD-MCNC: 14.6 G/DL — SIGNIFICANT CHANGE UP (ref 14–18)
HGB BLD-MCNC: 9.4 G/DL — LOW (ref 14–18)
HYALINE CASTS # UR AUTO: 2 /LPF — SIGNIFICANT CHANGE UP (ref 0–7)
IMM GRANULOCYTES NFR BLD AUTO: 0.3 % — SIGNIFICANT CHANGE UP (ref 0.1–0.3)
IMM GRANULOCYTES NFR BLD AUTO: 0.4 % — HIGH (ref 0.1–0.3)
IMM GRANULOCYTES NFR BLD AUTO: 0.5 % — HIGH (ref 0.1–0.3)
IMM GRANULOCYTES NFR BLD AUTO: 0.7 % — HIGH (ref 0.1–0.3)
IMM GRANULOCYTES NFR BLD AUTO: 0.8 % — HIGH (ref 0.1–0.3)
IMM GRANULOCYTES NFR BLD AUTO: 0.8 % — HIGH (ref 0.1–0.3)
IMM GRANULOCYTES NFR BLD AUTO: 0.9 % — HIGH (ref 0.1–0.3)
IMM GRANULOCYTES NFR BLD AUTO: 1 % — HIGH (ref 0.1–0.3)
INR BLD: 1.19 RATIO — SIGNIFICANT CHANGE UP (ref 0.65–1.3)
INR BLD: 1.4 RATIO — HIGH (ref 0.65–1.3)
KETONES UR-MCNC: NEGATIVE — SIGNIFICANT CHANGE UP
KETONES UR-MCNC: NEGATIVE — SIGNIFICANT CHANGE UP
LACTATE SERPL-SCNC: 1.4 MMOL/L — SIGNIFICANT CHANGE UP (ref 0.7–2)
LEUKOCYTE ESTERASE UR-ACNC: NEGATIVE — SIGNIFICANT CHANGE UP
LEUKOCYTE ESTERASE UR-ACNC: NEGATIVE — SIGNIFICANT CHANGE UP
LIDOCAIN IGE QN: 16 U/L — SIGNIFICANT CHANGE UP (ref 7–60)
LIPID PNL WITH DIRECT LDL SERPL: 189 MG/DL — HIGH
LYMPHOCYTES # BLD AUTO: 0.87 K/UL — LOW (ref 1.2–3.4)
LYMPHOCYTES # BLD AUTO: 0.88 K/UL — LOW (ref 1.2–3.4)
LYMPHOCYTES # BLD AUTO: 0.89 K/UL — LOW (ref 1.2–3.4)
LYMPHOCYTES # BLD AUTO: 0.92 K/UL — LOW (ref 1.2–3.4)
LYMPHOCYTES # BLD AUTO: 0.93 K/UL — LOW (ref 1.2–3.4)
LYMPHOCYTES # BLD AUTO: 0.95 K/UL — LOW (ref 1.2–3.4)
LYMPHOCYTES # BLD AUTO: 1.02 K/UL — LOW (ref 1.2–3.4)
LYMPHOCYTES # BLD AUTO: 1.02 K/UL — LOW (ref 1.2–3.4)
LYMPHOCYTES # BLD AUTO: 1.05 K/UL — LOW (ref 1.2–3.4)
LYMPHOCYTES # BLD AUTO: 1.13 K/UL — LOW (ref 1.2–3.4)
LYMPHOCYTES # BLD AUTO: 1.34 K/UL — SIGNIFICANT CHANGE UP (ref 1.2–3.4)
LYMPHOCYTES # BLD AUTO: 1.4 K/UL — SIGNIFICANT CHANGE UP (ref 1.2–3.4)
LYMPHOCYTES # BLD AUTO: 10.7 % — LOW (ref 20.5–51.1)
LYMPHOCYTES # BLD AUTO: 11.1 % — LOW (ref 20.5–51.1)
LYMPHOCYTES # BLD AUTO: 14.5 % — LOW (ref 20.5–51.1)
LYMPHOCYTES # BLD AUTO: 14.9 % — LOW (ref 20.5–51.1)
LYMPHOCYTES # BLD AUTO: 15.1 % — LOW (ref 20.5–51.1)
LYMPHOCYTES # BLD AUTO: 16.7 % — LOW (ref 20.5–51.1)
LYMPHOCYTES # BLD AUTO: 17.6 % — LOW (ref 20.5–51.1)
LYMPHOCYTES # BLD AUTO: 17.9 % — LOW (ref 20.5–51.1)
LYMPHOCYTES # BLD AUTO: 18.2 % — LOW (ref 20.5–51.1)
LYMPHOCYTES # BLD AUTO: 19.4 % — LOW (ref 20.5–51.1)
LYMPHOCYTES # BLD AUTO: 7.8 % — LOW (ref 20.5–51.1)
LYMPHOCYTES # BLD AUTO: 9.4 % — LOW (ref 20.5–51.1)
MAGNESIUM SERPL-MCNC: 1.6 MG/DL — LOW (ref 1.8–2.4)
MAGNESIUM SERPL-MCNC: 1.6 MG/DL — LOW (ref 1.8–2.4)
MAGNESIUM SERPL-MCNC: 1.7 MG/DL — LOW (ref 1.8–2.4)
MAGNESIUM SERPL-MCNC: 1.7 MG/DL — LOW (ref 1.8–2.4)
MAGNESIUM SERPL-MCNC: 1.8 MG/DL — SIGNIFICANT CHANGE UP (ref 1.8–2.4)
MAGNESIUM SERPL-MCNC: 2.1 MG/DL — SIGNIFICANT CHANGE UP (ref 1.8–2.4)
MCHC RBC-ENTMCNC: 31.3 G/DL — LOW (ref 32–37)
MCHC RBC-ENTMCNC: 32.7 G/DL — SIGNIFICANT CHANGE UP (ref 32–37)
MCHC RBC-ENTMCNC: 32.9 G/DL — SIGNIFICANT CHANGE UP (ref 32–37)
MCHC RBC-ENTMCNC: 32.9 PG — HIGH (ref 27–31)
MCHC RBC-ENTMCNC: 33 G/DL — SIGNIFICANT CHANGE UP (ref 32–37)
MCHC RBC-ENTMCNC: 33.1 G/DL — SIGNIFICANT CHANGE UP (ref 32–37)
MCHC RBC-ENTMCNC: 33.1 G/DL — SIGNIFICANT CHANGE UP (ref 32–37)
MCHC RBC-ENTMCNC: 33.2 PG — HIGH (ref 27–31)
MCHC RBC-ENTMCNC: 33.4 G/DL — SIGNIFICANT CHANGE UP (ref 32–37)
MCHC RBC-ENTMCNC: 33.5 G/DL — SIGNIFICANT CHANGE UP (ref 32–37)
MCHC RBC-ENTMCNC: 33.5 G/DL — SIGNIFICANT CHANGE UP (ref 32–37)
MCHC RBC-ENTMCNC: 33.8 G/DL — SIGNIFICANT CHANGE UP (ref 32–37)
MCHC RBC-ENTMCNC: 33.9 PG — HIGH (ref 27–31)
MCHC RBC-ENTMCNC: 34.1 G/DL — SIGNIFICANT CHANGE UP (ref 32–37)
MCHC RBC-ENTMCNC: 34.4 PG — HIGH (ref 27–31)
MCHC RBC-ENTMCNC: 34.4 PG — HIGH (ref 27–31)
MCHC RBC-ENTMCNC: 34.5 PG — HIGH (ref 27–31)
MCHC RBC-ENTMCNC: 34.5 PG — HIGH (ref 27–31)
MCHC RBC-ENTMCNC: 34.7 PG — HIGH (ref 27–31)
MCHC RBC-ENTMCNC: 34.8 PG — HIGH (ref 27–31)
MCHC RBC-ENTMCNC: 34.8 PG — HIGH (ref 27–31)
MCHC RBC-ENTMCNC: 35.5 PG — HIGH (ref 27–31)
MCV RBC AUTO: 101.4 FL — HIGH (ref 80–94)
MCV RBC AUTO: 102.4 FL — HIGH (ref 80–94)
MCV RBC AUTO: 102.7 FL — HIGH (ref 80–94)
MCV RBC AUTO: 103 FL — HIGH (ref 80–94)
MCV RBC AUTO: 103.9 FL — HIGH (ref 80–94)
MCV RBC AUTO: 104.1 FL — HIGH (ref 80–94)
MCV RBC AUTO: 104.2 FL — HIGH (ref 80–94)
MCV RBC AUTO: 104.3 FL — HIGH (ref 80–94)
MCV RBC AUTO: 104.4 FL — HIGH (ref 80–94)
MCV RBC AUTO: 104.5 FL — HIGH (ref 80–94)
MCV RBC AUTO: 104.6 FL — HIGH (ref 80–94)
MCV RBC AUTO: 105.3 FL — HIGH (ref 80–94)
MCV RBC AUTO: 105.3 FL — HIGH (ref 80–94)
METHOD TYPE: SIGNIFICANT CHANGE UP
MONOCYTES # BLD AUTO: 0.78 K/UL — HIGH (ref 0.1–0.6)
MONOCYTES # BLD AUTO: 0.86 K/UL — HIGH (ref 0.1–0.6)
MONOCYTES # BLD AUTO: 0.87 K/UL — HIGH (ref 0.1–0.6)
MONOCYTES # BLD AUTO: 0.89 K/UL — HIGH (ref 0.1–0.6)
MONOCYTES # BLD AUTO: 0.91 K/UL — HIGH (ref 0.1–0.6)
MONOCYTES # BLD AUTO: 0.93 K/UL — HIGH (ref 0.1–0.6)
MONOCYTES # BLD AUTO: 0.94 K/UL — HIGH (ref 0.1–0.6)
MONOCYTES # BLD AUTO: 0.98 K/UL — HIGH (ref 0.1–0.6)
MONOCYTES # BLD AUTO: 1.04 K/UL — HIGH (ref 0.1–0.6)
MONOCYTES # BLD AUTO: 1.09 K/UL — HIGH (ref 0.1–0.6)
MONOCYTES # BLD AUTO: 1.1 K/UL — HIGH (ref 0.1–0.6)
MONOCYTES # BLD AUTO: 1.48 K/UL — HIGH (ref 0.1–0.6)
MONOCYTES NFR BLD AUTO: 11.3 % — HIGH (ref 1.7–9.3)
MONOCYTES NFR BLD AUTO: 12.1 % — HIGH (ref 1.7–9.3)
MONOCYTES NFR BLD AUTO: 12.2 % — HIGH (ref 1.7–9.3)
MONOCYTES NFR BLD AUTO: 12.3 % — HIGH (ref 1.7–9.3)
MONOCYTES NFR BLD AUTO: 12.6 % — HIGH (ref 1.7–9.3)
MONOCYTES NFR BLD AUTO: 13.3 % — HIGH (ref 1.7–9.3)
MONOCYTES NFR BLD AUTO: 13.9 % — HIGH (ref 1.7–9.3)
MONOCYTES NFR BLD AUTO: 15.6 % — HIGH (ref 1.7–9.3)
MONOCYTES NFR BLD AUTO: 15.7 % — HIGH (ref 1.7–9.3)
MONOCYTES NFR BLD AUTO: 16.1 % — HIGH (ref 1.7–9.3)
MONOCYTES NFR BLD AUTO: 17.1 % — HIGH (ref 1.7–9.3)
MONOCYTES NFR BLD AUTO: 9.6 % — HIGH (ref 1.7–9.3)
MRSA PCR RESULT.: NEGATIVE — SIGNIFICANT CHANGE UP
NEUTROPHILS # BLD AUTO: 3.61 K/UL — SIGNIFICANT CHANGE UP (ref 1.4–6.5)
NEUTROPHILS # BLD AUTO: 3.68 K/UL — SIGNIFICANT CHANGE UP (ref 1.4–6.5)
NEUTROPHILS # BLD AUTO: 3.84 K/UL — SIGNIFICANT CHANGE UP (ref 1.4–6.5)
NEUTROPHILS # BLD AUTO: 4.21 K/UL — SIGNIFICANT CHANGE UP (ref 1.4–6.5)
NEUTROPHILS # BLD AUTO: 4.44 K/UL — SIGNIFICANT CHANGE UP (ref 1.4–6.5)
NEUTROPHILS # BLD AUTO: 4.8 K/UL — SIGNIFICANT CHANGE UP (ref 1.4–6.5)
NEUTROPHILS # BLD AUTO: 4.92 K/UL — SIGNIFICANT CHANGE UP (ref 1.4–6.5)
NEUTROPHILS # BLD AUTO: 5.11 K/UL — SIGNIFICANT CHANGE UP (ref 1.4–6.5)
NEUTROPHILS # BLD AUTO: 6.13 K/UL — SIGNIFICANT CHANGE UP (ref 1.4–6.5)
NEUTROPHILS # BLD AUTO: 6.15 K/UL — SIGNIFICANT CHANGE UP (ref 1.4–6.5)
NEUTROPHILS # BLD AUTO: 7.2 K/UL — HIGH (ref 1.4–6.5)
NEUTROPHILS # BLD AUTO: 9.24 K/UL — HIGH (ref 1.4–6.5)
NEUTROPHILS NFR BLD AUTO: 63.4 % — SIGNIFICANT CHANGE UP (ref 42.2–75.2)
NEUTROPHILS NFR BLD AUTO: 63.4 % — SIGNIFICANT CHANGE UP (ref 42.2–75.2)
NEUTROPHILS NFR BLD AUTO: 64.2 % — SIGNIFICANT CHANGE UP (ref 42.2–75.2)
NEUTROPHILS NFR BLD AUTO: 65.7 % — SIGNIFICANT CHANGE UP (ref 42.2–75.2)
NEUTROPHILS NFR BLD AUTO: 66.4 % — SIGNIFICANT CHANGE UP (ref 42.2–75.2)
NEUTROPHILS NFR BLD AUTO: 66.6 % — SIGNIFICANT CHANGE UP (ref 42.2–75.2)
NEUTROPHILS NFR BLD AUTO: 68.3 % — SIGNIFICANT CHANGE UP (ref 42.2–75.2)
NEUTROPHILS NFR BLD AUTO: 69.7 % — SIGNIFICANT CHANGE UP (ref 42.2–75.2)
NEUTROPHILS NFR BLD AUTO: 71 % — SIGNIFICANT CHANGE UP (ref 42.2–75.2)
NEUTROPHILS NFR BLD AUTO: 74.3 % — SIGNIFICANT CHANGE UP (ref 42.2–75.2)
NEUTROPHILS NFR BLD AUTO: 78.2 % — HIGH (ref 42.2–75.2)
NEUTROPHILS NFR BLD AUTO: 81.5 % — HIGH (ref 42.2–75.2)
NITRITE UR-MCNC: NEGATIVE — SIGNIFICANT CHANGE UP
NITRITE UR-MCNC: NEGATIVE — SIGNIFICANT CHANGE UP
NON HDL CHOLESTEROL: 224 MG/DL — HIGH
NRBC # BLD: 0 /100 WBCS — SIGNIFICANT CHANGE UP (ref 0–0)
ORGANISM # SPEC MICROSCOPIC CNT: SIGNIFICANT CHANGE UP
PH UR: 5 — SIGNIFICANT CHANGE UP (ref 5–8)
PH UR: 6.5 — SIGNIFICANT CHANGE UP (ref 5–8)
PLATELET # BLD AUTO: 111 K/UL — LOW (ref 130–400)
PLATELET # BLD AUTO: 119 K/UL — LOW (ref 130–400)
PLATELET # BLD AUTO: 124 K/UL — LOW (ref 130–400)
PLATELET # BLD AUTO: 134 K/UL — SIGNIFICANT CHANGE UP (ref 130–400)
PLATELET # BLD AUTO: 136 K/UL — SIGNIFICANT CHANGE UP (ref 130–400)
PLATELET # BLD AUTO: 136 K/UL — SIGNIFICANT CHANGE UP (ref 130–400)
PLATELET # BLD AUTO: 137 K/UL — SIGNIFICANT CHANGE UP (ref 130–400)
PLATELET # BLD AUTO: 137 K/UL — SIGNIFICANT CHANGE UP (ref 130–400)
PLATELET # BLD AUTO: 142 K/UL — SIGNIFICANT CHANGE UP (ref 130–400)
PLATELET # BLD AUTO: 143 K/UL — SIGNIFICANT CHANGE UP (ref 130–400)
PLATELET # BLD AUTO: 144 K/UL — SIGNIFICANT CHANGE UP (ref 130–400)
PLATELET # BLD AUTO: 146 K/UL — SIGNIFICANT CHANGE UP (ref 130–400)
PLATELET # BLD AUTO: 147 K/UL — SIGNIFICANT CHANGE UP (ref 130–400)
POTASSIUM SERPL-MCNC: 4.2 MMOL/L — SIGNIFICANT CHANGE UP (ref 3.5–5)
POTASSIUM SERPL-MCNC: 4.2 MMOL/L — SIGNIFICANT CHANGE UP (ref 3.5–5)
POTASSIUM SERPL-MCNC: 4.6 MMOL/L — SIGNIFICANT CHANGE UP (ref 3.5–5)
POTASSIUM SERPL-MCNC: 4.7 MMOL/L — SIGNIFICANT CHANGE UP (ref 3.5–5)
POTASSIUM SERPL-MCNC: 4.7 MMOL/L — SIGNIFICANT CHANGE UP (ref 3.5–5)
POTASSIUM SERPL-MCNC: 4.8 MMOL/L — SIGNIFICANT CHANGE UP (ref 3.5–5)
POTASSIUM SERPL-MCNC: 4.9 MMOL/L — SIGNIFICANT CHANGE UP (ref 3.5–5)
POTASSIUM SERPL-MCNC: 5 MMOL/L — SIGNIFICANT CHANGE UP (ref 3.5–5)
POTASSIUM SERPL-MCNC: 5 MMOL/L — SIGNIFICANT CHANGE UP (ref 3.5–5)
POTASSIUM SERPL-MCNC: 5.1 MMOL/L — HIGH (ref 3.5–5)
POTASSIUM SERPL-MCNC: 5.3 MMOL/L — HIGH (ref 3.5–5)
POTASSIUM SERPL-MCNC: 5.6 MMOL/L — HIGH (ref 3.5–5)
POTASSIUM SERPL-MCNC: 5.8 MMOL/L — HIGH (ref 3.5–5)
POTASSIUM SERPL-MCNC: 6.2 MMOL/L — CRITICAL HIGH (ref 3.5–5)
POTASSIUM SERPL-SCNC: 4.2 MMOL/L — SIGNIFICANT CHANGE UP (ref 3.5–5)
POTASSIUM SERPL-SCNC: 4.2 MMOL/L — SIGNIFICANT CHANGE UP (ref 3.5–5)
POTASSIUM SERPL-SCNC: 4.6 MMOL/L — SIGNIFICANT CHANGE UP (ref 3.5–5)
POTASSIUM SERPL-SCNC: 4.7 MMOL/L — SIGNIFICANT CHANGE UP (ref 3.5–5)
POTASSIUM SERPL-SCNC: 4.7 MMOL/L — SIGNIFICANT CHANGE UP (ref 3.5–5)
POTASSIUM SERPL-SCNC: 4.8 MMOL/L — SIGNIFICANT CHANGE UP (ref 3.5–5)
POTASSIUM SERPL-SCNC: 4.9 MMOL/L — SIGNIFICANT CHANGE UP (ref 3.5–5)
POTASSIUM SERPL-SCNC: 5 MMOL/L — SIGNIFICANT CHANGE UP (ref 3.5–5)
POTASSIUM SERPL-SCNC: 5 MMOL/L — SIGNIFICANT CHANGE UP (ref 3.5–5)
POTASSIUM SERPL-SCNC: 5.1 MMOL/L — HIGH (ref 3.5–5)
POTASSIUM SERPL-SCNC: 5.3 MMOL/L — HIGH (ref 3.5–5)
POTASSIUM SERPL-SCNC: 5.6 MMOL/L — HIGH (ref 3.5–5)
POTASSIUM SERPL-SCNC: 5.8 MMOL/L — HIGH (ref 3.5–5)
POTASSIUM SERPL-SCNC: 6.2 MMOL/L — CRITICAL HIGH (ref 3.5–5)
PROT SERPL-MCNC: 6.1 G/DL — SIGNIFICANT CHANGE UP (ref 6–8)
PROT SERPL-MCNC: 6.2 G/DL — SIGNIFICANT CHANGE UP (ref 6–8)
PROT SERPL-MCNC: 6.3 G/DL — SIGNIFICANT CHANGE UP (ref 6–8)
PROT SERPL-MCNC: 6.5 G/DL — SIGNIFICANT CHANGE UP (ref 6–8)
PROT SERPL-MCNC: 6.7 G/DL — SIGNIFICANT CHANGE UP (ref 6–8)
PROT SERPL-MCNC: 6.7 G/DL — SIGNIFICANT CHANGE UP (ref 6–8)
PROT SERPL-MCNC: 7.5 G/DL — SIGNIFICANT CHANGE UP (ref 6–8)
PROT UR-MCNC: ABNORMAL
PROT UR-MCNC: NEGATIVE — SIGNIFICANT CHANGE UP
PROTHROM AB SERPL-ACNC: 13.7 SEC — HIGH (ref 9.95–12.87)
PROTHROM AB SERPL-ACNC: 16.1 SEC — HIGH (ref 9.95–12.87)
RBC # BLD: 2.65 M/UL — LOW (ref 4.7–6.1)
RBC # BLD: 2.94 M/UL — LOW (ref 4.7–6.1)
RBC # BLD: 3.03 M/UL — LOW (ref 4.7–6.1)
RBC # BLD: 3.04 M/UL — LOW (ref 4.7–6.1)
RBC # BLD: 3.22 M/UL — LOW (ref 4.7–6.1)
RBC # BLD: 3.23 M/UL — LOW (ref 4.7–6.1)
RBC # BLD: 3.26 M/UL — LOW (ref 4.7–6.1)
RBC # BLD: 3.3 M/UL — LOW (ref 4.7–6.1)
RBC # BLD: 3.3 M/UL — LOW (ref 4.7–6.1)
RBC # BLD: 3.37 M/UL — LOW (ref 4.7–6.1)
RBC # BLD: 3.45 M/UL — LOW (ref 4.7–6.1)
RBC # BLD: 3.56 M/UL — LOW (ref 4.7–6.1)
RBC # BLD: 4.4 M/UL — LOW (ref 4.7–6.1)
RBC # FLD: 13.7 % — SIGNIFICANT CHANGE UP (ref 11.5–14.5)
RBC # FLD: 14.8 % — HIGH (ref 11.5–14.5)
RBC # FLD: 14.9 % — HIGH (ref 11.5–14.5)
RBC # FLD: 15.3 % — HIGH (ref 11.5–14.5)
RBC # FLD: 15.4 % — HIGH (ref 11.5–14.5)
RBC # FLD: 15.4 % — HIGH (ref 11.5–14.5)
RBC # FLD: 15.5 % — HIGH (ref 11.5–14.5)
RBC # FLD: 15.6 % — HIGH (ref 11.5–14.5)
RBC # FLD: 15.8 % — HIGH (ref 11.5–14.5)
RBC # FLD: 15.9 % — HIGH (ref 11.5–14.5)
RBC # FLD: 16 % — HIGH (ref 11.5–14.5)
RBC CASTS # UR COMP ASSIST: 2 /HPF — SIGNIFICANT CHANGE UP (ref 0–4)
SARS-COV-2 RNA SPEC QL NAA+PROBE: SIGNIFICANT CHANGE UP
SARS-COV-2 RNA SPEC QL NAA+PROBE: SIGNIFICANT CHANGE UP
SODIUM SERPL-SCNC: 132 MMOL/L — LOW (ref 135–146)
SODIUM SERPL-SCNC: 135 MMOL/L — SIGNIFICANT CHANGE UP (ref 135–146)
SODIUM SERPL-SCNC: 135 MMOL/L — SIGNIFICANT CHANGE UP (ref 135–146)
SODIUM SERPL-SCNC: 136 MMOL/L — SIGNIFICANT CHANGE UP (ref 135–146)
SODIUM SERPL-SCNC: 137 MMOL/L — SIGNIFICANT CHANGE UP (ref 135–146)
SODIUM SERPL-SCNC: 138 MMOL/L — SIGNIFICANT CHANGE UP (ref 135–146)
SODIUM SERPL-SCNC: 139 MMOL/L — SIGNIFICANT CHANGE UP (ref 135–146)
SODIUM SERPL-SCNC: 140 MMOL/L — SIGNIFICANT CHANGE UP (ref 135–146)
SODIUM SERPL-SCNC: 141 MMOL/L — SIGNIFICANT CHANGE UP (ref 135–146)
SP GR SPEC: 1.01 — LOW (ref 1.01–1.03)
SP GR SPEC: 1.02 — SIGNIFICANT CHANGE UP (ref 1.01–1.03)
SPECIMEN SOURCE: SIGNIFICANT CHANGE UP
SPECIMEN SOURCE: SIGNIFICANT CHANGE UP
T3FREE SERPL-MCNC: 0.39 PG/ML — LOW (ref 1.8–4.6)
T4 FREE SERPL-MCNC: 0.2 NG/DL — LOW (ref 0.9–1.8)
TRIGL SERPL-MCNC: 146 MG/DL — SIGNIFICANT CHANGE UP
TROPONIN T SERPL-MCNC: 0.39 NG/ML — CRITICAL HIGH
TROPONIN T SERPL-MCNC: 0.44 NG/ML — CRITICAL HIGH
TROPONIN T SERPL-MCNC: 0.47 NG/ML — CRITICAL HIGH
TROPONIN T SERPL-MCNC: 0.48 NG/ML — CRITICAL HIGH
TROPONIN T SERPL-MCNC: 0.49 NG/ML — CRITICAL HIGH
TROPONIN T SERPL-MCNC: 0.5 NG/ML — CRITICAL HIGH
TSH SERPL-MCNC: 83.99 UIU/ML — HIGH (ref 0.27–4.2)
UROBILINOGEN FLD QL: ABNORMAL
UROBILINOGEN FLD QL: SIGNIFICANT CHANGE UP
VIT B12 SERPL-MCNC: 432 PG/ML — SIGNIFICANT CHANGE UP (ref 232–1245)
WBC # BLD: 11.34 K/UL — HIGH (ref 4.8–10.8)
WBC # BLD: 5.69 K/UL — SIGNIFICANT CHANGE UP (ref 4.8–10.8)
WBC # BLD: 5.8 K/UL — SIGNIFICANT CHANGE UP (ref 4.8–10.8)
WBC # BLD: 5.84 K/UL — SIGNIFICANT CHANGE UP (ref 4.8–10.8)
WBC # BLD: 6.32 K/UL — SIGNIFICANT CHANGE UP (ref 4.8–10.8)
WBC # BLD: 6.64 K/UL — SIGNIFICANT CHANGE UP (ref 4.8–10.8)
WBC # BLD: 6.91 K/UL — SIGNIFICANT CHANGE UP (ref 4.8–10.8)
WBC # BLD: 7.03 K/UL — SIGNIFICANT CHANGE UP (ref 4.8–10.8)
WBC # BLD: 7.06 K/UL — SIGNIFICANT CHANGE UP (ref 4.8–10.8)
WBC # BLD: 7.69 K/UL — SIGNIFICANT CHANGE UP (ref 4.8–10.8)
WBC # BLD: 8.26 K/UL — SIGNIFICANT CHANGE UP (ref 4.8–10.8)
WBC # BLD: 8.67 K/UL — SIGNIFICANT CHANGE UP (ref 4.8–10.8)
WBC # BLD: 9.22 K/UL — SIGNIFICANT CHANGE UP (ref 4.8–10.8)
WBC # FLD AUTO: 11.34 K/UL — HIGH (ref 4.8–10.8)
WBC # FLD AUTO: 5.69 K/UL — SIGNIFICANT CHANGE UP (ref 4.8–10.8)
WBC # FLD AUTO: 5.8 K/UL — SIGNIFICANT CHANGE UP (ref 4.8–10.8)
WBC # FLD AUTO: 5.84 K/UL — SIGNIFICANT CHANGE UP (ref 4.8–10.8)
WBC # FLD AUTO: 6.32 K/UL — SIGNIFICANT CHANGE UP (ref 4.8–10.8)
WBC # FLD AUTO: 6.64 K/UL — SIGNIFICANT CHANGE UP (ref 4.8–10.8)
WBC # FLD AUTO: 6.91 K/UL — SIGNIFICANT CHANGE UP (ref 4.8–10.8)
WBC # FLD AUTO: 7.03 K/UL — SIGNIFICANT CHANGE UP (ref 4.8–10.8)
WBC # FLD AUTO: 7.06 K/UL — SIGNIFICANT CHANGE UP (ref 4.8–10.8)
WBC # FLD AUTO: 7.69 K/UL — SIGNIFICANT CHANGE UP (ref 4.8–10.8)
WBC # FLD AUTO: 8.26 K/UL — SIGNIFICANT CHANGE UP (ref 4.8–10.8)
WBC # FLD AUTO: 8.67 K/UL — SIGNIFICANT CHANGE UP (ref 4.8–10.8)
WBC # FLD AUTO: 9.22 K/UL — SIGNIFICANT CHANGE UP (ref 4.8–10.8)
WBC UR QL: 1 /HPF — SIGNIFICANT CHANGE UP (ref 0–5)

## 2022-01-01 PROCEDURE — 93296 REM INTERROG EVL PM/IDS: CPT

## 2022-01-01 PROCEDURE — 71260 CT THORAX DX C+: CPT | Mod: 26,MA

## 2022-01-01 PROCEDURE — 99221 1ST HOSP IP/OBS SF/LOW 40: CPT

## 2022-01-01 PROCEDURE — 93290 INTERROG DEV EVAL ICPMS IP: CPT | Mod: 26

## 2022-01-01 PROCEDURE — 72125 CT NECK SPINE W/O DYE: CPT | Mod: 26,MA

## 2022-01-01 PROCEDURE — 99284 EMERGENCY DEPT VISIT MOD MDM: CPT

## 2022-01-01 PROCEDURE — 93970 EXTREMITY STUDY: CPT | Mod: 26

## 2022-01-01 PROCEDURE — 93295 DEV INTERROG REMOTE 1/2/MLT: CPT

## 2022-01-01 PROCEDURE — 99223 1ST HOSP IP/OBS HIGH 75: CPT

## 2022-01-01 PROCEDURE — 99233 SBSQ HOSP IP/OBS HIGH 50: CPT

## 2022-01-01 PROCEDURE — 92557 COMPREHENSIVE HEARING TEST: CPT

## 2022-01-01 PROCEDURE — 92550 TYMPANOMETRY & REFLEX THRESH: CPT

## 2022-01-01 PROCEDURE — 93000 ELECTROCARDIOGRAM COMPLETE: CPT

## 2022-01-01 PROCEDURE — 30901 CONTROL OF NOSEBLEED: CPT

## 2022-01-01 PROCEDURE — 99232 SBSQ HOSP IP/OBS MODERATE 35: CPT

## 2022-01-01 PROCEDURE — 93284 PRGRMG EVAL IMPLANTABLE DFB: CPT

## 2022-01-01 PROCEDURE — 93306 TTE W/DOPPLER COMPLETE: CPT | Mod: 26

## 2022-01-01 PROCEDURE — 99214 OFFICE O/P EST MOD 30 MIN: CPT

## 2022-01-01 PROCEDURE — 93000 ELECTROCARDIOGRAM COMPLETE: CPT | Mod: 59

## 2022-01-01 PROCEDURE — 71045 X-RAY EXAM CHEST 1 VIEW: CPT | Mod: 26

## 2022-01-01 PROCEDURE — 33340 PERQ CLSR TCAT L ATR APNDGE: CPT | Mod: Q0

## 2022-01-01 PROCEDURE — 99231 SBSQ HOSP IP/OBS SF/LOW 25: CPT

## 2022-01-01 PROCEDURE — 99214 OFFICE O/P EST MOD 30 MIN: CPT | Mod: 25

## 2022-01-01 PROCEDURE — 74177 CT ABD & PELVIS W/CONTRAST: CPT | Mod: 26,MA

## 2022-01-01 PROCEDURE — 99285 EMERGENCY DEPT VISIT HI MDM: CPT

## 2022-01-01 PROCEDURE — 93010 ELECTROCARDIOGRAM REPORT: CPT

## 2022-01-01 PROCEDURE — 70450 CT HEAD/BRAIN W/O DYE: CPT | Mod: 26,MA

## 2022-01-01 PROCEDURE — 99222 1ST HOSP IP/OBS MODERATE 55: CPT

## 2022-01-01 PROCEDURE — G0268 REMOVAL OF IMPACTED WAX MD: CPT

## 2022-01-01 PROCEDURE — 72170 X-RAY EXAM OF PELVIS: CPT | Mod: 26

## 2022-01-01 PROCEDURE — 99215 OFFICE O/P EST HI 40 MIN: CPT

## 2022-01-01 PROCEDURE — 93925 LOWER EXTREMITY STUDY: CPT

## 2022-01-01 PROCEDURE — 99223 1ST HOSP IP/OBS HIGH 75: CPT | Mod: 24

## 2022-01-01 RX ORDER — MAGNESIUM SULFATE 500 MG/ML
2 VIAL (ML) INJECTION ONCE
Refills: 0 | Status: DISCONTINUED | OUTPATIENT
Start: 2022-01-01 | End: 2022-01-01

## 2022-01-01 RX ORDER — ENOXAPARIN SODIUM 100 MG/ML
40 INJECTION SUBCUTANEOUS EVERY 24 HOURS
Refills: 0 | Status: DISCONTINUED | OUTPATIENT
Start: 2022-01-01 | End: 2022-01-01

## 2022-01-01 RX ORDER — ATORVASTATIN CALCIUM 40 MG/1
40 TABLET, FILM COATED ORAL
Qty: 90 | Refills: 3 | Status: ACTIVE | COMMUNITY
Start: 2018-01-16

## 2022-01-01 RX ORDER — ASPIRIN/CALCIUM CARB/MAGNESIUM 324 MG
324 TABLET ORAL ONCE
Refills: 0 | Status: COMPLETED | OUTPATIENT
Start: 2022-01-01 | End: 2022-01-01

## 2022-01-01 RX ORDER — NYSTATIN CREAM 100000 [USP'U]/G
1 CREAM TOPICAL
Refills: 0 | Status: DISCONTINUED | OUTPATIENT
Start: 2022-01-01 | End: 2022-01-01

## 2022-01-01 RX ORDER — ATORVASTATIN CALCIUM 80 MG/1
40 TABLET, FILM COATED ORAL AT BEDTIME
Refills: 0 | Status: DISCONTINUED | OUTPATIENT
Start: 2022-01-01 | End: 2022-01-01

## 2022-01-01 RX ORDER — DEXTROSE 50 % IN WATER 50 %
25 SYRINGE (ML) INTRAVENOUS ONCE
Refills: 0 | Status: DISCONTINUED | OUTPATIENT
Start: 2022-01-01 | End: 2022-01-01

## 2022-01-01 RX ORDER — MAGNESIUM SULFATE 500 MG/ML
2 VIAL (ML) INJECTION ONCE
Refills: 0 | Status: COMPLETED | OUTPATIENT
Start: 2022-01-01 | End: 2022-01-01

## 2022-01-01 RX ORDER — FLUTICASONE FUROATE, UMECLIDINIUM BROMIDE AND VILANTEROL TRIFENATATE 200; 62.5; 25 UG/1; UG/1; UG/1
1 POWDER RESPIRATORY (INHALATION)
Qty: 0 | Refills: 0 | DISCHARGE

## 2022-01-01 RX ORDER — ESCITALOPRAM OXALATE 10 MG/1
10 TABLET, FILM COATED ORAL DAILY
Refills: 0 | Status: DISCONTINUED | OUTPATIENT
Start: 2022-01-01 | End: 2022-01-01

## 2022-01-01 RX ORDER — HEPARIN SODIUM 5000 [USP'U]/ML
1000 INJECTION INTRAVENOUS; SUBCUTANEOUS
Qty: 25000 | Refills: 0 | Status: DISCONTINUED | OUTPATIENT
Start: 2022-01-01 | End: 2022-01-01

## 2022-01-01 RX ORDER — METOPROLOL TARTRATE 50 MG
50 TABLET ORAL DAILY
Refills: 0 | Status: DISCONTINUED | OUTPATIENT
Start: 2022-01-01 | End: 2022-01-01

## 2022-01-01 RX ORDER — FUROSEMIDE 40 MG
40 TABLET ORAL DAILY
Refills: 0 | Status: DISCONTINUED | OUTPATIENT
Start: 2022-01-01 | End: 2022-01-01

## 2022-01-01 RX ORDER — METFORMIN HYDROCHLORIDE 850 MG/1
0 TABLET ORAL
Qty: 0 | Refills: 0 | DISCHARGE

## 2022-01-01 RX ORDER — HYDROCORTISONE 10 MG/G
1 CREAM TOPICAL
Qty: 28 | Refills: 0 | Status: ACTIVE | COMMUNITY
Start: 2020-12-22

## 2022-01-01 RX ORDER — GLUCAGON INJECTION, SOLUTION 0.5 MG/.1ML
1 INJECTION, SOLUTION SUBCUTANEOUS ONCE
Refills: 0 | Status: DISCONTINUED | OUTPATIENT
Start: 2022-01-01 | End: 2022-01-01

## 2022-01-01 RX ORDER — TAMSULOSIN HYDROCHLORIDE 0.4 MG/1
0.4 CAPSULE ORAL AT BEDTIME
Refills: 0 | Status: DISCONTINUED | OUTPATIENT
Start: 2022-01-01 | End: 2022-01-01

## 2022-01-01 RX ORDER — METOPROLOL SUCCINATE 50 MG/1
50 TABLET, EXTENDED RELEASE ORAL
Qty: 90 | Refills: 3 | Status: ACTIVE | COMMUNITY
Start: 2018-01-16 | End: 1900-01-01

## 2022-01-01 RX ORDER — SODIUM CHLORIDE 9 MG/ML
1000 INJECTION, SOLUTION INTRAVENOUS ONCE
Refills: 0 | Status: DISCONTINUED | OUTPATIENT
Start: 2022-01-01 | End: 2022-01-01

## 2022-01-01 RX ORDER — SODIUM CHLORIDE 9 MG/ML
1000 INJECTION INTRAMUSCULAR; INTRAVENOUS; SUBCUTANEOUS
Refills: 0 | Status: DISCONTINUED | OUTPATIENT
Start: 2022-01-01 | End: 2022-01-01

## 2022-01-01 RX ORDER — SODIUM CHLORIDE 9 MG/ML
1000 INJECTION, SOLUTION INTRAVENOUS
Refills: 0 | Status: DISCONTINUED | OUTPATIENT
Start: 2022-01-01 | End: 2022-01-01

## 2022-01-01 RX ORDER — RIVAROXABAN 15 MG-20MG
1 KIT ORAL
Qty: 0 | Refills: 0 | DISCHARGE

## 2022-01-01 RX ORDER — MOXIFLOXACIN HYDROCHLORIDE TABLETS, 400 MG 400 MG/1
1 TABLET, FILM COATED ORAL
Qty: 20 | Refills: 0
Start: 2022-01-01 | End: 2022-01-01

## 2022-01-01 RX ORDER — INSULIN GLARGINE 100 [IU]/ML
20 INJECTION, SOLUTION SUBCUTANEOUS
Qty: 0 | Refills: 0 | DISCHARGE

## 2022-01-01 RX ORDER — TAMSULOSIN HYDROCHLORIDE 0.4 MG/1
0.4 CAPSULE ORAL
Qty: 90 | Refills: 3 | Status: ACTIVE | COMMUNITY
Start: 2018-01-16

## 2022-01-01 RX ORDER — UMECLIDINIUM 62.5 UG/1
1 AEROSOL, POWDER ORAL
Qty: 0 | Refills: 0 | DISCHARGE

## 2022-01-01 RX ORDER — HEPARIN SODIUM 5000 [USP'U]/ML
INJECTION INTRAVENOUS; SUBCUTANEOUS
Qty: 25000 | Refills: 0 | Status: DISCONTINUED | OUTPATIENT
Start: 2022-01-01 | End: 2022-01-01

## 2022-01-01 RX ORDER — LEVOTHYROXINE SODIUM 125 MCG
250 TABLET ORAL DAILY
Refills: 0 | Status: DISCONTINUED | OUTPATIENT
Start: 2022-01-01 | End: 2022-01-01

## 2022-01-01 RX ORDER — MAGNESIUM SULFATE 500 MG/ML
2 VIAL (ML) INJECTION
Refills: 0 | Status: COMPLETED | OUTPATIENT
Start: 2022-01-01 | End: 2022-01-01

## 2022-01-01 RX ORDER — DEXTROSE 50 % IN WATER 50 %
12.5 SYRINGE (ML) INTRAVENOUS ONCE
Refills: 0 | Status: DISCONTINUED | OUTPATIENT
Start: 2022-01-01 | End: 2022-01-01

## 2022-01-01 RX ORDER — ASPIRIN/CALCIUM CARB/MAGNESIUM 324 MG
81 TABLET ORAL DAILY
Refills: 0 | Status: DISCONTINUED | OUTPATIENT
Start: 2022-01-01 | End: 2022-01-01

## 2022-01-01 RX ORDER — ACETAMINOPHEN 500 MG
650 TABLET ORAL EVERY 6 HOURS
Refills: 0 | Status: DISCONTINUED | OUTPATIENT
Start: 2022-01-01 | End: 2022-01-01

## 2022-01-01 RX ORDER — METOPROLOL TARTRATE 50 MG
1 TABLET ORAL
Qty: 0 | Refills: 0 | DISCHARGE

## 2022-01-01 RX ORDER — LATANOPROST 0.05 MG/ML
1 SOLUTION/ DROPS OPHTHALMIC; TOPICAL AT BEDTIME
Refills: 0 | Status: DISCONTINUED | OUTPATIENT
Start: 2022-01-01 | End: 2022-01-01

## 2022-01-01 RX ORDER — RIVAROXABAN 20 MG/1
20 TABLET, FILM COATED ORAL
Qty: 30 | Refills: 9 | Status: ACTIVE | COMMUNITY
Start: 2022-01-01

## 2022-01-01 RX ORDER — COLLAGENASE SANTYL 250 [ARB'U]/G
250 OINTMENT TOPICAL DAILY
Qty: 1 | Refills: 10 | Status: ACTIVE | COMMUNITY
Start: 2020-12-22

## 2022-01-01 RX ORDER — DEXTROSE 50 % IN WATER 50 %
15 SYRINGE (ML) INTRAVENOUS ONCE
Refills: 0 | Status: DISCONTINUED | OUTPATIENT
Start: 2022-01-01 | End: 2022-01-01

## 2022-01-01 RX ORDER — ASPIRIN/CALCIUM CARB/MAGNESIUM 324 MG
1 TABLET ORAL
Qty: 0 | Refills: 0 | DISCHARGE
Start: 2022-01-01

## 2022-01-01 RX ORDER — EZETIMIBE 10 MG/1
1 TABLET ORAL
Qty: 0 | Refills: 0 | DISCHARGE

## 2022-01-01 RX ORDER — LATANOPROST 0.05 MG/ML
1 SOLUTION/ DROPS OPHTHALMIC; TOPICAL
Qty: 0 | Refills: 0 | DISCHARGE

## 2022-01-01 RX ORDER — MAGNESIUM SULFATE 500 MG/ML
2 VIAL (ML) INJECTION
Refills: 0 | Status: DISCONTINUED | OUTPATIENT
Start: 2022-01-01 | End: 2022-01-01

## 2022-01-01 RX ORDER — ZINC OXIDE 200 MG/G
1 OINTMENT TOPICAL
Qty: 1 | Refills: 0
Start: 2022-01-01

## 2022-01-01 RX ORDER — FUROSEMIDE 40 MG
1 TABLET ORAL
Qty: 0 | Refills: 0 | DISCHARGE

## 2022-01-01 RX ORDER — BACITRACIN ZINC 500 UNIT/G
1 OINTMENT IN PACKET (EA) TOPICAL EVERY 12 HOURS
Refills: 0 | Status: DISCONTINUED | OUTPATIENT
Start: 2022-01-01 | End: 2022-01-01

## 2022-01-01 RX ORDER — CEFAZOLIN SODIUM 1 G
2000 VIAL (EA) INJECTION EVERY 8 HOURS
Refills: 0 | Status: DISCONTINUED | OUTPATIENT
Start: 2022-01-01 | End: 2022-01-01

## 2022-01-01 RX ORDER — ESCITALOPRAM OXALATE 10 MG/1
1 TABLET, FILM COATED ORAL
Qty: 0 | Refills: 0 | DISCHARGE

## 2022-01-01 RX ORDER — LEVOTHYROXINE SODIUM 125 MCG
125 TABLET ORAL DAILY
Refills: 0 | Status: DISCONTINUED | OUTPATIENT
Start: 2022-01-01 | End: 2022-01-01

## 2022-01-01 RX ORDER — RIVAROXABAN 15 MG-20MG
20 KIT ORAL
Refills: 0 | Status: DISCONTINUED | OUTPATIENT
Start: 2022-01-01 | End: 2022-01-01

## 2022-01-01 RX ORDER — METOPROLOL TARTRATE 50 MG
25 TABLET ORAL
Refills: 0 | Status: DISCONTINUED | OUTPATIENT
Start: 2022-01-01 | End: 2022-01-01

## 2022-01-01 RX ORDER — METFORMIN HYDROCHLORIDE 850 MG/1
1 TABLET ORAL
Qty: 0 | Refills: 0 | DISCHARGE

## 2022-01-01 RX ORDER — CEFAZOLIN SODIUM 1 G
2000 VIAL (EA) INJECTION ONCE
Refills: 0 | Status: DISCONTINUED | OUTPATIENT
Start: 2022-01-01 | End: 2022-01-01

## 2022-01-01 RX ORDER — EZETIMIBE 10 MG/1
10 TABLET ORAL
Qty: 90 | Refills: 3 | Status: ACTIVE | COMMUNITY
Start: 2021-01-01

## 2022-01-01 RX ORDER — ATORVASTATIN CALCIUM 80 MG/1
1 TABLET, FILM COATED ORAL
Qty: 0 | Refills: 0 | DISCHARGE

## 2022-01-01 RX ORDER — LEVOTHYROXINE SODIUM 125 MCG
250 TABLET ORAL
Qty: 0 | Refills: 0 | DISCHARGE

## 2022-01-01 RX ORDER — LOSARTAN POTASSIUM 100 MG/1
100 TABLET, FILM COATED ORAL DAILY
Refills: 0 | Status: DISCONTINUED | OUTPATIENT
Start: 2022-01-01 | End: 2022-01-01

## 2022-01-01 RX ORDER — BACITRACIN ZINC 500 UNIT/G
1 OINTMENT IN PACKET (EA) TOPICAL
Qty: 1 | Refills: 0
Start: 2022-01-01 | End: 2022-01-01

## 2022-01-01 RX ORDER — ENOXAPARIN SODIUM 100 MG/ML
40 INJECTION SUBCUTANEOUS
Refills: 0 | Status: COMPLETED | COMMUNITY
End: 2022-01-01

## 2022-01-01 RX ADMIN — ATORVASTATIN CALCIUM 40 MILLIGRAM(S): 80 TABLET, FILM COATED ORAL at 22:06

## 2022-01-01 RX ADMIN — Medication 250 MICROGRAM(S): at 05:36

## 2022-01-01 RX ADMIN — TAMSULOSIN HYDROCHLORIDE 0.4 MILLIGRAM(S): 0.4 CAPSULE ORAL at 21:43

## 2022-01-01 RX ADMIN — Medication 81 MILLIGRAM(S): at 11:06

## 2022-01-01 RX ADMIN — RIVAROXABAN 20 MILLIGRAM(S): KIT at 11:49

## 2022-01-01 RX ADMIN — Medication 324 MILLIGRAM(S): at 06:22

## 2022-01-01 RX ADMIN — Medication 25 MILLIGRAM(S): at 05:37

## 2022-01-01 RX ADMIN — Medication 25 MILLIGRAM(S): at 17:36

## 2022-01-01 RX ADMIN — Medication 25 GRAM(S): at 10:35

## 2022-01-01 RX ADMIN — Medication 25 MILLIGRAM(S): at 06:53

## 2022-01-01 RX ADMIN — NYSTATIN CREAM 1 APPLICATION(S): 100000 CREAM TOPICAL at 18:33

## 2022-01-01 RX ADMIN — Medication 25 MILLIGRAM(S): at 06:13

## 2022-01-01 RX ADMIN — ENOXAPARIN SODIUM 40 MILLIGRAM(S): 100 INJECTION SUBCUTANEOUS at 12:52

## 2022-01-01 RX ADMIN — TAMSULOSIN HYDROCHLORIDE 0.4 MILLIGRAM(S): 0.4 CAPSULE ORAL at 22:47

## 2022-01-01 RX ADMIN — ATORVASTATIN CALCIUM 40 MILLIGRAM(S): 80 TABLET, FILM COATED ORAL at 21:43

## 2022-01-01 RX ADMIN — TAMSULOSIN HYDROCHLORIDE 0.4 MILLIGRAM(S): 0.4 CAPSULE ORAL at 21:46

## 2022-01-01 RX ADMIN — Medication 100 MILLIGRAM(S): at 13:08

## 2022-01-01 RX ADMIN — ENOXAPARIN SODIUM 40 MILLIGRAM(S): 100 INJECTION SUBCUTANEOUS at 12:18

## 2022-01-01 RX ADMIN — NYSTATIN CREAM 1 APPLICATION(S): 100000 CREAM TOPICAL at 05:45

## 2022-01-01 RX ADMIN — Medication 1 APPLICATION(S): at 06:13

## 2022-01-01 RX ADMIN — LATANOPROST 1 DROP(S): 0.05 SOLUTION/ DROPS OPHTHALMIC; TOPICAL at 21:26

## 2022-01-01 RX ADMIN — TAMSULOSIN HYDROCHLORIDE 0.4 MILLIGRAM(S): 0.4 CAPSULE ORAL at 21:33

## 2022-01-01 RX ADMIN — Medication 1 ENEMA: at 00:10

## 2022-01-01 RX ADMIN — NYSTATIN CREAM 1 APPLICATION(S): 100000 CREAM TOPICAL at 05:38

## 2022-01-01 RX ADMIN — NYSTATIN CREAM 1 APPLICATION(S): 100000 CREAM TOPICAL at 05:41

## 2022-01-01 RX ADMIN — Medication 81 MILLIGRAM(S): at 13:08

## 2022-01-01 RX ADMIN — Medication 100 MILLIGRAM(S): at 21:26

## 2022-01-01 RX ADMIN — ATORVASTATIN CALCIUM 40 MILLIGRAM(S): 80 TABLET, FILM COATED ORAL at 22:47

## 2022-01-01 RX ADMIN — Medication 100 MILLIGRAM(S): at 13:07

## 2022-01-01 RX ADMIN — NYSTATIN CREAM 1 APPLICATION(S): 100000 CREAM TOPICAL at 06:13

## 2022-01-01 RX ADMIN — Medication 25 GRAM(S): at 18:08

## 2022-01-01 RX ADMIN — ENOXAPARIN SODIUM 40 MILLIGRAM(S): 100 INJECTION SUBCUTANEOUS at 12:17

## 2022-01-01 RX ADMIN — NYSTATIN CREAM 1 APPLICATION(S): 100000 CREAM TOPICAL at 18:30

## 2022-01-01 RX ADMIN — Medication 650 MILLIGRAM(S): at 22:00

## 2022-01-01 RX ADMIN — Medication 100 MILLIGRAM(S): at 22:06

## 2022-01-01 RX ADMIN — Medication 81 MILLIGRAM(S): at 12:52

## 2022-01-01 RX ADMIN — Medication 250 MICROGRAM(S): at 06:13

## 2022-01-01 RX ADMIN — LOSARTAN POTASSIUM 100 MILLIGRAM(S): 100 TABLET, FILM COATED ORAL at 05:57

## 2022-01-01 RX ADMIN — LATANOPROST 1 DROP(S): 0.05 SOLUTION/ DROPS OPHTHALMIC; TOPICAL at 21:59

## 2022-01-01 RX ADMIN — Medication 25 GRAM(S): at 12:16

## 2022-01-01 RX ADMIN — TAMSULOSIN HYDROCHLORIDE 0.4 MILLIGRAM(S): 0.4 CAPSULE ORAL at 22:07

## 2022-01-01 RX ADMIN — Medication 81 MILLIGRAM(S): at 11:19

## 2022-01-01 RX ADMIN — Medication 81 MILLIGRAM(S): at 12:17

## 2022-01-01 RX ADMIN — NYSTATIN CREAM 1 APPLICATION(S): 100000 CREAM TOPICAL at 05:56

## 2022-01-01 RX ADMIN — Medication 81 MILLIGRAM(S): at 12:21

## 2022-01-01 RX ADMIN — Medication 250 MICROGRAM(S): at 05:41

## 2022-01-01 RX ADMIN — ATORVASTATIN CALCIUM 40 MILLIGRAM(S): 80 TABLET, FILM COATED ORAL at 21:46

## 2022-01-01 RX ADMIN — NYSTATIN CREAM 1 APPLICATION(S): 100000 CREAM TOPICAL at 18:34

## 2022-01-01 RX ADMIN — NYSTATIN CREAM 1 APPLICATION(S): 100000 CREAM TOPICAL at 05:31

## 2022-01-01 RX ADMIN — LATANOPROST 1 DROP(S): 0.05 SOLUTION/ DROPS OPHTHALMIC; TOPICAL at 21:35

## 2022-01-01 RX ADMIN — TAMSULOSIN HYDROCHLORIDE 0.4 MILLIGRAM(S): 0.4 CAPSULE ORAL at 21:34

## 2022-01-01 RX ADMIN — Medication 25 GRAM(S): at 08:29

## 2022-01-01 RX ADMIN — Medication 25 MILLIGRAM(S): at 18:09

## 2022-01-01 RX ADMIN — Medication 100 MILLIGRAM(S): at 06:13

## 2022-01-01 RX ADMIN — NYSTATIN CREAM 1 APPLICATION(S): 100000 CREAM TOPICAL at 19:16

## 2022-01-01 RX ADMIN — Medication 81 MILLIGRAM(S): at 11:48

## 2022-01-01 RX ADMIN — ENOXAPARIN SODIUM 40 MILLIGRAM(S): 100 INJECTION SUBCUTANEOUS at 12:21

## 2022-01-01 RX ADMIN — TAMSULOSIN HYDROCHLORIDE 0.4 MILLIGRAM(S): 0.4 CAPSULE ORAL at 21:59

## 2022-01-01 RX ADMIN — Medication 25 MILLIGRAM(S): at 18:35

## 2022-01-01 RX ADMIN — LOSARTAN POTASSIUM 100 MILLIGRAM(S): 100 TABLET, FILM COATED ORAL at 05:41

## 2022-01-01 RX ADMIN — Medication 250 MICROGRAM(S): at 05:57

## 2022-01-01 RX ADMIN — LATANOPROST 1 DROP(S): 0.05 SOLUTION/ DROPS OPHTHALMIC; TOPICAL at 22:47

## 2022-01-01 RX ADMIN — Medication 250 MICROGRAM(S): at 05:45

## 2022-01-01 RX ADMIN — Medication 650 MILLIGRAM(S): at 23:30

## 2022-01-01 RX ADMIN — Medication 25 MILLIGRAM(S): at 05:31

## 2022-01-01 RX ADMIN — SODIUM CHLORIDE 75 MILLILITER(S): 9 INJECTION, SOLUTION INTRAVENOUS at 06:43

## 2022-01-01 RX ADMIN — ATORVASTATIN CALCIUM 40 MILLIGRAM(S): 80 TABLET, FILM COATED ORAL at 21:59

## 2022-01-01 RX ADMIN — Medication 250 MICROGRAM(S): at 05:31

## 2022-01-01 RX ADMIN — NYSTATIN CREAM 1 APPLICATION(S): 100000 CREAM TOPICAL at 18:59

## 2022-01-01 RX ADMIN — LATANOPROST 1 DROP(S): 0.05 SOLUTION/ DROPS OPHTHALMIC; TOPICAL at 21:33

## 2022-01-01 RX ADMIN — LATANOPROST 1 DROP(S): 0.05 SOLUTION/ DROPS OPHTHALMIC; TOPICAL at 22:59

## 2022-01-01 RX ADMIN — Medication 1 APPLICATION(S): at 17:10

## 2022-01-01 RX ADMIN — NYSTATIN CREAM 1 APPLICATION(S): 100000 CREAM TOPICAL at 06:02

## 2022-01-01 RX ADMIN — LATANOPROST 1 DROP(S): 0.05 SOLUTION/ DROPS OPHTHALMIC; TOPICAL at 22:07

## 2022-01-01 RX ADMIN — ATORVASTATIN CALCIUM 40 MILLIGRAM(S): 80 TABLET, FILM COATED ORAL at 21:26

## 2022-01-01 RX ADMIN — NYSTATIN CREAM 1 APPLICATION(S): 100000 CREAM TOPICAL at 17:09

## 2022-01-01 RX ADMIN — NYSTATIN CREAM 1 APPLICATION(S): 100000 CREAM TOPICAL at 18:08

## 2022-01-01 RX ADMIN — ATORVASTATIN CALCIUM 40 MILLIGRAM(S): 80 TABLET, FILM COATED ORAL at 21:33

## 2022-01-01 RX ADMIN — LATANOPROST 1 DROP(S): 0.05 SOLUTION/ DROPS OPHTHALMIC; TOPICAL at 21:43

## 2022-01-01 RX ADMIN — Medication 250 MICROGRAM(S): at 06:02

## 2022-01-01 RX ADMIN — ATORVASTATIN CALCIUM 40 MILLIGRAM(S): 80 TABLET, FILM COATED ORAL at 21:34

## 2022-01-01 RX ADMIN — Medication 100 MILLIGRAM(S): at 05:36

## 2022-01-01 RX ADMIN — Medication 25 MILLIGRAM(S): at 05:45

## 2022-01-01 RX ADMIN — Medication 250 MICROGRAM(S): at 06:53

## 2022-01-01 RX ADMIN — NYSTATIN CREAM 1 APPLICATION(S): 100000 CREAM TOPICAL at 17:33

## 2022-01-01 RX ADMIN — Medication 100 MILLIGRAM(S): at 15:36

## 2022-01-01 RX ADMIN — Medication 25 MILLIGRAM(S): at 18:29

## 2022-01-01 RX ADMIN — NYSTATIN CREAM 1 APPLICATION(S): 100000 CREAM TOPICAL at 06:56

## 2022-01-01 RX ADMIN — HEPARIN SODIUM 10 UNIT(S)/HR: 5000 INJECTION INTRAVENOUS; SUBCUTANEOUS at 07:36

## 2022-01-01 RX ADMIN — TAMSULOSIN HYDROCHLORIDE 0.4 MILLIGRAM(S): 0.4 CAPSULE ORAL at 21:26

## 2022-02-17 NOTE — ED PROVIDER NOTE - PROVIDER TOKENS
PROVIDER:[TOKEN:[[id number]],FOLLOWUP:[1-3 Days]] PROVIDER:[TOKEN:[[id number]],FOLLOWUP:[1-3 Days]],PROVIDER:[TOKEN:[[id number]],FOLLOWUP:[1-3 Days]]

## 2022-02-17 NOTE — ED PROVIDER NOTE - NSFOLLOWUPINSTRUCTIONS_ED_ALL_ED_FT
Constipation    Constipation is when a person has fewer than three bowel movements a week, has difficulty having a bowel movement, or has stools that are dry, hard, or larger than normal. Other symptoms can include abdominal pain or bloating. As people grow older, constipation is more common. A low-fiber diet, not taking in enough fluids, and taking certain medicines, including opioid painkillers, may make constipation worse. Treatment varies but may include dietary modifications (more fiber-rich foods), lifestyle modifications, and possible medications.     SEEK IMMEDIATE MEDICAL CARE IF YOU HAVE ANY OF THE FOLLOWING SYMPTOMS: bright red blood in your stool, constipation for longer than 4 days, abdominal or rectal pain, unexplained weight loss, or inability to pass gas. Constipation    Constipation is when a person has fewer than three bowel movements a week, has difficulty having a bowel movement, or has stools that are dry, hard, or larger than normal. Other symptoms can include abdominal pain or bloating. As people grow older, constipation is more common. A low-fiber diet, not taking in enough fluids, and taking certain medicines, including opioid painkillers, may make constipation worse. Treatment varies but may include dietary modifications (more fiber-rich foods), lifestyle modifications, and possible medications.     SEEK IMMEDIATE MEDICAL CARE IF YOU HAVE ANY OF THE FOLLOWING SYMPTOMS: bright red blood in your stool, constipation for longer than 4 days, abdominal or rectal pain, unexplained weight loss, or inability to pass gas.    Indwelling Urinary Catheter Insertion, Care After  This sheet gives you information about how to care for yourself after your procedure. Your health care provider may also give you more specific instructions. If you have problems or questions, contact your health care provider.  What can I expect after the procedure?  After the procedure, it is common to have:  Slight discomfort around your urethra where the catheter enters your body.Follow these instructions at home:     Keep the drainage bag at or below the level of your bladder. Doing this ensures that urine can only drain out, not back into your body.Secure the catheter tubing and drainage bag to your leg or thigh to keep it from moving.Check the catheter tubing regularly to make sure there are no kinks or blockages.Take showers daily to keep the catheter clean. Do not take a bath.Do not pull on your catheter or try to remove it.Disconnect the tubing and drainage bag as little as possible.Empty the drainage bag every 2–4 hours, or more often if needed. Do not let the bag get completely full.Wash your hands with soap and water before and after touching the catheter, tubing, or drainage bag.Do not let the drainage bag or catheter tubing touch the floor.Drink enough fluids to keep your urine clear or pale yellow, or as told by your health care provider.Contact a health care provider if:  Urine stops flowing into the drainage bag.You feel pain or pressure in the bladder area.You have back pain.Your catheter gets clogged.Your catheter starts to leak.Your urine looks cloudy.Your drainage bag or tubing looks dirty.You notice a bad smell when emptying your drainage bag.Get help right away if:  You have a fever or chills.You have severe pain in your back or your lower abdomen.You have warmth, redness, swelling, or pain in the urethra area.You notice blood in your urine.Your catheter gets pulled out.Summary  Do not pull on your catheter or try to remove it.Keep the drainage bag at or below the level of your bladder, but do not let the drainage bag or catheter tubing touch the floor.Wash your hands with soap and water before and after touching the catheter, tubing, or drainage bag.Contact your health care provider if you have a fever, chills, or any other signs of infection.This information is not intended to replace advice given to you by your health care provider. Make sure you discuss any questions you have with your health care provider.

## 2022-02-17 NOTE — ED PROVIDER NOTE - CARE PROVIDER_API CALL
Monty Person)  Gastroenterology; Internal Medicine  [address] [place] [address]  Phone: [phone]  Fax: [phone]  Follow Up Time: 1-3 Days   Monty Person)  Gastroenterology; Internal Medicine  [address] [place] [address]  Phone: [phone]  Fax: [phone]  Follow Up Time: 1-3 Days    Bang Forbes)  Urology  [address] [place] [address]  Phone: [phone]  Fax: [phone]  Follow Up Time: 1-3 Days

## 2022-02-17 NOTE — ED PROVIDER NOTE - NS ED ROS FT
Review of Systems    Constitutional: (-) fever   Eyes/ENT: (-) vision changes   Cardiovascular: (-) chest pain, (-) syncope (-) palpitations  Respiratory: (-) cough, (-) shortness of breath  Gastrointestinal: (-) vomiting, (-) diarrhea (-)black/bloody stools (-) abdominal pain  Genitourinary:  (+) dysuria   Musculoskeletal: (-) neck pain, (-) back pain, (-) leg pain/swelling  Integumentary: (-) rash, (-) edema  Neurological: (-) headache, (-) confusion  Hematologic: (-) easy bruising

## 2022-02-17 NOTE — ED PROVIDER NOTE - CARE PLAN
Principal Discharge DX:	Fecal impaction   1 Principal Discharge DX:	Fecal impaction  Secondary Diagnosis:	Urinary retention

## 2022-02-17 NOTE — ED ADULT NURSE REASSESSMENT NOTE - NS ED NURSE REASSESS COMMENT FT1
pt alert and oriented x4, pt reporting severe pain and burning when trying to urinate. pt reporting that he cannot urinate. JAREK Ruffin made aware, US at bedside done and pt noted to have 550 ml in bladder at that time. PA placed orders for Diaz and labs.

## 2022-02-17 NOTE — ED PROVIDER NOTE - PROGRESS NOTE DETAILS
JAREK NICOLE: pt had 2 large bowel movements after fecal disimpaction. Reviewed necessity for follow up. Counseled on red flags and to return for them.  Patient appears well on discharge. JAREK NICOLE: pt was discharged and awaiting transport and began to complain of inability to urinate.  Bedside sono reveals 550 cc of fluid in his bladder.  Will place Diaz catheter for urinary retention and give urology follow-up. JAREK NICOLE: Reviewed all results and necessity for follow up. Counseled on red flags and to return for them.  Patient appears well on discharge.

## 2022-02-17 NOTE — ED PROVIDER NOTE - CLINICAL SUMMARY MEDICAL DECISION MAKING FREE TEXT BOX
Pt with constipation s/p disimpaction. Also complained of urinary retention. Diaz catheter placed and labs done. Pending results and will dispo accordingly.

## 2022-02-17 NOTE — ED PROVIDER NOTE - CARE PROVIDERS DIRECT ADDRESSES
,[email or web address],DirectAddress_Unknown

## 2022-02-17 NOTE — ED PROVIDER NOTE - OBJECTIVE STATEMENT
81-year-old male with past medical history A. fib on Xarelto, HFrEF s/p AICD, CAD s/p CABG, BPH, hypothyroidism, diabetes, COPD, HTN, HLD, PVD presents with constipation/rectal pain x 5 days.  Also relates some dysuria.  No chest pain, shortness of breath, nausea, vomiting, diarrhea, fever, abdominal pain, rash, back pain, other symptoms.  He typically ambulates with a cane and has been able to do so.

## 2022-02-17 NOTE — ED PROVIDER NOTE - PHYSICAL EXAMINATION
PHYSICAL EXAM:    GENERAL: Alert, appears stated age, well appearing, non-toxic  SKIN: Warm, pink and dry. MMM.   HEAD: NC, AT  EYE: Normal lids/conjunctiva  ENT: Normal hearing, patent oropharynx   NECK: +supple. No meningismus, or JVD.   Pulm: Bilateral BS, normal resp effort, no wheezes, stridor, or retractions  CV: RRR, no M/R/G, 2+and = radial pulses  Abd: soft, non-tender, non-distended, no rebound/guarding. no CVA tenderness.   rectal exam with brown hard balls of stool.   Mskel: no erythema, cyanosis, edema. no calf tenderness  Neuro: AAOx3, moving extremities.

## 2022-02-17 NOTE — ED PROVIDER NOTE - ATTENDING WITH...
Assessment/Plan:    No problem-specific Assessment & Plan notes found for this encounter  TIF candidate? Will start with ugi, cont PPI, consider EGD    He could call for referral to sleep center for sleep study eval in future    Wt loss recommended and low acid diet     Diagnoses and all orders for this visit:    Gastroesophageal reflux disease, esophagitis presence not specified  -     FL UGI W/ AIR ROUTINE; Future  -     omeprazole (PriLOSEC) 40 MG capsule; Take 1 capsule (40 mg total) by mouth daily    Sleep disorder    Screening for cardiovascular, respiratory, and genitourinary diseases  -     Comprehensive metabolic panel; Future  -     Lipid Panel with Direct LDL reflex; Future  -     TSH, 3rd generation; Future    Screening for diabetes mellitus (DM)  -     Comprehensive metabolic panel; Future  -     Lipid Panel with Direct LDL reflex; Future  -     TSH, 3rd generation; Future    BMI 40 0-44 9, adult (HCC)    Other orders  -     ranitidine (ZANTAC) 150 mg tablet; Take 1 tablet by mouth daily as needed  -     Discontinue: omeprazole (PriLOSEC) 20 mg delayed release capsule; Take 20 mg by mouth daily        Recent data suggesting increased risk of ischemic CVA and chronic kidney damage with PPI use was advised, rx if covered  Chest discomfort not associated with dizziness or syncope or sob or diaphoresis      No Follow-up on file  Subjective:      Patient ID: Carmen Maravilla is a 28 y o  male  Chief Complaint   Patient presents with    Chest Pain     when stretching or driving, started a month ago  had pain below sternum after stretching the other day  denies SOB       HPI    Hx of hiatal hernia  1m ago was stretching and then sharp px lacw, comes and goes now, notes more when driving or cough or sneeze  Last week again stretched, and had tear sensation in epigastrum, heartburn worse, no n/v, wakes him at night, no black stool  Takes omeprazole and has had nexium and prilosec      Worse with red sauce  The following portions of the patient's history were reviewed and updated as appropriate: allergies, current medications, past family history, past medical history, past social history, past surgical history and problem list   Hx of EGD  Gi was in [facility]    Review of Systems   Respiratory: Negative for shortness of breath and wheezing  Neurological: Negative for dizziness and syncope  Current Outpatient Prescriptions   Medication Sig Dispense Refill    omeprazole (PriLOSEC) 40 MG capsule Take 1 capsule (40 mg total) by mouth daily 30 capsule 5    ranitidine (ZANTAC) 150 mg tablet Take 1 tablet by mouth daily as needed       No current facility-administered medications for this visit  Objective:    /84   Pulse 84   Temp 97 6 °F (36 4 °C)   Resp 20   Ht 6' (1 829 m)   Wt 136 kg (299 lb)   BMI 40 55 kg/m²        Physical Exam   Constitutional: He appears well-developed  HENT:   Head: Normocephalic  Eyes: Conjunctivae are normal    Neck: Neck supple  Cardiovascular: Normal rate and intact distal pulses  Pulmonary/Chest: Effort normal  No respiratory distress  Abdominal: Soft  He exhibits no distension and no mass  There is no tenderness  There is no rebound and no guarding  obese   Musculoskeletal: He exhibits no edema or deformity  Neurological: He is alert  Skin: Skin is warm and dry  Psychiatric: His behavior is normal  Thought content normal    Nursing note and vitals reviewed               Iasc Verde DO ACP

## 2022-02-17 NOTE — ED PROVIDER NOTE - ATTENDING CONTRIBUTION TO CARE
I personally evaluated the patient. I reviewed the Resident’s or Physician Assistant’s note (as assigned above), and agree with the findings and plan except as documented in my note.  82-year-old male with past medical history of A. kevin Xarelto, BPH, hypothyroidism, DM, HLD, HTN, CHF with AICD, CAD status post CABG presents with complaints of rectal pain due to constipation.  Last bowel movement was 5 days ago. Also reports burning with urination. States that he has pain upon straining.  Denies abdominal pain, nausea, vomiting, fever.  VSS, non toxic appearing, NAD, Head NCAT, MMM, neck supple, normal ROM, normal s1s2, lungs ctab, abd s/nttp/nd, no guarding or rebound, extremities wnl, AAO x 3, GCS 15, neuro grossly normal. No acute skin lesions. Plan is urinalysis, stool disimpaction and dispo accordingly.

## 2022-02-17 NOTE — ED PROVIDER NOTE - PATIENT PORTAL LINK FT
You can access the FollowMyHealth Patient Portal offered by [facility] by registering at the following website: [email or web address]. By joining [employer]’s FollowMyHealth portal, you will also be able to view your health information using other applications (apps) compatible with our system. You can access the FollowMyHealth Patient Portal offered by [facility] by registering at the following website: [email or web address]. By joining [employer]’s FollowMyHealth portal, you will also be able to view your health information using other applications (apps) compatible with our system.

## 2022-02-22 NOTE — ED PROVIDER NOTE - ATTENDING CONTRIBUTION TO CARE
80 yo M pmh as stated in chart pw pain around davis catheter. Patient evaluated for fecal disimpaction and difficulty urinating associated with impaction. Davis was placed 5 days ago. C/o burning at davis entry site. No change in uop, no change in urine color, no hematuria, no n/v/d, no abd pain, no flank pain, no f/c. Pt requesting to have davis removed.     CONSTITUTIONAL: Well-developed; well-nourished; in no acute distress. Sitting up and providing appropriate history and physical examination  SKIN: +mild skin breakdown over sacrum. Otherwise skin exam is warm and dry, no acute rash.  HEAD: Normocephalic; atraumatic.  EYES: PERRL, 3 mm bilateral, no nystagmus, EOM intact; conjunctiva and sclera clear.  ENT: No nasal discharge; airway clear.  NECK: Supple; non tender. + full passive ROM in all directions. No JVD  CARD: S1, S2 normal; no murmurs, gallops, or rubs. Regular rate and rhythm. + Symmetric Strong Pulses  RESP: No wheezes, rales or rhonchi. Good air movement bilaterally  ABD: soft; non-distended; non-tender. No Rebound, No Guarding, No signs of peritonitis, No CVA tenderness. No pulsatile abdominal mass. + Strong and Symmetric Pulses  EXT: Normal ROM. No clubbing, cyanosis or edema. Dp and Pt Pulses intact. Cap refill less than 3 seconds  NEURO: CN 2-12 intact, normal finger to nose, normal romberg, stable gait, no sensory or motor deficits, Alert, oriented, grossly unremarkable. No Focal deficits. GCS 15. NIH 0  PSYCH: Cooperative, appropriate.

## 2022-02-22 NOTE — ED PROVIDER NOTE - CARE PROVIDER_API CALL
Paul Brothers)  Urology  [address] [place] [address]  Phone: [phone]  Fax: [phone]  Follow Up Time:

## 2022-02-22 NOTE — ED PROVIDER NOTE - PATIENT PORTAL LINK FT
You can access the FollowMyHealth Patient Portal offered by [facility] by registering at the following website: [email or web address]. By joining [employer]’s FollowMyHealth portal, you will also be able to view your health information using other applications (apps) compatible with our system.

## 2022-02-22 NOTE — ED PROVIDER NOTE - PHYSICAL EXAMINATION
CONST: NAD  EYES: Sclera and conjunctiva clear.   ENT: No nasal discharge. Oropharynx normal appearing  NECK: Non-tender, no meningeal signs. normal ROM. supple   CARD: S1 S2; No jvd  RESP: Equal BS B/L, No wheezes, rhonchi or rales. No distress  GI: Soft, non-tender, non-distended. no cva tenderness. normal BS  MS: Normal ROM in all extremities. pulses 2 +.  SKIN: Erythematous skin changes to sacrum  NEURO: A&Ox3, No focal deficits. Strength 5/5 with no sensory deficits.

## 2022-02-22 NOTE — ED PROVIDER NOTE - CLINICAL SUMMARY MEDICAL DECISION MAKING FREE TEXT BOX
I personally evaluated the patient. I reviewed the Resident’s or Physician Assistant’s note (as assigned above), and agree with the findings and plan except as documented in my note. Patient evaluated for davis removal. Patient's davis removed, able to urinate in the ED without difficulty. Given barrier for wound care of sacrum. Instructed to f/u with urology. I have fully discussed the medical management and delivery of care with the patient. I have discussed any available labs, imaging and treatment options with the patient. Patient confirms understanding and has been given detailed return precautions. Patient instructed to return to the ED should symptoms persist or worsen. Patient has demonstrated capacity and has verbalized understanding. Patient is well appearing upon discharge.

## 2022-02-22 NOTE — ED ADULT NURSE NOTE - OBJECTIVE STATEMENT
pt sent in from assisted living facility, c/o pain to davis site. pt had davis placed because he was impacted and had davis placed. pt also requesting wound care to sacrum for a pressures ulcer.

## 2022-02-22 NOTE — ED PROVIDER NOTE - NSFOLLOWUPINSTRUCTIONS_ED_ALL_ED_FT
Follow up with PMD and Urology in 1-2 days.    Wound Care    Taking care of your wound properly can help to prevent pain and infection. It can also help your wound to heal more quickly.     HOW TO CARE FOR YOUR WOUND   Take or apply over-the-counter and prescription medicines only as told by your health care provider.  If you were prescribed antibiotic medicine, take or apply it as told by your health care provider. Do not stop using the antibiotic even if your condition improves.  Clean the wound each day or as told by your health care provider.  Wash the wound with mild soap and water.  Rinse the wound with water to remove all soap.  Pat the wound dry with a clean towel. Do not rub it.  There are many different ways to close and cover a wound. For example, a wound can be covered with stitches (sutures), skin glue, or adhesive strips. Follow instructions from your health care provider about:  How to take care of your wound.  When and how you should change your bandage (dressing).  When you should remove your dressing.  Removing whatever was used to close your wound.  Check your wound every day for signs of infection. Watch for:  Redness, swelling, or pain.  Fluid, blood, or pus.  Keep the dressing dry until your health care provider says it can be removed. Do not take baths, swim, use a hot tub, or do anything that would put your wound underwater until your health care provider approves.  Raise (elevate) the injured area above the level of your heart while you are sitting or lying down.  Do not scratch or pick at the wound.  Keep all follow-up visits as told by your health care provider. This is important.    SEEK MEDICAL CARE IF:  You received a tetanus shot and you have swelling, severe pain, redness, or bleeding at the injection site.  You have a fever.  Your pain is not controlled with medicine.  You have increased redness, swelling, or pain at the site of your wound.  You have fluid, blood, or pus coming from your wound.  You notice a bad smell coming from your wound or your dressing.    SEEK IMMEDIATE MEDICAL CARE IF:  You have a red streak going away from your wound.

## 2022-02-22 NOTE — ED PROVIDER NOTE - OBJECTIVE STATEMENT
31-year-old male past medical history HTN, HLD, DM, COPD, A. fib on Xarelto, CHF, AICD, BPH presents for Diaz pain.  Patient had Diaz placed 5 days ago after fecal disimpaction now presents for Diaz removal.  Endorses mild stinging pain localized to Diaz no aggravating or relieving factors.  Patient also mentions he is starting to develop a sacral ulcer mild improvement with lotion.

## 2022-02-22 NOTE — ED PROVIDER NOTE - NS ED ROS FT
Constitutional: (-) fever  Eyes/ENT: (-) blurry vision, (-) epistaxis  Cardiovascular: (-) chest pain, (-) syncope  Respiratory: (-) cough, (-) shortness of breath  Gastrointestinal: (-) vomiting, (-) diarrhea  : (+) davis pain, (-) hematuria, (-) dysuria  Musculoskeletal: (-) neck pain, (-) back pain, (-) joint pain  Integumentary: (+) sacral ulcer, (-) rash, (-) edema  Neurological: (-) headache, (-) altered mental status  Allergic/Immunologic: (-) pruritus

## 2022-03-18 PROBLEM — I70.213 ATHEROSCLER OF NATIVE ARTERY OF BOTH LEGS WITH INTERMIT CLAUDICATION: Status: ACTIVE | Noted: 2022-01-01

## 2022-03-18 NOTE — CONSULT LETTER
[Dear  ___] : Dear  [unfilled], [Courtesy Letter:] : I had the pleasure of seeing your patient, [unfilled], in my office today. [Please see my note below.] : Please see my note below. [FreeTextEntry2] : Dear Dr. Go Brothers,\par Dear Dr. Jah Laughlin,

## 2022-03-24 NOTE — ED ADULT TRIAGE NOTE - CHIEF COMPLAINT QUOTE
pt presents with right sided back and hip pain after a fall 2 days ago. pt A&ox3. pt states he was evaluated in ED the day of fall. pt presents with right sided back and hip pain after a fall 2 days ago. pt on xaralto. pt ambulatory on scene and was seen with at home PT today. per EMS can call friend/caretaker Michael

## 2022-03-24 NOTE — ED PROVIDER NOTE - CLINICAL SUMMARY MEDICAL DECISION MAKING FREE TEXT BOX
I personally evaluated the patient. I reviewed the Resident´s or Physician Assistant´s note (as assigned above), and agree with the findings and plan except as documented in my note.  Patient signed out to me by Dr. Elizabeth pending imaging and cardiology consultation.  Patient evaluated for her fall and back pain.  Labs, EKG, CT pan scan, chest and pelvis x-ray performed in the ED.  Patient noted to have elevated troponin and psoas hematoma.  Cardiology and trauma consulted.  Per trauma, no surgical intervention required at this time.  Per cardiology, patient currently not a cc or stepdown candidate, can be admitted to telemetry.  Patient admitted to telemetry for further evaluation and treatment.  Patient not having active chest pain while in the ED.

## 2022-03-24 NOTE — ED PROVIDER NOTE - PROGRESS NOTE DETAILS
CO- pt endorsed to Dr. Bonds- pending remaining imaging, cards recs, reassess, dispo Stephanie: Cardiology consulted and aware of pt's elevated troponin, will come and see. Trauma consult placed for psoas intramuscular hematoma.

## 2022-03-24 NOTE — ED PROVIDER NOTE - NS ED ROS FT
Constitutional: No fatigue, confusion, or amnesia.  Head: No headache  ENT: No visual changes.  Cardiac:  No chest pain or SOB.  Respiratory:  No respiratory distress or hemoptysis.  GI:  No nausea, vomiting, or abdominal pain.  :  No incontinence.  MS:  Reports back pain. No joint pain  Neuro:  No dizziness, LOC, paralysis, or N/T.  Skin:  No lacerations or abrasions.

## 2022-03-24 NOTE — ED PROVIDER NOTE - ATTENDING CONTRIBUTION TO CARE
81-year-old male past medical history HTN, HLD, DM, COPD, A. fib on Xarelto, CHF, AICD, BPH 81-year-old male past medical history HTN, HLD, DM, COPD, A. fib on Xarelto, CHF, AICD, BPH  pt presents for eval s/p fall 2 days ago. pt states he was walking, slipped on the rug. pt states he hit his R hip and back. no head trauma nor LOC. fall was unwitnessed. pt states he was unable to get up but was shouting. rené called nickny who helped him up. pt states he did not want to come to the hospital at that point. pt states since then, he developed LBP despite being ambulatory.  no numbness/weakness/urianry retention.  no chest pain/pressure. pt slightly limited as historian.     vss  gen- NAD, aaox3  card-rrr  lungs-ctab, no wheezing or rhonchi  abd-sntnd, no guarding or rebound  neuro- full str/sensation, cn ii-xii grossly intact, normal coordination  Spine- no midline c spine ttp, neck supple, FROM to neck, low thoracic/lumber tenderness      fall, unwitnessed, pt endorses nonsyncopal mechanism  given age and pt on ac, will get cxr, pelvis film, panscan  limited historian, will assess for possible syncope w/ ekg/trop  r/o lyte abn, rhabdo, mary lou, anemia

## 2022-03-24 NOTE — ED PROVIDER NOTE - OBJECTIVE STATEMENT
81-year-old male past medical history HTN, HLD, DM, COPD, A. fib on Xarelto, CHF, AICD, BPH presents for back pain after fall 2 days ago.  Was walking, slipped, fell backwards and landed on back, no head trauma, no LOC, but unwitnessed. Was down for 20 mins, someone passing heard him yelling and called FARHAN who helped him up but did not come to hospital.  Reports lower back pain. Denies pain elsewhere. Able to ambulate. No saddle anesthesia, N/T, paralysis, urinary retention, bladder/bowel incontinence.

## 2022-03-24 NOTE — ED PROVIDER NOTE - CARE PLAN
1 Principal Discharge DX:	Elevated troponin   Principal Discharge DX:	Elevated troponin  Secondary Diagnosis:	Rhabdomyolysis  Secondary Diagnosis:	Hematoma

## 2022-03-24 NOTE — ED PROVIDER NOTE - PHYSICAL EXAMINATION
CONSTITUTIONAL: NAD. Speaking in full sentences, moving all extremities.  SKIN: No lacerations or abrasions.  HEAD: NCAT  EYES: PERRLA, EOMI   NECK: No posterior midline cervical tenderness  CARD: +S1, S2 no murmurs, gallops, or rubs. Regular rate and rhythm. Radial, DP, PT 2+/4 bilaterally  RESP: LCTAB. No wheezes, rales or rhonchi.  ABD: Abdomen soft, nontender, nondistended.  NEURO: Alert, oriented, grossly unremarkable. Strength 5/5 in bilateral UE and LEs. Follows commands.  MSK: No TTP in UE and LEs. No chest wall tenderness or crepitus  BACK: Lower thoracic and lumbar midline TTP   PSYCH: Cooperative, appropriate.

## 2022-03-25 NOTE — ED ADULT NURSE NOTE - CHIEF COMPLAINT QUOTE
pt presents with right sided back and hip pain after a fall 2 days ago. pt on xaralto. pt ambulatory on scene and was seen with at home PT today. per EMS can call friend/caretaker Michael

## 2022-03-25 NOTE — CONSULT NOTE ADULT - SUBJECTIVE AND OBJECTIVE BOX
TRAUMA ACTIVATION LEVEL:  CODE / ALERT  / CONSULT  ACTIVATED BY: EMS**  /  ED**  INTUBATED: YES** / NO**      MECHANISM OF INJURY:   [] Blunt     [] MVC	  [x] Fall	  [] Pedestrian Struck	  [] Motorcycle     [] Assault     [] Bicycle collision    [] Sports injury    [] Penetrating    [] Gun Shot Wound      [] Stab Wound    GCS: 15 	E: 4	V: 5	M: 6    HPI:    81yM w/ PMHx of cHF, A-fib on Xarelto, BPH, HLD, hypothyroid, HTN, DM, COPD, Glaucoma, Psoriasis, s/p CABG, AICD, s/p VATS seen as (Code Trauma / Trauma Alert / Trauma Consult) s/p unwitnessed fall a few days ago ?HT, ?LOC, + AC.  Trauma assessment in ED: ABCs intact , GCS 15 , AAOx3. Pt is a poor historian. States that he was putting something in the back door of his truck when he fell on his right side and the door hit him on the left. Pt reports that he didn't come in initially as the pain was not severe however it worsen over the past day which prompted him to come into the hospital. In the ED pt's vital sign were stable, no external sign of trauma seen. pt states that back pain is resolving.      PAST MEDICAL & SURGICAL HISTORY:  Coronary artery disease  s/p CABG 2014    Heart failure  chronic systolic heart failure    Atrial fibrillation    Benign prostate hyperplasia    Dyslipidemia    Hypertension    Hypothyroidism    Diabetes mellitus    COPD (chronic obstructive pulmonary disease)    Glaucoma    Psoriasis    S/P CABG (coronary artery bypass graft)    AICD (automatic cardioverter/defibrillator) present    H/O total knee replacement, bilateral    S/P thoracotomy  pt states he had a VATS w/lung decortication        Allergies    No Known Allergies    Intolerances        Home Medications:  atorvastatin 40 mg oral tablet: 1 tab(s) orally once a day (at bedtime) (28 Oct 2020 01:34)  Incruse Ellipta 62.5 mcg/inh inhalation powder: 1 inhaler(s) inhaled once a day (28 Oct 2020 01:34)  Lasix 40 mg oral tablet: 1 tab(s) orally once a day (28 Oct 2020 01:34)  latanoprost 0.005% ophthalmic solution: 1 drop(s) to each affected eye once a day (in the evening) (28 Oct 2020 01:34)  levothyroxine: 250 microgram(s) orally once a day (28 Oct 2020 01:34)  metFORMIN 750 mg oral tablet, extended release: 1 tab(s) orally 2 times a day (28 Oct 2020 01:34)  metoprolol succinate 50 mg oral tablet, extended release: 1 tab(s) orally once a day (28 Oct 2020 01:34)  Trelegy Ellipta inhalation powder: 1 puff(s) inhaled once a day (28 Oct 2020 01:34)  warfarin 5 mg oral tablet: 1 tab(s) orally once a day (at bedtime) (28 Oct 2020 01:34)      ROS: 10-system review is otherwise negative except HPI above.      Primary Survey:    A - airway intact  B - bilateral breath sounds and good chest rise  C - palpable pulses in all extremities  D - GCS 15 on arrival, JAMES  Exposure obtained    Vital Signs Last 24 Hrs  T(C): 36.6 (24 Mar 2022 20:13), Max: 36.6 (24 Mar 2022 20:13)  T(F): 97.9 (24 Mar 2022 20:13), Max: 97.9 (24 Mar 2022 20:13)  HR: 77 (24 Mar 2022 20:13) (77 - 77)  BP: 137/85 (24 Mar 2022 20:13) (137/85 - 137/85)  BP(mean): --  RR: 18 (24 Mar 2022 20:13) (18 - 18)  SpO2: 99% (24 Mar 2022 20:13) (99% - 99%)    Secondary Survey:   General: NAD, A&Ox3, confused affect stumbles to find words, sentences not fully coherent  HEENT: Normocephalic, atraumatic, EOMI, PEERLA. no scalp lacerations   Neck: Soft, midline trachea. no c-spine tenderness  Chest: No chest wall tenderness, no subcutaneous emphysema   Cardiac: S1, S2, RRR  Respiratory: Bilateral breath sounds, clear and equal bilaterally  Abdomen: Soft, non-distended, non-tender, no rebound, no guarding.  Ext:  Moving b/l upper and lower extremities. Palpable Radial b/l UE, b/l DP palpable in LE.   Back: No T/L/S spine tenderness, No palpable runoff/stepoff/deformity  Skin: Right-side forearm tattoos, multiple small chronic wounds over B/L arms and legs         ACCESS / DEVICES:  [x] Peripheral IV  [ ] Central Venous Line	[ ] R	[ ] L	[ ] IJ	[ ] Fem	[ ] SC	Placed:   [ ] Arterial Line		[ ] R	[ ] L	[ ] Fem	[ ] Rad	[ ] Ax	Placed:   [ ] PICC:					[ ] Mediport  [ ] Urinary Catheter,  Date Placed:   [ ] Chest tube: [ ] Right, [ ] Left  [ ] LUDMILA/Poncho Drains    Labs:  CAPILLARY BLOOD GLUCOSE                        12.4   7.69  )-----------( 137      ( 24 Mar 2022 21:07 )             37.0       Auto Neutrophil %: 66.4 % (03-24-22 @ 21:07)  Auto Immature Granulocyte %: 0.8 % (03-24-22 @ 21:07)    03-24    136  |  100  |  18  ----------------------------<  80  5.6<H>   |  24  |  1.2      Calcium, Total Serum: 8.9 mg/dL (03-24-22 @ 22:43)      LFTs:             6.3  | 1.3  | 61       ------------------[82      ( 24 Mar 2022 22:43 )  3.8  | x    | 19          Lipase:x      Amylase:x         Lactate, Blood: 1.4 mmol/L (03-24-22 @ 21:07)      Coags:     13.70  ----< 1.19    ( 24 Mar 2022 21:07 )     42.1        CARDIAC MARKERS ( 24 Mar 2022 22:43 )  x     / x     / 1308 U/L / x     / x      CARDIAC MARKERS ( 24 Mar 2022 21:07 )  x     / 0.49 ng/mL / 1683 U/L / x     / x            Alcohol, Blood: <10 mg/dL (03-24-22 @ 21:07)            Alcohol, Blood: <10 mg/dL (03-24-22 @ 21:07)      RADIOLOGY & ADDITIONAL STUDIES:  < from: CT Chest, Abdomen and Pelvis w/ IV Cont (03.24.22 @ 22:04) >  IMPRESSION:    6.4 x 6.2 x 13.7 cm right psoas muscle intramuscular hematoma.  Otherwise no evidence for solid organ injury.  Bones are diffusely osteopenic without definiteevidence for fracture   identified.    --- End of Report ---    < end of copied text >    < from: CT Cervical Spine No Cont (03.24.22 @ 22:03) >  Impression:    No CT evidence of acute cervical spine fracture.    Severe multilevel degenerative changes of the cervical spine.    --- End of Report ---      < end of copied text >    < from: CT Head No Cont (03.24.22 @ 21:51) >  Impression:    No CT evidence of acute intracranial hemorrhage or large territorial   infarct.    --- End of Report ---    < end of copied text >      ---------------------------------------------------------------------------------------     TRAUMA ACTIVATION LEVEL:   CONSULT  ACTIVATED BY:  ED  INTUBATED: NO      MECHANISM OF INJURY:   [x] Fall	      GCS: 15 	E: 4	V: 5	M: 6    HPI:    81yM w/ PMHx of cHF, A-fib on Xarelto, BPH, HLD, hypothyroid, HTN, DM, COPD, Glaucoma, Psoriasis, s/p CABG, AICD, s/p VATS seen as Trauma Consult s/p unwitnessed fall a few days ago ?HT, ?LOC, + AC.  Trauma assessment in ED: ABCs intact , GCS 15 , AAOx3. Pt is a poor historian. States that he was putting something in the back door of his truck when he fell on his right side and the door hit him on the left. Pt reports that he didn't come in initially as the pain was not severe however it worsen over the past day which prompted him to come into the hospital. In the ED pt's vital sign were stable, no external sign of trauma seen. pt states that back pain is resolving.      PAST MEDICAL & SURGICAL HISTORY:  Coronary artery disease  s/p CABG 2014    Heart failure  chronic systolic heart failure    Atrial fibrillation    Benign prostate hyperplasia    Dyslipidemia    Hypertension    Hypothyroidism    Diabetes mellitus    COPD (chronic obstructive pulmonary disease)    Glaucoma    Psoriasis    S/P CABG (coronary artery bypass graft)    AICD (automatic cardioverter/defibrillator) present    H/O total knee replacement, bilateral    S/P thoracotomy  pt states he had a VATS w/lung decortication        Allergies    No Known Allergies    Intolerances        Home Medications:  atorvastatin 40 mg oral tablet: 1 tab(s) orally once a day (at bedtime) (28 Oct 2020 01:34)  Incruse Ellipta 62.5 mcg/inh inhalation powder: 1 inhaler(s) inhaled once a day (28 Oct 2020 01:34)  Lasix 40 mg oral tablet: 1 tab(s) orally once a day (28 Oct 2020 01:34)  latanoprost 0.005% ophthalmic solution: 1 drop(s) to each affected eye once a day (in the evening) (28 Oct 2020 01:34)  levothyroxine: 250 microgram(s) orally once a day (28 Oct 2020 01:34)  metFORMIN 750 mg oral tablet, extended release: 1 tab(s) orally 2 times a day (28 Oct 2020 01:34)  metoprolol succinate 50 mg oral tablet, extended release: 1 tab(s) orally once a day (28 Oct 2020 01:34)  Trelegy Ellipta inhalation powder: 1 puff(s) inhaled once a day (28 Oct 2020 01:34)  warfarin 5 mg oral tablet: 1 tab(s) orally once a day (at bedtime) (28 Oct 2020 01:34)      ROS: 10-system review is otherwise negative except HPI above.      Primary Survey:    A - airway intact  B - bilateral breath sounds and good chest rise  C - palpable pulses in all extremities  D - GCS 15 on arrival, JAMES  Exposure obtained    Vital Signs Last 24 Hrs  T(C): 36.6 (24 Mar 2022 20:13), Max: 36.6 (24 Mar 2022 20:13)  T(F): 97.9 (24 Mar 2022 20:13), Max: 97.9 (24 Mar 2022 20:13)  HR: 77 (24 Mar 2022 20:13) (77 - 77)  BP: 137/85 (24 Mar 2022 20:13) (137/85 - 137/85)  BP(mean): --  RR: 18 (24 Mar 2022 20:13) (18 - 18)  SpO2: 99% (24 Mar 2022 20:13) (99% - 99%)    Secondary Survey:   General: NAD, A&Ox3, confused affect stumbles to find words, sentences not fully coherent  HEENT: Normocephalic, atraumatic, EOMI, PEERLA. no scalp lacerations   Neck: Soft, midline trachea. no c-spine tenderness  Chest: No chest wall tenderness, no subcutaneous emphysema   Cardiac: S1, S2, RRR  Respiratory: Bilateral breath sounds, clear and equal bilaterally  Abdomen: Soft, non-distended, non-tender, no rebound, no guarding.  Ext:  Moving b/l upper and lower extremities. Palpable Radial b/l UE, b/l DP palpable in LE.   Back: No T/L/S spine tenderness, No palpable runoff/stepoff/deformity  Skin: Right-side forearm tattoos, multiple small chronic wounds over B/L arms and legs         ACCESS / DEVICES:  [x] Peripheral IV  [ ] Central Venous Line	[ ] R	[ ] L	[ ] IJ	[ ] Fem	[ ] SC	Placed:   [ ] Arterial Line		[ ] R	[ ] L	[ ] Fem	[ ] Rad	[ ] Ax	Placed:   [ ] PICC:					[ ] Mediport  [ ] Urinary Catheter,  Date Placed:   [ ] Chest tube: [ ] Right, [ ] Left  [ ] LUDMILA/Poncho Drains    Labs:  CAPILLARY BLOOD GLUCOSE                        12.4   7.69  )-----------( 137      ( 24 Mar 2022 21:07 )             37.0       Auto Neutrophil %: 66.4 % (03-24-22 @ 21:07)  Auto Immature Granulocyte %: 0.8 % (03-24-22 @ 21:07)    03-24    136  |  100  |  18  ----------------------------<  80  5.6<H>   |  24  |  1.2      Calcium, Total Serum: 8.9 mg/dL (03-24-22 @ 22:43)      LFTs:             6.3  | 1.3  | 61       ------------------[82      ( 24 Mar 2022 22:43 )  3.8  | x    | 19          Lipase:x      Amylase:x         Lactate, Blood: 1.4 mmol/L (03-24-22 @ 21:07)      Coags:     13.70  ----< 1.19    ( 24 Mar 2022 21:07 )     42.1        CARDIAC MARKERS ( 24 Mar 2022 22:43 )  x     / x     / 1308 U/L / x     / x      CARDIAC MARKERS ( 24 Mar 2022 21:07 )  x     / 0.49 ng/mL / 1683 U/L / x     / x            Alcohol, Blood: <10 mg/dL (03-24-22 @ 21:07)            Alcohol, Blood: <10 mg/dL (03-24-22 @ 21:07)      RADIOLOGY & ADDITIONAL STUDIES:  < from: CT Chest, Abdomen and Pelvis w/ IV Cont (03.24.22 @ 22:04) >  IMPRESSION:    6.4 x 6.2 x 13.7 cm right psoas muscle intramuscular hematoma.  Otherwise no evidence for solid organ injury.  Bones are diffusely osteopenic without definiteevidence for fracture   identified.    --- End of Report ---    < end of copied text >    < from: CT Cervical Spine No Cont (03.24.22 @ 22:03) >  Impression:    No CT evidence of acute cervical spine fracture.    Severe multilevel degenerative changes of the cervical spine.    --- End of Report ---      < end of copied text >    < from: CT Head No Cont (03.24.22 @ 21:51) >  Impression:    No CT evidence of acute intracranial hemorrhage or large territorial   infarct.    --- End of Report ---    < end of copied text >      ---------------------------------------------------------------------------------------     TRAUMA ACTIVATION LEVEL:  CODE / ALERT  / CONSULT  ACTIVATED BY: EMS**  /  ED**  INTUBATED: YES** / NO**    MECHANISM OF INJURY: S/p fall, -HT, -LOC, +AC Xarelto     GCS: 15 	E: 4	V: 5	M: 6    HPI:  81yM w/ PMHx of cHF, A-fib on Xarelto, BPH, HLD, hypothyroid, HTN, DM, COPD, Glaucoma, Psoriasis, s/p CABG, AICD, s/p VATS s/p unwitnessed fall a few days ago ?HT, ?LOC, + AC.  Trauma assessment in ED: ABCs intact , GCS 15 , AAOx3. Pt is a poor historian. States that he was putting something in the back door of his truck when he fell on his right side and the door hit him on the left. Pt reports that he didn't come in initially as the pain was not severe however it worsen over the past day which prompted him to come into the hospital. In the ED pt's vital sign were stable, no external sign of trauma seen. pt states that back pain is resolving.      PAST MEDICAL & SURGICAL HISTORY:  Coronary artery disease  s/p CABG 2014  Heart failure  chronic systolic heart failure  Atrial fibrillation  Benign prostate hyperplasia  Dyslipidemia  Hypertension  Hypothyroidism  Diabetes mellitus  COPD (chronic obstructive pulmonary disease)  Glaucoma  Psoriasis  S/P CABG (coronary artery bypass graft)  AICD (automatic cardioverter/defibrillator) present  H/O total knee replacement, bilateral  S/P thoracotomy  pt states he had a VATS w/lung decortication    Allergies  No Known Allergies    Home Medications:  atorvastatin 40 mg oral tablet: 1 tab(s) orally once a day (at bedtime) (28 Oct 2020 01:34)  Incruse Ellipta 62.5 mcg/inh inhalation powder: 1 inhaler(s) inhaled once a day (28 Oct 2020 01:34)  Lasix 40 mg oral tablet: 1 tab(s) orally once a day (28 Oct 2020 01:34)  latanoprost 0.005% ophthalmic solution: 1 drop(s) to each affected eye once a day (in the evening) (28 Oct 2020 01:34)  levothyroxine: 250 microgram(s) orally once a day (28 Oct 2020 01:34)  metFORMIN 750 mg oral tablet, extended release: 1 tab(s) orally 2 times a day (28 Oct 2020 01:34)  metoprolol succinate 50 mg oral tablet, extended release: 1 tab(s) orally once a day (28 Oct 2020 01:34)  Trelegy Ellipta inhalation powder: 1 puff(s) inhaled once a day (28 Oct 2020 01:34)  warfarin 5 mg oral tablet: 1 tab(s) orally once a day (at bedtime) (28 Oct 2020 01:34)    ROS: 10-system review is otherwise negative except HPI above.      Primary Survey:    A - airway intact  B - bilateral breath sounds and good chest rise  C - palpable pulses in all extremities  D - GCS 15 on arrival, JAMES  Exposure obtained    Vital Signs Last 24 Hrs  T(C): 36.6 (24 Mar 2022 20:13), Max: 36.6 (24 Mar 2022 20:13)  T(F): 97.9 (24 Mar 2022 20:13), Max: 97.9 (24 Mar 2022 20:13)  HR: 77 (24 Mar 2022 20:13) (77 - 77)  BP: 137/85 (24 Mar 2022 20:13) (137/85 - 137/85)  BP(mean): --  RR: 18 (24 Mar 2022 20:13) (18 - 18)  SpO2: 99% (24 Mar 2022 20:13) (99% - 99%)    Secondary Survey:   General: NAD, A&Ox3, confused affect stumbles to find words, sentences not fully coherent  HEENT: Normocephalic, atraumatic, EOMI, PEERLA. no scalp lacerations   Neck: Soft, midline trachea. no c-spine tenderness  Chest: No chest wall tenderness, no subcutaneous emphysema   Cardiac: S1, S2, RRR  Respiratory: Bilateral breath sounds, clear and equal bilaterally  Abdomen: Soft, non-distended, non-tender, no rebound, no guarding.  Ext:  Moving b/l upper and lower extremities. Palpable Radial b/l UE, b/l DP palpable in LE.   Back: No T/L/S spine tenderness, No palpable runoff/stepoff/deformity  Skin: Right-side forearm tattoos, multiple small chronic wounds over B/L arms and legs       ACCESS / DEVICES:  [x] Peripheral IV    Labs:  CAPILLARY BLOOD GLUCOSE                        12.4   7.69  )-----------( 137      ( 24 Mar 2022 21:07 )             37.0       Auto Neutrophil %: 66.4 % (03-24-22 @ 21:07)  Auto Immature Granulocyte %: 0.8 % (03-24-22 @ 21:07)    03-24    136  |  100  |  18  ----------------------------<  80  5.6<H>   |  24  |  1.2    Calcium, Total Serum: 8.9 mg/dL (03-24-22 @ 22:43)    LFTs:             6.3  | 1.3  | 61       ------------------[82      ( 24 Mar 2022 22:43 )  3.8  | x    | 19           Lactate, Blood: 1.4 mmol/L (03-24-22 @ 21:07)    Coags:     13.70  ----< 1.19    ( 24 Mar 2022 21:07 )     42.1      CARDIAC MARKERS ( 24 Mar 2022 22:43 )  x     / x     / 1308 U/L / x     / x      CARDIAC MARKERS ( 24 Mar 2022 21:07 )  x     / 0.49 ng/mL / 1683 U/L / x     / x        Alcohol, Blood: <10 mg/dL (03-24-22 @ 21:07)    RADIOLOGY & ADDITIONAL STUDIES:  < from: CT Chest, Abdomen and Pelvis w/ IV Cont (03.24.22 @ 22:04) >  IMPRESSION:  6.4 x 6.2 x 13.7 cm right psoas muscle intramuscular hematoma.  Otherwise no evidence for solid organ injury.  Bones are diffusely osteopenic without definiteevidence for fracture   identified.    < from: CT Cervical Spine No Cont (03.24.22 @ 22:03) >  Impression:  No CT evidence of acute cervical spine fracture.  Severe multilevel degenerative changes of the cervical spine.    < from: CT Head No Cont (03.24.22 @ 21:51) >  Impression:  No CT evidence of acute intracranial hemorrhage or large territorial   infarct.

## 2022-03-25 NOTE — CONSULT NOTE ADULT - ATTENDING COMMENTS
IMPRESSION  NSTEMI (suspecting silent MI)  R psoas mm hematoma (traumatic)  Possible ICD dysfunction  Other Cardiac hx: Known CAD / hx CABG, ICM (ef recovered) / CHB sp PPM ? upgrade to [employer] CRT-D 7/’17;  Persistent AF on xarelto at home  Other pmh:  HTN, DL, DM, CVA, PAD sp R AT atherectomy 10/’20, COPD, hypothyroidism  ---  Ekg:	3/24:  v paced,  but pacing malfxn.    Echo:	TTE (10/20): EF 50-55%, severe LAE, mod TR. mod AS (peak 33, mean 17, [place] 1.09)  Cath:	3/’17:  3vd.  Mod as.  Lima-lad mod-sev distal dz.  Svg-om1-om2 LI.   ----  Labs:	Hg 12.4  (prev 13.6 2/’22)  	K 5.6 (chronically hyperK)  	Scr 1.2 (at baseline)  	Trop 0.49 >>       PLAN  Load asa 325, then start asa 81  Stop xarelto (last taken yesterday am)   start on heparin gtt tomorrow am (@ usual time of next xarelto dose) - monitor PTT and H/H if ok with surgical   Echocardiogram   Troponin trending down   Continue home statin / toprol   Hold lasix today given elevated CK / gentle hydration with 75cc/h x 10H,  reassess CK tmrw / resume lasix based on volume status.   EPS eval for full device interrogation in   -  Kiip IMPRESSION  elevated troponin   R psoas mm hematoma (traumatic)  Possible ICD dysfunction  Other Cardiac hx: Known CAD / hx CABG, ICM (ef recovered) / CHB sp PPM ? upgrade to [employer] CRT-D 7/’17;  Persistent AF on xarelto at home  Other pmh:  HTN, DL, DM, CVA, PAD sp R AT atherectomy 10/’20, COPD, hypothyroidism  ---  Ekg:	3/24:  v paced,  but pacing malfxn.    Echo:	TTE (10/20): EF 50-55%, severe LAE, mod TR. mod AS (peak 33, mean 17, [place] 1.09)  Cath:	3/’17:  3vd.  Mod as.  Lima-lad mod-sev distal dz.  Svg-om1-om2 LI.   ----  Labs:	Hg 12.4  (prev 13.6 2/’22)  	K 5.6 (chronically hyperK)  	Scr 1.2 (at baseline)  	Trop 0.49 >>       PLAN  Load asa 325, then start asa 81  Stop xarelto (last taken yesterday am)   start on heparin gtt tomorrow am (@ usual time of next xarelto dose) - monitor PTT and H/H if ok with surgical   Echocardiogram   Troponin trending down   Continue home statin / toprol   Hold lasix today given elevated CK / gentle hydration with 75cc/h x 10H,  reassess CK tmrw / resume lasix based on volume status.   EPS makayla for full device interrogation in am  -  [employer]  Seen on 3/25

## 2022-03-25 NOTE — CHART NOTE - NSCHARTNOTEFT_GEN_A_CORE
Tertiary Trauma Survey (TTS)    Date of TTS: 03-25-22 @ 10:40   Admit Date: 03-25-22  Trauma Activation: CODE / ALERT / CONSULT  Admit GCS:        E-     V-     M-     HPI:  Patient is a 81 year old male with a PMHx of HTN, DL, DM, CVA, PAD sp R AT atherectomy 10/’20, COPD, hypothyroidism, known CAD / hx CABG, ICM (ef recovered) / CHB sp PPM ? upgrade to Medtronic CRT-D 7/’17;  Persistent AF on xarelto at home, who presents for back pain after suffering a mechanical fall 2 days ago. Pt lives in [facility] ([facility] 2 days ago, pt woke up to use the bathroom as he stood up from his walking chair, pt slipped on shoe and fell onto the floor, landing on his back. Pt was on the floor for about 20 mins. Someone passing by heard him yelling and called FARHAN who helped him up but did not come to hospital. Pt denied any dizziness, LOC or head trauma. Pt endorses lower back pain (more on the right side vs left). Able to ambulate. Denies Headache, fevers, chills, CP, SOB, NVDC, No saddle anesthesia, N/T, paralysis, urinary retention, bladder/bowel incontinence.     CT A/P shows 6.4 x 6.2 x 13.7 cm right psoas muscle intramuscular hematoma. Otherwise no evidence for solid organ injury. Bones are diffusely osteopenic without definite evidence for fracture.    Significant labs: Troponin 0.49, repeat Troponin 0.48; CK levels 1683 and repeat 1308  Potassium 6.2 (Hemolyzed), Repeat Potassium 5.6 (Hemolyzed) Hgb 12.6 ( was 14.6 on Feb 17th 22)    EKG: 3/24: v paced, but pacing malfxn    Pt is being admitted to medicine (Telemetry) for NSTEMI. (25 Mar 2022 08:20)    Patient seen and examined 3/25/22 for Trauma Tertiary Survey. No new traumatic injuries identified.  Patient sleeping comfortably in bed, denies any pain or complaints.  No FND, no weakness, no pain on palpation of RLE, area of hematoma soft, non-tender.    PHYSICAL EXAM:  General: NAD  HEENT: Normocephalic, atraumatic, EOMI, PEERLA. no scalp lacerations   Neck: Soft, midline trachea. no c-spine tenderness  Chest: No chest wall tenderness, no subcutaneous emphysema   Cardiac: S1, S2, RRR  Respiratory: Bilateral breath sounds, clear and equal bilaterally  Abdomen: Soft, non-distended, non-tender, no rebound, no guarding.  Groin: Normal appearing, pelvis stable   Ext:  Moving b/l upper and lower extremities. Palpable Radial b/l UE, b/l DP palpable in LE.   Back: No T/L/S spine tenderness, No palpable runoff/stepoff/deformity      Vital Signs Last 24 Hrs  T(C): 36.1 (25 Mar 2022 07:39), Max: 36.6 (24 Mar 2022 20:13)  T(F): 97 (25 Mar 2022 07:39), Max: 97.9 (24 Mar 2022 20:13)  HR: 60 (25 Mar 2022 07:39) (60 - 77)  BP: 140/85 (25 Mar 2022 07:39) (130/65 - 140/85)  BP(mean): --  RR: 18 (25 Mar 2022 07:39) (18 - 18)  SpO2: 100% (25 Mar 2022 07:39) (98% - 100%)    Labs:  CAPILLARY BLOOD GLUCOSE                              12.4   7.69  )-----------( 137      ( 24 Mar 2022 21:07 )             37.0       Auto Neutrophil %: 66.4 % (03-24-22 @ 21:07)  Auto Immature Granulocyte %: 0.8 % (03-24-22 @ 21:07)    03-25    139  |  103  |  17  ----------------------------<  72  4.7   |  24  |  1.1      Calcium, Total Serum: 9.2 mg/dL (03-25-22 @ 03:54)      LFTs:             6.5  | 1.4  | 60       ------------------[87      ( 25 Mar 2022 03:54 )  3.8  | x    | 19          Lipase:x      Amylase:x         Lactate, Blood: 1.4 mmol/L (03-24-22 @ 21:07)      Coags:     13.70  ----< 1.19    ( 24 Mar 2022 21:07 )     42.1        CARDIAC MARKERS ( 25 Mar 2022 03:54 )  x     / 0.44 ng/mL / 1291 U/L / x     / x      CARDIAC MARKERS ( 25 Mar 2022 01:35 )  x     / 0.48 ng/mL / x     / x     / x      CARDIAC MARKERS ( 24 Mar 2022 22:43 )  x     / x     / 1308 U/L / x     / x      CARDIAC MARKERS ( 24 Mar 2022 21:07 )  x     / 0.49 ng/mL / 1683 U/L / x     / x            Alcohol, Blood: <10 mg/dL (03-24-22 @ 21:07)            Alcohol, Blood: <10 mg/dL (03-24-22 @ 21:07)      RADIOLOGICAL FINDINGS REVIEW:  < from: CT Abdomen and Pelvis w/ IV Cont (03.24.22 @ 22:04) >    IMPRESSION:    6.4 x 6.2 x 13.7 cm right psoas muscle intramuscular hematoma.  Otherwise no evidence for solid organ injury.  Bones are diffusely osteopenic without definiteevidence for fracture   identified.    --- End of Report ---    < end of copied text >    < from: CT Cervical Spine No Cont (03.24.22 @ 22:03) >        Impression:    No CT evidence of acute cervical spine fracture.    Severe multilevel degenerative changes of the cervical spine.    --- End of Report ---    < end of copied text >    < from: Xray Pelvis AP only (03.24.22 @ 21:57) >      Moderate to severe degenerative changes of bilateral hips and extensive   vascular calcifications.  No evidence for acute fracture or dislocation.    < end of copied text >    < from: Xray Chest 1 View AP/PA (03.24.22 @ 21:56) >    Impression:    No radiographic evidence of acute cardiopulmonary disease.    < end of copied text >    < from: CT Head No Cont (03.24.22 @ 21:51) >    Impression:    No CT evidence of acute intracranial hemorrhage or large territorial   infarct.    --- End of Report ---    < end of copied text >        ASSESSMENT/ PLAN:   81yM w/ PMHx of cHF, A-fib on Xarelto, BPH, HLD, hypothyroid, HTN, DM, COPD, Glaucoma, Psoriasis, s/p CABG, AICD, s/p VATS seen as (Code Trauma / Trauma Alert / Trauma Consult) s/p unwitnessed fall a few days ago ?HT, ?LOC, + AC with no external signs of trauma. IN the ED pt is vitally stable with multiple laboratory abnormalities 5.6 K, 1683 CK, 0.49 Trops,  and CT shows Right psoas intramuscular hematoma.     Injuries identified:   - Right psoas intramuscular hematoma  - No new traumatic injuries, patient cleared from trauma surgery standpoint Tertiary Trauma Survey (TTS)    Date of TTS: 03-25-22 @ 10:40   Admit Date: 03-25-22  Trauma Activation: CODE / ALERT / CONSULT  Admit GCS:        E-     V-     M-     HPI:  Patient is a 81 year old male with a PMHx of HTN, DL, DM, CVA, PAD sp R AT atherectomy 10/’20, COPD, hypothyroidism, known CAD / hx CABG, ICM (ef recovered) / CHB sp PPM ? upgrade to Medtronic CRT-D 7/’17;  Persistent AF on xarelto at home, who presents for back pain after suffering a mechanical fall 2 days ago. Pt lives in [facility] ([facility] 2 days ago, pt woke up to use the bathroom as he stood up from his walking chair, pt slipped on shoe and fell onto the floor, landing on his back. Pt was on the floor for about 20 mins. Someone passing by heard him yelling and called FARHAN who helped him up but did not come to hospital. Pt denied any dizziness, LOC or head trauma. Pt endorses lower back pain (more on the right side vs left). Able to ambulate. Denies Headache, fevers, chills, CP, SOB, NVDC, No saddle anesthesia, N/T, paralysis, urinary retention, bladder/bowel incontinence.     CT A/P shows 6.4 x 6.2 x 13.7 cm right psoas muscle intramuscular hematoma. Otherwise no evidence for solid organ injury. Bones are diffusely osteopenic without definite evidence for fracture.    Significant labs: Troponin 0.49, repeat Troponin 0.48; CK levels 1683 and repeat 1308  Potassium 6.2 (Hemolyzed), Repeat Potassium 5.6 (Hemolyzed) Hgb 12.6 ( was 14.6 on Feb 17th 22)    EKG: 3/24: v paced, but pacing malfxn    Pt is being admitted to medicine (Telemetry) for NSTEMI. (25 Mar 2022 08:20)    Patient seen and examined 3/25/22 for Trauma Tertiary Survey. No new traumatic injuries identified.  Patient sleeping comfortably in bed, denies any pain or complaints.  No FND, no weakness, no pain on palpation of RLE, area of hematoma soft, non-tender.    PHYSICAL EXAM:  General: NAD  HEENT: Normocephalic, atraumatic, EOMI, PEERLA. no scalp lacerations   Neck: Soft, midline trachea. no c-spine tenderness  Chest: No chest wall tenderness, no subcutaneous emphysema   Cardiac: S1, S2, RRR  Respiratory: Bilateral breath sounds, clear and equal bilaterally  Abdomen: Soft, non-distended, non-tender, no rebound, no guarding.  Groin: Normal appearing, pelvis stable   Ext:  Moving b/l upper and lower extremities. Palpable Radial b/l UE, b/l DP palpable in LE.   Back: No T/L/S spine tenderness, No palpable runoff/stepoff/deformity      Vital Signs Last 24 Hrs  T(C): 36.1 (25 Mar 2022 07:39), Max: 36.6 (24 Mar 2022 20:13)  T(F): 97 (25 Mar 2022 07:39), Max: 97.9 (24 Mar 2022 20:13)  HR: 60 (25 Mar 2022 07:39) (60 - 77)  BP: 140/85 (25 Mar 2022 07:39) (130/65 - 140/85)  BP(mean): --  RR: 18 (25 Mar 2022 07:39) (18 - 18)  SpO2: 100% (25 Mar 2022 07:39) (98% - 100%)    Labs:  CAPILLARY BLOOD GLUCOSE                              12.4   7.69  )-----------( 137      ( 24 Mar 2022 21:07 )             37.0       Auto Neutrophil %: 66.4 % (03-24-22 @ 21:07)  Auto Immature Granulocyte %: 0.8 % (03-24-22 @ 21:07)    03-25    139  |  103  |  17  ----------------------------<  72  4.7   |  24  |  1.1      Calcium, Total Serum: 9.2 mg/dL (03-25-22 @ 03:54)      LFTs:             6.5  | 1.4  | 60       ------------------[87      ( 25 Mar 2022 03:54 )  3.8  | x    | 19          Lipase:x      Amylase:x         Lactate, Blood: 1.4 mmol/L (03-24-22 @ 21:07)      Coags:     13.70  ----< 1.19    ( 24 Mar 2022 21:07 )     42.1        CARDIAC MARKERS ( 25 Mar 2022 03:54 )  x     / 0.44 ng/mL / 1291 U/L / x     / x      CARDIAC MARKERS ( 25 Mar 2022 01:35 )  x     / 0.48 ng/mL / x     / x     / x      CARDIAC MARKERS ( 24 Mar 2022 22:43 )  x     / x     / 1308 U/L / x     / x      CARDIAC MARKERS ( 24 Mar 2022 21:07 )  x     / 0.49 ng/mL / 1683 U/L / x     / x            Alcohol, Blood: <10 mg/dL (03-24-22 @ 21:07)            Alcohol, Blood: <10 mg/dL (03-24-22 @ 21:07)      RADIOLOGICAL FINDINGS REVIEW:  < from: CT Abdomen and Pelvis w/ IV Cont (03.24.22 @ 22:04) >    IMPRESSION:    6.4 x 6.2 x 13.7 cm right psoas muscle intramuscular hematoma.  Otherwise no evidence for solid organ injury.  Bones are diffusely osteopenic without definiteevidence for fracture   identified.    --- End of Report ---    < end of copied text >    < from: CT Cervical Spine No Cont (03.24.22 @ 22:03) >        Impression:    No CT evidence of acute cervical spine fracture.    Severe multilevel degenerative changes of the cervical spine.    --- End of Report ---    < end of copied text >    < from: Xray Pelvis AP only (03.24.22 @ 21:57) >      Moderate to severe degenerative changes of bilateral hips and extensive   vascular calcifications.  No evidence for acute fracture or dislocation.    < end of copied text >    < from: Xray Chest 1 View AP/PA (03.24.22 @ 21:56) >    Impression:    No radiographic evidence of acute cardiopulmonary disease.    < end of copied text >    < from: CT Head No Cont (03.24.22 @ 21:51) >    Impression:    No CT evidence of acute intracranial hemorrhage or large territorial   infarct.    --- End of Report ---    < end of copied text >        ASSESSMENT/ PLAN:   81yM w/ PMHx of cHF, A-fib on Xarelto, BPH, HLD, hypothyroid, HTN, DM, COPD, Glaucoma, Psoriasis, s/p CABG, AICD, s/p VATS seen as (Code Trauma / Trauma Alert / Trauma Consult) s/p unwitnessed fall a few days ago ?HT, ?LOC, + AC with no external signs of trauma. IN the ED pt is vitally stable with multiple laboratory abnormalities 5.6 K, 1683 CK, 0.49 Trops,  and CT shows Right psoas intramuscular hematoma.     Injuries identified:   - Right psoas intramuscular hematoma  - No new traumatic injuries, patient stable from trauma surgery standpoint, no further intervention needed

## 2022-03-25 NOTE — CONSULT NOTE ADULT - ASSESSMENT
ASSESSMENT:  81yM w/ PMHx of cHF, A-fib on Xarelto, BPH, HLD, hypothyroid, HTN, DM, COPD, Glaucoma, Psoriasis, s/p CABG, AICD, s/p VATS seen as (Code Trauma / Trauma Alert / Trauma Consult) s/p unwitnessed fall a few days ago ?HT, ?LOC, + AC with no external signs of trauma. IN the ED pt is vitally stable with multiple laboratory abnormalities 5.6 K, 1683 CK, 0.49 Trops,  and CT shows Right psoas intramuscular hematoma.     Injuries identified:   - Right psoas intramuscular hematoma.   -   -     PLAN:   - Trauma Labs: (CBC, BMP, Coags, T&S, UA, EtOH level)  Additional studies:  EKG  Utox    Trauma Imaging to include the following:  - CXR, Pelvic Xray  - CT Head,  CT C-spine, CT Max/Face, CT Chest, CT Abd/Pelvis  - Extremity films: None    Additional consultations:  - Neurosurgery  - Orthopedics  - OMFS  - PT/Rehab/SW  - Hospitalist/Medicine     Disposition pending results of above labs and imaging  Above plan discussed with Trauma attending,  ***  , patient, patient family, and ED team  --------------------------------------------------------------------------------------  03-25-22 @ 00:32    TRAUMA SENIOR SPECTRA: [id number]  TRAUMA TEAM SPECTRA: [id number] ASSESSMENT:  81yM w/ PMHx of cHF, A-fib on Xarelto, BPH, HLD, hypothyroid, HTN, DM, COPD, Glaucoma, Psoriasis, s/p CABG, AICD, s/p VATS seen as Trauma Consult s/p unwitnessed fall a few days ago ?HT, ?LOC, + AC with no external signs of trauma. IN the ED pt is vitally stable with multiple laboratory abnormalities 5.6 K, 1683 CK, 0.49 Trops,  and CT shows Right psoas intramuscular hematoma.     Injuries identified:   - Right psoas intramuscular hematoma.   -   -     PLAN:   - Trauma Labs: (CBC, BMP, Coags, T&S, UA, EtOH level)  Additional studies:  EKG  Utox    Trauma Imaging to include the following:  - CXR, Pelvic Xray  - CT Head,  CT C-spine, CT Max/Face, CT Chest, CT Abd/Pelvis  - Extremity films: None    Additional consultations:  - Neurosurgery  - Orthopedics  - OMFS  - PT/Rehab/SW  - Hospitalist/Medicine     Disposition pending results of above labs and imaging  Above plan discussed with Trauma attending,  ***  , patient, patient family, and ED team  --------------------------------------------------------------------------------------  03-25-22 @ 00:32    TRAUMA SENIOR SPECTRA: [id number]  TRAUMA TEAM SPECTRA: [id number] ASSESSMENT:  81yM w/ PMHx of cHF, A-fib on Xarelto, BPH, HLD, hypothyroid, HTN, DM, COPD, Glaucoma, Psoriasis, s/p CABG, AICD, s/p VATS seen as (Code Trauma / Trauma Alert / Trauma Consult) s/p unwitnessed fall a few days ago ?HT, ?LOC, + AC with no external signs of trauma. IN the ED pt is vitally stable with multiple laboratory abnormalities 5.6 K, 1683 CK, 0.49 Trops,  and CT shows Right psoas intramuscular hematoma.     Injuries identified:   - Right psoas intramuscular hematoma    Plan:   -Fall was two days ago, patient is hemodynamically stable, H/H WNL; unlikely to have a significant traumatic bleed requiring intervention  -Trend H/H, monitor hemodynamics  -No further trauma surgery workup   -Trauma Tertiary Survey 3/25   -Patient and plan discussed with Dr. Driver   ASSESSMENT:  81yM w/ PMHx of cHF, A-fib on Xarelto, BPH, HLD, hypothyroid, HTN, DM, COPD, Glaucoma, Psoriasis, s/p CABG, AICD, s/p VATS seen as (Code Trauma / Trauma Alert / Trauma Consult) s/p unwitnessed fall a few days ago ?HT, ?LOC, + AC with no external signs of trauma. IN the ED pt is vitally stable with multiple laboratory abnormalities 5.6 K, 1683 CK, 0.49 Trops,  and CT shows Right psoas intramuscular hematoma.     Injuries identified:   - Right psoas intramuscular hematoma    Plan:   -Fall was two days ago, patient is hemodynamically stable, H/H WNL; unlikely to have a significant traumatic bleed requiring intervention  -Trend H/H, monitor hemodynamics  -No further trauma surgery workup   -Trauma Tertiary Survey 3/26   -Patient and plan discussed with Dr. Driver

## 2022-03-25 NOTE — H&P ADULT - ASSESSMENT
Patient is a 81 year old male with a PMHx of HTN, DL, DM, CVA, PAD sp R AT atherectomy 10/’20, COPD, hypothyroidism, known CAD / hx CABG, ICM (ef recovered) / CHB sp PPM ? upgrade to Medtronic CRT-D 7/’17;  Persistent AF on xarelto at home, who presents for back pain after suffering a mechanical fall 2 days ago. Pt lives in [facility] ([facility] 2 days ago, pt woke up to use the bathroom as he stood up from his walking chair, pt slipped on shoe and fell onto the floor, landing on his back. Pt was on the floor for about 20 mins. Someone passing by heard him yelling and called FARHAN who helped him up but did not come to hospital. Pt denied any dizziness, LOC or head trauma. Pt endorses lower back pain (more on the right side vs left). Able to ambulate. Denies Headache, fevers, chills, CP, SOB, NVDC, No saddle anesthesia, N/T, paralysis, urinary retention, bladder/bowel incontinence.     #NSTEMI  #Possible ICD Dysfunction  - Cardiology consult appreciated   - 3/’17:  3vd.  Mod as.  Lima-lad mod-sev distal dz.  [id number] LI.   - Load asa 325, then start asa 81  - Stop xarelto (last taken yesterday am)   - c/w heparin gtt (@ usual time of next xarelto dose) - monitor PTT and H/H  - Check tte  - Trend ce until peak / fall   - Continue home statin / toprol   - Hold lasix today given elevated CK / gentle hydration with 75cc/h x 10H,  reassess CK / resume lasix based on volume status.   - once determined that hematoma is improving and h/h stable -- suggest diagnostic cath for NSTEMI  - EPS eval for full device interrogation in am  -  medtronic    #Right Psoas Intramuscular Hematoma  - Trauma surg recs appreciated  - CT A/P shows 6.4 x 6.2 x 13.7 cm right psoas muscle intramuscular hematoma  - Fall was two days ago, patient is hemodynamically stable, H/H WNL; unlikely to have a significant traumatic bleed requiring intervention  - Trend H/H, monitor hemodynamics  - No further trauma surgery workup     #Hypertension  #Dyslipidemia  - c/w Losartan  - c/w Atorvastatin  - f/u lipid panel    #Diabetes Mellitus - insulin dependent  - on Glargine at home  - FS goal 140 - 180  - c/w lantus and lispro  - f/u am A1C    #COPD  - On trelegy at home  - not on home o2    #Hypothyroidism  - c/w Levothyroxine  - f/u AM TSH    #BPH  - c/w Tamsulosin    Dispo: Acute, telemetry  Diet: DASH/Carb consistent  DVT PPx: Heparin for now  Activity Increase as tolerated

## 2022-03-25 NOTE — H&P ADULT - ATTENDING COMMENTS
# fall -mechanical; with psoas hematoma due to anticoagulation  monitor CBC, BP, worsening symptoms; patient on AC with heparin now    # NSTEMI- cardio following; continue asa and heparin drip  monitor PTT closely, monitor CBC  await further plans from cardio    # ICD dysfunction- cardiac EP consulted    # mild rhabdomyolysis- monitor   continue IV fluids  monitor fluid status  hold lasix    # RLE > LLE  check US LE    plan of care d/w patient.  Pending: cardio plans, monitoring CBC, PTT; transition to oral anticoagulant # fall -mechanical; with psoas hematoma due to anticoagulation  monitor CBC, BP, worsening symptoms; patient on AC with heparin now    # NSTEMI- cardio following; continue asa and heparin drip  monitor PTT closely, monitor CBC  await further plans from cardio    # ICD dysfunction- cardiac EP consulted    # mild rhabdomyolysis- monitor   continue IV fluids  monitor fluid status  hold lasix    # RLE > LLE  check US LE    # gluteal pressure injury- POA; wound care consult  follow recommendations    plan of care d/w patient.  Pending: cardio plans, monitoring CBC, PTT; transition to oral anticoagulant

## 2022-03-25 NOTE — CONSULT NOTE ADULT - ATTENDING COMMENTS
This is 82 y/o mal w/ PMHx of cHF, A-fib on Xarelto, BPH, HLD, hypothyroid, HTN, DM, COPD, Glaucoma, Psoriasis, s/p CABG, AICD, s/p VATS s/p unwitnessed fall a few days ago ?HT, ?LOC, + AC.  Trauma assessment in ED: ABCs intact , GCS 15 , AAOx3. Pt is a poor historian. States that he was putting something in the back door of his truck when he fell on his right side and the door hit him on the left. Pt reports that he didn't come in initially as the pain was not severe however it worsen over the past day which prompted him to come into the hospital. In the ED pt's vital sign were stable, no external sign of trauma seen. pt states that back pain is resolving.     Primary and secondary surveys were performed.    AAO x3  GCS 15  Neuro intact  Neck: no step-offs  Chest: clear  CV : irrr  Abdomen; soft, nontender  Extr: no deformities    All images and labs were reviewed     ASSESSMENT:  82 y/o male, S/P fall.  Right Psoas Muscle hematoma.  Elevated Trop.  A.fib.  Coagulopathy due to Xarelto.    PLAN:  Patient had the fall at least 2 days ago.  Hematoma is stable. Hb stable.  Patient has multiple medical comorbidities and elevated Troponin.  He needs Medical evaluation and further management.  Will follow for tertiary survey.

## 2022-03-25 NOTE — ED ADULT NURSE NOTE - ALCOHOL PRE SCREEN (AUDIT - C)
"Patient presents today to discuss surgical treatment for her right shoulder.  She has had acromioclavicular joint reconstruction surgery and revision, now she has symptoms of rotator cuff tear and a large lipoma that was noted on the MRI.  I reviewed the MRI images with her and discussed treatment options.  She would like to proceed with surgical intervention, this would consist of debriding the scar tissue and bony fragments from around the acromioclavicular joint, and to repairing the rotator cuff as well as removing the lipoma which is around the surgical neck of the humerus.  Risks were discussed including the possible risk for damage to the axillary nerve or the musculocutaneous nerve, infection etc.  She would like to have the procedure scheduled in November and a date for November 14 was selected.  The procedure will be a open procedure, a small incision over the acromioclavicular joint area to remove the bone fragments and scar tissue and old suture, and a lateral approach to the shoulder to repair the rotator cuff and remove the lipoma.  To be scheduled with the preoperative evaluation prior to the procedure with her primary care provider./60  Pulse 50  Temp 97.3  F (36.3  C) (Tympanic)  Ht 5' 5\" (1.651 m)  Wt 139 lb (63 kg)  SpO2 100%  BMI 23.13 kg/m2  " Statement Selected

## 2022-03-25 NOTE — CHART NOTE - NSCHARTNOTEFT_GEN_A_CORE
Pt troponins have remained stable, slightly increase from 0.44 to 0.5, although pt remains without chest pain. Will continue to trend. Per Cardiology recommendations, if pt's EF normal on TTE, will not require ischemic work up.  Pt was on Heparin drip, latest PTT 73, however, developed epistaxis. Pt also noted to have decrease in Hb - was 12.4 on admission, down to 11.7 (baseline 14), likely due to large R psoas hematoma. Will stop Heparin drip, repeat CBC, T+S at 8 PM. If pt's Hb remains stable tomorrow, consider restarting pt's Xarelto for AFib.

## 2022-03-25 NOTE — H&P ADULT - NSHPLABSRESULTS_GEN_ALL_CORE
Vitals:  ============  T(F): 97 (25 Mar 2022 07:39), Max: 97.9 (24 Mar 2022 20:13)  HR: 60 (25 Mar 2022 07:39)  BP: 140/85 (25 Mar 2022 07:39)  RR: 18 (25 Mar 2022 07:39)  SpO2: 100% (25 Mar 2022 07:39) (98% - 100%)  temp max in last 48H T(F): , Max: 97.9 (03-24-22 @ 20:13)    =======================================================  Current Antibiotics:    Other medications:  heparin  Infusion 1000 Unit(s)/Hr IV Continuous <Continuous>  lactated ringers. 1000 milliLiter(s) IV Continuous <Continuous>  magnesium sulfate  IVPB 2 Gram(s) IV Intermittent every 2 hours      =======================================================  Labs:                        12.4   7.69  )-----------( 137      ( 24 Mar 2022 21:07 )             37.0     03-25    139  |  103  |  17  ----------------------------<  72  4.7   |  24  |  1.1    Ca    9.2      25 Mar 2022 03:54  Mg     1.6     03-25    TPro  6.5  /  Alb  3.8  /  TBili  1.4<H>  /  DBili  x   /  AST  60<H>  /  ALT  19  /  AlkPhos  87  03-25      Creatinine, Serum: 1.1 mg/dL (03-25-22 @ 03:54)  Creatinine, Serum: 1.2 mg/dL (03-25-22 @ 03:45)  Creatinine, Serum: 1.2 mg/dL (03-24-22 @ 22:43)  Creatinine, Serum: 1.2 mg/dL (03-24-22 @ 21:07)            WBC Count: 7.69 K/uL (03-24-22 @ 21:07)    COVID-19 PCR: NotDetec (03-24-22 @ 22:50)      Alkaline Phosphatase, Serum: 87 U/L (03-25-22 @ 03:54)  Alkaline Phosphatase, Serum: 82 U/L (03-24-22 @ 22:43)  Alkaline Phosphatase, Serum: 95 U/L (03-24-22 @ 21:07)  Alanine Aminotransferase (ALT/SGPT): 19 U/L (03-25-22 @ 03:54)  Alanine Aminotransferase (ALT/SGPT): 19 U/L (03-24-22 @ 22:43)  Alanine Aminotransferase (ALT/SGPT): 24 U/L (03-24-22 @ 21:07)  Aspartate Aminotransferase (AST/SGOT): 60 U/L (03-25-22 @ 03:54)  Aspartate Aminotransferase (AST/SGOT): 61 U/L (03-24-22 @ 22:43)  Aspartate Aminotransferase (AST/SGOT): 76 U/L (03-24-22 @ 21:07)  Bilirubin Total, Serum: 1.4 mg/dL (03-25-22 @ 03:54)  Bilirubin Total, Serum: 1.3 mg/dL (03-24-22 @ 22:43)  Bilirubin Total, Serum: 1.6 mg/dL (03-24-22 @ 21:07)

## 2022-03-25 NOTE — H&P ADULT - HISTORY OF PRESENT ILLNESS
Patient is a 81 year old male with a PMHx of HTN, DL, DM, CVA, PAD sp R AT atherectomy 10/’20, COPD, hypothyroidism, known CAD / hx CABG, ICM (ef recovered) / CHB sp PPM ? upgrade to Medtronic CRT-D 7/’17;  Persistent AF on xarelto at home, who presents for back pain after suffering a mechanical fall 2 days ago. Pt [facility] in [facility] 2 days ago, pt woke up to use the bathroom as he stood up from his walking chair, pt slipped on shoe and fell onto the floor, landing on his back. Pt was on the floor for about 20 mins. Someone passing by heard him yelling and called FARHAN who helped him up but did not come to hospital. Pt denied any dizziness, LOC or head trauma. Pt endorses lower back pain (more on the right side vs left). Able to ambulate. Denies Headache, fevers, chills, CP, SOB, NVDC, No saddle anesthesia, N/T, paralysis, urinary retention, bladder/bowel incontinence.     CT A/P shows 6.4 x 6.2 x 13.7 cm right psoas muscle intramuscular hematoma. Otherwise no evidence for solid organ injury. Bones are diffusely osteopenic without definite evidence for fracture.    Significant labs: Troponin 0.49, repeat Troponin 0.48; CK levels 1683 and repeat 1308  Potassium 6.2 (Hemolyzed), Repeat Potassium 5.6 (Hemolyzed) Hgb 12.6 ( was 14.6 on Feb 17th 22)    EKG: 3/24: v paced, but pacing malfxn    Pt is being admitted to medicine (Telemetry) for NSTEMI.

## 2022-03-25 NOTE — CONSULT NOTE ADULT - SUBJECTIVE AND OBJECTIVE BOX
Outpt cardiologist:  Dr Laughlin    HPI:  81 year-year old male with  Other Cardiac hx:  ICM (ef recovered) / CHB sp PPM ? upgrade to Medtronic CRT-D 7/’17;  Persistent AF on xarelto at home  Other pmh:  HTN, DL, DM, CVA, PAD sp R AT atherectomy 10/’20, COPD, hypothyroidism  presented for increased R blank/back pain.      Pt reports pain on R lower abd toward back.  Reports recent mechanical fall 2 days ago:  was getting out from recliner,  slipped on shoe on floor and fell onto floor.  was on floor for likely less than 1 hour prior to ems arriving.  Denied any loc / head trauma.     Pt denied any recent chest pains / palpitations.  Reports stable breathing / no recent exacerbations for his COPD (doesn't require home o2).            PAST MEDICAL & SURGICAL HISTORY  Coronary artery disease  s/p CABG 2014    Heart failure  chronic systolic heart failure    Atrial fibrillation    Benign prostate hyperplasia    Dyslipidemia    Hypertension    Hypothyroidism    Diabetes mellitus    COPD (chronic obstructive pulmonary disease)    Glaucoma    Psoriasis    S/P CABG (coronary artery bypass graft)    AICD (automatic cardioverter/defibrillator) present    H/O total knee replacement, bilateral    S/P thoracotomy  pt states he had a VATS w/lung decortication        FAMILY HISTORY:  FAMILY HISTORY:  Family history of coronary artery disease (Father)        SOCIAL HISTORY:  Social History:      ALLERGIES:  No Known Allergies      MEDICATIONS:    PRN:      HOME MEDICATIONS:  Home Medications:  atorvastatin 40 mg oral tablet: 1 tab(s) orally once a day (at bedtime) (28 Oct 2020 01:34)  Incruse Ellipta 62.5 mcg/inh inhalation powder: 1 inhaler(s) inhaled once a day (28 Oct 2020 01:34)  Lasix 40 mg oral tablet: 1 tab(s) orally once a day (28 Oct 2020 01:34)  latanoprost 0.005% ophthalmic solution: 1 drop(s) to each affected eye once a day (in the evening) (28 Oct 2020 01:34)  levothyroxine: 250 microgram(s) orally once a day (28 Oct 2020 01:34)  metFORMIN 750 mg oral tablet, extended release: 1 tab(s) orally 2 times a day (28 Oct 2020 01:34)  metoprolol succinate 50 mg oral tablet, extended release: 1 tab(s) orally once a day (28 Oct 2020 01:34)  Trelegy Ellipta inhalation powder: 1 puff(s) inhaled once a day (28 Oct 2020 01:34)  warfarin 5 mg oral tablet: 1 tab(s) orally once a day (at bedtime) (28 Oct 2020 01:34)      VITALS:   T(F): 97.9 (03-24 @ 20:13), Max: 97.9 (03-24 @ 20:13)  HR: 77 (03-24 @ 20:13) (77 - 77)  BP: 137/85 (03-24 @ 20:13) (137/85 - 137/85)  BP(mean): --  RR: 18 (03-24 @ 20:13) (18 - 18)  SpO2: 99% (03-24 @ 20:13) (99% - 99%)    I&O's Summary      REVIEW OF SYSTEMS:  CONSTITUTIONAL: No weakness, fevers or chills  HEENT: No visual changes, neck/ear pain  RESPIRATORY: No cough, sob  CARDIOVASCULAR: See HPI denied any chest pain / palpitations.   GASTROINTESTINAL:No nausea, vomiting, diarrhea  + R back pain.   GENITOURINARY: No dysuria, frequency or hematuria  NEUROLOGICAL: No new focal deficits  SKIN: No new rashes    PHYSICAL EXAM:  General: Not in distress.  Non-toxic appearing.   HEENT: EOMI  Cardio: regular, S1, S2, +sys murmur  Pulm: B/L BS.  No wheezing / crackles / rales  Abdomen: Soft, non-tender, non-distended. Normoactive bowel sounds  Extremities: No edema b/l le  Neuro: A&O x3. No focal deficits    LABS:                        12.4   7.69  )-----------( 137      ( 24 Mar 2022 21:07 )             37.0     03-24    136  |  100  |  18  ----------------------------<  80  5.6<H>   |  24  |  1.2    Ca    8.9      24 Mar 2022 22:43    TPro  6.3  /  Alb  3.8  /  TBili  1.3<H>  /  DBili  x   /  AST  61<H>  /  ALT  19  /  AlkPhos  82  03-24    PT/INR - ( 24 Mar 2022 21:07 )   PT: 13.70 sec;   INR: 1.19 ratio         PTT - ( 24 Mar 2022 21:07 )  PTT:42.1 sec  Creatine Kinase, Serum: 1308 U/L *H* (03-24-22 @ 22:43)  Lactate, Blood: 1.4 mmol/L (03-24-22 @ 21:07)  Creatine Kinase, Serum: 1683 U/L *H* (03-24-22 @ 21:07)  Troponin T, Serum: 0.49 ng/mL *HH* (03-24-22 @ 21:07)    CARDIAC MARKERS ( 24 Mar 2022 22:43 )  x     / x     / 1308 U/L / x     / x      CARDIAC MARKERS ( 24 Mar 2022 21:07 )  x     / 0.49 ng/mL / 1683 U/L / x     / x            Troponin trend:

## 2022-03-25 NOTE — CONSULT NOTE ADULT - ASSESSMENT
IMPRESSION  ·	NSTEMI (suspecting silent MI)  ·	R psoas mm hematoma (traumatic)  ·	Other Cardiac hx: Known CAD / hx CABG, ICM (ef recovered) / CHB sp PPM ? upgrade to Medtronic CRT-D 7/’17;  Persistent AF on xarelto at home  ·	Other pmh:  HTN, DL, DM, CVA, PAD sp R AT atherectomy 10/’20, COPD, hypothyroidism  ---  Ekg:	3/24:  v paced,  but pacing malfxn.    Echo:	TTE (10/20): EF 50-55%, severe LAE, mod TR. mod AS (peak 33, mean 17, [place] 1.09)  Cath:	3/’17:  3vd.  Mod as.  Lima-lad mod-sev distal dz.  Svg-om1-om2 LI.   ----  Labs:	Hg 12.4  (prev 13.6 2/’22)  	K 5.6 (chronically hyperK)  	Scr 1.2 (at baseline)  	Trop 0.49 >>       PLAN  ·	Load asa 325, then start asa 81  ·	Stop xarelto (last taken yesterday am)   ·	start on heparin gtt tomorrow am (@ usual time of next xarelto dose) - monitor PTT and H/H  ·	Check tte  ·	Trend ce until peak / fall   ·	Continue home statin / toprol   ·	Hold lasix today given elevated CK / gentle hydration with 75cc/h x 10H,  reassess CK tmrw / resume lasix based on volume status.   ·	once determined that hematoma is improving and h/h stable -- suggest diagnostic cath for NSTEMI   IMPRESSION  ·	NSTEMI (suspecting silent MI)  ·	R psoas mm hematoma (traumatic)  ·	Possible ICD dysfunction  ·	Other Cardiac hx: Known CAD / hx CABG, ICM (ef recovered) / CHB sp PPM ? upgrade to Medtronic CRT-D 7/’17;  Persistent AF on xarelto at home  ·	Other pmh:  HTN, DL, DM, CVA, PAD sp R AT atherectomy 10/’20, COPD, hypothyroidism  ---  Ekg:	3/24:  v paced,  but pacing malfxn.    Echo:	TTE (10/20): EF 50-55%, severe LAE, mod TR. mod AS (peak 33, mean 17, [place] 1.09)  Cath:	3/’17:  3vd.  Mod as.  Lima-lad mod-sev distal dz.  Svg-om1-om2 LI.   ----  Labs:	Hg 12.4  (prev 13.6 2/’22)  	K 5.6 (chronically hyperK)  	Scr 1.2 (at baseline)  	Trop 0.49 >>       PLAN  ·	Load asa 325, then start asa 81  ·	Stop xarelto (last taken yesterday am)   ·	start on heparin gtt tomorrow am (@ usual time of next xarelto dose) - monitor PTT and H/H  ·	Check tte  ·	Trend ce until peak / fall   ·	Continue home statin / toprol   ·	Hold lasix today given elevated CK / gentle hydration with 75cc/h x 10H,  reassess CK tmrw / resume lasix based on volume status.   ·	once determined that hematoma is improving and h/h stable -- suggest diagnostic cath for NSTEMI  ·	[facility]   IMPRESSION  ·	NSTEMI (suspecting silent MI)  ·	R psoas mm hematoma (traumatic)  ·	Possible ICD dysfunction  ·	Other Cardiac hx: Known CAD / hx CABG, ICM (ef recovered) / CHB sp PPM ? upgrade to Medtronic CRT-D 7/’17;  Persistent AF on xarelto at home  ·	Other pmh:  HTN, DL, DM, CVA, PAD sp R AT atherectomy 10/’20, COPD, hypothyroidism  ---  Ekg:	3/24:  v paced,  but pacing malfxn.    Echo:	TTE (10/20): EF 50-55%, severe LAE, mod TR. mod AS (peak 33, mean 17, [place] 1.09)  Cath:	3/’17:  3vd.  Mod as.  Lima-lad mod-sev distal dz.  Svg-om1-om2 LI.   ----  Labs:	Hg 12.4  (prev 13.6 2/’22)  	K 5.6 (chronically hyperK)  	Scr 1.2 (at baseline)  	Trop 0.49 >>      IMPRESSION  ·	Elevated troponin   ·	R psoas mm hematoma (traumatic)  ·	Possible ICD dysfunction  ·	Other Cardiac hx: Known CAD / hx CABG, ICM (ef recovered) / CHB sp PPM ? upgrade to Medtronic CRT-D 7/’17;  Persistent AF on xarelto at home  ·	Other pmh:  HTN, DL, DM, CVA, PAD sp R AT atherectomy 10/’20, COPD, hypothyroidism  ---  Ekg:	3/24:  v paced,  but pacing malfxn.    Echo:	TTE (10/20): EF 50-55%, severe LAE, mod TR. mod AS (peak 33, mean 17, [place] 1.09)  Cath:	3/’17:  3vd.  Mod as.  Lima-lad mod-sev distal dz.  Svg-om1-om2 LI.   ----  Labs:	Hg 12.4  (prev 13.6 2/’22)  	K 5.6 (chronically hyperK)  	Scr 1.2 (at baseline)  	Trop 0.49 >>

## 2022-03-25 NOTE — ADVANCED PRACTICE NURSE CONSULT - ASSESSMENT
81 year old male with a PMHx of HTN, DL, DM, CVA, PAD sp R AT atherectomy 10/’20, COPD, hypothyroidism, known CAD / hx CABG, ICM (ef recovered) / CHB sp PPM ? upgrade to Medtronic CRT-D 7/’17;  Persistent AF on xarelto at home, who presents (3/25) for back pain after suffering a mechanical fall 2 days ago. Pt [facility] in [facility] 2 days ago, pt woke up to use the bathroom as he stood up from his walking chair, pt slipped on shoe and fell onto the floor, landing on his back. Pt was on the floor for about 20 mins. Someone passing by heard him yelling and called FARHAN who helped him up but did not come to hospital. Pt denied any dizziness, LOC or head trauma. Pt endorses lower back pain (more on the right side vs left). Able to ambulate. Denies Headache, fevers, chills, CP, SOB, NVDC, No saddle anesthesia, N/T, paralysis, urinary retention, bladder/bowel incontinence. CT A/P shows 6.4 x 6.2 x 13.7 cm right psoas muscle intramuscular hematoma. Otherwise no evidence for solid organ injury. Bones are diffusely osteopenic without definite evidence for fracture. Significant labs: Troponin 0.49, repeat Troponin 0.48; CK levels 1683 and repeat 1308 Potassium 6.2 (Hemolyzed), Repeat Potassium 5.6 (Hemolyzed) Hgb 12.6 ( was 14.6 on Feb 17th 22) EKG: 3/24: v paced, but pacing malfxn. Pt is being admitted to medicine (Telemetry) for NSTEMI.    Currently admitted to medicine, today is hospital day-0d; in ED3, being managed for NSTEMI; Possible ICD Dysfunction; Right Psoas Intramuscular Hematoma; Hypertension; Dyslipidemia; Diabetes Mellitus - insulin dependent; COPD; Hypothyroidism; BPH.     Received patient in ED3, laying supine in stretcher, HOB elevated 30 degrees. Pt awake, A&Ox3, made aware of purpose of WOCN visit, agreeable to consult. Patient reports buttock wound present "for about a month", states he has been treating it at home w/ Dalton [employer] Aquaphor.     Dry, crusted skin present to b/l heels, no pressure injuries. Small dry, intact scabs present to BLEs.    With assistance from primary RN Jayme, turned patient to right side for skin assessment.     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     Patient currently bedbound, limited mobility in bed, reports incontinent of urine and stool.

## 2022-03-25 NOTE — CHART NOTE - NSCHARTNOTEFT_GEN_A_CORE
Spoke to pt's son, Nishant, provided medical update. Son requests that all staff only contact his sister, Laurita Alvarez ([phone]), who works in medical records, and Nishant ([phone]) for any questions and for medical updates.

## 2022-03-25 NOTE — ADVANCED PRACTICE NURSE CONSULT - RECOMMEDATIONS
1. Fungal IAD b/l buttock-Cleanse skin w/ perineal cleanser, gently pat dry. Apply Nystatin cream & Coloplast Tiffani protective barrier cream q12h & prn after each incontinence episode.   -Assess skin/wound qshift, report changes to primary provider.     Additional recs/PI Prevention:   -Continue turning/positioning patient from side-to-side q2h while in bed, q1h when/if OOB chair, or in accordance w/ pt's plan of care. Utilize pillows to assist w/ turning/positioning. When/if OOB chair, utilize pillows or chair cushion to offload pressure.   -Offload heels from bed surface applying offloading boots to BLEs.   -Continue utilizing one underpad underneath patient to contain incontinence episodes; change pad when saturated/soiled.   -If patient w/ consistent urinary incontinence, consider utilizing condom catheter (if patient candidate) to contain any urinary incontinence.   -Continue nutrition consult for optimal wound healing & nutritional status.     Plan of Care: Primary TAMARA Delacruz at bedside & made aware of above recs. Spoke w/  covering/primary MD Kc  (at [phone]) in regards to above. Signing off on patient, no further needs/recs from [facility] service at this time. Staff RN to perform routine skin/wound assessment and manage wound care. Questions or concerns or if wound worsens and reconsult needed, please contact [facility], [phone].

## 2022-03-25 NOTE — CHART NOTE - NSCHARTNOTEFT_GEN_A_CORE
Electrophysiology    Pts device __BiV ICD____ was interrogated on 03-25-22  Device working properly  Events: no events  elevated optivol consistent with fluid accumulation     Preliminary results d/w with primary team Dr Kc [id number]  Reviewed by attending    Contact EP ACP with any questions [phone]

## 2022-03-26 NOTE — PROGRESS NOTE ADULT - ASSESSMENT
Patient is a 81 year old male with a PMHx of HTN, DL, DM, CVA, PAD sp R AT atherectomy 10/’20, COPD, hypothyroidism, known CAD / hx CABG, ICM (ef recovered) / CHB sp PPM ? upgrade to Medtronic CRT-D 7/’17;  Persistent AF on xarelto at home, who presents for back pain after suffering a mechanical fall 2 days ago prior to presentation. Found to have psoas hematoma- No further intervention from trauma's standpoint. Trops and CKMB elevated- cardiology following.  Device evaluated by EP.     #NSTEMI  - Cardiology consult appreciated   - 3/’17:  3vd.  Mod as.  Lima-lad mod-sev distal dz.  Svg-om1-om2 LI.   - Load asa 325, then start asa 81  - Xarelto held- before resuming ensure that H/H is stable , trnd CBC   - s/p heparin gtt- discontinued b/c of epistasxis and drop in H/H   - f/u echo   - Trend ce until peak / fall   - trop 0.49> 0.48> 0.44> 0.5> 0.39   -CKMB 28.3> 29.4   - Continue home statin / toprol   - Hold lasix today given elevated CK / gentle hydration with 75cc/h x 10H,  reassess CK / resume lasix based on volume status.   - once determined that hematoma is improving and h/h stable -- f/u cardio whether patient will need cath   - EPS eval for full device interrogation -  device working properly, no events     #Right Psoas Intramuscular Hematoma  - Trauma surg recs appreciated  - CT A/P shows 6.4 x 6.2 x 13.7 cm right psoas muscle intramuscular hematoma, if H/H drops repeat CT scan and consult vascualr   - Fall was two days ago, patient is hemodynamically stable, H/H WNL; unlikely to have a significant traumatic bleed requiring intervention  - hgb 12.4>>11.4> 12.0   - Trend H/H, monitor hemodynamics  - No further trauma surgery workup     #Hypertension  #Dyslipidemia  - c/w Losartan  - c/w Atorvastatin  -    #Diabetes Mellitus - insulin dependent  - on Glargine at home  - FS goal 140 - 180  - c/w lantus and lispro  - f/ u A1c     #COPD  - On trelegy at home  - not on home o2    #Hypothyroidism  - c/w Levothyroxine  - f/u AM TSH    #BPH  - c/w Tamsulosin    Dispo: Acute, telemetry  Diet: DASH/Carb consistent  DVT PPx: heparin sub q   ivity Increase as tolera    Handoff: f/u echo, cardio for possible cath, trend H/H , Xarelto on hold Patient is a 81 year old male with a PMHx of HTN, DL, DM, CVA, PAD sp R AT atherectomy 10/’20, COPD, hypothyroidism, known CAD / hx CABG, ICM (ef recovered) / CHB sp PPM ? upgrade to Medtronic CRT-D 7/’17;  Persistent AF on xarelto at home, who presents for back pain after suffering a mechanical fall 2 days ago prior to presentation. Found to have psoas hematoma- No further intervention from trauma's standpoint. Trops and CKMB elevated- cardiology following.  Device evaluated by EP.     #NSTEMI  - Cardiology consult appreciated   - 3/’17:  3vd.  Mod as.  Lima-lad mod-sev distal dz.  Svg-om1-om2 LI.   - Load asa 325, then start asa 81  - Xarelto held- before resuming ensure that H/H is stable , trnd CBC   - s/p heparin gtt- discontinued b/c of epistasxis and drop in H/H   - f/u echo   - Trend ce until peak / fall   - trop 0.49> 0.48> 0.44> 0.5> 0.39   -CKMB 28.3> 29.4   - Continue home statin / toprol   - Hold lasix today given elevated CK / gentle hydration with 75cc/h x 10H,  reassess CK / resume lasix based on volume status.   - once determined that hematoma is improving and h/h stable -- f/u cardio whether patient will need cath   - EPS eval for full device interrogation -  device working properly, no events     #Right Psoas Intramuscular Hematoma  - Trauma surg recs appreciated  - CT A/P shows 6.4 x 6.2 x 13.7 cm right psoas muscle intramuscular hematoma, if H/H drops repeat CT scan and consult vascualr   - Fall was two days ago, patient is hemodynamically stable, H/H WNL; unlikely to have a significant traumatic bleed requiring intervention  - hgb 12.4>>11.4> 12.0   - Trend H/H, monitor hemodynamics  - No further trauma surgery workup     #Hypertension  #Dyslipidemia  - c/w Losartan  - c/w Atorvastatin  -    #Diabetes Mellitus - insulin dependent  - on Glargine at home  - FS goal 140 - 180  - c/w lantus and lispro  - f/ u A1c     #COPD  - On trelegy at home  - not on home o2    #Hypothyroidism  - c/w Levothyroxine  - f/u AM TSH    #BPH  - c/w Tamsulosin    Dispo: Acute, telemetry  Diet: DASH/Carb consistent  DVT PPx: lovenox  ivity Increase as tolera    Handoff: f/u echo, cardio for possible cath, trend H/H , Xarelto on hold Patient is a 81 year old male with a PMHx of HTN, DL, DM, CVA, PAD sp R AT atherectomy 10/’20, COPD, hypothyroidism, known CAD / hx CABG, ICM (ef recovered) / CHB sp PPM ? upgrade to Medtronic CRT-D 7/’17;  Persistent AF on xarelto at home, who presents for back pain after suffering a mechanical fall 2 days ago prior to presentation. Found to have psoas hematoma- No further intervention from trauma's standpoint. Trops and CKMB elevated- cardiology following.  Device evaluated by EP.     #NSTEMI  - Cardiology consult appreciated   - 3/’17:  3vd.  Mod as.  Lima-lad mod-sev distal dz.  Svg-om1-om2 LI.   - Load asa 325, then start asa 81  - Xarelto held- before resuming ensure that H/H is stable , trnd CBC   - s/p heparin gtt- discontinued b/c of epistasxis and drop in H/H   - f/u echo   - Trend ce until peak / fall   - trop 0.49> 0.48> 0.44> 0.5> 0.39   -CKMB 28.3> 29.4   - Continue home statin / toprol   - Hold lasix today given elevated CK / gentle hydration with 75cc/h x 10H,  reassess CK / resume lasix based on volume status.   - once determined that hematoma is improving and h/h stable -- f/u cardio whether patient will need cath   - EPS eval for full device interrogation -  device working properly, no events     #Right Psoas Intramuscular Hematoma  - Trauma surg recs appreciated  - CT A/P shows 6.4 x 6.2 x 13.7 cm right psoas muscle intramuscular hematoma, if H/H drops repeat CT scan and consult vascualr   - Fall was two days ago, patient is hemodynamically stable, H/H WNL; unlikely to have a significant traumatic bleed requiring intervention  - hgb 12.4>>11.4> 12.0   - Trend H/H, monitor hemodynamics  - No further trauma surgery workup     #Hypertension  #Dyslipidemia  - c/w Losartan  - c/w Atorvastatin    #Macrocytic Anemia   -F/u B12, folate, TSH     #Diabetes Mellitus - insulin dependent  - on Glargine at home  - FS goal 140 - 180  - c/w lantus and lispro  - f/ u A1c     #COPD  - On trelegy at home  - not on home o2    #Hypothyroidism  - c/w Levothyroxine  - f/u AM TSH    #BPH  - c/w Tamsulosin    Dispo: Acute, telemetry  Diet: DASH/Carb consistent  DVT PPx: lovenox  ivity Increase as tolera    Handoff: f/u echo, cardio for possible cath, trend H/H , Xarelto on hold

## 2022-03-26 NOTE — PROGRESS NOTE ADULT - SUBJECTIVE AND OBJECTIVE BOX
MYRNA TOVAR  81y  Male      Patient is a 81y old  Male who presents with a chief complaint of NSTEMI (25 Mar 2022 08:20)      INTERVAL HPI/OVERNIGHT EVENTS:  No acute events overnight.  Patient resting in bed comfortably.  Reports back pain but otherwise doing well.  Denies CP, SOB, palpitations, fever and chills.         Vital Signs Last 24 Hrs  T(C): 37.1 (26 Mar 2022 05:00), Max: 37.1 (26 Mar 2022 05:00)  T(F): 98.7 (26 Mar 2022 05:00), Max: 98.7 (26 Mar 2022 05:00)  HR: 60 (26 Mar 2022 05:00) (60 - 60)  BP: 124/59 (26 Mar 2022 05:00) (112/52 - 124/59)  RR: 18 (26 Mar 2022 05:00) (18 - 18)  SpO2: 97% (25 Mar 2022 18:30) (97% - 98%)    PHYSICAL EXAM:  GENERAL: NAD,  NECK: Supple, No JVD, Normal thyroid  CHEST/LUNG: Clear to auscultation bilaterally; No rales, rhonchi, wheezing, or rubs  HEART: Regular rate and rhythm; No murmurs, rubs, or gallops  ABDOMEN: Soft, Nontender, Nondistended; Bowel sounds present  EXTREMITIES:  2+ Peripheral Pulses, No clubbing, cyanosis, or edema      Consultant(s) Notes Reviewed:  [x ] YES  [ ] NO  Care Discussed with Consultants/Other Providers [ x] YES  [ ] NO    LABS:                        12.0   6.32  )-----------( 124      ( 26 Mar 2022 06:50 )             35.9     03-26    136  |  101  |  15  ----------------------------<  73  5.8<H>   |  22  |  1.1    Ca    9.5      26 Mar 2022 06:50  Mg     2.1     03-26    TPro  6.7  /  Alb  4.0  /  TBili  1.7<H>  /  DBili  x   /  AST  64<H>  /  ALT  18  /  AlkPhos  88  03-26    PT/INR - ( 24 Mar 2022 21:07 )   PT: 13.70 sec;   INR: 1.19 ratio         PTT - ( 25 Mar 2022 11:17 )  PTT:73.9 sec      CAPILLARY BLOOD GLUCOSE      POCT Blood Glucose.: 77 mg/dL (26 Mar 2022 08:06)      RADIOLOGY & ADDITIONAL TESTS:  RADIOLOGICAL FINDINGS REVIEW:  < from: CT Abdomen and Pelvis w/ IV Cont (03.24.22 @ 22:04) >    IMPRESSION:    6.4 x 6.2 x 13.7 cm right psoas muscle intramuscular hematoma.  Otherwise no evidence for solid organ injury.  Bones are diffusely osteopenic without definiteevidence for fracture   identified.    --- End of Report ---    < end of copied text >    < from: CT Cervical Spine No Cont (03.24.22 @ 22:03) >        Impression:    No CT evidence of acute cervical spine fracture.    Severe multilevel degenerative changes of the cervical spine.    --- End of Report ---    < end of copied text >    < from: Xray Pelvis AP only (03.24.22 @ 21:57) >      Moderate to severe degenerative changes of bilateral hips and extensive   vascular calcifications.  No evidence for acute fracture or dislocation.    < end of copied text >    < from: Xray Chest 1 View AP/PA (03.24.22 @ 21:56) >    Impression:    No radiographic evidence of acute cardiopulmonary disease.    < end of copied text >    < from: CT Head No Cont (03.24.22 @ 21:51) >    Impression:    No CT evidence of acute intracranial hemorrhage or large territorial   infarct.    --- End of Report ---    < end of copied text >    Imaging Personally Reviewed:  [ ] YES  [ ] NO

## 2022-03-27 NOTE — PHYSICAL THERAPY INITIAL EVALUATION ADULT - SPECIFY REASON(S)
13:47. Pt currently without activity orders. Attempted to call Dr Rush x [phone] with no answer. Will f/u with PT as appropriate pending OOB activity orders.

## 2022-03-27 NOTE — PROGRESS NOTE ADULT - ASSESSMENT
#NSTEMI  - Cardiology consult appreciated   - 3/’17:  3vd.  Mod as.  Lima-lad mod-sev distal dz.  Svg-om1-om2 LI.   - Load asa 325, then start asa 81  - H/H is stable , trnd CBC, fu cardio if Hep/drip still needed  - f/u 2D echo   - Trend ce  - Continue home statin / toprol   - Hold lasix; re-assess in am if can be resumed  - Trend CK  - f/u cardiology if any intervention/cath needed; if no intervention DC Heparin drip and restart Xarelto  - EPS eval for full device interrogation -  device working properly, no events     #Right Psoas Intramuscular Hematoma  - Trauma surg recs appreciated  - CT A/P shows 6.4 x 6.2 x 13.7 cm right psoas muscle intramuscular hematoma, if H/H drops repeat CT scan and consult vascualr   - Fall was two days ago, patient is hemodynamically stable, H/H WNL; unlikely to have a significant traumatic bleed requiring intervention  - Trend H/H, monitor hemodynamics  - No further trauma surgery workup     #Hypertension  #Dyslipidemia  - c/w Losartan  - c/w Atorvastatin    #Macrocytic Anemia   -F/u B12, folate, TSH     #Diabetes Mellitus - insulin dependent  - on Glargine at home  - FS goal 140 - 180  - c/w lantus and lispro  - f/ u A1c     #COPD  - On trelegy at home  - not on home o2    #Hypothyroidism  - c/w Levothyroxine  - f/u AM TSH    #BPH  - c/w Tamsulosin    Dispo: Acute, telemetry  Diet: DASH/Carb consistent  DVT PPx: lovenox  Activity Increase as tolerated    Handoff: f/u echo, cardio for possible cath and if hep drip needed, trend H/H , CK levels, PTT while on Hep drip #NSTEMI  - Cardiology consult appreciated   - 3/’17:  3vd.  Mod as.  Lima-lad mod-sev distal dz.  Svg-om1-om2 LI.   - Load asa 325, then start asa 81  - H/H is stable , trnd CBC, fu cardio if Hep/drip still needed  - f/u 2D echo   - Trend ce  - Continue home statin / toprol   - Hold lasix; re-assess in am if can be resumed  - Trend CK  - f/u cardiology if any intervention/cath needed; if no intervention DC Heparin drip and restart Xarelto  - EPS eval for full device interrogation -  device working properly, no events     # Afib was on Xarelto:  - Resume xarelto in am if cbc remains stable  - Fu cardiology if Hep drip still needed    #Right Psoas Intramuscular Hematoma  - Trauma surg recs appreciated  - CT A/P shows 6.4 x 6.2 x 13.7 cm right psoas muscle intramuscular hematoma, if H/H drops repeat CT scan and consult vascualr   - Fall was two days ago, patient is hemodynamically stable, H/H WNL; unlikely to have a significant traumatic bleed requiring intervention  - Trend H/H, monitor hemodynamics  - No further trauma surgery workup     #Hypertension  #Dyslipidemia  - c/w Losartan  - c/w Atorvastatin    #Macrocytic Anemia   -F/u B12, folate, TSH     #Diabetes Mellitus - insulin dependent  - on Glargine at home  - FS goal 140 - 180  - c/w lantus and lispro  - f/ u A1c     #COPD  - On trelegy at home  - not on home o2    #Hypothyroidism  - c/w Levothyroxine  - f/u AM TSH    #BPH  - c/w Tamsulosin    Dispo: Acute, telemetry  Diet: DASH/Carb consistent  DVT PPx: lovenox  Activity Increase as tolerated    Handoff: f/u echo, cardio for possible cath and if hep drip needed, trend H/H , CK levels, PTT while on Hep drip #NSTEMI  - Cardiology consult appreciated   - 3/’17:  3vd.  Mod as.  Lima-lad mod-sev distal dz.  Svg-om1-om2 LI.   - Load asa 325, then start asa 81  - H/H is stable  - Per trauma team, no contraindication to restarting xarelto if h/h stable  - f/u 2D echo   - Trend ce  - Continue home statin / toprol   - Hold lasix; re-assess in am if can be resumed  - Trend CK  - f/u cardiology if any intervention/cath needed; if no intervention DC Heparin drip and restart Xarelto  - EPS eval for full device interrogation -  device working properly, no events     # Afib was on Xarelto:  - Resume xarelto in am if cbc remains stable  - Fu cardiology if Hep drip still needed    #Right Psoas Intramuscular Hematoma  - Trauma surg recs appreciated  - CT A/P shows 6.4 x 6.2 x 13.7 cm right psoas muscle intramuscular hematoma, if H/H drops repeat CT scan and consult vascualr   - Fall was two days ago, patient is hemodynamically stable, H/H WNL; unlikely to have a significant traumatic bleed requiring intervention  - Trend H/H, monitor hemodynamics  - No further trauma surgery workup   - Spoke to trauma team: ok to restart xarelto     #Hypertension  #Dyslipidemia  - c/w Losartan  - c/w Atorvastatin    #Macrocytic Anemia   -F/u B12, folate, TSH     #Diabetes Mellitus - insulin dependent  - on Glargine at home  - FS goal 140 - 180  - c/w lantus and lispro  - f/ u A1c     #COPD  - On trelegy at home  - not on home o2    #Hypothyroidism  - c/w Levothyroxine  - f/u AM TSH    #BPH  - c/w Tamsulosin    Dispo: Acute, telemetry  Diet: DASH/Carb consistent  DVT PPx: lovenox  Activity Increase as tolerated    Handoff: f/u echo, cardio for possible cath and if hep drip needed, trend H/H , CK levels, restart xarelto in am

## 2022-03-27 NOTE — PROGRESS NOTE ADULT - SUBJECTIVE AND OBJECTIVE BOX
MYRNA TOVAR    Patient is a 81y old  Male who presents with a chief complaint of NSTEMI (26 Mar 2022 10:21)    HPI:  Patient is a 81 year old male with a PMHx of HTN, DL, DM, CVA, PAD sp R AT atherectomy 10/’20, COPD, hypothyroidism, known CAD / hx CABG, ICM (ef recovered) / CHB sp PPM ? upgrade to Medtronic CRT-D 7/’17;  Persistent AF on xarelto at home, who presents for back pain after suffering a mechanical fall 2 days ago. Pt [facility] in [facility] ([facility] 2 days ago, pt woke up to use the bathroom as he stood up from his walking chair, pt slipped on shoe and fell onto the floor, landing on his back. Pt was on the floor for about 20 mins. Someone passing by heard him yelling and called FARHAN who helped him up but did not come to hospital. Pt denied any dizziness, LOC or head trauma. Pt endorses lower back pain (more on the right side vs left). Able to ambulate. Denies Headache, fevers, chills, CP, SOB, NVDC, No saddle anesthesia, N/T, paralysis, urinary retention, bladder/bowel incontinence.     CT A/P shows 6.4 x 6.2 x 13.7 cm right psoas muscle intramuscular hematoma. Otherwise no evidence for solid organ injury. Bones are diffusely osteopenic without definite evidence for fracture.    Significant labs: Troponin 0.49, repeat Troponin 0.48; CK levels 1683 and repeat 1308  Potassium 6.2 (Hemolyzed), Repeat Potassium 5.6 (Hemolyzed) Hgb 12.6 ( was 14.6 on Feb 17th 22)    EKG: 3/24: v paced, but pacing malfxn    Pt is being admitted to medicine (Telemetry) for NSTEMI. (25 Mar 2022 08:20)    INTERVAL HPI/OVERNIGHT EVENTS:    Feels the same today. no acute complaints or concerns    ROS: All ROS negative except    PHYSICAL EXAM:  T(C): 36.5, Max: 36.5 (03-27-22 @ 14:07)  HR: 60 (60 - 60)  BP: 117/55 (102/58 - 123/59)  RR: 18 (18 - 18)  SpO2: --    GENERAL: NAD  NECK: Supple, No JVD  PULMONARY/CHEST: No rales, rhonchi, wheezing  CARDIOVASC: Regular rate and rhythm; No murmurs  GI/ABDOMEN: Soft, Nontender, Nondistended; Bowel sounds present  EXTREMITIES:  No clubbing, cyanosis, or edema, no deformity. No calf tenderness b/l.  SKIN: No rashes or lesions  NERVOUS SYSTEM:  Alert & Oriented X3, no gross neurological  deficit     Consultant(s) Notes Reviewed by me.   Care Discussed with Consultants/Other Providers     LABS:                        11.2   5.84  )-----------( 143      ( 27 Mar 2022 04:30 )             33.8     03-27    140  |  105  |  19  ----------------------------<  82  5.0   |  23  |  1.2    Ca    9.4      27 Mar 2022 04:30  Mg     1.8     03-27    TPro  6.1  /  Alb  3.8  /  TBili  1.7<H>  /  DBili  x   /  AST  54<H>  /  ALT  17  /  AlkPhos  93  03-27        CAPILLARY BLOOD GLUCOSE      POCT Blood Glucose.: 114 mg/dL (27 Mar 2022 11:26)  POCT Blood Glucose.: 91 mg/dL (27 Mar 2022 08:18)  POCT Blood Glucose.: 108 mg/dL (26 Mar 2022 21:18)  POCT Blood Glucose.: 93 mg/dL (26 Mar 2022 16:49)            RADIOLOGY & ADDITIONAL TESTS:      MEDICATIONS  (STANDING):  aspirin enteric coated 81 milliGRAM(s) Oral daily  atorvastatin 40 milliGRAM(s) Oral at bedtime  enoxaparin Injectable 40 milliGRAM(s) SubCutaneous every 24 hours  lactated ringers. 1000 milliLiter(s) (75 mL/Hr) IV Continuous <Continuous>  latanoprost 0.005% Ophthalmic Solution 1 Drop(s) Both EYES at bedtime  levothyroxine  Oral Tab/Cap - Peds 250 MICROGram(s) Oral daily  losartan 100 milliGRAM(s) Oral daily  nystatin Cream 1 Application(s) Topical two times a day  tamsulosin 0.4 milliGRAM(s) Oral at bedtime    MEDICATIONS  (PRN):  acetaminophen     Tablet .. 650 milliGRAM(s) Oral every 6 hours PRN Temp greater or equal to 38C (100.4F), Moderate Pain (4 - 6), Severe Pain (7 - 10)

## 2022-03-28 NOTE — PHYSICAL THERAPY INITIAL EVALUATION ADULT - ACTIVE RANGE OF MOTION EXAMINATION, REHAB EVAL
RLE limited due to R hematoma/discomfort with hip flexion/bilateral  lower extremity Active ROM was WFL (within functional limits)

## 2022-03-28 NOTE — PROGRESS NOTE ADULT - SUBJECTIVE AND OBJECTIVE BOX
MYRNA TOVAR 81y Male  MRN#: [id number]   CODE STATUS: full code     Hospital Day: 3d    Pt is currently admitted with the primary diagnosis of chest pain     SUBJECTIVE    no acute events overnight, pt seen and examined, no new complaints                                             ----------------------------------------------------------  OBJECTIVE  PAST MEDICAL & SURGICAL HISTORY  Coronary artery disease  s/p CABG 2014    Heart failure  chronic systolic heart failure    Atrial fibrillation    Benign prostate hyperplasia    Dyslipidemia    Hypertension    Hypothyroidism    Diabetes mellitus    COPD (chronic obstructive pulmonary disease)    Glaucoma    Psoriasis    S/P CABG (coronary artery bypass graft)    AICD (automatic cardioverter/defibrillator) present    H/O total knee replacement, bilateral    S/P thoracotomy  pt states he had a VATS w/lung decortication                                              -----------------------------------------------------------  ALLERGIES:  No Known Allergies                                            ------------------------------------------------------------    HOME MEDICATIONS  Home Medications:  atorvastatin 40 mg oral tablet: 1 tab(s) orally once a day (at bedtime) (28 Oct 2020 01:34)  Incruse Ellipta 62.5 mcg/inh inhalation powder: 1 inhaler(s) inhaled once a day (28 Oct 2020 01:34)  Lasix 40 mg oral tablet: 1 tab(s) orally once a day (28 Oct 2020 01:34)  latanoprost 0.005% ophthalmic solution: 1 drop(s) to each affected eye once a day (in the evening) (28 Oct 2020 01:34)  levothyroxine: 250 microgram(s) orally once a day (28 Oct 2020 01:34)  metoprolol succinate 50 mg oral tablet, extended release: 1 tab(s) orally once a day (28 Oct 2020 01:34)  Trelegy Ellipta inhalation powder: 1 puff(s) inhaled once a day (28 Oct 2020 01:34)                           MEDICATIONS:  STANDING MEDICATIONS  aspirin enteric coated 81 milliGRAM(s) Oral daily  atorvastatin 40 milliGRAM(s) Oral at bedtime  enoxaparin Injectable 40 milliGRAM(s) SubCutaneous every 24 hours  latanoprost 0.005% Ophthalmic Solution 1 Drop(s) Both EYES at bedtime  levothyroxine  Oral Tab/Cap - Peds 250 MICROGram(s) Oral daily  losartan 100 milliGRAM(s) Oral daily  nystatin Cream 1 Application(s) Topical two times a day  tamsulosin 0.4 milliGRAM(s) Oral at bedtime    PRN MEDICATIONS  acetaminophen     Tablet .. 650 milliGRAM(s) Oral every 6 hours PRN                                            ------------------------------------------------------------  VITAL SIGNS: Last 24 Hours  T(C): 35.8 (28 Mar 2022 04:15), Max: 36.6 (27 Mar 2022 21:10)  T(F): 96.5 (28 Mar 2022 04:15), Max: 97.8 (27 Mar 2022 21:10)  HR: 63 (28 Mar 2022 05:30) (60 - 73)  BP: 111/58 (28 Mar 2022 05:30) (92/60 - 117/55)  BP(mean): 80 (28 Mar 2022 05:30) (80 - 80)  RR: 18 (27 Mar 2022 21:10) (18 - 18)  SpO2: 96% (28 Mar 2022 08:18) (96% - 96%)      03-27-22 @ 07:01  -  03-28-22 @ 07:00  --------------------------------------------------------  IN: 120 mL / OUT: 450 mL / NET: -330 mL    03-28-22 @ 07:01  -  03-28-22 @ 10:57  --------------------------------------------------------  IN: 106 mL / OUT: 400 mL / NET: -294 mL                                             --------------------------------------------------------------  LABS:                        11.2   5.69  )-----------( 136      ( 28 Mar 2022 06:30 )             33.8     03-28    137  |  103  |  20  ----------------------------<  105<H>  4.2   |  22  |  1.2    Ca    9.1      28 Mar 2022 06:30  Mg     1.8     03-28    TPro  6.2  /  Alb  3.7  /  TBili  1.7<H>  /  DBili  x   /  AST  49<H>  /  ALT  16  /  AlkPhos  94  03-28          Troponin T, Serum: 0.47 ng/mL *HH* (03-28-22 @ 06:30)  Creatine Kinase, Serum: 857 U/L *H* (03-28-22 @ 06:30)          CARDIAC MARKERS ( 28 Mar 2022 06:30 )  x     / 0.47 ng/mL / 857 U/L / x     / x      CARDIAC MARKERS ( 26 Mar 2022 11:24 )  x     / x     / 1266 U/L / x     / x                                                  -------------------------------------------------------------  RADIOLOGY:    ACC: [id number] EXAM:  CT ABDOMEN AND PELVIS IC                        ACC: [id number] EXAM:  CT CHEST IC                          PROCEDURE DATE:  03/24/2022          INTERPRETATION:  CLINICAL STATEMENT: Trauma code    TECHNIQUE: Contiguous CT images were obtained of the chest, abdomen and   pelvis.    Intravenous Contrast:  Intravenous contrast administered.  95 cc Omnipaque 350 intravenous contrast was administered. 5 cc discarded.  Oral contrast: was not administered.      COMPARISON:  CT chestdated 12/26/2018    FINDINGS:    CHEST:    LUNGS, PLEURA AND AIRWAYS: No focal consolidation or pneumothorax. Trace   right pleural effusion. Bilateral predominantly basilar and dependent   subsegmental atelectasis. Central airways are patent.    HEART AND VESSELS: Cardiomegaly No pericardial effusion is present.   Normal caliber thoracic aorta. Dilated main pulmonary artery to 3.5 cm.   Aortic valvular and mitral annular calcifications. Post CABG    THORACIC INLET/MEDIASTINUM/LYMPH NODES: No enlarged thoracic lymph nodes    ABDOMEN/PELVIS:    LIVER: Unremarkable.    SPLEEN: Unremarkable.    PANCREAS: Unremarkable.    GALLBLADDER AND BILIARY TREE: Cholelithiasis.    ADRENALS: Unremarkable.    KIDNEYS: Symmetric pattern of renal enhancement. No hydronephrosis.    LYMPH NODES: There are no enlarged abdominal or pelvic lymph nodes.    VASCULATURE: Atherosclerotic calcification of the abdominal aorta and its   branches. Normal caliber abdominal aorta. Dilated celiac trunk to 1.7 cm,   possibly on the basis of post stenotic dilatation.    BOWEL: No evidence for bowel obstruction. Unremarkable appendix. Colonic   diverticulosis.    PERITONEUM/RETROPERITONEUM/MESENTERY: There is no ascites or   pneumoperitoneum.    PELVIC VISCERA: Unremarkable.    BONES AND SOFT TISSUES: Hyperdense enlargement of the right psoas muscle   compatible with intramuscular hematoma measuring approximately 6.4 x 6.2   x 13.7 cm. Left chest wall cardiac device. Sternotomy. Bones are   diffusely osteopenic. No definite acute fracture was identified. Bony   degenerative changes noted.      IMPRESSION:    6.4 x 6.2 x 13.7 cm right psoas muscle intramuscular hematoma.  Otherwise no evidence for solid organ injury.  Bones are diffusely osteopenic without definiteevidence for fracture   identified.    --- End of Report ---            DAVID WILLIS MD; Attending Radiologist  This document has been electronically signed. Mar 24 2022 11:11PM            Summary:   1. Mildlyto moderately decreased global left ventricular systolic   function.   2. Severely enlarged left atrium.   3. Increased LV wall thickness.   4. Mild to moderately increased left ventricular internal cavity size.   5. Aortic valve severely calcified, moderate to severe AS.   6. Sclerotic aortic valve with decreased opening.   7. Thickening and calcification of the anterior and posterior mitral   valve leaflets.   8. Mild mitral valve regurgitation.   9. Mild to moderate mitral stenosis.  10. Mild tricuspid regurgitation.  11. Mild aortic regurgitation.  12. Mild pulmonic valve regurgitation.  13. Right atrial enlargement.  14. Small pericardial effusion.                                              --------------------------------------------------------------    PHYSICAL EXAM:  General: not in acute distress  LUNGS: air entry bilat  HEART: RRR, +S1,S2,  ABDOMEN: SNTTP, ND x 4 q's  EXT: Warm, well perfused x 4  NEURO: AxOx3, No FND's noted  SKIN: No new breakdown or rashes noted                                           --------------------------------------------------------------

## 2022-03-28 NOTE — PROGRESS NOTE ADULT - ASSESSMENT
Patient is a 81 year old male with a PMHx of HTN, DL, DM, CVA, PAD sp R AT atherectomy 10/’20, COPD, hypothyroidism, known CAD / hx CABG, ICM (ef recovered) / CHB sp PPM ? upgrade to Medtronic CRT-D 7/’17;  Persistent AF on xarelto at home, who presents for back pain after suffering a mechanical fall 2 days ago prior to presentation. Found to have psoas hematoma- No further intervention from trauma's standpoint. Trops and CKMB elevated- cardiology following.  Device evaluated by EP.     #mechanical fall   #Right Psoas Intramuscular Hematoma  - Trauma surg recs appreciated  - CT A/P shows 6.4 x 6.2 x 13.7 cm right psoas muscle intramuscular hematoma, if H/H drops repeat CT scan and consult vascualr   - Fall was two days prior to admission, patient is hemodynamically stable, H/H WNL; unlikely to have a significant traumatic bleed requiring intervention  - Trend H/H, monitor hemodynamics  - No further trauma surgery workup     #NSTEMI  - Cardiology consult appreciated   - 3/’17:  3vd.  Mod as.  Lima-lad mod-sev distal dz.  Svg-om1-om2 LI.   - s/p asa load 325, c/w asa 81  - Xarelto held- before resuming ensure that H/H is stable , trnd CBC   - s/p heparin gtt- discontinued b/c of epistasxis and drop in H/H   - TTE, mild to mod decreased global systolic function, severely enlarged left atrium, mod/severe AS,   - Continue with asa / statin / toprol /losartan   - f/u cardio whether patient will need cath   - EPS eval for full device interrogation -  device working properly, no events     #Hypertension  #Dyslipidemia  - c/w Losartan  - c/w Atorvastatin    #Diabetes Mellitus - insulin dependent  - on Glargine at home  - FS goal 140 - 180  - c/w lantus and lispro  - f/ u A1c     #COPD  - On trelegy at home  - not on home o2    #Hypothyroidism  - c/w Levothyroxine 250mcg qd   - f/u AM TSH - 89    #BPH  - c/w Tamsulosin    Diet: DASH/Carb consistent  DVT PPx: lovenox  ivity Increase as tolera    Pending, cardio fu for possible cath, trend H/H , Xarelto on hold     Patient is a 81 year old male with a PMHx of HTN, DL, DM, CVA, PAD sp R AT atherectomy 10/’20, COPD, hypothyroidism, known CAD / hx CABG, ICM (ef recovered) / CHB sp PPM ? upgrade to Medtronic CRT-D 7/’17;  Persistent AF on xarelto at home, who presents for back pain after suffering a mechanical fall 2 days ago prior to presentation. Found to have psoas hematoma- No further intervention from trauma's standpoint. Trops and CKMB elevated- cardiology following.  Device evaluated by EP.     #mechanical fall   #Right Psoas Intramuscular Hematoma  - Trauma surg recs appreciated  - CT A/P shows 6.4 x 6.2 x 13.7 cm right psoas muscle intramuscular hematoma, if H/H drops repeat CT scan and consult vascualr   - Fall was two days prior to admission, patient is hemodynamically stable, H/H WNL; unlikely to have a significant traumatic bleed requiring intervention  - Trend H/H, monitor hemodynamics  - No further trauma surgery workup     #NSTEMI  - Cardiology consult appreciated   - 3/’17:  3vd.  Mod as.  Lima-lad mod-sev distal dz.  Svg-om1-om2 LI.   - s/p asa load 325, c/w asa 81  - Xarelto held- before resuming ensure that H/H is stable , trnd CBC   - s/p heparin gtt- discontinued b/c of epistasxis and drop in H/H   - TTE, mild to mod decreased global systolic function, severely enlarged left atrium, mod/severe AS,   - Continue with asa / statin / toprol /losartan   - f/u cardio whether patient will need cath   - EPS eval for full device interrogation -  device working properly, no events     #Hypertension  #Dyslipidemia  - c/w Losartan  - c/w Atorvastatin    #Diabetes Mellitus - insulin dependent  - on Glargine at home  - FS goal 140 - 180  - c/w lantus and lispro  - f/ u A1c     #COPD  - On trelegy at home  - not on home o2    #Hypothyroidism  - c/w Levothyroxine 250mcg qd   - f/u AM TSH - 89    #BPH  - c/w Tamsulosin    Diet: DASH/Carb consistent  DVT PPx: lovenox  ivity Increase as tolera    Pending, ENT eval for restarting AC, endo consult for elevated TSH despite being on high dose levothyroxine,

## 2022-03-28 NOTE — PHYSICAL THERAPY INITIAL EVALUATION ADULT - ADDITIONAL COMMENTS
Pt lives alone in senior community, elevator accessible apartment. Pt reports ambulating limited household distances with rollator, recent fall. Pt has home PT services that he wishes to continue. Other DME includes shower chair without back.

## 2022-03-28 NOTE — PHYSICAL THERAPY INITIAL EVALUATION ADULT - PERTINENT HX OF CURRENT PROBLEM, REHAB EVAL
Patient is a 81 year old male with a PMHx of HTN, DL, DM, CVA, PAD sp R AT atherectomy 10/’20, COPD, hypothyroidism, known CAD / hx CABG, ICM (ef recovered) / CHB sp PPM ? upgrade to [employer] CRT-D 7/’17;  Persistent AF on xarelto at home, who presents for back pain after suffering a mechanical fall 2 days ago

## 2022-03-28 NOTE — PROGRESS NOTE ADULT - ASSESSMENT
IMPRESSION  Right psoas hematoma  Atrial fibrillation - anticoagulation on hold due to epistaxis  Elevated troponin - stable, peaked at 0.5  CAD s/p CABG   Ischemic cardiomyopathy  NSVT on telemetry  PAD    RECOMMENDATIONS  - Continue aspirin 81mg daily and Atorvastatin 40mg daily  - Continue monitoring Hb and vital signs  - Would not pursue any invasive cardiac work-up at this time due to large psoas hematoma  - Losartan held as outpatient due to hyperkalemia (based on last visit note with Dr. Laughlin); monitor renal function and electrolytes  - Start beta-blockers as tolerated by HR and BP  - Surgery evaluation of psoas hematoma prior to restarting anticoagulation  - Patient had epistaxis as outpatient and was awaiting ENT evaluation; consider inpatient evaluation by ENT for anticoagulation as well IMPRESSION  Right psoas hematoma  Atrial fibrillation - anticoagulation on hold due to epistaxis  Elevated troponin - stable, peaked at 0.5  CAD s/p CABG   Ischemic cardiomyopathy  NSVT on telemetry  PAD    RECOMMENDATIONS  - Continue aspirin 81mg daily and Atorvastatin 40mg daily  - Continue monitoring Hb and vital signs  - Would not pursue any invasive cardiac work-up at this time due to large psoas hematoma  - Losartan held as outpatient due to hyperkalemia (based on last visit note with Dr. Laughlin); discontinue Losartan for now  - Start beta-blockers (25mg BID) as tolerated by HR and BP  - Surgery follow of psoas hematoma prior to restarting anticoagulation  - Patient had epistaxis as outpatient and was awaiting ENT evaluation; consider inpatient evaluation by ENT   - EP evaluation for Watchman device due to fall and significant hematoma

## 2022-03-28 NOTE — PROGRESS NOTE ADULT - SUBJECTIVE AND OBJECTIVE BOX
SUBJECTIVE:    Patient is a 81y old  Male who presents with a chief complaint of NSTEMI (28 Mar 2022 10:57)    Currently admitted to medicine with the primary diagnosis of Elevated troponin; right psoas hematoma       Today is hospital day 3d.     PAST MEDICAL & SURGICAL HISTORY  PAST MEDICAL & SURGICAL HISTORY:  Coronary artery disease  s/p CABG 2014    Heart failure  chronic systolic heart failure    Atrial fibrillation    Benign prostate hyperplasia    Dyslipidemia    Hypertension    Hypothyroidism    Diabetes mellitus    COPD (chronic obstructive pulmonary disease)    Glaucoma    Psoriasis    S/P CABG (coronary artery bypass graft)    AICD (automatic cardioverter/defibrillator) present    H/O total knee replacement, bilateral    S/P thoracotomy  pt states he had a VATS w/lung decortication      SOCIAL HISTORY:    ALLERGIES:  No Known Allergies    MEDICATIONS:  STANDING MEDICATIONS  aspirin enteric coated 81 milliGRAM(s) Oral daily  atorvastatin 40 milliGRAM(s) Oral at bedtime  enoxaparin Injectable 40 milliGRAM(s) SubCutaneous every 24 hours  latanoprost 0.005% Ophthalmic Solution 1 Drop(s) Both EYES at bedtime  levothyroxine  Oral Tab/Cap - Peds 250 MICROGram(s) Oral daily  losartan 100 milliGRAM(s) Oral daily  nystatin Cream 1 Application(s) Topical two times a day  tamsulosin 0.4 milliGRAM(s) Oral at bedtime    PRN MEDICATIONS  acetaminophen     Tablet .. 650 milliGRAM(s) Oral every 6 hours PRN    VITALS:   T(F): 97.4  HR: 61  BP: 104/59  RR: 18  SpO2: 96%    LABS:                        11.2   5.69  )-----------( 136      ( 28 Mar 2022 06:30 )             33.8     03-28    137  |  103  |  20  ----------------------------<  105<H>  4.2   |  22  |  1.2    Ca    9.1      28 Mar 2022 06:30  Mg     1.8     03-28    TPro  6.2  /  Alb  3.7  /  TBili  1.7<H>  /  DBili  x   /  AST  49<H>  /  ALT  16  /  AlkPhos  94  03-28          Troponin T, Serum: 0.47 ng/mL *HH* (03-28-22 @ 06:30)  Creatine Kinase, Serum: 857 U/L *H* (03-28-22 @ 06:30)      CARDIAC MARKERS ( 28 Mar 2022 06:30 )  x     / 0.47 ng/mL / 857 U/L / x     / x          RADIOLOGY:    < from: CT Abdomen and Pelvis w/ IV Cont (03.24.22 @ 22:04) >  IMPRESSION:    6.4 x 6.2 x 13.7 cm right psoas muscle intramuscular hematoma.  Otherwise no evidence for solid organ injury.  Bones are diffusely osteopenic without definiteevidence for fracture   identified.    < end of copied text >    < from: TTE Echo Complete w/o Contrast w/ Doppler (03.27.22 @ 08:39) >  Summary:   1. Mildlyto moderately decreased global left ventricular systolic   function.   2. Severely enlarged left atrium.   3. Increased LV wall thickness.   4. Mild to moderately increased left ventricular internal cavity size.   5. Aortic valve severely calcified, moderate to severe AS.   6. Sclerotic aortic valve with decreased opening.   7. Thickening and calcification of the anterior and posterior mitral   valve leaflets.   8. Mild mitral valve regurgitation.   9. Mild to moderate mitral stenosis.  10. Mild tricuspid regurgitation.  11. Mild aortic regurgitation.  12. Mild pulmonic valve regurgitation.  13. Right atrial enlargement.  14. Small pericardial effusion.    < end of copied text >    Cath in 2017: patent LIMA to LAD with moderate to severe distal disease; Patient SVG to OM1 and OM2; RCA; small non-dominant    PHYSICAL EXAM:  GEN: No acute distress  LUNGS: Clear to auscultation bilaterally   HEART: S1/S2 present. RRR, systolic murmur  ABD: Soft, non-tender, non-distended. Bowel sounds present  EXT: No LE edema  NEURO: AAOX3

## 2022-03-28 NOTE — PHYSICAL THERAPY INITIAL EVALUATION ADULT - PRECAUTIONS/LIMITATIONS, REHAB EVAL
Pt cleared by MD Penaloza to work with PT with elevated troponins./cardiac precautions/fall precautions

## 2022-03-28 NOTE — PHYSICAL THERAPY INITIAL EVALUATION ADULT - GENERAL OBSERVATIONS, REHAB EVAL
[phone] PT IE completed. Pt recevied semifowlers in bed, NAD, pt agreeable to PT session. Pt left sitting in bedside chair, NAD +tele

## 2022-03-28 NOTE — PROGRESS NOTE ADULT - ATTENDING COMMENTS
# fall - mechanical  # psoas muscle hematoma; d/w trauma surgery; okay to restart anticoagulation   # recurrent epistaxis on anticoagulation; consult ENT for recommendation on restarting anticoagulation    # NSTEMI- cardio; following; no further inpatient work up planned  # hypertension-controlled  losartan was discontinued as OP for hyperkalemia  started on toprol; hold for BP and Heart rate parameters    # hypothyroidism  low Free T3 and T4; TSH-83  on Ltx- 250 mcg; consult endocrine for recommendation on dosage adjustment    # DM  # COPD    Pending: ENT endocrine consult; restarting anticoagulation., PT follow up
a. fib  nsvt  EP eval for watchman  cont BB  will follow
# fall -mechanical; with psoas hematoma due to anticoagulation  hg stable  continue to montior  cbc; if stable; plan to star on xarelto tomorrow  d/w patient regarding risk of bleeding with fall episodes. patient verbalized understanding    # NSTEMI  patient denies any chest pain; trop peaked at 0.5 and improved  started on heparin; but d/c'ed because of drop in hg, psoas hematoma, epistaxis  continue asa   pending echo  cardio following    # hypothyroidism  TSH- 83  check free T3, T3  patient on Ltx-250 mcg daily    # ICD dysfunction- cardiac EP consulted- interrogated; device working properly    # mild rhabdomyolysis-improving  continue IV fluids  monitor fluid status  hold lasix    # RLE > LLE  US venous duplex- neg for DVT    # gluteal pressure injury- POA; wound care consult  follow recommendations    plan of care d/w patient.  Pending: cardio plans, echo, starting anticoag tomorrow; if stable, PT consult

## 2022-03-29 NOTE — CONSULT NOTE ADULT - ASSESSMENT
82y/o M with NSTEMI on AC now held 2/2 large right psoas hematoma and epistaxis    - Pt seen and examined at bedside with Dr. Miramontes, plan as per attending.  - As per Cardiology cont ASA/Lipitor; pt considering Watchman procedure, EP c/s appreciated, o/p f/u   - No acute ENT intervention at this time, no active bleed noted to cauterize/pack  - D/w bedside RN, to call ENT directly should bleeding restart - will cauterize vs place packing.

## 2022-03-29 NOTE — PROGRESS NOTE ADULT - SUBJECTIVE AND OBJECTIVE BOX
MYRNA TOVAR 81y Male  MRN#: [id number]   CODE STATUS: full code     Hospital Day: 4d    Pt is currently admitted with the primary diagnosis of fall     SUBJECTIVE    no acute events overnight, pt seen and examined, no new complaints, denies chest pain sob, nvd abdominal pain,                                             ----------------------------------------------------------  OBJECTIVE  PAST MEDICAL & SURGICAL HISTORY  Coronary artery disease  s/p CABG 2014    Heart failure  chronic systolic heart failure    Atrial fibrillation    Benign prostate hyperplasia    Dyslipidemia    Hypertension    Hypothyroidism    Diabetes mellitus    COPD (chronic obstructive pulmonary disease)    Glaucoma    Psoriasis    S/P CABG (coronary artery bypass graft)    AICD (automatic cardioverter/defibrillator) present    H/O total knee replacement, bilateral    S/P thoracotomy  pt states he had a VATS w/lung decortication                                              -----------------------------------------------------------  ALLERGIES:  No Known Allergies                                            ------------------------------------------------------------    HOME MEDICATIONS  Home Medications:  atorvastatin 40 mg oral tablet: 1 tab(s) orally once a day (at bedtime) (28 Oct 2020 01:34)  Incruse Ellipta 62.5 mcg/inh inhalation powder: 1 inhaler(s) inhaled once a day (28 Oct 2020 01:34)  Lasix 40 mg oral tablet: 1 tab(s) orally once a day (28 Oct 2020 01:34)  latanoprost 0.005% ophthalmic solution: 1 drop(s) to each affected eye once a day (in the evening) (28 Oct 2020 01:34)  levothyroxine: 250 microgram(s) orally once a day (28 Oct 2020 01:34)  metoprolol succinate 50 mg oral tablet, extended release: 1 tab(s) orally once a day (28 Oct 2020 01:34)  Trelegy Ellipta inhalation powder: 1 puff(s) inhaled once a day (28 Oct 2020 01:34)                           MEDICATIONS:  STANDING MEDICATIONS  aspirin enteric coated 81 milliGRAM(s) Oral daily  atorvastatin 40 milliGRAM(s) Oral at bedtime  enoxaparin Injectable 40 milliGRAM(s) SubCutaneous every 24 hours  latanoprost 0.005% Ophthalmic Solution 1 Drop(s) Both EYES at bedtime  levothyroxine  Oral Tab/Cap - Peds 250 MICROGram(s) Oral daily  magnesium sulfate  IVPB 2 Gram(s) IV Intermittent every 2 hours  metoprolol tartrate 25 milliGRAM(s) Oral two times a day  nystatin Cream 1 Application(s) Topical two times a day  tamsulosin 0.4 milliGRAM(s) Oral at bedtime    PRN MEDICATIONS  acetaminophen     Tablet .. 650 milliGRAM(s) Oral every 6 hours PRN                                            ------------------------------------------------------------  VITAL SIGNS: Last 24 Hours  T(C): 36.3 (29 Mar 2022 04:00), Max: 36.3 (28 Mar 2022 14:25)  T(F): 97.3 (29 Mar 2022 04:00), Max: 97.4 (28 Mar 2022 14:25)  HR: 60 (29 Mar 2022 04:00) (60 - 61)  BP: 101/56 (29 Mar 2022 04:00) (95/62 - 117/52)  BP(mean): --  RR: 18 (29 Mar 2022 04:00) (18 - 18)  SpO2: 96% (29 Mar 2022 07:48) (96% - 96%)      03-28-22 @ 07:01  -  03-29-22 @ 07:00  --------------------------------------------------------  IN: 831 mL / OUT: 700 mL / NET: 131 mL    03-29-22 @ 07:01  -  03-29-22 @ 10:11  --------------------------------------------------------  IN: 266 mL / OUT: 0 mL / NET: 266 mL                                             --------------------------------------------------------------  LABS:                        10.5   5.80  )-----------( 134      ( 29 Mar 2022 07:30 )             31.3     03-29    136  |  100  |  20  ----------------------------<  94  4.2   |  24  |  1.3    Ca    9.1      29 Mar 2022 07:30  Mg     1.7     03-29    TPro  6.2  /  Alb  3.6  /  TBili  1.5<H>  /  DBili  x   /  AST  46<H>  /  ALT  15  /  AlkPhos  89  03-29                  CARDIAC MARKERS ( 28 Mar 2022 06:30 )  x     / 0.47 ng/mL / 857 U/L / x     / x            PHYSICAL EXAM:  General: not in acute distress  LUNGS: air entry bilat  HEART: RRR, +S1,S2,  ABDOMEN: SNTTP, ND x 4 q's  EXT: Warm, well perfused x 4  NEURO: AxOx3, No FND's noted  SKIN: No new breakdown or rashes noted                                           --------------------------------------------------------------

## 2022-03-29 NOTE — CONSULT NOTE ADULT - ASSESSMENT
EP Dr Gipson  Cards Dr Laughlin    81y Male HTN, HLD, CVA, PAD, COPD, hypothyroid, CAD, CABG, CHB PPM -< upgraded to CRT-D 2017, persistent AF on Xarelto, recent recurrent nosebleed, s/p mechanical fall, now with right psoas muscle hematoma,  EP consulted for watchman device placement    persistent AF  [id number] Score is at least 6  mechanical fall  traumatic psoas muscle hematoma     con't current management  agree with holding Xarelto  resume when safe  follow up with Dr Gipson as out patient in 3-4 weeks to discuss watchman procedure    d/w pts daughter Laurita Aureliano [phone]

## 2022-03-29 NOTE — CONSULT NOTE ADULT - NS ATTEND AMEND GEN_ALL_CORE FT
Epistaxis  Mechanical Fall    - Continue OAC- as per ENT  - Discussed potential option of Watchman device as an alternative to OAC due to recurrent epistaxis and fall. Patient prefer us to discuss with daughter and he wants to discuss with his cardiologist  - OP f-up for device check and decision about watchman
Patient seen and examined at bedside.    local care and nasal hygiene discussed. will follow.

## 2022-03-29 NOTE — CONSULT NOTE ADULT - SUBJECTIVE AND OBJECTIVE BOX
Patient is a 81y old  Male who presents with a chief complaint of NSTEMI (29 Mar 2022 10:11)        HPI:  Patient is a 81 year old male with a PMHx of HTN, DL, DM, CVA, PAD sp R AT atherectomy 10/’20, COPD, hypothyroidism, known CAD / hx CABG, ICM (ef recovered) / CHB sp PPM ? upgrade to Medtronic CRT-D ;  Persistent AF on xarelto at home, who presents for back pain after suffering a mechanical fall 2 days ago. Pt lives in [facility] ([facility] 2 days ago, pt woke up to use the bathroom as he stood up from his walking chair, pt slipped on shoe and fell onto the floor, landing on his back. Pt was on the floor for about 20 mins. Someone passing by heard him yelling and called FARHAN who helped him up but did not come to hospital. Pt denied any dizziness, LOC or head trauma. Pt endorses lower back pain (more on the right side vs left). Able to ambulate. Denies Headache, fevers, chills, CP, SOB, NVDC, No saddle anesthesia, N/T, paralysis, urinary retention, bladder/bowel incontinence.     CT A/P shows 6.4 x 6.2 x 13.7 cm right psoas muscle intramuscular hematoma. Otherwise no evidence for solid organ injury. Bones are diffusely osteopenic without definite evidence for fracture.    Significant labs: Troponin 0.49, repeat Troponin 0.48; CK levels 1683 and repeat 1308  Potassium 6.2 (Hemolyzed), Repeat Potassium 5.6 (Hemolyzed) Hgb 12.6 ( was 14.6 on )    EKG: 3/24: v paced, but pacing malfxn    Pt is being admitted to medicine (Telemetry) for NSTEMI. (25 Mar 2022 08:20)      Electrophysiology:  81y Male    REVIEW OF SYSTEMS    [ ] A ten-point review of systems was otherwise negative except as noted.  [ ] Due to altered mental status/intubation, subjective information were not able to be obtained from the patient. History was obtained, to the extent possible, from review of the chart and collateral sources of information.      PAST MEDICAL & SURGICAL HISTORY:  Coronary artery disease  s/p CABG     Heart failure  chronic systolic heart failure    Atrial fibrillation    Benign prostate hyperplasia    Dyslipidemia    Hypertension    Hypothyroidism    Diabetes mellitus    COPD (chronic obstructive pulmonary disease)    Glaucoma    Psoriasis    S/P CABG (coronary artery bypass graft)    AICD (automatic cardioverter/defibrillator) present    H/O total knee replacement, bilateral    S/P thoracotomy  pt states he had a VATS w/lung decortication        Home Medications:  atorvastatin 40 mg oral tablet: 1 tab(s) orally once a day (at bedtime) (28 Oct 2020 01:34)  Incruse Ellipta 62.5 mcg/inh inhalation powder: 1 inhaler(s) inhaled once a day (28 Oct 2020 01:34)  Lasix 40 mg oral tablet: 1 tab(s) orally once a day (28 Oct 2020 01:)  latanoprost 0.005% ophthalmic solution: 1 drop(s) to each affected eye once a day (in the evening) (28 Oct 2020 01:34)  levothyroxine: 250 microgram(s) orally once a day (28 Oct 2020 01:)  metoprolol succinate 50 mg oral tablet, extended release: 1 tab(s) orally once a day (28 Oct 2020 01:)  Trelegy Ellipta inhalation powder: 1 puff(s) inhaled once a day (28 Oct 2020 01:34)      Allergies:  No Known Allergies      FAMILY HISTORY:  Family history of coronary artery disease (Father)        SOCIAL HISTORY:    CIGARETTES:  ALCOHOL:        PREVIOUS DIAGNOSTIC TESTING:      ECHO  FINDINGS:    STRESS  FINDINGS:    CATHETERIZATION  FINDINGS:    ELECTROPHYSIOLOGY STUDY  FINDINGS:    CAROTID ULTRASOUND:  FINDINGS    VENOUS DUPLEX SCAN:  FINDINGS:    CHEST CT PULMONARY ANGIO with IV Contrast:  FINDINGS:      MEDICATIONS  (STANDING):  aspirin enteric coated 81 milliGRAM(s) Oral daily  atorvastatin 40 milliGRAM(s) Oral at bedtime  enoxaparin Injectable 40 milliGRAM(s) SubCutaneous every 24 hours  latanoprost 0.005% Ophthalmic Solution 1 Drop(s) Both EYES at bedtime  levothyroxine  Oral Tab/Cap - Peds 250 MICROGram(s) Oral daily  magnesium sulfate  IVPB 2 Gram(s) IV Intermittent once  metoprolol tartrate 25 milliGRAM(s) Oral two times a day  nystatin Cream 1 Application(s) Topical two times a day  tamsulosin 0.4 milliGRAM(s) Oral at bedtime    MEDICATIONS  (PRN):  acetaminophen     Tablet .. 650 milliGRAM(s) Oral every 6 hours PRN Temp greater or equal to 38C (100.4F), Moderate Pain (4 - 6), Severe Pain (7 - 10)      Vital Signs Last 24 Hrs  T(C): 36.3 (29 Mar 2022 04:00), Max: 36.3 (28 Mar 2022 14:25)  T(F): 97.3 (29 Mar 2022 04:00), Max: 97.4 (28 Mar 2022 14:25)  HR: 60 (29 Mar 2022 04:00) (60 - 61)  BP: 101/56 (29 Mar 2022 04:00) (95/62 - 117/52)  BP(mean): --  RR: 18 (29 Mar 2022 04:00) (18 - 18)  SpO2: 96% (29 Mar 2022 07:48) (96% - 96%)    PHYSICAL EXAM:    GENERAL: In no apparent distress, well nourished, and hydrated.  HEAD:  Atraumatic, Normocephalic  EYES: EOMI, PERRLA, conjunctiva and sclera clear  NECK: Supple and normal thyroid.  No JVD or carotid bruit.  Carotid pulse is 2+ bilaterally.  HEART: Regular rate and rhythm; No murmurs, rubs, or gallops.  PULMONARY: Clear to auscultation and perfusion.  No rales, wheezing, or rhonchi bilaterally.  ABDOMEN: Soft, Nontender, Nondistended; Bowel sounds present  EXTREMITIES:  2+ Peripheral Pulses, No clubbing, cyanosis, or edema  NEUROLOGICAL: Grossly nonfocal    I&O's Detail    28 Mar 2022 07:01  -  29 Mar 2022 07:00  --------------------------------------------------------  IN:    Oral Fluid: 831 mL  Total IN: 831 mL    OUT:    Voided (mL): 700 mL  Total OUT: 700 mL    Total NET: 131 mL      29 Mar 2022 07:01  -  29 Mar 2022 11:53  --------------------------------------------------------  IN:    Oral Fluid: 266 mL  Total IN: 266 mL    OUT:    Voided (mL): 500 mL  Total OUT: 500 mL    Total NET: -234 mL        Daily     Daily Weight in k.6 (29 Mar 2022 04:00)    INTERPRETATION OF TELEMETRY:    ECG:        LABS:                        10.5   5.80  )-----------( 134      ( 29 Mar 2022 07:30 )             31.3         136  |  100  |  20  ----------------------------<  94  4.2   |  24  |  1.3    Ca    9.1      29 Mar 2022 07:30  Mg     1.7     03    TPro  6.2  /  Alb  3.6  /  TBili  1.5<H>  /  DBili  x   /  AST  46<H>  /  ALT  15  /  AlkPhos  89  0329    CARDIAC MARKERS ( 28 Mar 2022 06:30 )  x     / 0.47 ng/mL / 857 U/L / x     / x              BNP  Free Thyroxine, Serum: 0.2 ng/dL [0.9 - 1.8] (22 @ 04:30)            RADIOLOGY & ADDITIONAL STUDIES:       Patient is a 81y old  Male who presents with a chief complaint of NSTEMI (29 Mar 2022 10:11)        HPI:  Patient is a 81 year old male with a PMHx of HTN, DL, DM, CVA, PAD sp R AT atherectomy 10/’20, COPD, hypothyroidism, known CAD / hx CABG, ICM (ef recovered) / CHB sp PPM ? upgrade to Medtronic CRT-D ;  Persistent AF on xarelto at home, who presents for back pain after suffering a mechanical fall 2 days ago. Pt lives in [facility] ([facility] 2 days ago, pt woke up to use the bathroom as he stood up from his walking chair, pt slipped on shoe and fell onto the floor, landing on his back. Pt was on the floor for about 20 mins. Someone passing by heard him yelling and called FARHAN who helped him up but did not come to hospital. Pt denied any dizziness, LOC or head trauma. Pt endorses lower back pain (more on the right side vs left). Able to ambulate. Denies Headache, fevers, chills, CP, SOB, NVDC, No saddle anesthesia, N/T, paralysis, urinary retention, bladder/bowel incontinence.     CT A/P shows 6.4 x 6.2 x 13.7 cm right psoas muscle intramuscular hematoma. Otherwise no evidence for solid organ injury. Bones are diffusely osteopenic without definite evidence for fracture.    Significant labs: Troponin 0.49, repeat Troponin 0.48; CK levels 1683 and repeat 1308  Potassium 6.2 (Hemolyzed), Repeat Potassium 5.6 (Hemolyzed) Hgb 12.6 ( was 14.6 on )    EKG: 3/24: v paced, but pacing malfxn    Pt is being admitted to medicine (Telemetry) for NSTEMI. (25 Mar 2022 08:20)      Electrophysiology:  81y Male HTN, HLD, CVA, PAD, COPD, hypothyroid, CAD, CABG, CHB PPM -< upgraded to CRT-D , persistent AF on Xarelto, recent recurrent nosebleed, s/p mechanical fall, now with right psoas muscle hematoma, Xarelto on hold  EP consulted for watchman device placement        REVIEW OF SYSTEMS    [x ] A ten-point review of systems was otherwise negative except as noted.  [ ] Due to altered mental status/intubation, subjective information were not able to be obtained from the patient. History was obtained, to the extent possible, from review of the chart and collateral sources of information.      PAST MEDICAL & SURGICAL HISTORY:  Coronary artery disease  s/p CABG     Heart failure  chronic systolic heart failure    Atrial fibrillation    Benign prostate hyperplasia    Dyslipidemia    Hypertension    Hypothyroidism    Diabetes mellitus    COPD (chronic obstructive pulmonary disease)    Glaucoma    Psoriasis    S/P CABG (coronary artery bypass graft)    AICD (automatic cardioverter/defibrillator) present    H/O total knee replacement, bilateral    S/P thoracotomy  pt states he had a VATS w/lung decortication        Home Medications:  atorvastatin 40 mg oral tablet: 1 tab(s) orally once a day (at bedtime) (28 Oct 2020 01:34)  Incruse Ellipta 62.5 mcg/inh inhalation powder: 1 inhaler(s) inhaled once a day (28 Oct 2020 01:34)  Lasix 40 mg oral tablet: 1 tab(s) orally once a day (28 Oct 2020 01:34)  latanoprost 0.005% ophthalmic solution: 1 drop(s) to each affected eye once a day (in the evening) (28 Oct 2020 01:34)  levothyroxine: 250 microgram(s) orally once a day (28 Oct 2020 01:)  metoprolol succinate 50 mg oral tablet, extended release: 1 tab(s) orally once a day (28 Oct 2020 01:34)  Trelegy Ellipta inhalation powder: 1 puff(s) inhaled once a day (28 Oct 2020 01:)      Allergies:  No Known Allergies      FAMILY HISTORY:  Family history of coronary artery disease (Father)        SOCIAL HISTORY:    CIGARETTES:  ALCOHOL:        PREVIOUS DIAGNOSTIC TESTING:      ECHO  FINDINGS:  < from: TTE Echo Complete w/o Contrast w/ Doppler (22 @ 08:39) >  Summary:   1. Mildlyto moderately decreased global left ventricular systolic   function.   2. Severely enlarged left atrium.   3. Increased LV wall thickness.   4. Mild to moderately increased left ventricular internal cavity size.   5. Aortic valve severely calcified, moderate to severe AS.   6. Sclerotic aortic valve with decreased opening.   7. Thickening and calcification of the anterior and posterior mitral   valve leaflets.   8. Mild mitral valve regurgitation.   9. Mild to moderate mitral stenosis.  10. Mild tricuspid regurgitation.  11. Mild aortic regurgitation.  12. Mild pulmonic valve regurgitation.  13. Right atrial enlargement.  14. Small pericardial effusion.    < end of copied text >    < from: TTE Echo Complete w/o Contrast w/ Doppler (10.28.20 @ 10:00) >  Summary:   1. Left ventricular ejection fraction, by visual estimation, is 50 to 55%.   2. Normal global left ventricular systolic function.   3. Severely enlarged left atrium.   4. Mildly increased left ventricular internal cavity size.   5. Mildly enlarged right atrium.   6. Small pericardial effusion.   7. Mild mitral stenosis.   8. Mild mitral valve regurgitation.   9. Mitral annular calcification.  10. Thickening and calcification of the anterior and posterior mitral valve leaflets.  11.Moderate tricuspid regurgitation.  12. Aortic valve thickening with decreased leaflet opening.  13. Mild aortic regurgitation.  14. Estimated pulmonary artery systolic pressure is 40.9 mmHg assuming a right atrial pressure of 15 mmHg, which is consistent with mild pulmonary hypertension.  15. Peak transaortic gradient equals 33.7 mmHg, mean transaortic gradient equals 17.0 mmHg, the calculated aortic valve area equals 1.09 cm² by the continuity equation consistent with moderate aortic stenosis.    < end of copied text >      < from: Transthoracic Echocardiogram (18 @ 12:09) >  Summary:   1. Left ventricular ejection fraction, by visual estimation, is 40 to   45%.   2. Mildly to moderately decreased global left ventricular systolic   function.   3. Severely enlarged left atrium.   4. Mildly increased LV wall thickness.   5. Small pericardial effusion.   6. Mild to moderate mitral valve regurgitation.   7. Moderate mitral annular calcification.   8. Thickening and calcification of the anterior and posterior mitral   valve leaflets.   9. Mild-moderate tricuspid regurgitation.  10. Estimated pulmonary artery systolic pressure is 44.9 mmHg assuming a   right atrial pressure of 10 mmHg, which is consistent with mild pulmonary   hypertension.  11. Small patent foramen ovale, with predominantly left to right shunting   across the atrial septum.  12. Peak transaortic gradient is 34.0 mmHg, mean transaortic gradient   equals 18.2 mmHg, the calculated aortic valve area equals 0.73 cm² by the   continuity equation consistent with severe aortic stenosis.    < end of copied text >      VENOUS DUPLEX SCAN:  FINDINGS:  < from: [facility] Duplex Lower Ext Vein Scan, Bilat (22 @ 08:40) >  Impression:    No evidence of deep venous thrombosis or superficial thrombophlebitis in   the bilateral lower extremities.    < end of copied text >    CHEST CT PULMONARY ANGIO with IV Contrast:  FINDINGS:  < from: CT Abdomen and Pelvis w/ IV Cont (22 @ 22:04) >    6.4 x 6.2 x 13.7 cm right psoas muscle intramuscular hematoma.  Otherwise no evidence for solid organ injury.  Bones are diffusely osteopenic without definiteevidence for fracture   identified.    < end of copied text >      MEDICATIONS  (STANDING):  aspirin enteric coated 81 milliGRAM(s) Oral daily  atorvastatin 40 milliGRAM(s) Oral at bedtime  enoxaparin Injectable 40 milliGRAM(s) SubCutaneous every 24 hours  latanoprost 0.005% Ophthalmic Solution 1 Drop(s) Both EYES at bedtime  levothyroxine  Oral Tab/Cap - Peds 250 MICROGram(s) Oral daily  magnesium sulfate  IVPB 2 Gram(s) IV Intermittent once  metoprolol tartrate 25 milliGRAM(s) Oral two times a day  nystatin Cream 1 Application(s) Topical two times a day  tamsulosin 0.4 milliGRAM(s) Oral at bedtime    MEDICATIONS  (PRN):  acetaminophen     Tablet .. 650 milliGRAM(s) Oral every 6 hours PRN Temp greater or equal to 38C (100.4F), Moderate Pain (4 - 6), Severe Pain (7 - 10)      Vital Signs Last 24 Hrs  T(C): 36.3 (29 Mar 2022 04:00), Max: 36.3 (28 Mar 2022 14:25)  T(F): 97.3 (29 Mar 2022 04:00), Max: 97.4 (28 Mar 2022 14:25)  HR: 60 (29 Mar 2022 04:00) (60 - 61)  BP: 101/56 (29 Mar 2022 04:00) (95/62 - 117/52)  BP(mean): --  RR: 18 (29 Mar 2022 04:00) (18 - 18)  SpO2: 96% (29 Mar 2022 07:48) (96% - 96%)    PHYSICAL EXAM:    GENERAL: In no apparent distress, well nourished, and hydrated.  HEAD:  Atraumatic, Normocephalic  EYES: EOMI, PERRLA, conjunctiva and sclera clear  NECK: Supple and normal thyroid.  No JVD or carotid bruit.  Carotid pulse is 2+ bilaterally.  HEART: Regular rate and rhythm; No murmurs, rubs, or gallops.  PULMONARY: Clear to auscultation and perfusion.  No rales, wheezing, or rhonchi bilaterally.  ABDOMEN: Soft, Nontender, Nondistended; Bowel sounds present  EXTREMITIES:  2+ Peripheral Pulses, No clubbing, cyanosis, or edema  NEUROLOGICAL: Grossly nonfocal    I&O's Detail    28 Mar 2022 07:01  -  29 Mar 2022 07:00  --------------------------------------------------------  IN:    Oral Fluid: 831 mL  Total IN: 831 mL    OUT:    Voided (mL): 700 mL  Total OUT: 700 mL    Total NET: 131 mL      29 Mar 2022 07:01  -  29 Mar 2022 11:53  --------------------------------------------------------  IN:    Oral Fluid: 266 mL  Total IN: 266 mL    OUT:    Voided (mL): 500 mL  Total OUT: 500 mL    Total NET: -234 mL        Daily     Daily Weight in k.6 (29 Mar 2022 04:00)    INTERPRETATION OF TELEMETRY:    ECG:        LABS:                        10.5   5.80  )-----------( 134      ( 29 Mar 2022 07:30 )             31.3         136  |  100  |  20  ----------------------------<  94  4.2   |  24  |  1.3    Ca    9.1      29 Mar 2022 07:30  Mg     1.7         TPro  6.2  /  Alb  3.6  /  TBili  1.5<H>  /  DBili  x   /  AST  46<H>  /  ALT  15  /  AlkPhos  89      CARDIAC MARKERS ( 28 Mar 2022 06:30 )  x     / 0.47 ng/mL / 857 U/L / x     / x              BNP  Free Thyroxine, Serum: 0.2 ng/dL [0.9 - 1.8] (22 @ 04:30)        RADIOLOGY & ADDITIONAL STUDIES:

## 2022-03-29 NOTE — PROGRESS NOTE ADULT - ASSESSMENT
Patient is a 81 year old male with a PMHx of HTN, DL, DM, CVA, PAD sp R AT atherectomy 10/’20, COPD, hypothyroidism, known CAD / hx CABG, ICM (ef recovered) / CHB sp PPM ? upgrade to Medtronic CRT-D 7/’17;  Persistent AF on xarelto at home, who presents for back pain after suffering a mechanical fall 2 days ago prior to presentation. Found to have psoas hematoma- No further intervention from trauma's standpoint. Trops and CKMB elevated- cardiology following.  Device evaluated by EP.     #mechanical fall   #Right Psoas Intramuscular Hematoma  - Trauma surg recs appreciated  - CT A/P shows 6.4 x 6.2 x 13.7 cm right psoas muscle intramuscular hematoma, if H/H drops repeat CT scan and consult vascualr   - Trend H/H, monitor hemodynamics  - No further trauma surgery workup     #NSTEMI  - Cardiology consult appreciated   - 3/’17:  3vd.  Mod as.  Lima-lad mod-sev distal dz.  [id number] LI.   - s/p asa load 325, c/w asa 81  - s/p heparin gtt- discontinued b/c of epistasxis and drop in H/H   - TTE, mild to mod decreased global systolic function, severely enlarged left atrium, mod/severe AS,   - Continue with asa / statin / toprol /losartan   - cardio eval appreciated- no further ischemic workup inpt   - EPS eval for full device interrogation -  device working properly, no events     #chronic Afib   - on xarelto at home, c/b epistaxis   - sp heparin gtt stopped due to epistaxis and drop in hgb   - EP eval for watchman   - c/w metoprolol tartrate 25mg bid,     #Hypertension  #Dyslipidemia  - c/w Losartan  - c/w Atorvastatin    #Diabetes Mellitus - insulin dependent  - on Glargine at home  - FS goal 140 - 180  - c/w lantus and lispro  - f/ u A1c     #COPD  - On trelegy at home  - not on home o2    #Hypothyroidism  - c/w Levothyroxine 250mcg qd   - f/u AM TSH - 89  - endo eval     #BPH  - c/w Tamsulosin    Diet: DASH/Carb consistent  DVT PPx: lovenox ppx   activity - Increase as tolera    Pending, EP eval for watchman, endo consult for elevated TSH despite being on high dose levothyroxine, PT/ rehab

## 2022-03-29 NOTE — PROGRESS NOTE ADULT - ASSESSMENT
Patient is a 81 year old male with a PMHx of HTN, DL, DM, CVA, PAD sp R AT atherectomy 10/’20, COPD, hypothyroidism, known CAD / hx CABG, ICM (ef recovered) / CHB sp PPM ? upgrade to Medtronic CRT-D 7/’17;  Persistent AF on xarelto at home, who presents for back pain after suffering a mechanical fall 2 days ago.  CT A/P shows 6.4 x 6.2 x 13.7 cm right psoas muscle intramuscular hematoma. Otherwise no evidence for solid organ injury. Bones are diffusely osteopenic without definite evidence for fracture.    S/P Mechanical fall  NSTEMI  R Psoas hematoma  Epistaxis  DM-2 / HTN / DL  H/O CVA  PAD  CAD S/P CABG  COPD  Hypothyroidism  NSVT on tele                 PLAN:    ·	Monitor H/H  ·	A/C on hold due to R psoas hematoma  ·	Cardiology eval noted. Recommended to cont ASA and Lipitor. No invasive cardiac w/u as per cardiology due to large psoas hematoma and epistaxis  ·	ECHO reviewed. Mild to moderately decreased global LV systolic function.  ·	Losartan on hold by his cardiologist due to hyperkalemia  ·	Cont Metoprolol 25 mg po q 12h  ·	EP eval for Watchman noted. Recommended out pt f/u.   ·	Monitor FS. On no meds for DM-2  ·	Trauma surgery f/u to clear the pt to start A/C        Progress Note Handoff    Pending (specify):  Consults__Trauma surgery f/u______, Tests________, Test Results_______, Other_________  Family discussion:  Disposition: Home___/SNF___/Other________/Unknown at this time________    Vijay Jean MD  Spectra: [phone]

## 2022-03-29 NOTE — CONSULT NOTE ADULT - SUBJECTIVE AND OBJECTIVE BOX
ENT: Pt is an 80y/o M admitted s/p fall onto back, found to have NSTEMI. ENT consulted for recurrent epistaxis. Pt is on Xarelto at home; reports episodes of epistaxis self-limiting over the past two months. All have resolved spontaneously. He was admitted here for an NSTEMI after fall, heparin drip/ASA was started and subsequently held due to significant epistaxis; sporadic episodes of epistaxis continued even after AC was held. Pt states he gets a small drip occasionally when he bends over to pick something up from the floor. Denies SOB, diff breathing at this time.     HPI:  Patient is a 81 year old male with a PMHx of HTN, DL, DM, CVA, PAD sp R AT atherectomy 10/’20, COPD, hypothyroidism, known CAD / hx CABG, ICM (ef recovered) / CHB sp PPM ? upgrade to Medtronic CRT-D 7/’17;  Persistent AF on xarelto at home, who presents for back pain after suffering a mechanical fall 2 days ago. Pt lives in [facility] ([facility] 2 days ago, pt woke up to use the bathroom as he stood up from his walking chair, pt slipped on shoe and fell onto the floor, landing on his back. Pt was on the floor for about 20 mins. Someone passing by heard him yelling and called FARHAN who helped him up but did not come to hospital. Pt denied any dizziness, LOC or head trauma. Pt endorses lower back pain (more on the right side vs left). Able to ambulate. Denies Headache, fevers, chills, CP, SOB, NVDC, No saddle anesthesia, N/T, paralysis, urinary retention, bladder/bowel incontinence.  CT A/P shows 6.4 x 6.2 x 13.7 cm right psoas muscle intramuscular hematoma. Otherwise no evidence for solid organ injury. Bones are diffusely osteopenic without definite evidence for fracture.  Significant labs: Troponin 0.49, repeat Troponin 0.48; CK levels 1683 and repeat 1308  Potassium 6.2 (Hemolyzed), Repeat Potassium 5.6 (Hemolyzed) Hgb 12.6 ( was 14.6 on Feb 17th 22)  EKG: 3/24: v paced, but pacing malfxn  Pt is being admitted to medicine (Telemetry) for NSTEMI. (25 Mar 2022 08:20)    PAST MEDICAL & SURGICAL HISTORY:  Coronary artery disease  s/p CABG 2014  Heart failure  chronic systolic heart failure  Atrial fibrillation  Benign prostate hyperplasia  Dyslipidemia  Hypertension  Hypothyroidism  Diabetes mellitus  COPD (chronic obstructive pulmonary disease)  Glaucoma  Psoriasis  S/P CABG (coronary artery bypass graft)  AICD (automatic cardioverter/defibrillator) present  H/O total knee replacement, bilateral  S/P thoracotomy  pt states he had a VATS w/lung decortication    Allergies  No Known Allergies    MEDICATIONS  (STANDING):  aspirin enteric coated 81 milliGRAM(s) Oral daily  atorvastatin 40 milliGRAM(s) Oral at bedtime  enoxaparin Injectable 40 milliGRAM(s) SubCutaneous every 24 hours  latanoprost 0.005% Ophthalmic Solution 1 Drop(s) Both EYES at bedtime  levothyroxine  Oral Tab/Cap - Peds 250 MICROGram(s) Oral daily  metoprolol tartrate 25 milliGRAM(s) Oral two times a day  nystatin Cream 1 Application(s) Topical two times a day  tamsulosin 0.4 milliGRAM(s) Oral at bedtime    MEDICATIONS  (PRN):  acetaminophen     Tablet .. 650 milliGRAM(s) Oral every 6 hours PRN Temp greater or equal to 38C (100.4F), Moderate Pain (4 - 6), Severe Pain (7 - 10)    FAMILY HISTORY:  Family history of coronary artery disease (Father)    ROS:   ENT: all negative except as noted in HPI   CV: denies palpitations  Pulm: denies SOB, cough, hemoptysis  GI: denies change in apetite, indigestion, n/v  : denies pertinent urinary symptoms, urgency  Neuro: denies numbness/tingling, loss of sensation  Psych: denies anxiety  MS: denies muscle weakness, instability  Heme: denies easy bruising or bleeding  Endo: denies heat/cold intolerance, excessive sweating  Vascular: denies LE edema    Vital Signs Last 24 Hrs  T(C): 36.3 (29 Mar 2022 04:00), Max: 36.3 (28 Mar 2022 14:25)  T(F): 97.3 (29 Mar 2022 04:00), Max: 97.4 (28 Mar 2022 14:25)  HR: 60 (29 Mar 2022 04:00) (60 - 61)  BP: 101/56 (29 Mar 2022 04:00) (95/62 - 117/52)  RR: 18 (29 Mar 2022 04:00) (18 - 18)  SpO2: 96% (29 Mar 2022 07:48) (96% - 96%)                        10.5   5.80  )-----------( 134      ( 29 Mar 2022 07:30 )             31.3     136  |  100  |  20  ----------------------------<  94  4.2   |  24  |  1.3    Ca    9.1      29 Mar 2022 07:30  Mg     1.7     03-29  TPro  6.2  /  Alb  3.6  /  TBili  1.5<H>  /  DBili  x   /  AST  46<H>  /  ALT  15  /  AlkPhos  89  03-29    PHYSICAL EXAM:  Gen: awake, alert, NAD. No drooling or pooling of secretions. No trismus.   Skin: No rashes, bruises, or lesions  HEENT: Head NC/AT. Nares bilaterally patent, dried blood noted to right naris, left naris patent. Oral cavity no erythema/edema. Tongue wnl. Uvula midline. Posterior oropharynx clear, no discharge/blood noted. Neck supple, trachea midline.   Resp: breathing easily, no stridor, no accessory muscle use   CV: no peripheral edema/cyanosis  GI: soft, nontender, nondistended  Neuro: A&Ox3  Psych: normal mood, normal affect

## 2022-03-29 NOTE — PROGRESS NOTE ADULT - SUBJECTIVE AND OBJECTIVE BOX
MYRNA TOVAR  81y Male    CHIEF COMPLAINT:    Patient is a 81y old  Male who presents with a chief complaint of NSTEMI (29 Mar 2022 11:52)      INTERVAL HPI/OVERNIGHT EVENTS:    Patient seen and examined. No cp. No sob. No palpitations. No abdominal pain.     ROS: All other systems are negative.    Vital Signs:    T(F): 97.3 (22 @ 04:00), Max: 97.4 (22 @ 14:25)  HR: 60 (22 @ 04:00) (60 - 61)  BP: 101/56 (22 @ 04:00) (95/62 - 117/52)  RR: 18 (22 @ 04:00) (18 - 18)  SpO2: 96% (22 @ 07:48) (96% - 96%)  I&O's Summary    28 Mar 2022 07:01  -  29 Mar 2022 07:00  --------------------------------------------------------  IN: 831 mL / OUT: 700 mL / NET: 131 mL    29 Mar 2022 07:01  -  29 Mar 2022 13:13  --------------------------------------------------------  IN: 266 mL / OUT: 500 mL / NET: -234 mL      Daily     Daily Weight in k.6 (29 Mar 2022 04:00)  CAPILLARY BLOOD GLUCOSE      POCT Blood Glucose.: 123 mg/dL (29 Mar 2022 07:52)  POCT Blood Glucose.: 200 mg/dL (28 Mar 2022 21:18)  POCT Blood Glucose.: 128 mg/dL (28 Mar 2022 16:19)      PHYSICAL EXAM:    GENERAL:  NAD  SKIN: No rashes or lesions  HENT: Atraumatic. Normocephalic. PERRL. Moist membranes.  NECK: Supple, No JVD. No lymphadenopathy.  PULMONARY: CTA B/L. No wheezing. No rales  CVS: Normal S1, S2. Rate and Rhythm are regular. No murmurs.  ABDOMEN/GI: Soft, Nontender, Nondistended; BS present  EXTREMITIES: Peripheral pulses intact. No edema B/L LE.  NEUROLOGIC:  No motor or sensory deficit.  PSYCH: Alert & oriented x 3    Consultant(s) Notes Reviewed:  [x ] YES  [ ] NO  Care Discussed with Consultants/Other Providers [ x] YES  [ ] NO    EKG reviewed  Telemetry reviewed    LABS:                        10.5   5.80  )-----------( 134      ( 29 Mar 2022 07:30 )             31.3   Hemoglobin: 10.5 g/dL ( @ 07:30)  Hemoglobin: 11.2 g/dL ( @ 06:30)  Hemoglobin: 11.2 g/dL ( @ 04:30)  Hemoglobin: 11.2 g/dL ( @ 11:24)  Hemoglobin: 12.0 g/dL ( @ 06:50)  Hemoglobin: 11.4 g/dL ( @ 20:00)  Hemoglobin: 11.7 g/dL ( @ 11:17)  Hemoglobin: 12.4 g/dL ( @ 21:07)        136  |  100  |  20  ----------------------------<  94  4.2   |  24  |  1.3    Ca    9.1      29 Mar 2022 07:30  Mg     1.7         TPro  6.2  /  Alb  3.6  /  TBili  1.5<H>  /  DBili  x   /  AST  46<H>  /  ALT  15  /  AlkPhos  89          Trop 0.47, CKMB --, , 22 @ 06:30        RADIOLOGY & ADDITIONAL TESTS:    < from: TTE Echo Complete w/o Contrast w/ Doppler (22 @ 08:39) >    Summary:   1. Mildlyto moderately decreased global left ventricular systolic   function.   2. Severely enlarged left atrium.   3. Increased LV wall thickness.   4. Mild to moderately increased left ventricular internal cavity size.   5. Aortic valve severely calcified, moderate to severe AS.   6. Sclerotic aortic valve with decreased opening.   7. Thickening and calcification of the anterior and posterior mitral   valve leaflets.   8. Mild mitral valve regurgitation.   9. Mild to moderate mitral stenosis.  10. Mild tricuspid regurgitation.  11. Mild aortic regurgitation.  12. Mild pulmonic valve regurgitation.  13. Right atrial enlargement.  14. Small pericardial effusion.    < end of copied text >    Imaging or report Personally Reviewed:  [ ] YES  [ ] NO    Medications:  Standing  aspirin enteric coated 81 milliGRAM(s) Oral daily  atorvastatin 40 milliGRAM(s) Oral at bedtime  enoxaparin Injectable 40 milliGRAM(s) SubCutaneous every 24 hours  latanoprost 0.005% Ophthalmic Solution 1 Drop(s) Both EYES at bedtime  levothyroxine  Oral Tab/Cap - Peds 250 MICROGram(s) Oral daily  metoprolol tartrate 25 milliGRAM(s) Oral two times a day  nystatin Cream 1 Application(s) Topical two times a day  tamsulosin 0.4 milliGRAM(s) Oral at bedtime    PRN Meds  acetaminophen     Tablet .. 650 milliGRAM(s) Oral every 6 hours PRN      Case discussed with resident    Care discussed with pt/family

## 2022-03-30 NOTE — PROGRESS NOTE ADULT - SUBJECTIVE AND OBJECTIVE BOX
MYRNA TOVAR  81y Male    CHIEF COMPLAINT:    Patient is a 81y old  Male who presents with a chief complaint of NSTEMI (30 Mar 2022 10:18)      INTERVAL HPI/OVERNIGHT EVENTS:    Patient seen and examined. No cp. No sob. No more epistaxis. Had some nose bleed yesterday    ROS: All other systems are negative.    Vital Signs:    T(F): 96.7 (03-30-22 @ 04:39), Max: 97.3 (03-29-22 @ 14:21)  HR: 60 (03-30-22 @ 04:39) (60 - 62)  BP: 118/63 (03-30-22 @ 04:39) (88/51 - 118/63)  RR: 18 (03-30-22 @ 04:39) (18 - 18)  SpO2: --  I&O's Summary    29 Mar 2022 07:01  -  30 Mar 2022 07:00  --------------------------------------------------------  IN: 266 mL / OUT: 1400 mL / NET: -1134 mL      Daily     Daily   CAPILLARY BLOOD GLUCOSE      POCT Blood Glucose.: 120 mg/dL (29 Mar 2022 22:20)  POCT Blood Glucose.: 105 mg/dL (29 Mar 2022 17:16)      PHYSICAL EXAM:    GENERAL:  NAD  SKIN: No rashes or lesions  HENT: Atraumatic. Normocephalic. PERRL. Moist membranes.  NECK: Supple, No JVD. No lymphadenopathy.  PULMONARY: CTA B/L. No wheezing. No rales  CVS: Normal S1, S2. Rate and Rhythm are regular. No murmurs.  ABDOMEN/GI: Soft, Nontender, Nondistended; BS present  EXTREMITIES: Peripheral pulses intact. No edema B/L LE.  NEUROLOGIC:  No motor or sensory deficit.  PSYCH: Alert & oriented x 3    Consultant(s) Notes Reviewed:  [x ] YES  [ ] NO  Care Discussed with Consultants/Other Providers [ x] YES  [ ] NO    EKG reviewed  Telemetry reviewed    LABS:                        10.5   5.80  )-----------( 134      ( 29 Mar 2022 07:30 )             31.3   Hemoglobin: 10.5 g/dL (03-29 @ 07:30)  Hemoglobin: 11.2 g/dL (03-28 @ 06:30)  Hemoglobin: 11.2 g/dL (03-27 @ 04:30)  Hemoglobin: 11.2 g/dL (03-26 @ 11:24)  Hemoglobin: 12.0 g/dL (03-26 @ 06:50)  Hemoglobin: 11.4 g/dL (03-25 @ 20:00)  Hemoglobin: 11.7 g/dL (03-25 @ 11:17)    03-29    136  |  100  |  20  ----------------------------<  94  4.2   |  24  |  1.3    Ca    9.1      29 Mar 2022 07:30  Mg     1.7     03-29    TPro  6.2  /  Alb  3.6  /  TBili  1.5<H>  /  DBili  x   /  AST  46<H>  /  ALT  15  /  AlkPhos  89  03-29        Trop 0.47, CKMB --, , 03-28-22 @ 06:30        RADIOLOGY & ADDITIONAL TESTS:      Imaging or report Personally Reviewed:  [ ] YES  [ ] NO    Medications:  Standing  aspirin enteric coated 81 milliGRAM(s) Oral daily  atorvastatin 40 milliGRAM(s) Oral at bedtime  latanoprost 0.005% Ophthalmic Solution 1 Drop(s) Both EYES at bedtime  levothyroxine  Oral Tab/Cap - Peds 250 MICROGram(s) Oral daily  metoprolol tartrate 25 milliGRAM(s) Oral two times a day  nystatin Cream 1 Application(s) Topical two times a day  rivaroxaban 20 milliGRAM(s) Oral with dinner  tamsulosin 0.4 milliGRAM(s) Oral at bedtime    PRN Meds  acetaminophen     Tablet .. 650 milliGRAM(s) Oral every 6 hours PRN      Case discussed with resident    Care discussed with pt/family

## 2022-03-30 NOTE — PROGRESS NOTE ADULT - SUBJECTIVE AND OBJECTIVE BOX
MYRNA TOVAR 81y Male  MRN#: [id number]   CODE STATUS: full code     Hospital Day: 5d    Pt is currently admitted with the primary diagnosis of fall     SUBJECTIVE    no acute events overnight, pt seen and examined, no new complaints                                             ----------------------------------------------------------  OBJECTIVE  PAST MEDICAL & SURGICAL HISTORY  Coronary artery disease  s/p CABG 2014    Heart failure  chronic systolic heart failure    Atrial fibrillation    Benign prostate hyperplasia    Dyslipidemia    Hypertension    Hypothyroidism    Diabetes mellitus    COPD (chronic obstructive pulmonary disease)    Glaucoma    Psoriasis    S/P CABG (coronary artery bypass graft)    AICD (automatic cardioverter/defibrillator) present    H/O total knee replacement, bilateral    S/P thoracotomy  pt states he had a VATS w/lung decortication                                              -----------------------------------------------------------  ALLERGIES:  No Known Allergies                                            ------------------------------------------------------------    HOME MEDICATIONS  Home Medications:  atorvastatin 40 mg oral tablet: 1 tab(s) orally once a day (at bedtime) (28 Oct 2020 01:34)  Incruse Ellipta 62.5 mcg/inh inhalation powder: 1 inhaler(s) inhaled once a day (28 Oct 2020 01:34)  Lasix 40 mg oral tablet: 1 tab(s) orally once a day (28 Oct 2020 01:34)  latanoprost 0.005% ophthalmic solution: 1 drop(s) to each affected eye once a day (in the evening) (28 Oct 2020 01:34)  levothyroxine: 250 microgram(s) orally once a day (28 Oct 2020 01:34)  metoprolol succinate 50 mg oral tablet, extended release: 1 tab(s) orally once a day (28 Oct 2020 01:34)  Trelegy Ellipta inhalation powder: 1 puff(s) inhaled once a day (28 Oct 2020 01:34)                           MEDICATIONS:  STANDING MEDICATIONS  aspirin enteric coated 81 milliGRAM(s) Oral daily  atorvastatin 40 milliGRAM(s) Oral at bedtime  latanoprost 0.005% Ophthalmic Solution 1 Drop(s) Both EYES at bedtime  levothyroxine  Oral Tab/Cap - Peds 250 MICROGram(s) Oral daily  metoprolol tartrate 25 milliGRAM(s) Oral two times a day  nystatin Cream 1 Application(s) Topical two times a day  rivaroxaban 20 milliGRAM(s) Oral with dinner  tamsulosin 0.4 milliGRAM(s) Oral at bedtime    PRN MEDICATIONS  acetaminophen     Tablet .. 650 milliGRAM(s) Oral every 6 hours PRN                                            ------------------------------------------------------------  VITAL SIGNS: Last 24 Hours  T(C): 35.9 (30 Mar 2022 04:39), Max: 36.3 (29 Mar 2022 14:21)  T(F): 96.7 (30 Mar 2022 04:39), Max: 97.3 (29 Mar 2022 14:21)  HR: 60 (30 Mar 2022 04:39) (60 - 62)  BP: 118/63 (30 Mar 2022 04:39) (88/51 - 118/63)  BP(mean): --  RR: 18 (30 Mar 2022 04:39) (18 - 18)  SpO2: --      03-29-22 @ 07:01  -  03-30-22 @ 07:00  --------------------------------------------------------  IN: 266 mL / OUT: 1400 mL / NET: -1134 mL                                             --------------------------------------------------------------  LABS:                        10.5   5.80  )-----------( 134      ( 29 Mar 2022 07:30 )             31.3     03-29    136  |  100  |  20  ----------------------------<  94  4.2   |  24  |  1.3    Ca    9.1      29 Mar 2022 07:30  Mg     1.7     03-29    TPro  6.2  /  Alb  3.6  /  TBili  1.5<H>  /  DBili  x   /  AST  46<H>  /  ALT  15  /  AlkPhos  89  03-29      PHYSICAL EXAM:  General: not in acute distress  LUNGS: air entry bilat  HEART: RRR, +S1,S2,  ABDOMEN: SNTTP, ND x 4 q's  EXT: Warm, well perfused x 4  NEURO: AxOx3, No FND's noted  SKIN: No new breakdown or rashes noted                                           --------------------------------------------------------------

## 2022-03-30 NOTE — CONSULT NOTE ADULT - SUBJECTIVE AND OBJECTIVE BOX
HPI:  Patient is a 81 year old male with a PMHx of HTN, DL, DM, CVA, PAD sp R AT atherectomy 10/’20, COPD, hypothyroidism, known CAD / hx CABG, ICM (ef recovered) / CHB sp PPM ? upgrade to Medtronic CRT-D 7/’17;  Persistent AF on xarelto at home, who presents for back pain after suffering a mechanical fall 2 days ago. Pt lives in [facility] ([facility] 2 days ago, pt woke up to use the bathroom as he stood up from his walking chair, pt slipped on shoe and fell onto the floor, landing on his back. Pt was on the floor for about 20 mins. Someone passing by heard him yelling and called FAUSTINONY who helped him up but did not come to hospital. Pt denied any dizziness, LOC or head trauma. Pt endorses lower back pain (more on the right side vs left). Able to ambulate. Denies Headache, fevers, chills, CP, SOB, NVDC, No saddle anesthesia, N/T, paralysis, urinary retention, bladder/bowel incontinence.     CT A/P shows 6.4 x 6.2 x 13.7 cm right psoas muscle intramuscular hematoma. Otherwise no evidence for solid organ injury. Bones are diffusely osteopenic without definite evidence for fracture.    Significant labs: Troponin 0.49, repeat Troponin 0.48; CK levels 1683 and repeat 1308  Potassium 6.2 (Hemolyzed), Repeat Potassium 5.6 (Hemolyzed) Hgb 12.6 ( was 14.6 on Feb 17th 22)    EKG: 3/24: v paced, but pacing malfxn    Pt is being admitted to medicine (Telemetry) for NSTEMI. (25 Mar 2022 08:20)      PAST MEDICAL & SURGICAL HISTORY  Coronary artery disease  s/p CABG 2014    Heart failure  chronic systolic heart failure    Atrial fibrillation    Benign prostate hyperplasia    Dyslipidemia    Hypertension    Hypothyroidism    Diabetes mellitus    COPD (chronic obstructive pulmonary disease)    Glaucoma    Psoriasis    S/P CABG (coronary artery bypass graft)    AICD (automatic cardioverter/defibrillator) present    H/O total knee replacement, bilateral    S/P thoracotomy  pt states he had a VATS w/lung decortication        FAMILY HISTORY:  FAMILY HISTORY:  Family history of coronary artery disease (Father)        SOCIAL HISTORY:  []smoker  []Alcohol  []Drug    ALLERGIES:  No Known Allergies      MEDICATIONS:  MEDICATIONS  (STANDING):  aspirin enteric coated 81 milliGRAM(s) Oral daily  atorvastatin 40 milliGRAM(s) Oral at bedtime  latanoprost 0.005% Ophthalmic Solution 1 Drop(s) Both EYES at bedtime  levothyroxine  Oral Tab/Cap - Peds 250 MICROGram(s) Oral daily  metoprolol tartrate 25 milliGRAM(s) Oral two times a day  nystatin Cream 1 Application(s) Topical two times a day  rivaroxaban 20 milliGRAM(s) Oral with dinner  tamsulosin 0.4 milliGRAM(s) Oral at bedtime    MEDICATIONS  (PRN):  acetaminophen     Tablet .. 650 milliGRAM(s) Oral every 6 hours PRN Temp greater or equal to 38C (100.4F), Moderate Pain (4 - 6), Severe Pain (7 - 10)      HOME MEDICATIONS:  Home Medications:  atorvastatin 40 mg oral tablet: 1 tab(s) orally once a day (at bedtime) (28 Oct 2020 01:34)  Incruse Ellipta 62.5 mcg/inh inhalation powder: 1 inhaler(s) inhaled once a day (28 Oct 2020 01:34)  Lasix 40 mg oral tablet: 1 tab(s) orally once a day (28 Oct 2020 01:34)  latanoprost 0.005% ophthalmic solution: 1 drop(s) to each affected eye once a day (in the evening) (28 Oct 2020 01:34)  levothyroxine: 250 microgram(s) orally once a day (28 Oct 2020 01:34)  metoprolol succinate 50 mg oral tablet, extended release: 1 tab(s) orally once a day (28 Oct 2020 01:34)  Trelegy Ellipta inhalation powder: 1 puff(s) inhaled once a day (28 Oct 2020 01:34)      VITALS:   T(F): 96.7 (03-30 @ 04:39), Max: 97.8 (03-27 @ 21:10)  HR: 60 (03-30 @ 04:39) (60 - 73)  BP: 118/63 (03-30 @ 04:39) (88/51 - 123/59)  BP(mean): 80 (03-28 @ 05:30) (80 - 80)  RR: 18 (03-30 @ 04:39) (18 - 18)  SpO2: 96% (03-29 @ 07:48) (96% - 96%)    I&O's Summary    29 Mar 2022 07:01  -  30 Mar 2022 07:00  --------------------------------------------------------  IN: 266 mL / OUT: 1400 mL / NET: -1134 mL        REVIEW OF SYSTEMS:  CONSTITUTIONAL: No weakness, fevers or chills  EYES: No visual changes  ENT: No vertigo or throat pain   NECK: No pain or stiffness  RESPIRATORY: No cough, wheezing, hemoptysis; No shortness of breath  CARDIOVASCULAR: No chest pain or palpitations  GASTROINTESTINAL: No abdominal or epigastric pain. No nausea, vomiting, or hematemesis; No diarrhea or constipation. No melena or hematochezia.  GENITOURINARY: No Polyuria  NEUROLOGICAL:  No tremors, no Weakness or numbness  SKIN: No itching, no rashes  MSK: no joint pain    PHYSICAL EXAM:  GENERAL: Patient is awake , alert and oriented,  not in acute distress  EYES: No proptosis, no lid lag  NECK: No thyroid enlargement, no palpable nodules , no bruit  LUNGS: Clear to auscultation bilaterally   CARDIOVASCULAR: S1/S2 present, RRR , no murmurs or rubs  ABD: Soft, non-tender, non-distended, +BS  EXT: No SAUL  SKIN: No abdominal striae  NEURO: No tremors, DTR 2+    LABS:                        10.5   5.80  )-----------( 134      ( 29 Mar 2022 07:30 )             31.3     03-29    136  |  100  |  20  ----------------------------<  94  4.2   |  24  |  1.3    Ca    9.1      29 Mar 2022 07:30  Mg     1.7     03-29    TPro  6.2  /  Alb  3.6  /  TBili  1.5<H>  /  DBili  x   /  AST  46<H>  /  ALT  15  /  AlkPhos  89  03-29 03-26 Chol 273<H> LDL -- HDL 49 Trig 146  POCT Blood Glucose.: 120 mg/dL (03-29-22 @ 22:20)  POCT Blood Glucose.: 105 mg/dL (03-29-22 @ 17:16)  POCT Blood Glucose.: 123 mg/dL (03-29-22 @ 07:52)  POCT Blood Glucose.: 200 mg/dL (03-28-22 @ 21:18)  POCT Blood Glucose.: 128 mg/dL (03-28-22 @ 16:19)  POCT Blood Glucose.: 224 mg/dL (03-28-22 @ 11:39)  POCT Blood Glucose.: 93 mg/dL (03-28-22 @ 07:37)  POCT Blood Glucose.: 114 mg/dL (03-27-22 @ 22:17)  POCT Blood Glucose.: 149 mg/dL (03-27-22 @ 16:59)  POCT Blood Glucose.: 114 mg/dL (03-27-22 @ 11:26)    TSH 83.99; Total T3 --; Free T4 --

## 2022-03-30 NOTE — PROGRESS NOTE ADULT - ASSESSMENT
Patient is a 81 year old male with a PMHx of HTN, DL, DM, CVA, PAD sp R AT atherectomy 10/’20, COPD, hypothyroidism, known CAD / hx CABG, ICM (ef recovered) / CHB sp PPM ? upgrade to Medtronic CRT-D 7/’17;  Persistent AF on xarelto at home, who presents for back pain after suffering a mechanical fall 2 days ago.  CT A/P shows 6.4 x 6.2 x 13.7 cm right psoas muscle intramuscular hematoma. Otherwise no evidence for solid organ injury. Bones are diffusely osteopenic without definite evidence for fracture.    S/P Mechanical fall  NSTEMI  R Psoas hematoma  Epistaxis  DM-2 / HTN / DL  H/O CVA  PAD  CAD S/P CABG  COPD  Hypothyroidism  NSVT on tele                 PLAN:    ·	Monitor H/H. Check CBC today  ·	No more epistaxis. Seen by ENT. Recall if the pt bleeds again  ·	Cleared by Trauma surgery to restart A/C  ·	Will restart his Xarelto 20 mg po daily and watch him overnight.   ·	Cardiology eval noted. Recommended to cont ASA and Lipitor. No invasive cardiac w/u as per cardiology due to large psoas hematoma and epistaxis  ·	ECHO reviewed. Mild to moderately decreased global LV systolic function.  ·	Losartan on hold by his cardiologist due to hyperkalemia  ·	Cont Metoprolol 25 mg po q 12h  ·	EP eval for Watchman noted. Recommended out pt f/u.   ·	Monitor FS. On no meds for DM-2  ·	Pt's TSH is 83.99. On Levothyroxine 250 mcg po daily. Endocrine eval  ·	Pt wants SNF placement. PT eval        Progress Note Handoff    Pending (specify):  Consults__Endocrine eval____, Tests________, Test Results_______, Other_Social services for d/c planning________  Family discussion:  Disposition: Home___/SNF_x__/Other________/Unknown at this time________    Vijay Jean MD  Spectra: [phone]

## 2022-03-30 NOTE — PROGRESS NOTE ADULT - ASSESSMENT
Patient is a 81 year old male with a PMHx of HTN, DL, DM, CVA, PAD sp R AT atherectomy 10/’20, COPD, hypothyroidism, known CAD / hx CABG, ICM (ef recovered) / CHB sp PPM ? upgrade to Medtronic CRT-D 7/’17;  Persistent AF on xarelto at home, who presents for back pain after suffering a mechanical fall 2 days ago prior to presentation. Found to have psoas hematoma- No further intervention from trauma's standpoint. Trops and CKMB elevated- cardiology following.  Device evaluated by EP.     #mechanical fall   #Right Psoas Intramuscular Hematoma  - Trauma surg recs appreciated  - CT A/P shows 6.4 x 6.2 x 13.7 cm right psoas muscle intramuscular hematoma, if H/H drops repeat CT scan and consult vascualr   - Trend H/H, monitor hemodynamics  - No further trauma surgery workup     #NSTEMI  - Cardiology consult appreciated   - 3/’17:  3vd.  Mod as.  Lima-lad mod-sev distal dz.  Svg-om1-om2 LI.   - s/p asa load 325, c/w asa 81  - TTE, mild to mod decreased global systolic function, severely enlarged left atrium, mod/severe AS,   - Continue with asa / statin / toprol   - cardio eval appreciated- no further ischemic workup inpt   - EPS eval for full device interrogation -  device working properly, no events     #chronic Afib   - on xarelto at home, c/b epistaxis   - pt declining wathcman at this time   - c/w metoprolol tartrate 25mg bid,   - restart xarelto and monitor for epistaxis and hgb     #Hypertension  #Dyslipidemia  - c/w Atorvastatin    #Diabetes Mellitus - insulin dependent  - on Glargine at home  - FS goal 140 - 180  - c/w lantus and lispro  - f/ u A1c     #COPD  - On trelegy at home  - not on home o2    #Hypothyroidism  - c/w Levothyroxine 250mcg qd   - f/u AM TSH - 89  - endo eval     #BPH  - c/w Tamsulosin    Diet: DASH/Carb consistent  DVT PPx: xarelto  activity - Increase as tolera    Pending, endo consult for elevated TSH despite being on high dose levothyroxine, xarelto restarted today monitor for epistaxis/hgb, PT/ rehab, dc tomorrow if hgb stable, would like to got to [place] rehab.

## 2022-03-30 NOTE — CONSULT NOTE ADULT - ASSESSMENT
Patient is a 81 year old male with a PMHx of HTN, DL, DM, CVA, PAD sp R AT atherectomy 10/’20, COPD, hypothyroidism, known CAD / hx CABG, ICM (ef recovered) / CHB sp PPM ? upgrade to Medtronic CRT-D 7/’17;  Persistent AF on xarelto at home, who presents for back pain after suffering a mechanical fall 2 days PTP.  He was admitted 5 days ago for psoas muscle hematoma and NSTEMI.   We were called for elevated TSH despite taking Levo-T.     # Hypothrodism :  - his previous TSH 08/2021 was 5.1. pt was on same dose of levo-T      * this note is incomplete, pending attending review*   Patient is a 81 year old male with a PMHx of HTN, DL, DM, CVA, PAD sp R AT atherectomy 10/’20, COPD, hypothyroidism, known CAD / hx CABG, ICM (ef recovered) / CHB sp PPM ? upgrade to Medtronic CRT-D 7/’17;  Persistent AF on xarelto at home, who presents for back pain after suffering a mechanical fall 2 days PTP.  He was admitted 5 days ago for psoas muscle hematoma and NSTEMI.   We were called for elevated TSH despite taking Levo-T.     # Hypothrodism :  - his previous TSH 08/2021 was 5.1. pt was on same dose of levo-T      patient is non compliant with his l-thyroxine, please resume l-thyroxine 2 tabs of 125 mcg. daily, so total daily dosage is 250 mcg. daily. he has financial issues with insulin affordability etc., but does take his diabetes meds, i think he definitely needs a pill box at home, and some help so that he takes all his meds. he also does not keep his endocrine appointments.

## 2022-03-31 NOTE — PROGRESS NOTE ADULT - SUBJECTIVE AND OBJECTIVE BOX
MYRNA TOVAR  81y Male    CHIEF COMPLAINT:    Patient is a 81y old  Male who presents with a chief complaint of NSTEMI (31 Mar 2022 10:21)      INTERVAL HPI/OVERNIGHT EVENTS:    Patient seen and examined. Events noted. Having epistaxis since yesterday. No cp or sob. No dizziness.     ROS: All other systems are negative.    Vital Signs:    T(F): 96.9 (03-31-22 @ 04:48), Max: 97.4 (03-30-22 @ 13:58)  HR: 62 (03-31-22 @ 04:48) (60 - 62)  BP: 97/55 (03-31-22 @ 04:48) (92/50 - 102/79)  RR: 18 (03-31-22 @ 04:48) (18 - 18)  SpO2: 95% (03-31-22 @ 09:21) (95% - 95%)  I&O's Summary    30 Mar 2022 07:01  -  31 Mar 2022 07:00  --------------------------------------------------------  IN: 354 mL / OUT: 500 mL / NET: -146 mL    31 Mar 2022 07:01  -  31 Mar 2022 11:54  --------------------------------------------------------  IN: 0 mL / OUT: 0 mL / NET: 0 mL      Daily     Daily   CAPILLARY BLOOD GLUCOSE      POCT Blood Glucose.: 123 mg/dL (31 Mar 2022 11:45)  POCT Blood Glucose.: 123 mg/dL (31 Mar 2022 07:55)  POCT Blood Glucose.: 135 mg/dL (30 Mar 2022 21:22)  POCT Blood Glucose.: 133 mg/dL (30 Mar 2022 16:34)  POCT Blood Glucose.: 126 mg/dL (30 Mar 2022 11:55)      PHYSICAL EXAM:    GENERAL:  NAD  SKIN: No rashes or lesions  HENT: Atraumatic. Normocephalic. PERRL. Moist membranes.  NECK: Supple, No JVD. No lymphadenopathy.  PULMONARY: CTA B/L. No wheezing. No rales  CVS: Normal S1, S2. Rate and Rhythm are regular. No murmurs.  ABDOMEN/GI: Soft, Nontender, Nondistended; BS present  EXTREMITIES: Peripheral pulses intact. No edema B/L LE.  NEUROLOGIC:  No motor or sensory deficit.  PSYCH: Alert & oriented x 3    Consultant(s) Notes Reviewed:  [x ] YES  [ ] NO  Care Discussed with Consultants/Other Providers [ x] YES  [ ] NO    EKG reviewed  Telemetry reviewed    LABS:                        10.5   9.22  )-----------( 111      ( 31 Mar 2022 08:00 )             31.9   Hemoglobin: 10.5 g/dL (03-31 @ 08:00)  Hemoglobin: 10.5 g/dL (03-29 @ 07:30)  Hemoglobin: 11.2 g/dL (03-28 @ 06:30)  Hemoglobin: 11.2 g/dL (03-27 @ 04:30)    03-31    135  |  101  |  22<H>  ----------------------------<  118<H>  5.0   |  20  |  1.1    Ca    9.0      31 Mar 2022 08:00  Mg     1.6     03-31    TPro  6.2  /  Alb  3.4<L>  /  TBili  1.8<H>  /  DBili  x   /  AST  41  /  ALT  14  /  AlkPhos  92  03-31              RADIOLOGY & ADDITIONAL TESTS:      Imaging or report Personally Reviewed:  [ ] YES  [ ] NO    Medications:  Standing  aspirin enteric coated 81 milliGRAM(s) Oral daily  atorvastatin 40 milliGRAM(s) Oral at bedtime  ceFAZolin   IVPB 2000 milliGRAM(s) IV Intermittent every 8 hours  enoxaparin Injectable 40 milliGRAM(s) SubCutaneous every 24 hours  latanoprost 0.005% Ophthalmic Solution 1 Drop(s) Both EYES at bedtime  levothyroxine  Oral Tab/Cap - Peds 250 MICROGram(s) Oral daily  magnesium sulfate  IVPB 2 Gram(s) IV Intermittent once  metoprolol tartrate 25 milliGRAM(s) Oral two times a day  nystatin Cream 1 Application(s) Topical two times a day  tamsulosin 0.4 milliGRAM(s) Oral at bedtime    PRN Meds  acetaminophen     Tablet .. 650 milliGRAM(s) Oral every 6 hours PRN      Case discussed with resident    Care discussed with pt/family

## 2022-03-31 NOTE — PROGRESS NOTE ADULT - SUBJECTIVE AND OBJECTIVE BOX
ENT DAILY PROGRESS NOTE    Pt is a 81y Male a/w fall onto back, found with NSTEMI, ENT recalled for acute Right naris epistaxis by RN. Patient seen and examined at bedside. Patient reports he started having spontaneous epistaxis to right naris few minutes ago and reports pooling in the back of his throat. He states he sees his private ENT from [facility], he states he was given silver nitrate for his recurrent epistaxis. He reports that his epistaxis is exacerbated when he bends over to pick-up something from the floor. Denies any fever, chills, SOB/difficulty breathing, N/V        REVIEW OF SYSTEMS   [x] A ten-point review of systems was otherwise negative except as noted.      Allergies    No Known Allergies    Intolerances        MEDICATIONS:  acetaminophen     Tablet .. 650 milliGRAM(s) Oral every 6 hours PRN  aspirin enteric coated 81 milliGRAM(s) Oral daily  atorvastatin 40 milliGRAM(s) Oral at bedtime  latanoprost 0.005% Ophthalmic Solution 1 Drop(s) Both EYES at bedtime  levothyroxine  Oral Tab/Cap - Peds 250 MICROGram(s) Oral daily  metoprolol tartrate 25 milliGRAM(s) Oral two times a day  nystatin Cream 1 Application(s) Topical two times a day  rivaroxaban 20 milliGRAM(s) Oral with dinner  tamsulosin 0.4 milliGRAM(s) Oral at bedtime      Vital Signs Last 24 Hrs  T(C): 36.1 (31 Mar 2022 04:48), Max: 36.3 (30 Mar 2022 13:58)  T(F): 96.9 (31 Mar 2022 04:48), Max: 97.4 (30 Mar 2022 13:58)  HR: 62 (31 Mar 2022 04:48) (60 - 62)  BP: 97/55 (31 Mar 2022 04:48) (92/50 - 102/79)  RR: 18 (31 Mar 2022 04:48) (18 - 18)      03-30 @ 07:01  -  03-31 @ 07:00  --------------------------------------------------------  IN:    Oral Fluid: 354 mL  Total IN: 354 mL    OUT:    Voided (mL): 500 mL  Total OUT: 500 mL    Total NET: -146 mL          PHYSICAL EXAM:    GEN: Well-developed, well-nourished. NAD, awake and alert. No drooling or pooling of secretions. No stridor or stertor. Good vocal quality, no hoarseness.   SKIN: Good color, non diaphoretic  HEENT: NC/AT; R Naris: +Active bleeding, pt attempted tamponade with tissue, still actively oozing. Left Naris: patent, no discharge noted or active bleeding, oozing or clots.   Oral mucosa pink and moist. No erythema or edema noted to buccal mucosa, tongue, FOM, uvula. Uvula midline. +Active bleeding noted to right posterior oropharynx   NECK:  Trachea midline. Neck supple, no TTP to B/L lateral neck, no cervical LAD.  RESP: No dyspnea, non-labored breathing. No use of accessory muscles.  CARDIO: +S1/S2  ABDO: Soft, NT.  EXT: JAMES x 4    LABS:  CBC-    BMP/CMP-        Coagulation Studies-    Endocrine Panel-              RADIOLOGY & ADDITIONAL STUDIES:

## 2022-03-31 NOTE — PROCEDURE NOTE - NSNASALPACK_ENT_A_CORE
59y    Male    Patient is a 59y old  Male who presents with a chief complaint of right sided abdominal pain (26 Feb 2018 11:55)  Home regimen Humalog 50/50 26       HPI:  This is a 59 M with PMH of DM celiac disease BPH HLD who came in c/o sudden onset of right sided abdominal pain, non-radiating associated with dry heaving. He denies fevers, chills, n/v/d/c/cp/sob. Pain and nausea improved after meds. (26 Feb 2018 11:55)      PAST MEDICAL & SURGICAL HISTORY:  Type 2 diabetes mellitus  Celiac disease  High cholesterol  DM (diabetes mellitus)  No significant past surgical history      MEDICATIONS  (STANDING):  aspirin enteric coated 81 milliGRAM(s) Oral daily  atorvastatin 10 milliGRAM(s) Oral at bedtime  dextrose 5%. 1000 milliLiter(s) (50 mL/Hr) IV Continuous <Continuous>  dextrose 50% Injectable 12.5 Gram(s) IV Push once  dextrose 50% Injectable 25 Gram(s) IV Push once  dextrose 50% Injectable 25 Gram(s) IV Push once  enoxaparin Injectable 40 milliGRAM(s) SubCutaneous daily  influenza   Vaccine 0.5 milliLiter(s) IntraMuscular once  insulin glargine Injectable (LANTUS) 20 Unit(s) SubCutaneous once  insulin lispro (HumaLOG) corrective regimen sliding scale   SubCutaneous Before meals and at bedtime  pantoprazole  Injectable 40 milliGRAM(s) IV Push daily  tamsulosin 0.4 milliGRAM(s) Oral at bedtime 6cc of air inflated/Rhino Rocket

## 2022-03-31 NOTE — PROGRESS NOTE ADULT - ASSESSMENT
Patient is a 81 year old male with a PMHx of HTN, DL, DM, CVA, PAD sp R AT atherectomy 10/’20, COPD, hypothyroidism, known CAD / hx CABG, ICM (ef recovered) / CHB sp PPM ? upgrade to Medtronic CRT-D 7/’17;  Persistent AF on xarelto at home, who presents for back pain after suffering a mechanical fall 2 days ago.  CT A/P shows 6.4 x 6.2 x 13.7 cm right psoas muscle intramuscular hematoma. Otherwise no evidence for solid organ injury. Bones are diffusely osteopenic without definite evidence for fracture.    S/P Mechanical fall  NSTEMI  R Psoas hematoma  Epistaxis  DM-2 / HTN / DL  H/O CVA  PAD  CAD S/P CABG  COPD  Hypothyroidism  NSVT on tele                 PLAN:    ·	Epistaxis again. S/P nasal packing by ENT  ·	Will not start him on any A/C as in patient. Pt is advised to f/u with his cardiologist Dr. Laughlin after discharge to discuss about A/C  ·	Monitor H/H.   ·	ENT f/u  ·	Cleared by Trauma surgery to restart A/C  ·	Cardiology eval noted. Recommended to cont ASA and Lipitor. No invasive cardiac w/u as per cardiology due to large psoas hematoma and epistaxis  ·	ECHO reviewed. Mild to moderately decreased global LV systolic function.  ·	Losartan on hold by his cardiologist due to hyperkalemia  ·	Cont Metoprolol 25 mg po q 12h  ·	EP eval for Watchman noted. Recommended out pt f/u.   ·	Monitor FS. On no meds for DM-2  ·	Pt's TSH is 83.99. On Levothyroxine 250 mcg po daily. Endocrine eval noted. Likely pt is noncompliant with his meds. Recommended to cont current dose of Levothyroxine.           Progress Note Handoff    Pending (specify):  Consults______, Tests________, Test Results_______, Other_Epistaxis. ________  Family discussion:  Disposition: Home___/SNF_x__/Other________/Unknown at this time________    Vijay Jean MD  Spectra: [phone]        Detail Level: Zone

## 2022-03-31 NOTE — PROGRESS NOTE ADULT - ASSESSMENT
81y Male a/w fall onto back, found with NSTEMI, ENT recalled for acute Right naris epistaxis by RN.    Plan:   - Place Right 7.5 Nasal Packing (Rapid Rhino), continue for 48-72h, will reassess and follow epistaxis course  - Recommend gram-positive abx coverage for duration of packing placement  - Continue mustache dressing, change prn/q shift   - Recommend bacitracin to b/l nares to keep area moist  - Continue to monitor SpO2, recommend humidified oxygen  - Continue to monitor for episodes of rebleeding  - Continue to monitor H/h, transfuse prn  - Avoid: Nasal trauma; no nose rubbing, blowing or manipulating nasal packing, bending with head blow the waist and heavy lifting, Sneeze with mouth open and pinching nares.  - Will discuss with attending

## 2022-03-31 NOTE — PROGRESS NOTE ADULT - SUBJECTIVE AND OBJECTIVE BOX
MYRNA TOVAR 81y Male  MRN#: [id number]   CODE STATUS: full code     Hospital Day: 6d    Pt is currently admitted with the primary diagnosis of fall    SUBJECTIVE    epistaxis overnight and this morning, did not receive AC, nasal packing at bedside by ENT   no other complaints                                             ----------------------------------------------------------  OBJECTIVE  PAST MEDICAL & SURGICAL HISTORY  Coronary artery disease  s/p CABG 2014    Heart failure  chronic systolic heart failure    Atrial fibrillation    Benign prostate hyperplasia    Dyslipidemia    Hypertension    Hypothyroidism    Diabetes mellitus    COPD (chronic obstructive pulmonary disease)    Glaucoma    Psoriasis    S/P CABG (coronary artery bypass graft)    AICD (automatic cardioverter/defibrillator) present    H/O total knee replacement, bilateral    S/P thoracotomy  pt states he had a VATS w/lung decortication                                              -----------------------------------------------------------  ALLERGIES:  No Known Allergies                                            ------------------------------------------------------------    HOME MEDICATIONS  Home Medications:  atorvastatin 40 mg oral tablet: 1 tab(s) orally once a day (at bedtime) (28 Oct 2020 01:34)  Incruse Ellipta 62.5 mcg/inh inhalation powder: 1 inhaler(s) inhaled once a day (28 Oct 2020 01:34)  Lasix 40 mg oral tablet: 1 tab(s) orally once a day (28 Oct 2020 01:34)  latanoprost 0.005% ophthalmic solution: 1 drop(s) to each affected eye once a day (in the evening) (28 Oct 2020 01:34)  levothyroxine: 250 microgram(s) orally once a day (28 Oct 2020 01:34)  metoprolol succinate 50 mg oral tablet, extended release: 1 tab(s) orally once a day (28 Oct 2020 01:34)  Trelegy Ellipta inhalation powder: 1 puff(s) inhaled once a day (28 Oct 2020 01:34)                           MEDICATIONS:  STANDING MEDICATIONS  aspirin enteric coated 81 milliGRAM(s) Oral daily  atorvastatin 40 milliGRAM(s) Oral at bedtime  ceFAZolin   IVPB 2000 milliGRAM(s) IV Intermittent every 8 hours  latanoprost 0.005% Ophthalmic Solution 1 Drop(s) Both EYES at bedtime  levothyroxine  Oral Tab/Cap - Peds 250 MICROGram(s) Oral daily  metoprolol tartrate 25 milliGRAM(s) Oral two times a day  nystatin Cream 1 Application(s) Topical two times a day  tamsulosin 0.4 milliGRAM(s) Oral at bedtime    PRN MEDICATIONS  acetaminophen     Tablet .. 650 milliGRAM(s) Oral every 6 hours PRN                                            ------------------------------------------------------------  VITAL SIGNS: Last 24 Hours  T(C): 36.1 (31 Mar 2022 04:48), Max: 36.3 (30 Mar 2022 13:58)  T(F): 96.9 (31 Mar 2022 04:48), Max: 97.4 (30 Mar 2022 13:58)  HR: 62 (31 Mar 2022 04:48) (60 - 62)  BP: 97/55 (31 Mar 2022 04:48) (92/50 - 102/79)  BP(mean): --  RR: 18 (31 Mar 2022 04:48) (18 - 18)  SpO2: 95% (31 Mar 2022 09:21) (95% - 95%)      03-30-22 @ 07:01  -  03-31-22 @ 07:00  --------------------------------------------------------  IN: 354 mL / OUT: 500 mL / NET: -146 mL                                             --------------------------------------------------------------  LABS:                        10.5   9.22  )-----------( 111      ( 31 Mar 2022 08:00 )             31.9     03-31    135  |  101  |  22<H>  ----------------------------<  118<H>  5.0   |  20  |  1.1    Ca    9.0      31 Mar 2022 08:00  Mg     1.6     03-31    TPro  6.2  /  Alb  3.4<L>  /  TBili  1.8<H>  /  DBili  x   /  AST  41  /  ALT  14  /  AlkPhos  92  03-31    PHYSICAL EXAM:  General: not in acute distress  LUNGS: air entry bilat  HEART: RRR, +S1,S2,  ABDOMEN: SNTTP, ND x 4 q's  EXT: Warm, well perfused x 4  NEURO: AxOx3, No FND's noted  SKIN: No new breakdown or rashes noted                                           --------------------------------------------------------------

## 2022-03-31 NOTE — PROGRESS NOTE ADULT - ASSESSMENT
Patient is a 81 year old male with a PMHx of HTN, DL, DM, CVA, PAD sp R AT atherectomy 10/’20, COPD, hypothyroidism, known CAD / hx CABG, ICM (ef recovered) / CHB sp PPM ? upgrade to Medtronic CRT-D 7/’17;  Persistent AF on xarelto at home, who presents for back pain after suffering a mechanical fall 2 days ago prior to presentation. Found to have psoas hematoma- No further intervention from trauma's standpoint. Trops and CKMB elevated- cardiology following.  Device evaluated by EP.     #mechanical fall   #Right Psoas Intramuscular Hematoma  - Trauma surg recs appreciated  - CT A/P shows 6.4 x 6.2 x 13.7 cm right psoas muscle intramuscular hematoma, if H/H drops repeat CT scan and consult vascualr   - Trend H/H, monitor hemodynamics  - No further trauma surgery workup     #NSTEMI  - Cardiology consult appreciated   - 3/’17:  3vd.  Mod as.  Lima-lad mod-sev distal dz.  Svg-om1-om2 LI.   - s/p asa load 325, c/w asa 81  - TTE, mild to mod decreased global systolic function, severely enlarged left atrium, mod/severe AS,   - Continue with asa / statin / toprol   - cardio eval appreciated- no further ischemic workup inpt   - EPS eval for full device interrogation -  device working properly, no events     #recurrent epistaxis   - ENT appreciated, s/p packing   - cefazolin ppx,   - not on AC,     #chronic Afib   - on xarelto at home, on hold due to epistaxis   - pt declining wathcman at this time   - c/w metoprolol tartrate 25mg bid,     #Hypertension  #Dyslipidemia  - c/w Atorvastatin    #Diabetes Mellitus - insulin dependent  - on Glargine at home  - FS goal 140 - 180  - c/w lantus and lispro  - f/ u A1c     #COPD  - On trelegy at home  - not on home o2    #Hypothyroidism  - c/w Levothyroxine 250mcg qd   - f/u AM TSH - 89  - endo eval appreciated pt not compliant, fu endocrine outpt,     #BPH  - c/w Tamsulosin    Diet: DASH/Carb consistent  DVT PPx: lovenox ppx   activity - Increase as tolerated     Pending - monitoring for epistaxis, possible dc tomorrow.

## 2022-04-01 NOTE — DISCHARGE NOTE PROVIDER - HOSPITAL COURSE
Patient is a 81 year old male with a PMHx of HTN, DL, DM, CVA, PAD sp R AT atherectomy 10/’20, COPD, hypothyroidism, known CAD / hx CABG, ICM (ef recovered) / CHB sp PPM ? upgrade to Medtronic CRT-D 7/’17;  Persistent AF on xarelto at home, who presents for back pain after suffering a mechanical fall 2 days ago prior to presentation. Found to have psoas hematoma- No further intervention from trauma's standpoint. Trops and CKMB elevated- cardiology following.  Device evaluated by EP.     #mechanical fall   #Right Psoas Intramuscular Hematoma  - Trauma surg recs appreciated  - CT A/P shows 6.4 x 6.2 x 13.7 cm right psoas muscle intramuscular hematoma, if H/H drops repeat CT scan and consult vascualr   - Trend H/H, monitor hemodynamics  - No further trauma surgery workup     #NSTEMI  - Cardiology consult appreciated   - 3/’17:  3vd.  Mod as.  Lima-lad mod-sev distal dz.  Svg-om1-om2 LI.   - s/p asa load 325, c/w asa 81  - TTE, mild to mod decreased global systolic function, severely enlarged left atrium, mod/severe AS,   - Continue with asa / statin / toprol   - cardio eval appreciated- no further ischemic workup inpt   - EPS eval for full device interrogation -  device working properly, no events     #recurrent epistaxis   - ENT appreciated, s/p packing   - cefazolin ppx,   - not on AC,     #chronic Afib   - on xarelto at home, on hold due to epistaxis   - pt declining wathcman at this time   - c/w metoprolol tartrate 25mg bid,     #Hypertension  #Dyslipidemia  - c/w Atorvastatin    #Diabetes Mellitus - insulin dependent  - on Glargine at home  - FS goal 140 - 180  - c/w lantus and lispro  - f/ u A1c     #COPD  - On trelegy at home  - not on home o2    #Hypothyroidism  - c/w Levothyroxine 250mcg qd   - f/u AM TSH - 89  - endo eval appreciated pt not compliant, fu endocrine outpt,     #BPH  - c/w Tamsulosin     pt stable for dc with no AC given the  risk of bleeding, epistaxis/ fall hemotoma, fu with pcp and cardiology outpt

## 2022-04-01 NOTE — DISCHARGE NOTE PROVIDER - NSDCCPCAREPLAN_GEN_ALL_CORE_FT
PRINCIPAL DISCHARGE DIAGNOSIS  Diagnosis: Elevated troponin  Assessment and Plan of Treatment: you came in following fall found to ahve elevated cardiac enzymes   you were treated medically for possibel acute coronary syndrome   you will folow up outrpt with cardiology and continue on medications as prescribed      SECONDARY DISCHARGE DIAGNOSES  Diagnosis: Rhabdomyolysis  Assessment and Plan of Treatment: after your fall you were found to ahve muscel breakdown   you were treated with fluids and phsycial therapy    Diagnosis: Hematoma  Assessment and Plan of Treatment: you were found to have a hematoma - blood collection in your back muscles, after the fall   you wwere seen by our surgery teams, they did not recommend any intervention

## 2022-04-01 NOTE — PROGRESS NOTE ADULT - SUBJECTIVE AND OBJECTIVE BOX
MYRNA TOVAR 81y Male  MRN#: [id number]   CODE STATUS: full code     Hospital Day: 7d    Pt is currently admitted with the primary diagnosis of fall     SUBJECTIVE    no acute events overnight, pt seen and examined, no new events, denies any complaints, no new bleeding packing in place                                             ----------------------------------------------------------  OBJECTIVE  PAST MEDICAL & SURGICAL HISTORY  Coronary artery disease  s/p CABG 2014    Heart failure  chronic systolic heart failure    Atrial fibrillation    Benign prostate hyperplasia    Dyslipidemia    Hypertension    Hypothyroidism    Diabetes mellitus    COPD (chronic obstructive pulmonary disease)    Glaucoma    Psoriasis    S/P CABG (coronary artery bypass graft)    AICD (automatic cardioverter/defibrillator) present    H/O total knee replacement, bilateral    S/P thoracotomy  pt states he had a VATS w/lung decortication                                              -----------------------------------------------------------  ALLERGIES:  No Known Allergies                                            ------------------------------------------------------------    HOME MEDICATIONS  Home Medications:  atorvastatin 40 mg oral tablet: 1 tab(s) orally once a day (at bedtime) (28 Oct 2020 01:34)  Incruse Ellipta 62.5 mcg/inh inhalation powder: 1 inhaler(s) inhaled once a day (28 Oct 2020 01:34)  Lasix 40 mg oral tablet: 1 tab(s) orally once a day (28 Oct 2020 01:34)  latanoprost 0.005% ophthalmic solution: 1 drop(s) to each affected eye once a day (in the evening) (28 Oct 2020 01:34)  levothyroxine: 250 microgram(s) orally once a day (28 Oct 2020 01:34)  metoprolol succinate 50 mg oral tablet, extended release: 1 tab(s) orally once a day (28 Oct 2020 01:34)  Trelegy Ellipta inhalation powder: 1 puff(s) inhaled once a day (28 Oct 2020 01:34)                           MEDICATIONS:  STANDING MEDICATIONS  aspirin enteric coated 81 milliGRAM(s) Oral daily  atorvastatin 40 milliGRAM(s) Oral at bedtime  BACItracin   Ointment 1 Application(s) Topical every 12 hours  ceFAZolin   IVPB 2000 milliGRAM(s) IV Intermittent every 8 hours  enoxaparin Injectable 40 milliGRAM(s) SubCutaneous every 24 hours  latanoprost 0.005% Ophthalmic Solution 1 Drop(s) Both EYES at bedtime  levothyroxine  Oral Tab/Cap - Peds 250 MICROGram(s) Oral daily  metoprolol tartrate 25 milliGRAM(s) Oral two times a day  nystatin Cream 1 Application(s) Topical two times a day  tamsulosin 0.4 milliGRAM(s) Oral at bedtime    PRN MEDICATIONS  acetaminophen     Tablet .. 650 milliGRAM(s) Oral every 6 hours PRN                                            ------------------------------------------------------------  VITAL SIGNS: Last 24 Hours  T(C): 36.1 (01 Apr 2022 04:36), Max: 36.1 (01 Apr 2022 04:36)  T(F): 96.9 (01 Apr 2022 04:36), Max: 96.9 (01 Apr 2022 04:36)  HR: 60 (01 Apr 2022 04:36) (60 - 61)  BP: 105/57 (01 Apr 2022 04:36) (97/55 - 105/57)  BP(mean): --  RR: 18 (01 Apr 2022 04:36) (18 - 18)  SpO2: --      03-31-22 @ 07:01  -  04-01-22 @ 07:00  --------------------------------------------------------  IN: 120 mL / OUT: 2 mL / NET: 118 mL                                             --------------------------------------------------------------  LABS:                        10.1   8.26  )-----------( 137      ( 01 Apr 2022 06:00 )             30.7     04-01    135  |  100  |  25<H>  ----------------------------<  117<H>  4.8   |  21  |  1.1    Ca    9.2      01 Apr 2022 06:00  Mg     1.8     04-01    TPro  6.2  /  Alb  3.6  /  TBili  1.6<H>  /  DBili  x   /  AST  32  /  ALT  10  /  AlkPhos  91  04-01        PHYSICAL EXAM:  General: not in acute distress  LUNGS: air entry bilat  HEART: RRR, +S1,S2,  ABDOMEN: SNTTP, ND x 4 q's  EXT: Warm, well perfused x 4  NEURO: AxOx3, No FND's noted  SKIN: No new breakdown or rashes noted                                           --------------------------------------------------------------

## 2022-04-01 NOTE — PROGRESS NOTE ADULT - SUBJECTIVE AND OBJECTIVE BOX
MYRNA TOVAR  81y Male    CHIEF COMPLAINT:    Patient is a 81y old  Male who presents with a chief complaint of NSTEMI (2022 09:29)      INTERVAL HPI/OVERNIGHT EVENTS:    Patient seen and examined. Still having nasal packing but no more epistaxis.     ROS: All other systems are negative.    Vital Signs:    T(F): 96.9 (22 @ 04:36), Max: 96.9 (22 @ 04:36)  HR: 60 (22 @ 04:36) (60 - 61)  BP: 105/57 (22 @ 04:36) (97/55 - 105/57)  RR: 18 (22 @ 04:36) (18 - 18)  SpO2: --  I&O's Summary    31 Mar 2022 07:  -  2022 07:00  --------------------------------------------------------  IN: 120 mL / OUT: 2 mL / NET: 118 mL    2022 07:01  -  2022 10:49  --------------------------------------------------------  IN: 450 mL / OUT: 0 mL / NET: 450 mL      Daily     Daily Weight in k.3 (2022 04:36)  CAPILLARY BLOOD GLUCOSE      POCT Blood Glucose.: 132 mg/dL (2022 07:42)  POCT Blood Glucose.: 125 mg/dL (31 Mar 2022 21:39)  POCT Blood Glucose.: 135 mg/dL (31 Mar 2022 16:57)  POCT Blood Glucose.: 123 mg/dL (31 Mar 2022 11:45)      PHYSICAL EXAM:    GENERAL:  NAD  SKIN: No rashes or lesions  HENT: Atraumatic. Normocephalic. PERRL. Moist membranes.  NECK: Supple, No JVD. No lymphadenopathy.  PULMONARY: CTA B/L. No wheezing. No rales  CVS: Normal S1, S2. Rate and Rhythm are regular. No murmurs.  ABDOMEN/GI: Soft, Nontender, Nondistended; BS present  EXTREMITIES: Peripheral pulses intact. No edema B/L LE.  NEUROLOGIC:  No motor or sensory deficit.  PSYCH: Alert & oriented x 3    Consultant(s) Notes Reviewed:  [x ] YES  [ ] NO  Care Discussed with Consultants/Other Providers [ x] YES  [ ] NO    EKG reviewed  Telemetry reviewed    LABS:                        10.1   8.26  )-----------( 137      ( 2022 06:00 )             30.7     04-    135  |  100  |  25<H>  ----------------------------<  117<H>  4.8   |  21  |  1.1    Ca    9.2      2022 06:00  Mg     1.8         TPro  6.2  /  Alb  3.6  /  TBili  1.6<H>  /  DBili  x   /  AST  32  /  ALT  10  /  AlkPhos  91  -              RADIOLOGY & ADDITIONAL TESTS:      Imaging or report Personally Reviewed:  [ ] YES  [ ] NO    Medications:  Standing  aspirin enteric coated 81 milliGRAM(s) Oral daily  atorvastatin 40 milliGRAM(s) Oral at bedtime  BACItracin   Ointment 1 Application(s) Topical every 12 hours  ceFAZolin   IVPB 2000 milliGRAM(s) IV Intermittent every 8 hours  enoxaparin Injectable 40 milliGRAM(s) SubCutaneous every 24 hours  latanoprost 0.005% Ophthalmic Solution 1 Drop(s) Both EYES at bedtime  levothyroxine  Oral Tab/Cap - Peds 250 MICROGram(s) Oral daily  metoprolol tartrate 25 milliGRAM(s) Oral two times a day  nystatin Cream 1 Application(s) Topical two times a day  tamsulosin 0.4 milliGRAM(s) Oral at bedtime    PRN Meds  acetaminophen     Tablet .. 650 milliGRAM(s) Oral every 6 hours PRN      Case discussed with resident    Care discussed with pt/family           MYRNA TOVAR  81y Male    CHIEF COMPLAINT:    Patient is a 81y old  Male who presents with a chief complaint of NSTEMI (2022 09:29)      INTERVAL HPI/OVERNIGHT EVENTS:    Patient seen and examined. Still having nasal packing but no more epistaxis. No cp. No palpitations.     ROS: All other systems are negative.    Vital Signs:    T(F): 96.9 (22 @ 04:36), Max: 96.9 (22 @ 04:36)  HR: 60 (22 @ 04:36) (60 - 61)  BP: 105/57 (22 @ 04:36) (97/55 - 105/57)  RR: 18 (22 @ 04:36) (18 - 18)  SpO2: --  I&O's Summary    31 Mar 2022 07:  -  2022 07:00  --------------------------------------------------------  IN: 120 mL / OUT: 2 mL / NET: 118 mL    2022 07:01  -  2022 10:49  --------------------------------------------------------  IN: 450 mL / OUT: 0 mL / NET: 450 mL      Daily     Daily Weight in k.3 (2022 04:36)  CAPILLARY BLOOD GLUCOSE      POCT Blood Glucose.: 132 mg/dL (2022 07:42)  POCT Blood Glucose.: 125 mg/dL (31 Mar 2022 21:39)  POCT Blood Glucose.: 135 mg/dL (31 Mar 2022 16:57)  POCT Blood Glucose.: 123 mg/dL (31 Mar 2022 11:45)      PHYSICAL EXAM:    GENERAL:  NAD  SKIN: No rashes or lesions  HENT: Atraumatic. Normocephalic. PERRL. Moist membranes. Anterior nasal packing  NECK: Supple, No JVD. No lymphadenopathy.  PULMONARY: CTA B/L. No wheezing. No rales  CVS: Normal S1, S2. Rate and Rhythm are regular. No murmurs.  ABDOMEN/GI: Soft, Nontender, Nondistended; BS present  EXTREMITIES: Peripheral pulses intact. No edema B/L LE.  NEUROLOGIC:  No motor or sensory deficit.  PSYCH: Alert & oriented x 3    Consultant(s) Notes Reviewed:  [x ] YES  [ ] NO  Care Discussed with Consultants/Other Providers [ x] YES  [ ] NO    EKG reviewed  Telemetry reviewed    LABS:                        10.1   8.26  )-----------( 137      ( 2022 06:00 )             30.7   Hemoglobin: 10.1 g/dL ( @ 06:00)  Hemoglobin: 10.5 g/dL ( @ 08:00)  Hemoglobin: 10.5 g/dL ( @ 07:30)  Hemoglobin: 11.2 g/dL ( @ 06:30)        135  |  100  |  25<H>  ----------------------------<  117<H>  4.8   |  21  |  1.1    Ca    9.2      2022 06:00  Mg     1.8     -    TPro  6.2  /  Alb  3.6  /  TBili  1.6<H>  /  DBili  x   /  AST  32  /  ALT  10  /  AlkPhos  91  -              RADIOLOGY & ADDITIONAL TESTS:      Imaging or report Personally Reviewed:  [ ] YES  [ ] NO    Medications:  Standing  aspirin enteric coated 81 milliGRAM(s) Oral daily  atorvastatin 40 milliGRAM(s) Oral at bedtime  BACItracin   Ointment 1 Application(s) Topical every 12 hours  ceFAZolin   IVPB 2000 milliGRAM(s) IV Intermittent every 8 hours  enoxaparin Injectable 40 milliGRAM(s) SubCutaneous every 24 hours  latanoprost 0.005% Ophthalmic Solution 1 Drop(s) Both EYES at bedtime  levothyroxine  Oral Tab/Cap - Peds 250 MICROGram(s) Oral daily  metoprolol tartrate 25 milliGRAM(s) Oral two times a day  nystatin Cream 1 Application(s) Topical two times a day  tamsulosin 0.4 milliGRAM(s) Oral at bedtime    PRN Meds  acetaminophen     Tablet .. 650 milliGRAM(s) Oral every 6 hours PRN      Case discussed with resident    Care discussed with pt/family

## 2022-04-01 NOTE — DISCHARGE NOTE PROVIDER - NSDCMRMEDTOKEN_GEN_ALL_CORE_FT
aspirin 81 mg oral delayed release tablet: 1 tab(s) orally once a day  atorvastatin 40 mg oral tablet: 1 tab(s) orally once a day (at bedtime)  Desitin 40% topical ointment: Apply topically to affected area 4 times a day   Flomax 0.4 mg oral capsule: 1 cap(s) orally once a day (at bedtime)  Incruse Ellipta 62.5 mcg/inh inhalation powder: 1 inhaler(s) inhaled once a day  Lasix 40 mg oral tablet: 1 tab(s) orally once a day  latanoprost 0.005% ophthalmic solution: 1 drop(s) to each affected eye once a day (in the evening)  levothyroxine: 250 microgram(s) orally once a day  losartan 100 mg oral tablet: 1 tab(s) orally once a day   metoprolol succinate 50 mg oral tablet, extended release: 1 tab(s) orally once a day  Trelegy Ellipta inhalation powder: 1 puff(s) inhaled once a day   aspirin 81 mg oral delayed release tablet: 1 tab(s) orally once a day  atorvastatin 40 mg oral tablet: 1 tab(s) orally once a day (at bedtime)  bacitracin 500 units/g topical ointment: 1 application topically every 12 hours  Desitin 40% topical ointment: Apply topically to affected area 4 times a day   Flomax 0.4 mg oral capsule: 1 cap(s) orally once a day (at bedtime)  Incruse Ellipta 62.5 mcg/inh inhalation powder: 1 inhaler(s) inhaled once a day  Lasix 40 mg oral tablet: 1 tab(s) orally once a day  latanoprost 0.005% ophthalmic solution: 1 drop(s) to each affected eye once a day (in the evening)  levothyroxine: 250 microgram(s) orally once a day  losartan 100 mg oral tablet: 1 tab(s) orally once a day   metoprolol succinate 50 mg oral tablet, extended release: 1 tab(s) orally once a day  Trelegy Ellipta inhalation powder: 1 puff(s) inhaled once a day

## 2022-04-01 NOTE — PROGRESS NOTE ADULT - ASSESSMENT
Pt is a 81y Male a/w NSTEMI, being followed by ENT for epistaxis requiring nasal packing anteriorly.    ·	maintain nasal packing today, will plan to deflate and remove packing Sat AM  ·	cont mustache dressing, RN can change as needed if soiled  ·	cont current care  ·	restarted ASA 81  ·	w/d with attng

## 2022-04-01 NOTE — DISCHARGE NOTE PROVIDER - PROVIDER TOKENS
PROVIDER:[TOKEN:[[id number]],FOLLOWUP:[1 week]],PROVIDER:[TOKEN:[[id number]],FOLLOWUP:[1 week]]

## 2022-04-01 NOTE — DISCHARGE NOTE PROVIDER - CARE PROVIDER_API CALL
Allen Brooke)  Cardiovascular Disease; Internal Medicine; Interventional Cardiology; Nuclear Cardiology  [address] [place] [address], [address]  [place] [address]  Phone: [phone]  Fax: [phone]  Follow Up Time: 1 week    Paul Troy)  HeadNe Surgery  [address] [place] [address]  Phone: [phone]  Fax: [phone]  Follow Up Time: 1 week

## 2022-04-01 NOTE — PROGRESS NOTE ADULT - ASSESSMENT
Patient is a 81 year old male with a PMHx of HTN, DL, DM, CVA, PAD sp R AT atherectomy 10/’20, COPD, hypothyroidism, known CAD / hx CABG, ICM (ef recovered) / CHB sp PPM ? upgrade to Medtronic CRT-D 7/’17;  Persistent AF on xarelto at home, who presents for back pain after suffering a mechanical fall 2 days ago prior to presentation. Found to have psoas hematoma- No further intervention from trauma's standpoint. Trops and CKMB elevated- cardiology following.  Device evaluated by EP.     #mechanical fall   #Right Psoas Intramuscular Hematoma  - Trauma surg recs appreciated  - CT A/P shows 6.4 x 6.2 x 13.7 cm right psoas muscle intramuscular hematoma, if H/H drops repeat CT scan and consult vascualr   - Trend H/H, monitor hemodynamics  - No further trauma surgery workup     #NSTEMI  - Cardiology consult appreciated   - 3/’17:  3vd.  Mod as.  Lima-lad mod-sev distal dz.  [id number] LI.   - s/p asa load 325, c/w asa 81  - TTE, mild to mod decreased global systolic function, severely enlarged left atrium, mod/severe AS,   - Continue with asa / statin / toprol   - cardio eval appreciated- no further ischemic workup inpt   - EPS eval for full device interrogation -  device working properly, no events     #recurrent epistaxis   - ENT appreciated, s/p packing   - cefazolin ppx,   - not on AC,     #chronic Afib   - on xarelto at home, on hold due to epistaxis   - pt declining wathcman at this time   - c/w metoprolol tartrate 25mg bid,     #Hypertension  #Dyslipidemia  - c/w Atorvastatin    #Diabetes Mellitus - insulin dependent  - on Glargine at home  - FS goal 140 - 180  - c/w lantus and lispro  - f/ u A1c     #COPD  - On trelegy at home  - not on home o2    #Hypothyroidism  - c/w Levothyroxine 250mcg qd   - f/u AM TSH - 89  - endo eval appreciated pt not compliant, fu endocrine outpt,     #BPH  - c/w Tamsulosin    Diet: DASH/Carb consistent  DVT PPx: lovenox ppx   activity - Increase as tolerated     Pending - monitoring for epistaxis, possible dc

## 2022-04-01 NOTE — DISCHARGE NOTE PROVIDER - NSDCFUSCHEDAPPT_GEN_ALL_CORE_FT
MYRNA TOVAR ; 04/18/2022 ; [facility]  MYRNA TOVAR ; 04/21/2022 ; [facility] [address] [facility] MYRNA Love ; 04/22/2022 ; [facility] Card[facility] [address] [facility] MYRNA Love ; 04/28/2022 ; [facility] Card[facility] [address] [facility]

## 2022-04-01 NOTE — PROGRESS NOTE ADULT - ASSESSMENT
Patient is a 81 year old male with a PMHx of HTN, DL, DM, CVA, PAD sp R AT atherectomy 10/’20, COPD, hypothyroidism, known CAD / hx CABG, ICM (ef recovered) / CHB sp PPM ? upgrade to Medtronic CRT-D 7/’17;  Persistent AF on xarelto at home, who presents for back pain after suffering a mechanical fall 2 days ago.  CT A/P shows 6.4 x 6.2 x 13.7 cm right psoas muscle intramuscular hematoma. Otherwise no evidence for solid organ injury. Bones are diffusely osteopenic without definite evidence for fracture.    S/P Mechanical fall  NSTEMI  R Psoas hematoma  Epistaxis  DM-2 / HTN / DL  H/O CVA  PAD  CAD S/P CABG  COPD  Hypothyroidism  NSVT on tele                 PLAN:    ·	Epistaxis again. S/P nasal packing by ENT  ·	Will not start him on any A/C as in patient. Pt is advised to f/u with his cardiologist Dr. Laughlin after discharge to discuss about A/C  ·	Monitor H/H.   ·	ENT f/u  ·	Cleared by Trauma surgery to restart A/C  ·	Cardiology eval noted. Recommended to cont ASA and Lipitor. No invasive cardiac w/u as per cardiology due to large psoas hematoma and epistaxis  ·	ECHO reviewed. Mild to moderately decreased global LV systolic function.  ·	Losartan on hold by his cardiologist due to hyperkalemia  ·	Cont Metoprolol 25 mg po q 12h  ·	EP eval for Watchman noted. Recommended out pt f/u.   ·	Monitor FS. On no meds for DM-2  ·	Pt's TSH is 83.99. On Levothyroxine 250 mcg po daily. Endocrine eval noted. Likely pt is noncompliant with his meds. Recommended to cont current dose of Levothyroxine.           Progress Note Handoff    Pending (specify):  Consults______, Tests________, Test Results_______, Other_Epistaxis. ________  Family discussion:  Disposition: Home___/SNF_x__/Other________/Unknown at this time________    Vijay Jean MD  Spectra: [phone]        Patient is a 81 year old male with a PMHx of HTN, DL, DM, CVA, PAD sp R AT atherectomy 10/’20, COPD, hypothyroidism, known CAD / hx CABG, ICM (ef recovered) / CHB sp PPM ? upgrade to Medtronic CRT-D 7/’17;  Persistent AF on xarelto at home, who presents for back pain after suffering a mechanical fall 2 days ago.  CT A/P shows 6.4 x 6.2 x 13.7 cm right psoas muscle intramuscular hematoma. Otherwise no evidence for solid organ injury. Bones are diffusely osteopenic without definite evidence for fracture.    S/P Mechanical fall  NSTEMI  R Psoas hematoma  Epistaxis  DM-2 / HTN / DL  H/O CVA  PAD  CAD S/P CABG  COPD  Hypothyroidism  NSVT on tele                 PLAN:    ·	S/P nasal packing by ENT yesterday  ·	ENT f/u. After nasal packing is removed by the ENT and there is no epistaxis, pt can be discharged to SNF  ·	Will not start him on any A/C as in patient. Pt is advised to f/u with his cardiologist Dr. Laughlin after discharge to discuss about A/C  ·	Monitor H/H.   ·	Cardiology eval noted. Recommended to cont ASA and Lipitor. No invasive cardiac w/u as per cardiology due to large psoas hematoma and epistaxis  ·	ECHO reviewed. Mild to moderately decreased global LV systolic function.  ·	Losartan on hold by his cardiologist due to hyperkalemia  ·	Cont Metoprolol 25 mg po q 12h  ·	EP eval for Watchman noted. Recommended out pt f/u.   ·	Monitor FS. On no meds for DM-2  ·	Pt's TSH is 83.99. On Levothyroxine 250 mcg po daily. Endocrine eval noted. Likely pt is noncompliant with his meds. Recommended to cont current dose of Levothyroxine.           Progress Note Handoff    Pending (specify):  Consults______, Tests________, Test Results_______, Other_Epistaxis. Will d/c to SNF after removal of nasal packing. ________  Family discussion:  Disposition: Home___/SNF_x__/Other________/Unknown at this time________    Vijay Jean MD  Spectra: [phone]

## 2022-04-01 NOTE — PROGRESS NOTE ADULT - SUBJECTIVE AND OBJECTIVE BOX
ENT DAILY PROGRESS NOTE    Pt is a 81y Male a/w NSTEMI, being followed by ENT for epistaxis requiring nasal packing anteriorly. PT seen and examined at bedside - complaining of feeling very tired this morning, was cold last night and didnt get rest. Pt denies any active bleeding since packing was placed. Pt otherwise no complaints.       REVIEW OF SYSTEMS   [x] A ten-point review of systems was otherwise negative except as noted.    Allergies    No Known Allergies    Intolerances      MEDICATIONS:  acetaminophen     Tablet .. 650 milliGRAM(s) Oral every 6 hours PRN  aspirin enteric coated 81 milliGRAM(s) Oral daily  atorvastatin 40 milliGRAM(s) Oral at bedtime  BACItracin   Ointment 1 Application(s) Topical every 12 hours  ceFAZolin   IVPB 2000 milliGRAM(s) IV Intermittent every 8 hours  enoxaparin Injectable 40 milliGRAM(s) SubCutaneous every 24 hours  latanoprost 0.005% Ophthalmic Solution 1 Drop(s) Both EYES at bedtime  levothyroxine  Oral Tab/Cap - Peds 250 MICROGram(s) Oral daily  metoprolol tartrate 25 milliGRAM(s) Oral two times a day  nystatin Cream 1 Application(s) Topical two times a day  tamsulosin 0.4 milliGRAM(s) Oral at bedtime    Vital Signs Last 24 Hrs  T(C): 36.1 (01 Apr 2022 04:36), Max: 36.1 (01 Apr 2022 04:36)  T(F): 96.9 (01 Apr 2022 04:36), Max: 96.9 (01 Apr 2022 04:36)  HR: 60 (01 Apr 2022 04:36) (60 - 61)  BP: 105/57 (01 Apr 2022 04:36) (97/55 - 105/57)  RR: 18 (01 Apr 2022 04:36) (18 - 18)    03-31 @ 07:01  -  04-01 @ 07:00  --------------------------------------------------------  IN:    Oral Fluid: 120 mL  Total IN: 120 mL    OUT:    Voided (mL): 2 mL  Total OUT: 2 mL    Total NET: 118 mL      04-01 @ 07:01  -  04-01 @ 11:35  --------------------------------------------------------  IN:    Oral Fluid: 450 mL  Total IN: 450 mL    OUT:  Total OUT: 0 mL    Total NET: 450 mL    PHYSICAL EXAM:    GEN: NAD, awake and alert. No drooling or pooling of secretions. No stridor or stertor. Good vocal quality, no hoarseness.   SKIN: Good color, non diaphoretic  HEENT: NC/AT; Oral mucosa pink and moist. No erythema or edema noted to buccal mucosa, tongue, FOM, uvula or posterior oropharynx. Uvula midline  NECK:  Trachea midline. Neck supple, no TTP to B/L lateral neck, no cervical LAD.  RESP: No dyspnea, non-labored breathing. No use of accessory muscles.  CARDIO: +S1/S2  ABDO: Soft, NT.  EXT: JAMES x 4    LABS:  CBC-                        10.1   8.26  )-----------( 137      ( 01 Apr 2022 06:00 )             30.7     BMP/CMP-  01 Apr 2022 06:00    135    |  100    |  25     ----------------------------<  117    4.8     |  21     |  1.1      Ca    9.2        01 Apr 2022 06:00  Mg     1.8       01 Apr 2022 06:00    TPro  6.2    /  Alb  3.6    /  TBili  1.6    /  DBili  x      /  AST  32     /  ALT  10     /  AlkPhos  91     01 Apr 2022 06:00    Coagulation Studies-    Endocrine Panel-  Calcium, Total Serum: 9.2 mg/dL (04-01 @ 06:00)              RADIOLOGY & ADDITIONAL STUDIES:

## 2022-04-02 NOTE — DISCHARGE NOTE NURSING/CASE MANAGEMENT/SOCIAL WORK - PATIENT PORTAL LINK FT
You can access the FollowMyHealth Patient Portal offered by [facility] by registering at the following website: [email or web address]. By joining [employer]’s FollowMyHealth portal, you will also be able to view your health information using other applications (apps) compatible with our system.
You can access the FollowMyHealth Patient Portal offered by [facility] by registering at the following website: [email or web address]. By joining [employer]’s FollowMyHealth portal, you will also be able to view your health information using other applications (apps) compatible with our system.

## 2022-04-02 NOTE — PROGRESS NOTE ADULT - ASSESSMENT
Pt is a 81y Male a/w NSTEMI, being followed by ENT for epistaxis requiring nasal packing anteriorly - packing deflated and removed this AM. No further bleeding noted.     ·	cont bacitracin ointment to nares q12h  ·	saline sprays prn, gently  ·	resume AC as per med/cardio, started on ASA 81 yesterday  ·	monitor this AM, if no bleeding -> ok for D/C to SNF from ENT perspective  ·	if rebleeds, hold pressure and recall ENT [phone]  ·	please swab LEFT NARES gently for COVID swab  ·	w/d with nolvia d/w RN and medical resident

## 2022-04-02 NOTE — DISCHARGE NOTE NURSING/CASE MANAGEMENT/SOCIAL WORK - NSDCPEFALRISK_GEN_ALL_CORE
For information on Fall & Injury Prevention, visit: [email or web address] OR  [email or web address] OR  https://www.cdc.gov/steadi/patient.html
For information on Fall & Injury Prevention, visit: [email or web address] OR  [email or web address] OR  https://www.cdc.gov/steadi/patient.html

## 2022-04-02 NOTE — PROGRESS NOTE ADULT - PROVIDER SPECIALTY LIST ADULT
ENT
Internal Medicine
Hospitalist
Internal Medicine
ENT
ENT
Hospitalist
Internal Medicine
Internal Medicine
Cardiology
Hospitalist
Internal Medicine
Internal Medicine

## 2022-04-02 NOTE — PROGRESS NOTE ADULT - SUBJECTIVE AND OBJECTIVE BOX
ENT DAILY PROGRESS NOTE    Pt is a 81y Male a/w NSTEMI, being followed by ENT for epistaxis requiring nasal packing anteriorly - seen and examined at bedside. Pt doing well, no complaints this AM - no bleeding noted by patient or RN.       REVIEW OF SYSTEMS   [x] A ten-point review of systems was otherwise negative except as noted.    Allergies    No Known Allergies    Intolerances      MEDICATIONS:  acetaminophen     Tablet .. 650 milliGRAM(s) Oral every 6 hours PRN  aspirin enteric coated 81 milliGRAM(s) Oral daily  atorvastatin 40 milliGRAM(s) Oral at bedtime  BACItracin   Ointment 1 Application(s) Topical every 12 hours  ceFAZolin   IVPB 2000 milliGRAM(s) IV Intermittent every 8 hours  enoxaparin Injectable 40 milliGRAM(s) SubCutaneous every 24 hours  latanoprost 0.005% Ophthalmic Solution 1 Drop(s) Both EYES at bedtime  levothyroxine  Oral Tab/Cap - Peds 250 MICROGram(s) Oral daily  metoprolol tartrate 25 milliGRAM(s) Oral two times a day  nystatin Cream 1 Application(s) Topical two times a day  tamsulosin 0.4 milliGRAM(s) Oral at bedtime      Vital Signs Last 24 Hrs  T(C): 36.2 (02 Apr 2022 05:30), Max: 36.6 (01 Apr 2022 12:35)  T(F): 97.2 (02 Apr 2022 05:30), Max: 97.9 (01 Apr 2022 22:37)  HR: 61 (02 Apr 2022 05:30) (60 - 61)  BP: 100/55 (02 Apr 2022 05:30) (100/55 - 116/56)  RR: 18 (02 Apr 2022 08:51) (18 - 18)  SpO2: 95% (02 Apr 2022 08:51) (95% - 95%)      04-01 @ 07:01  -  04-02 @ 07:00  --------------------------------------------------------  IN:    IV PiggyBack: 100 mL    Oral Fluid: 1460 mL  Total IN: 1560 mL    OUT:    Stool (mL): 1 mL    Voided (mL): 150 mL  Total OUT: 151 mL    Total NET: 1409 mL      PHYSICAL EXAM:    GEN: NAD, awake and alert. No drooling or pooling of secretions. No stridor or stertor. Good vocal quality, no hoarseness.   SKIN: Good color, non diaphoretic  HEENT: NC/AT; Oral mucosa pink and moist. No erythema or edema noted to buccal mucosa, tongue, FOM, uvula or posterior oropharynx. Uvula midline. no active bleeding or clots noted to post OP.   NARES: + right rhino rocket in place, no bleeding or clots noted around area. Balloon deflated - no bleeding noted, packing removed.   NECK:  Trachea midline. Neck supple, no TTP to B/L lateral neck, no cervical LAD.  RESP: No dyspnea, non-labored breathing. No use of accessory muscles.  CARDIO: +S1/S2  ABDO: Soft, NT.  EXT: JAMES x 4    LABS:  CBC-                        9.4    8.67  )-----------( 136      ( 02 Apr 2022 08:26 )             27.6     BMP/CMP-  01 Apr 2022 06:00    135    |  100    |  25     ----------------------------<  117    4.8     |  21     |  1.1      Ca    9.2        01 Apr 2022 06:00  Mg     1.8       01 Apr 2022 06:00    TPro  6.2    /  Alb  3.6    /  TBili  1.6    /  DBili  x      /  AST  32     /  ALT  10     /  AlkPhos  91     01 Apr 2022 06:00

## 2022-04-02 NOTE — CHART NOTE - NSCHARTNOTEFT_GEN_A_CORE
<<<RESIDENT DISCHARGE NOTE>>>     MYRNA TOVAR  MRN-[id number]    VITAL SIGNS:  T(F): 97.2 (04-02-22 @ 05:30), Max: 97.9 (04-01-22 @ 22:37)  HR: 61 (04-02-22 @ 05:30)  BP: 100/55 (04-02-22 @ 05:30)  SpO2: 95% (04-02-22 @ 08:51)      PHYSICAL EXAMINATION:  General: not in acute distress, packing removed, no bleeding noted   LUNGS: air entry bilat  HEART: +S1,S2, regular rate   ABDOMEN: SNTTP, ND x 4 q's  EXT: Warm, well perfused x 4  NEURO: AxOx3, No FND's noted  SKIN: No new breakdown or rashes noted    TEST RESULTS:                        9.4    8.67  )-----------( 136      ( 02 Apr 2022 08:26 )             27.6       04-02    140  |  104  |  28<H>  ----------------------------<  112<H>  4.6   |  23  |  1.1    Ca    8.9      02 Apr 2022 08:26  Mg     1.7     04-02    TPro  6.2  /  Alb  3.6  /  TBili  1.4<H>  /  DBili  x   /  AST  28  /  ALT  7   /  AlkPhos  92  04-02    pt stable for dc with no AC given the  risk of bleeding,   nasal packing removed by ENT with no bleeding   fu with pcp and cardiology outpt   plan of care discussed with Shayy Shay over the phone including the reasoning behind no A/C

## 2022-04-11 NOTE — CDI QUERY NOTE - NSCDIOTHERTXTBX_GEN_ALL_CORE_HH
Dr. Jean,  82 y/o male admitted with back pain after suffering a mechanical fall 2 days  ago... found to have a psoas hematoma...      Attending H + P:    # fall -mechanical; with psoas hematoma due to anticoagulation  monitor CBC, BP, worsening symptoms; patient on AC with heparin now    PTT = 42.1  PT = 13.70  CT:  A/P shows 6.4 X 6.2 X 13.7 cm right psoas muscle intramuscular hematoma...  Xarelto was held,  heparin was discontinued b/c of epistaxis and  drop in H + H      Trauma Consult: Injuries identified: Right psoas intramuscular hematoma...no  further trauma workup    Please clarify the etiology of the patient's psoas hematoma:    -Psoas hematoma is associated with anticoagulation  -Psoas hematoma is not associated with anticoagulation  -Other (please specify)  -Clinically unable to determine.  Thank you    Rosalia Tee  [facility] Department

## 2022-04-13 PROBLEM — Z95.810 AICD (AUTOMATIC CARDIOVERTER/DEFIBRILLATOR) PRESENT: Status: RESOLVED | Noted: 2020-09-08 | Resolved: 2022-01-01

## 2022-04-13 NOTE — PHYSICAL EXAM
[General Appearance - Well Developed] : well developed [Normal Appearance] : normal appearance [Well Groomed] : well groomed [General Appearance - Well Nourished] : well nourished [No Deformities] : no deformities [Normal Conjunctiva] : the conjunctiva exhibited no abnormalities [General Appearance - In No Acute Distress] : no acute distress [Eyelids - No Xanthelasma] : the eyelids demonstrated no xanthelasmas [Normal Oral Mucosa] : normal oral mucosa [No Oral Pallor] : no oral pallor [No Oral Cyanosis] : no oral cyanosis [Normal Jugular Venous A Waves Present] : normal jugular venous A waves present [Normal Jugular Venous V Waves Present] : normal jugular venous V waves present [No Jugular Venous Peterson A Waves] : no jugular venous peterson A waves [Heart Sounds] : normal S1 and S2 [Murmurs] : no murmurs present [Irregularly Irregular] : the rhythm was irregularly irregular [Systolic grade ___/6] : A grade [unfilled]/6 systolic murmur was heard. [Respiration, Rhythm And Depth] : normal respiratory rhythm and effort [Exaggerated Use Of Accessory Muscles For Inspiration] : no accessory muscle use [Auscultation Breath Sounds / Voice Sounds] : lungs were clear to auscultation bilaterally [Bowel Sounds] : normal bowel sounds [Abdomen Soft] : soft [Abdomen Tenderness] : non-tender [Abdomen Mass (___ Cm)] : no abdominal mass palpated [Abnormal Walk] : normal gait [Nail Clubbing] : no clubbing of the fingernails [Cyanosis, Localized] : no localized cyanosis [Petechial Hemorrhages (___cm)] : no petechial hemorrhages [Skin Color & Pigmentation] : normal skin color and pigmentation [] : no rash [No Venous Stasis] : no venous stasis [Skin Lesions] : no skin lesions [No Skin Ulcers] : no skin ulcer [No Xanthoma] : no  xanthoma was observed [Oriented To Time, Place, And Person] : oriented to person, place, and time

## 2022-04-27 NOTE — HISTORY OF PRESENT ILLNESS
[de-identified] : I had a pleasure of seeing Mr. TOVAR for initial consultation for device care. He is a retired respiratory.\par \par Mr. TOVAR is a 81 year-year old male with history of CAD/CABG, DM, HTN, DL, Ischemic Cardiomyopathy, CHB s/p CRT-D (MDT, Dep, 17) with RV lead dislodgement-reposition, T wave oversensing, persistent atrial fibrillation is here for routine f-up.\par \par Seen by Dr. Laughlin recently- noted to have high K yesterday- meds adjusted.\par \par 4/27: Recent multiple mechanical falls- Psoas hematoma. Major nasal bleed. Off Xarelto.\par \par Denies chest pain, shortness of breath, palpitation, dizziness or LOC except noted above.\par \par EKG (4/27): AF=BVP@61\par EKG: AF w/BiVP@ 62/min, ? + PVC\par TTE (10/20): EF 50-55%, severe LAE, mod TR\par Cardio: Dr. Laughlin

## 2022-04-27 NOTE — PHYSICAL EXAM
[Heart Sounds] : normal S1 and S2 [Murmurs] : no murmurs present [Irregularly Irregular] : the rhythm was irregularly irregular [Respiration, Rhythm And Depth] : normal respiratory rhythm and effort [Exaggerated Use Of Accessory Muscles For Inspiration] : no accessory muscle use [Auscultation Breath Sounds / Voice Sounds] : lungs were clear to auscultation bilaterally [Clean] : clean [Dry] : dry [Well-Healed] : well-healed [Abdomen Soft] : soft [Abdomen Tenderness] : non-tender [Abdomen Mass (___ Cm)] : no abdominal mass palpated [Nail Clubbing] : no clubbing of the fingernails [Cyanosis, Localized] : no localized cyanosis [Petechial Hemorrhages (___cm)] : no petechial hemorrhages [] : no ischemic changes Detail Level: Zone

## 2022-04-27 NOTE — PROCEDURE
[Complete Heart Block] : complete heart block [See Scanned Paceart Report] : See scanned paceart report [See Device Printout] : See device printout [CRT-D] : Cardiac resynchronization therapy defibrillator [VVIR] : VVIR [Voltage: ___ volts] : Voltage was [unfilled] volts [Charge Time: ___ sec] : charge time was [unfilled] seconds [Longevity: ___ months] : The estimated remaining battery life is [unfilled] months [Threshold Testing Performed] : Threshold testing was performed [Sensing Amplitude ___mv] : sensing amplitude was [unfilled] mv [Lead Imp:  ___ohms] : lead impedance was [unfilled] ohms [___V @] : [unfilled] V [___ ms] : [unfilled] ms [Programmed for Longevity] : output reprogrammed for improved battery longevity [Sense ___ %] : Sense [unfilled]% [Pace ___ %] : Pace [unfilled]% [de-identified] : Medtronic [de-identified] : [employer]ira MRI CTRD [de-identified] : [id number] [de-identified] : 7/17/17 [de-identified] : 60 [de-identified] : Total  94.9%, effective CRT 94.9%\par No episodes\par Ongoing fluid accumulation

## 2022-04-27 NOTE — ASSESSMENT
[FreeTextEntry1] : ## persistent atrial fibrillation\par ## Ischemic Cardiomyopathy\par ## CHB s/p CRT-D\par \par - ICD interrogation shows normally functioning CRT-D. Battery life 3 years. Optivol below threshold. No new events. Total  94%.\par - EF has recovered as per last echo\par - Relatively asymptomatic.\par - In this scenario, we will continue current care. if worsening LVEF, we will consider aggressive approach to RVR with AVN blocker agents vs Amiodarone\par - Continue with GDMT.\par - Patient is at increased risk of stroke based on CHADSVASC score 6+ (Age, HTN, DM, CAD, ICM). He is a good candidate for Watchman implant as an alternative to anticoagulation as had complications with anticoagulation.\par \par I have discussed different treatment options with the patient including other anticoagulation medication. I have explained the risks and benefits of the procedure to the patient. I have explained to the patient the patient will require to be on anticoagulation for 45 days after implant and TIMOTEO will be repeated. If there is no leak patient will remain on aspirin and Plavix for next month, and then ASA only. There is approximately 1-2% chance of any major cardiovascular complication to occur. Complications include, but are not limited to infection, bleeding, and damage to the vessels, hole in the heart, stroke, death and heart attack. The patient understands the risk and would like to proceed with the procedure. Materials were provided to the patient. Patient indicated that all of his questions were answered to his satisfaction and verbalized understanding.\par \par Referring PCP/Cardiologist have discussed and recommended this to the patient/family and agrees with implant of watchman.\par \par We will restart Xarelto after watchman\par - Discuss with Dr. Ernesto young to proceed prior to RLE angio,\par - Remote Monitoring\par - Return after watchman

## 2022-05-02 NOTE — ED ADULT NURSE NOTE - SUICIDE SCREENING QUESTION 1
Medication is attached.  Pharmacy has been verified and attached.  Last visit 03/15/2022  Request for venlafaxine XR (EFFEXOR XR) 37.5 MG  Not on list .  
Pt. Prescribed the below instead of effexor  sumatriptan (Imitrex) 100 MG tablet 10 tablet 0 1/3/2022     Sig: Take 1 tablet by mouth at onset of migraine. May repeat after 2 hours if needed.    Sent to pharmacy as: SUMAtriptan Succinate 100 MG Oral Tablet (Imitrex)          Writer called pharmacy and notified that medication has been discontinued.   
No

## 2022-05-05 NOTE — H&P PST ADULT - CARDIOVASCULAR
detailed exam Ear Wedge Repair Text: A wedge excision was completed by carrying down an excision through the full thickness of the ear and cartilage with an inward facing Burow's triangle. The wound was then closed in a layered fashion.

## 2022-05-05 NOTE — H&P PST ADULT - NSANTHOSAYNRD_GEN_A_CORE
No. DAVIDA screening performed.  STOP BANG Legend: 0-2 = LOW Risk; 3-4 = INTERMEDIATE Risk; 5-8 = HIGH Risk

## 2022-05-05 NOTE — H&P PST ADULT - HISTORY OF PRESENT ILLNESS
CURRENTLY  DENIES ANY CP, SOB, PALPITATIONS, COUGH OR DYSURIA  EXERCISE TOLERANCE <1/2 BLOCK WITHOUT SOB- USES WHEELCHAIR AND WALKER  SLEEPS IN RECLINER- UNABLE TO LAY FLAT    AS PER PATIENT  this is his/her complete medical history including medications - PRESCRIPTIONS  OVER THE COUNTER MEDS    pt denies any covid s/s, or tested positive in the past.  Received covid vaccine  pt advised self quarantine till day of procedure    Anesthesia Alert  NO--Difficult Airway  NO--History of neck surgery or radiation  NO--Limited ROM of neck  NO--History of Malignant hyperthermia  NO--No personal or family history of Pseudocholinesterase deficiency.  NO--Prior Anesthesia Complication  NO--Latex Allergy  NO--Loose teeth  NO--History of Rheumatoid Arthritis  NO--DAVIDA  NO--Bleeding risk  NO--Other_____    Patient verbalized understanding of instructions and was given the opportunity to ask questions and have them answered.

## 2022-05-05 NOTE — H&P PST ADULT - REASON FOR ADMISSION
83 Y/O M SCHEDULED FOR PAST FOR WATCHMAN/ TIMOTEO ON 5/10/22 WITH DR HARMAN UNDER GA. PT WITH HISTORY OF AFIB X 10 YRS. WAS ON XARELTO BUT WAS STOPPED DUE TO NOSE BLEED. HE HAS HAD CARDIOVERSION IN THE PAST AS WELL.

## 2022-05-05 NOTE — H&P PST ADULT - TEACHING/LEARNING LEARNING PREFERENCES
Dear Veronica Pedroza,    Thank you for choosing [address] [place] [address] for your healthcare needs. We strive to provide EXCELLENT care to you and your family. In an effort to explain clearly why you were here in the hospital, I've also written a very brief summary below. Other details including formal diagnosis, medication changes, and follow up appointment recommendations can also be found in this packet. You were admitted for abdominal pain due to kidney stones, and were found to have a mass at the GE junction (stomach-esophagus) for which you underwent endoscopy and had biopsies done. You also received care from specialist physicians in the following specialties:  [address] TO ONCOLOGY  IP CONSULT TO GENERAL SURGERY    Here are the updates to your medication list:  **Take pantoprazole 40mg BID   **AVOID NSAIDS - including advil, aleve, BC powder    Remember that it is important for you to take your medications exactly as they are prescribed. It is helpful to keep a list of your medication with the names, dosages, and times to be taken in your wallet. Additionally,   - Please make sure to follow up with your primary care physician within 1-2 weeks of discharge for hospital follow up. You should also follow up with an oncologist and your GI physician. - Please make sure to continue to monitor for: Chest pain, shortness of breath, high fevers, and return to the Emergency Department with any of these symptoms.   - Please get up slowly from a seated or laying position, avoid falls. - Avoid tobacco, alcohol and other illicit drug use. - Diet restrictions: None  - Activity restrictions: None  - As we discussed we are very concerned about cancer. Your lesion at the gastro-esophageal junction appears consistent with malignancy, but we must await final pathology before we can diagnose this. You will need close o/p follow up.  Please ensure you see a doctor within 1 week of discharge home. You will need a CT scan of the chest as an outpatient by your oncologist.           Make sure to also see your primary care doctor for follow-up. Bring these papers with you and be sure to review your medication list with your doctor. I cannot stress the importance of follow up enough. I've included the information for your follow-up appointments below: Follow-up Information     Follow up With Specialties Details Why Contact Caitlyn Ayala DO Family Medicine In 1 week [facility]  [address] [facility]  [phone]      Oncology   In 1 week      Dyllan Ellis MD Hematology and Oncology, Internal Medicine, Hematology, Oncology In 1 week Call for a corky wilson appointment  [address]  [phone]            At this time, the following test results are still pending: Final pathology result. Again, please follow-up these results with your primary care provider. Should you have any fever over 101 degrees for 24 hours, chest pain, shortness of breath, fever, chills, nausea, vomiting, diarrhea, change in mentation, falling, weakness, bleeding, or worsening pain, please seek medical attention immediately. Finally, as your discharging physician, you may be receiving a survey regarding my care. I would greatly value and appreciate your input in the survey as we strive for excellence. If you have any questions, I can be reached at [phone].   Thank you so much again for allowing me to care for you at [address] [place] [address].    Respectfully yours,  Claudetta Grange, MD individual instruction/verbal instruction/written material

## 2022-05-05 NOTE — H&P PST ADULT - NSICDXPASTSURGICALHX_GEN_ALL_CORE_FT
PAST SURGICAL HISTORY:  AICD (automatic cardioverter/defibrillator) present MEDTRONIC    H/O total knee replacement, bilateral     S/P CABG (coronary artery bypass graft)     S/P thoracotomy pt states he had a VATS w/lung decortication

## 2022-05-10 NOTE — CHART NOTE - NSCHARTNOTEFT_GEN_A_CORE
Attending: Stevo Johnson MD, [facility], [facility]  Co-surgeon: Adrian Gipson MD [facility]  PROCEDURE: Left Atrial Appendage Occlusion Device Implant- watchman FLX Device  Indication: Persistent Atrial Fibrillation, Intolerant to long-term anticoagulation, NYHA-2    Brief Summary: 1 TSP uneventful. 27 mm Watchman FLX attempted, but not deployed due to anatomical issues. No complications.    PLAN:   - DC home today.  - 2 hour bed rest after hemostasis.  - Restart Xarelto after bed rest if no bleeding- 1st dose today.  - Continue other home meds including anti-arrhythmic drugs.  - OP f-up in 1 month      CONSENT:   The risks, benefits, indications and alternatives to the procedure were explained in detail with the patient and discussed at length prior to the procedure. The patient expressed understanding of the risks and consented to the procedure. All questions asked were answered and all permits were signed. The patient was transported to the Electrophysiology Laboratory in a non-sedated state and was placed supine on the fluoroscopy table. A representative from device [employer] was present for clinical support.    GENERAL ANESTHESIA:   The patient underwent general anesthesia as administered by Anesthesia service. Patient was on mechanical ventilation which was managed by anesthesia team who was present through out the procedure. Continuous blood pressure, heart rate and O2 saturation monitoring with supplemental oxygen as needed was utilized throughout the procedure. Appropriate antibiotics were given.    LOCAL ANESTHESIA:   Lidocaine 1% was injected into subcutaneous tissue for local anesthesia.     ANTICOAGULATION:   Heparin bolus was given following transseptal puncture and a drip was started to maintain ACT > 300 throughout the procedure. Continuous trans-esophageal echocardiogram was performed to exclude LA/AURE thrombus, as well as for monitoring and guidance during the procedure. The anatomy of the left atrial appendage was assessed in the standard views.    ACCESS:   The left and right groins were prepped and draped in a sterile fashion. Local anesthesia was injected into the subcutaneous tissue overlying the right and left femoral veins. The veins were accessed using the modified Seldinger technique. One short 11 Fr sheath (with subsequent upgrade to long 16 Fr sheath accommodate watchman device) in right femoral vein and one 11 Fr sheath in left femoral vein were placed without complications.    Baseline TIMOTEO demonstrated EF 35-40%, no intracardiac thrombus- but 'smoke' and no pericardial effusion at baseline. Maximum AURE Ostial diameter was 24 mm.    INTRACARDIAC ECHO:  Intracardiac echocardiography was performed under the guidance of fluoroscopy from the mid right atrium. Cardiac anatomy was assessed. There was no baseline pericardial effusion. No thrombus was seen.  Post-procedure, the intracardiac echocardiogram was repeated and there remained unchanged.    SINGLE TRANSSEPTAL PUNCTURE WITH LA ENTRY:   The left atrium was entered at the inferior and posterior aspect of the inter-atrial septum, under fluoroscopic and TIMOTEO guidance, by a single transseptal approach using long SL-1 sheath and transseptal needle. Entry of the LA was verified by pressure measurements and TIMOTEO imaging. There were no complications.    A J wire was then advanced to the AURE and the transseptal sheath was exchanged for the 14F Watchman Access System double curve sheath. A 5F pigtail catheter was then carefully advanced through the Access Sheath into the distal portion of the AURE under fluoroscopic guidance. Manual contrast injections were performed in SOHAIL/AP caudal and others (as needed) view, for identification of AURE landmarks and the landing zone of the device. The Access Sheath was then carefully advanced over the pigtail catheter until it was in position into the ARUE. The pigtail was removed and the Watchman delivery system advanced until the pre-specified radiopaque marker reached the tip of the Access sheath. Next a 27 mm Watchman device was unsheathed into the AURE, with adequate expansion. However, due to unfavorable anatomy (low anterior AURE with severely enlarged RA/LA), numerous pectinate muscles and shallow depth, we could not place watchman in reasonable position without significant leaks.    At the end of the procedure, the Access Sheath and delivery system were pulled back to the RA and removed from the body. Repeat TIMOTEO imaging revealed no pericardial effusion. Access was closed with perclose in right groin and vascade in left groin.    The patient tolerated the procedure well and was successfully extubated and transferred to the PACU for further monitoring. There were no complications.    EBL: 10 cc  Contrast: 60 cc

## 2022-05-10 NOTE — PRE-ANESTHESIA EVALUATION ADULT - NSANTHNECKRD_ENT_A_CORE
Cross Cover Note    Called re BRBPR. Patient states due to straining. Will recheck hgb. Last hgb 10.7 on 2/17.    Anaya Johns PA-C      No

## 2022-05-26 NOTE — ASSESSMENT
[FreeTextEntry1] : ## persistent atrial fibrillation\par ## Ischemic Cardiomyopathy\par ## CHB s/p CRT-D\par \par - ICD interrogation shows normally functioning CRT-D. Battery life 3 years. Optivol below threshold. No new events. Total  94%.\par - EF has recovered as per last echo\par - Relatively asymptomatic.\par - In this scenario, we will continue current care. if worsening LVEF, we will consider aggressive approach to RVR with AVN blocker agents vs Amiodarone\par - Continue with GDMT.\par - - On Xarelto. Compliant. No bleeding issues. Patient to contact us if there is any bleeding issues, interruption or any issues with OAC. Patient to go to ER/call 911 if any major bleeding. \par - Failed Watchman FLX attempt due to shape/depth of AURE and trabeculae preventing full expansion of the device- procedure aborted. Planned for assessment for Amulet. To do CT prior. Will do CT once amulet available. For now, continue with Xarelto- no bleeding issues.\par - Remote Monitoring\par - Return in 6 months

## 2022-05-26 NOTE — PROCEDURE
[Complete Heart Block] : complete heart block [CRT-D] : Cardiac resynchronization therapy defibrillator [VVIR] : VVIR [Voltage: ___ volts] : Voltage was [unfilled] volts [Longevity: ___ months] : The estimated remaining battery life is [unfilled] months [Normal] : The battery status is normal. [Threshold Testing Performed] : Threshold testing was performed [Lead Imp:  ___ohms] : lead impedance was [unfilled] ohms [___V @] : [unfilled] V [___ ms] : [unfilled] ms [None] : none [Counters Reset] : the counters were reset [Pace ___ %] : Pace [unfilled]% [de-identified] : [employer] [de-identified] : LISA STUART [de-identified] : [id number] [de-identified] : 07/17/2017 [de-identified] : 59 [de-identified] : No Episodes

## 2022-05-26 NOTE — HISTORY OF PRESENT ILLNESS
[de-identified] : I had a pleasure of seeing Mr. TOVAR for initial consultation for device care. He is a retired respiratory.\par \par Mr. TOVAR is a 81 year-year old male with history of CAD/CABG, DM, HTN, DL, Ischemic Cardiomyopathy, CHB s/p CRT-D (MDT, Dep, 17) with RV lead dislodgement-reposition, T wave oversensing, persistent atrial fibrillation is here for routine f-up.\par \par Seen by Dr. Laughlin recently- noted to have high K yesterday- meds adjusted.\par \par 4/27: Recent multiple mechanical falls- Psoas hematoma. Major nasal bleed. Off Xarelto.\par 5/26: s/p Watchman FLX attempt- unsuccessful due to anatomical issues. Feels better.\par \par Denies chest pain, shortness of breath, palpitation, dizziness or LOC except noted above.\par \par EKG (5/26): AF-BVP@63\par EKG (4/27): AF=BVP@61\par EKG: AF w/BiVP@ 62/min, ? + PVC\par TTE (10/20): EF 50-55%, severe LAE, mod TR\par Cardio: Dr. Laughlin

## 2022-06-08 PROBLEM — H61.23 BILATERAL IMPACTED CERUMEN: Status: ACTIVE | Noted: 2022-01-01

## 2022-06-08 PROBLEM — H90.3 SENSORINEURAL HEARING LOSS (SNHL) OF BOTH EARS: Status: ACTIVE | Noted: 2022-01-01

## 2022-06-08 PROBLEM — H90.5 SNHL (SENSORINEURAL HEARING LOSS): Status: ACTIVE | Noted: 2022-01-01

## 2022-06-08 NOTE — ASSESSMENT
[FreeTextEntry1] : Wax cleaned.\par \par I reviewed, interpreted, and discussed the Audiogram done today. Bilateral SNHL.\par \par Part of discussion and history with patient's son. \par \par

## 2022-06-08 NOTE — HISTORY OF PRESENT ILLNESS
[de-identified] : Patient presents today with c/o clogged ears. He admits hearing loss out of the right ear. H/o wax build up. No tinnitus. No otalgia. No h/o ear infections. No recent audiogram.

## 2022-06-08 NOTE — PHYSICAL EXAM
[Normal] : mucosa is normal [Midline] : trachea located in midline position [de-identified] : bilateral cerumen impaction, removed with curette

## 2022-06-29 PROBLEM — I25.10 ASHD (ARTERIOSCLEROTIC HEART DISEASE): Status: ACTIVE | Noted: 2018-08-10

## 2022-06-29 PROBLEM — I35.0 AORTIC STENOSIS: Status: ACTIVE | Noted: 2018-12-31

## 2022-06-29 PROBLEM — I50.22 CHRONIC SYSTOLIC CONGESTIVE HEART FAILURE: Status: ACTIVE | Noted: 2019-12-01

## 2022-06-29 PROBLEM — I34.0 MITRAL REGURGITATION: Status: ACTIVE | Noted: 2018-08-10

## 2022-06-29 PROBLEM — E11.9 TYPE 2 DIABETES MELLITUS: Status: ACTIVE | Noted: 2020-02-03

## 2022-10-03 NOTE — ED PROVIDER NOTE - DISPOSITION TYPE
Logged in to [facility] to hear and see pt, she was in her car  Asked that she hang on a few minutes we were running a tad behind. She understood   ADMIT

## 2022-10-26 ENCOUNTER — APPOINTMENT (OUTPATIENT)
Dept: CARDIOLOGY | Facility: CLINIC | Age: 82
End: 2022-10-26

## 2022-11-16 NOTE — DIETITIAN INITIAL EVALUATION ADULT. - NAME AND PHONE
Urinary catheter placement 14f and 15 f attempted. Was able to obtain 30ml urine but was leaking past the catheter at the same time. Nutrition Intervention: meals and snacks; Nutrition Monitoring: diet order,energy intake,body composition,NFPF,glucose profile, electrolyte profile

## 2022-11-17 ENCOUNTER — APPOINTMENT (OUTPATIENT)
Dept: CARDIOLOGY | Facility: CLINIC | Age: 82
End: 2022-11-17

## 2022-12-05 ENCOUNTER — APPOINTMENT (OUTPATIENT)
Dept: OTOLARYNGOLOGY | Facility: CLINIC | Age: 82
End: 2022-12-05

## 2022-12-29 ENCOUNTER — APPOINTMENT (OUTPATIENT)
Dept: CARDIOLOGY | Facility: CLINIC | Age: 82
End: 2022-12-29

## 2023-01-31 NOTE — H&P PST ADULT - PEDAL EDEMA SEVERITY
33 y.o. female  at 37w1d   Reports + FM, denies VB, LOF or regular CTX  Doing well without concerns     History of successful  desiring  again which is approved  TW lbs   Reviewed warning signs, normal FKCs, labor precautions and how/when to call.  RTC x 1 wks, call or present sooner prn.     I spent a total of 20 minutes on the day of the visit.This includes face to face time and non-face to face time preparing to see the patient (eg, review of tests), Obtaining and/or reviewing separately obtained history, Documenting clinical information in the electronic or other health record, Independently interpreting resultsand communicating results to the patient/family/caregiver, or Care coordination.      
2+

## 2023-03-07 NOTE — H&P PST ADULT - CONSTITUTIONAL
[FreeTextEntry1] : Interval History:\par Cardiac MRI reviewed with Cardiac Imaging at [facility] and is most consistent with ischemic cardiomyopathy. He denies any new symptoms, but feels stressed by  and caring for his wife who had a stroke. He denies chest pain. He denies exertional dyspnea. Rare palpitations (which he associates with stress). Adherent to irbesartan and bisoprolol.\par \par History:\par This 78 year old man was followed by a cardiologist for mitral valve prolapse and hypertension. In summer 2022 a monitor and echo showed some abnormality and he was sent for a nuclear stress test at [facility] which showed evidence of scar. He was prescribed amiodarone but this made him feel terrible so he stopped it. More recently advised to take metoprolol, but feels this caused low blood pressure and fatigue. He had LUTS and was evaluated by Dr. Patel who started tadalafil which Mr. Wade feels helped considerably.\par \par He tells me he is active, exercises on a bicycle and with weights at home and does not note limitations or problems when doing so. He may have had atrial fibrillation on a Holter monitor (he thinks associated with sexual arousal), but never on anticoagulation. He denies any history of syncope.\par \par Denies history of CAD, prior revascularization/stenting. Was under care of Dr. Shearer at [facility].\par \par He stopped metoprolol, but was urged by his PMD to resume it. Since going back on metoprolol he stopped irbesartan, because of concern for low BP, but denies any issues with irbesartan previously. \par \par [facility] September showed Cr 1.3 and normal electrolytes. Platelets were 103 and Hgb 13.8 - stable for him. Hgb A1c 5.3 %.\par \par He is s/p left total hip replacement in 2010 and excision of duodenal pre-cancerous neoplasm in 2000.\par \par December 16, 2022:\par Since last visit, he reports feeling well. He denies any chest pain, palpitations.\par He has been taking one half tablet of metoprolol and irbesartan daily. He finds if he takes the whole tablet he feels unfocused. \par Did not schedule cardiac MRI.\par \par Cardiovascular Summary:\par ----------------------------------------------\par ECG:\par 3/7/2023: sinus bradycardia 59 bpm, frequent PVCs, lateral TWI, possible anterior infarct\par 12/16/2022: sinus with PVCs and PACs, lateral TWI, possible anterior infarct\par 11/4/2022: NSR 60 bpm, occasional PVCs, low voltage, anterior and lateral infarct age undetermined\par ----------------------------------------------\par Stress:\par 7/28/2022: exercise MPI stress, 6 METS (6 minutes Quintin), moderate inferior, apical, lateral infarct no ischemia\par ----------------------------------------------\par Echo:\par 11/10/2022: LVEF 43 %, moderate MR, segmental LV hypokinesis, moderate concentric LVH, moderate PHTN\par ----------------------------------------------\par MRI\par 12/30/2022: LGE in lateral wall, in the septum, and inferior wall, and LGE within apex, LVEF 34 % detailed exam

## 2023-04-08 NOTE — BRIEF OPERATIVE NOTE - ANESTHESIOLOGIST NAME
[FreeTextEntry1] : right second toe distal OM. Patient referred by vascular surgeon for further assessment and evaluation.  Patient H no fever chills night sweats nausea vomiting shortness of breath or calf tenderness.  Patient is continue to local wound care daily. ursula

## 2023-05-18 NOTE — H&P ADULT - NSHPPHYSICALEXAM_GEN_ALL_CORE
PHYSICAL EXAM:  GENERAL: NAD, lying in bed comfortably  HEAD:  Atraumatic, Normocephalic  EYES: EOMI, PERRLA, conjunctiva and sclera clear  ENT: Moist mucous membranes  NECK: Supple, No JVD  CHEST/LUNG: Clear to auscultation bilaterally; No rales, rhonchi, wheezing, or rubs. Unlabored respirations  HEART: Regular rate and rhythm; + Systolic murmur  ABDOMEN: Bowel sounds present; Soft, Nontender, Nondistended. No hepatomegaly  EXTREMITIES:  2+ Peripheral Pulses, brisk capillary refill. No clubbing, cyanosis, or edema  NERVOUS SYSTEM:  Alert & Oriented X3, speech clear. No deficits   MSK: FROM all 4 extremities, full and equal strength
equal bilaterally

## 2023-10-27 NOTE — PROGRESS NOTE ADULT - SUBJECTIVE AND OBJECTIVE BOX
MYRNA TOVAR  78y  Male    Patient is a 78y old  Male who presents with a chief complaint of chest pain (24 Dec 2018 09:18)      INTERVAL HPI/OVERNIGHT EVENTS: None    T(C): 35.4 (12-24-18 @ 04:09), Max: 36.6 (12-23-18 @ 14:18)  HR: 60 (12-24-18 @ 04:09) (60 - 62)  BP: 131/66 (12-24-18 @ 04:09) (108/57 - 131/66)  RR: 18 (12-24-18 @ 04:09) (18 - 18)  SpO2: --  Wt(kg): --Vital Signs Last 24 Hrs  T(C): 35.4 (24 Dec 2018 04:09), Max: 36.6 (23 Dec 2018 14:18)  T(F): 95.8 (24 Dec 2018 04:09), Max: 97.9 (23 Dec 2018 14:18)  HR: 60 (24 Dec 2018 04:09) (60 - 62)  BP: 131/66 (24 Dec 2018 04:09) (108/57 - 131/66)  BP(mean): --  RR: 18 (24 Dec 2018 04:09) (18 - 18)  SpO2: --    PHYSICAL EXAM:  GENERAL: NAD, well-groomed, well-developed  HEAD:  Atraumatic, Normocephalic  NECK: Supple, No JVD, Normal thyroid  NERVOUS SYSTEM:  Alert & Oriented X3, Good concentration; Motor Strength 5/5 B/L upper and lower extremities; DTRs 2+ intact and symmetric  CHEST/LUNG: Clear to percussion bilaterally; No rales, rhonchi, wheezing, or rubs  HEART: Regular rate and rhythm; No murmurs, rubs, or gallops  ABDOMEN: Soft, Nontender, Nondistended; Bowel sounds present  EXTREMITIES:  2+ Peripheral Pulses, No clubbing, cyanosis, or edema    Consultant(s) Notes Reviewed:  [x ] YES  [ ] NO    Discussed with Consultants/Other Providers/Residents [ x] YES     LABS                         13.2   7.21  )-----------( 124      ( 24 Dec 2018 07:54 )             40.4     12-24    139  |  99  |  35<H>  ----------------------------<  121<H>  4.3   |  24  |  1.1    Ca    9.4      24 Dec 2018 07:54  Mg     1.5     12-24    TPro  6.7  /  Alb  4.0  /  TBili  2.1<H>  /  DBili  x   /  AST  19  /  ALT  15  /  AlkPhos  114  12-24    PT/INR - ( 24 Dec 2018 07:54 )   PT: 25.70 sec;   INR: 2.25 ratio         PTT - ( 24 Dec 2018 07:54 )  PTT:40.4 sec      LIVER FUNCTIONS - ( 24 Dec 2018 07:54 )  Alb: 4.0 g/dL / Pro: 6.7 g/dL / ALK PHOS: 114 U/L / ALT: 15 U/L / AST: 19 U/L / GGT: x           CAPILLARY BLOOD GLUCOSE      POCT Blood Glucose.: 126 mg/dL (24 Dec 2018 07:36)        RADIOLOGY & ADDITIONAL TESTS:    Imaging Personally Reviewed:  [x ] YES  [ ] NO    MEDICATIONS  (STANDING):  atorvastatin 40 milliGRAM(s) Oral at bedtime  buDESOnide 160 MICROgram(s)/formoterol 4.5 MICROgram(s) Inhaler 2 Puff(s) Inhalation two times a day  dextrose 5%. 1000 milliLiter(s) (50 mL/Hr) IV Continuous <Continuous>  dextrose 50% Injectable 12.5 Gram(s) IV Push once  dextrose 50% Injectable 25 Gram(s) IV Push once  dextrose 50% Injectable 25 Gram(s) IV Push once  furosemide    Tablet 40 milliGRAM(s) Oral daily  insulin glargine Injectable (LANTUS) 28 Unit(s) SubCutaneous at bedtime  insulin lispro Injectable (HumaLOG) 10 Unit(s) SubCutaneous before dinner  latanoprost 0.005% Ophthalmic Solution 1 Drop(s) Both EYES at bedtime  levothyroxine 250 MICROGram(s) Oral daily  losartan 100 milliGRAM(s) Oral daily  metoprolol succinate ER 50 milliGRAM(s) Oral daily  regadenoson Injectable 0.4 milliGRAM(s) IV Push once  tamsulosin 0.4 milliGRAM(s) Oral at bedtime    MEDICATIONS  (PRN):  ALBUTerol    90 MICROgram(s) HFA Inhaler 2 Puff(s) Inhalation every 6 hours PRN Bronchospasm  dextrose 40% Gel 15 Gram(s) Oral once PRN Blood Glucose LESS THAN 70 milliGRAM(s)/deciliter  glucagon  Injectable 1 milliGRAM(s) IntraMuscular once PRN Glucose LESS THAN 70 milligrams/deciliter      HEALTH ISSUES - PROBLEM Dx: Oral Minoxidil Counseling- I discussed with the patient the risks of oral minoxidil including but not limited to shortness of breath, swelling of the feet or ankles, dizziness, lightheadedness, unwanted hair growth and allergic reaction.  The patient verbalized understanding of the proper use and possible adverse effects of oral minoxidil.  All of the patient's questions and concerns were addressed.

## 2025-02-19 NOTE — CONSULT NOTE ADULT - SUBJECTIVE AND OBJECTIVE BOX
Occupational Therapy                 Therapy Communication Note    Patient Name: Elliot Partida  MRN: [id number]  Department: [facility]  Room: 207/207-A  Today's Date: 2/19/2025     Discipline: Occupational Therapy    OT Missed Visit: Yes     Missed Visit Reason:Patient in a medical procedure. Pt. was unavailable due to having a test(ultrasound) at this time.    Missed Time: Attempt at 1158       CHIEF COMPLAINT:Patient is a 78y old  Male who presents with a chief complaint of L arm pain    HISTORY OF PRESENT ILLNESS:   78 year old male with pmhx of CHF s/p AICD, a fib on coumadin, BPH, CAD s/p cardiac stent s/p CABG in 2014, DM, DLD, HTN, COPD, hypothyroidism, c/o pain under his left elbow and mid sternal chest pain that lasted for two hours this morning. Pt was recent d/c on 12/24 for CP. He had a nuclear stress test on 12/24. His last cath was one year ago and did not have any stents placed (follows with Dr. Laughlin). Denies any sob, leg pain/swelling, cough, fever/chills/recent illness. Pt former smoker. Patient also states he has CP at times when eating    PAST MEDICAL & SURGICAL HISTORY:  COPD (chronic obstructive pulmonary disease)  Diabetes mellitus  Hypothyroidism  Hypertension  Dyslipidemia  Benign prostate hyperplasia  Atrial fibrillation  Heart failure: chronic systolic heart failure  Coronary artery disease: s/p CABG 2014  AICD (automatic cardioverter/defibrillator) present  S/P CABG (coronary artery bypass graft)    FAMILY HISTORY:  Family history of coronary artery disease (Father)    Allergies    No Known Allergies    Home Medications:  atorvastatin 40 mg oral tablet: 1 tab(s) orally once a day (at bedtime) (21 Dec 2018 21:53)  Flomax 0.4 mg oral capsule: 1 cap(s) orally once a day (at bedtime) (21 Dec 2018 21:53)  Incruse Ellipta 62.5 mcg/inh inhalation powder: 1 inhaler(s) inhaled once a day (21 Dec 2018 21:53)  Lasix 40 mg oral tablet: 1 tab(s) orally once a day (21 Dec 2018 21:53)  latanoprost 0.005% ophthalmic solution: 1 drop(s) to each affected eye once a day (in the evening) (21 Dec 2018 21:53)  levothyroxine 125 mcg (0.125 mg) oral capsule: 2 cap(s) orally once a day (21 Dec 2018 21:53)  metFORMIN 1000 mg oral tablet: 1 tab(s) orally 2 times a day (21 Dec 2018 21:53)  metoprolol succinate 50 mg oral tablet, extended release: 1 tab(s) orally once a day (21 Dec 2018 21:53)  Toujeo SoloStar 300 units/mL subcutaneous solution: 16 unit(s) subcutaneous once a day (21 Dec 2018 21:53)  Ventolin HFA 90 mcg/inh inhalation aerosol: 2 puff(s) inhaled 4 times a day, As Needed (21 Dec 2018 21:53)    SOCIAL HISTORY:    [  ] active smoker  [x ] non smoker  [  ] Etoh  [  ] recreational drugs    REVIEW OF SYSTEMS:  14 point ROS negative except as mentioned above in HPI    PHYSICAL EXAM:  T(C): 35.6 (12-26-18 @ 09:13), Max: 35.6 (12-26-18 @ 09:13)  HR: 55 (12-26-18 @ 09:13) (55 - 55)  BP: 141/94 (12-26-18 @ 09:13) (141/94 - 141/94)  RR: 18 (12-26-18 @ 09:13) (18 - 18)  SpO2: 98% (12-26-18 @ 09:13) (98% - 98%)  Wt(kg): --  I&O's Summary      General Appearance: in NAD	  HEENT: NC, No JVD appreciated  Cardiovascular: Normal S1 S2, No murmurs  Respiratory: cta b/l  Gastrointestinal:  Soft, Non-tender, BS +	  Neurologic: No focal deficits, AAOx3  Extremities: ROM wnl, No clubbing/cyanosis/edema  Skin: No rashes, No ecchymoses, No cyanosis  Vascular: Peripheral pulses palpable 2+ bilaterally    LABS:	 	                        13.6   6.86  )-----------( 107      ( 26 Dec 2018 10:09 )             40.2     12-26    136  |  98  |  40<H>  ----------------------------<  178<H>  5.7<H>   |  23  |  1.0    Ca    9.1      26 Dec 2018 10:09    TPro  7.3  /  Alb  4.2  /  TBili  1.1  /  DBili  x   /  AST  33  /  ALT  15  /  AlkPhos  126<H>  12-26    eGFR if Non African American: 71 mL/min/1.73M2 (12-26-18 @ 10:09)      Hemoglobin A1C, Whole Blood: 9.3 % (12-22-18 @ 07:51)    Serum Pro-Brain Natriuretic Peptide: 1659 pg/mL (12-21-18 @ 18:52)      CARDIAC MARKERS:  12-26-18 @ 10:09  TROPONIN-T  0.01 ng/mL  CKMB  --  CREATINE KINASE  --  12-23-18 @ 20:56  TROPONIN-T  0.04 ng/mL  CKMB  6.2 ng/mL  CREATINE KINASE  154 U/L  	    ECG:  < from: 12 Lead ECG (12.26.18 @ 09:10) >  Diagnosis Line *** Suspect arm lead reversal, interpretation assumes no reversal  Atrial fibrillation  Right bundle branch block  Premature ventricular complexes  Abnormal ECG    Confirmed by Allen Brooke ([id number]) on 12/26/2018 10:45:44 AM    < end of copied text >    	    PREVIOUS DIAGNOSTIC TESTING:    [x ] Echocardiogram:    < from: Transthoracic Echocardiogram (04.10.18 @ 12:56) >  Summary:   1. Severely decreased global left ventricular systolic function.   2. LV Ejection Fraction by Hickey's Method with abiplane EF of 30 %.   3. Moderately increased LV wall thickness.   4. There is moderate concentric left ventricular hypertrophy.   5. Moderate mitral annular calcification.   6. Mild aortic regurgitation.   7. Mild calcific arotic stenosis.   8. LA volume Index is 51.0 ml/m² ml/m2.   9. Mitral valve mean gradient is 2.3 mmHg consistent with mild mitral   stenosis.    < end of copied text >    [x ] Stress Test:  	  < from: NM Nuclear Stress Pharmacologic Multiple (12.24.18 @ 12:18) >  IMPRESSION:     1. SMALL IN SIZE AND MILD IN INTENSITY STRESS-INDUCED ISCHEMIA IN THE   INFERIOR WALL OF THE LEFT VENTRICLE WITH PATIENT HAVING A SIGNIFICANT   MOTION AT THE TIME OF ACQUISITION.    2. THERE IS MODERATE IN SIZE AND SEVERE IN INTENSITY FIXED DEFECTS ARE   SEEN INVOLVING APEX, ANTERIOR WALL, AND LATERAL WALL OF THE LEFT   VENTRICLE. MODERATE IN SIZE ANDMILD IN INTENSITY FIXED DEFECT IS SEEN   INVOLVING THE INFERIOR WALL OF THE LEFT VENTRICLE. FINDING MOST LIKELY   RELATED TO OLD MYOCARDIAL INFARCTION.    3. DILATED LEFT VENTRICLE WITH GENERALIZED GLOBAL HYPOKINESIA AND   DECREASED WALL THICKENING MOST MARKED IN THE APEX.    4. LEFT VENTRICULAR EJECTION FRACTION OF  30 % WHICH IS DECREASED, NORMAL   RANGE 50% AND ABOVE.        CB GOLDSTEIN M.D., ATTENDING RADIOLOGIST  This document has been electronically signed. Dec 24 2018 12:34PM    < end of copied text > CHIEF COMPLAINT:Patient is a 78y old  Male who presents with a chief complaint of L arm pain    HISTORY OF PRESENT ILLNESS:   78 year old male with pmhx of CHF s/p AICD, a fib on coumadin, BPH, CAD s/p cardiac stent s/p CABG in 2014, DM, DLD, HTN, COPD, hypothyroidism, c/o pain under his left elbow and mid sternal chest pain that lasted for two hours this morning. Pt was recent d/c on 12/24 for CP. He had a nuclear stress test on 12/24. His last cath was one year ago and did not have any stents placed (follows with Dr. Laughlin). Denies any sob, leg pain/swelling, cough, fever/chills/recent illness. Pt former smoker. Patient also states he has CP at times when eating    PAST MEDICAL & SURGICAL HISTORY:  COPD (chronic obstructive pulmonary disease)  Diabetes mellitus  Hypothyroidism  Hypertension  Dyslipidemia  Benign prostate hyperplasia  Atrial fibrillation  Heart failure: chronic systolic heart failure  Coronary artery disease: s/p CABG 2014  AICD (automatic cardioverter/defibrillator) present  S/P CABG (coronary artery bypass graft)    FAMILY HISTORY:  Family history of coronary artery disease (Father)    Allergies    No Known Allergies    Home Medications:  atorvastatin 40 mg oral tablet: 1 tab(s) orally once a day (at bedtime) (21 Dec 2018 21:53)  Flomax 0.4 mg oral capsule: 1 cap(s) orally once a day (at bedtime) (21 Dec 2018 21:53)  Incruse Ellipta 62.5 mcg/inh inhalation powder: 1 inhaler(s) inhaled once a day (21 Dec 2018 21:53)  Lasix 40 mg oral tablet: 1 tab(s) orally once a day (21 Dec 2018 21:53)  latanoprost 0.005% ophthalmic solution: 1 drop(s) to each affected eye once a day (in the evening) (21 Dec 2018 21:53)  levothyroxine 125 mcg (0.125 mg) oral capsule: 2 cap(s) orally once a day (21 Dec 2018 21:53)  metFORMIN 1000 mg oral tablet: 1 tab(s) orally 2 times a day (21 Dec 2018 21:53)  metoprolol succinate 50 mg oral tablet, extended release: 1 tab(s) orally once a day (21 Dec 2018 21:53)  Toujeo SoloStar 300 units/mL subcutaneous solution: 16 unit(s) subcutaneous once a day (21 Dec 2018 21:53)  Ventolin HFA 90 mcg/inh inhalation aerosol: 2 puff(s) inhaled 4 times a day, As Needed (21 Dec 2018 21:53)    SOCIAL HISTORY:    [  ] active smoker  [x ] non smoker  [  ] Etoh  [no  ] recreational drugs    REVIEW OF SYSTEMS:  14 point ROS negative except as mentioned above in HPI    PHYSICAL EXAM:  T(C): 35.6 (12-26-18 @ 09:13), Max: 35.6 (12-26-18 @ 09:13)  HR: 55 (12-26-18 @ 09:13) (55 - 55)  BP: 141/94 (12-26-18 @ 09:13) (141/94 - 141/94)  RR: 18 (12-26-18 @ 09:13) (18 - 18)  SpO2: 98% (12-26-18 @ 09:13) (98% - 98%)  Wt(kg): --  I&O's Summary      General Appearance: in NAD	  HEENT: NC, No JVD appreciated  Cardiovascular: Normal S1 S2, No murmurs  Respiratory: cta b/l  Gastrointestinal:  Soft, Non-tender, BS +	  Neurologic: No focal deficits, AAOx3  Extremities/MS: ROM wnl, No clubbing/cyanosis/edema  Skin: No rashes, No ecchymoses, No cyanosis  Vascular: Peripheral pulses palpable 2+ bilaterally    LABS:	 	                        13.6   6.86  )-----------( 107      ( 26 Dec 2018 10:09 )             40.2     12-26    136  |  98  |  40<H>  ----------------------------<  178<H>  5.7<H>   |  23  |  1.0    Ca    9.1      26 Dec 2018 10:09    TPro  7.3  /  Alb  4.2  /  TBili  1.1  /  DBili  x   /  AST  33  /  ALT  15  /  AlkPhos  126<H>  12-26    eGFR if Non African American: 71 mL/min/1.73M2 (12-26-18 @ 10:09)      Hemoglobin A1C, Whole Blood: 9.3 % (12-22-18 @ 07:51)    Serum Pro-Brain Natriuretic Peptide: 1659 pg/mL (12-21-18 @ 18:52)      CARDIAC MARKERS:  12-26-18 @ 10:09  TROPONIN-T  0.01 ng/mL  CKMB  --  CREATINE KINASE  --  12-23-18 @ 20:56  TROPONIN-T  0.04 ng/mL  CKMB  6.2 ng/mL  CREATINE KINASE  154 U/L  	    ECG:  < from: 12 Lead ECG (12.26.18 @ 09:10) >  Diagnosis Line *** Suspect arm lead reversal, interpretation assumes no reversal  Atrial fibrillation  Right bundle branch block  Premature ventricular complexes  Abnormal ECG    Confirmed by Allen Brooke ([id number]) on 12/26/2018 10:45:44 AM    < end of copied text >    	    PREVIOUS DIAGNOSTIC TESTING:    [x ] Echocardiogram:    < from: Transthoracic Echocardiogram (04.10.18 @ 12:56) >  Summary:   1. Severely decreased global left ventricular systolic function.   2. LV Ejection Fraction by Hickey's Method with abiplane EF of 30 %.   3. Moderately increased LV wall thickness.   4. There is moderate concentric left ventricular hypertrophy.   5. Moderate mitral annular calcification.   6. Mild aortic regurgitation.   7. Mild calcific arotic stenosis.   8. LA volume Index is 51.0 ml/m² ml/m2.   9. Mitral valve mean gradient is 2.3 mmHg consistent with mild mitral   stenosis.    < end of copied text >    [x ] Stress Test:  	  < from: NM Nuclear Stress Pharmacologic Multiple (12.24.18 @ 12:18) >  IMPRESSION:     1. SMALL IN SIZE AND MILD IN INTENSITY STRESS-INDUCED ISCHEMIA IN THE   INFERIOR WALL OF THE LEFT VENTRICLE WITH PATIENT HAVING A SIGNIFICANT   MOTION AT THE TIME OF ACQUISITION.    2. THERE IS MODERATE IN SIZE AND SEVERE IN INTENSITY FIXED DEFECTS ARE   SEEN INVOLVING APEX, ANTERIOR WALL, AND LATERAL WALL OF THE LEFT   VENTRICLE. MODERATE IN SIZE ANDMILD IN INTENSITY FIXED DEFECT IS SEEN   INVOLVING THE INFERIOR WALL OF THE LEFT VENTRICLE. FINDING MOST LIKELY   RELATED TO OLD MYOCARDIAL INFARCTION.    3. DILATED LEFT VENTRICLE WITH GENERALIZED GLOBAL HYPOKINESIA AND   DECREASED WALL THICKENING MOST MARKED IN THE APEX.    4. LEFT VENTRICULAR EJECTION FRACTION OF  30 % WHICH IS DECREASED, NORMAL   RANGE 50% AND ABOVE.        CB GOLDSTEIN M.D., ATTENDING RADIOLOGIST  This document has been electronically signed. Dec 24 2018 12:34PM    < end of copied text >

## 2025-06-09 NOTE — ED PROVIDER NOTE - CHILD ABUSE FACILITY
